# Patient Record
Sex: MALE | Race: WHITE | NOT HISPANIC OR LATINO | Employment: OTHER | URBAN - METROPOLITAN AREA
[De-identification: names, ages, dates, MRNs, and addresses within clinical notes are randomized per-mention and may not be internally consistent; named-entity substitution may affect disease eponyms.]

---

## 2017-05-10 ENCOUNTER — OFFICE VISIT (OUTPATIENT)
Dept: CARDIOLOGY | Facility: CLINIC | Age: 67
End: 2017-05-10

## 2017-05-10 VITALS
BODY MASS INDEX: 34.2 KG/M2 | SYSTOLIC BLOOD PRESSURE: 98 MMHG | HEIGHT: 66 IN | HEART RATE: 95 BPM | DIASTOLIC BLOOD PRESSURE: 55 MMHG | WEIGHT: 212.8 LBS

## 2017-05-10 DIAGNOSIS — R09.89 LABILE HYPERTENSION: ICD-10-CM

## 2017-05-10 DIAGNOSIS — E66.8 MODERATE OBESITY: Primary | ICD-10-CM

## 2017-05-10 DIAGNOSIS — E11.9 INSULIN DEPENDENT TYPE 2 DIABETES MELLITUS (HCC): ICD-10-CM

## 2017-05-10 DIAGNOSIS — E78.5 DYSLIPIDEMIA: ICD-10-CM

## 2017-05-10 DIAGNOSIS — I11.9 HYPERTENSIVE HEART DISEASE WITHOUT HEART FAILURE: ICD-10-CM

## 2017-05-10 DIAGNOSIS — Z79.4 INSULIN DEPENDENT TYPE 2 DIABETES MELLITUS (HCC): ICD-10-CM

## 2017-05-10 PROCEDURE — 99213 OFFICE O/P EST LOW 20 MIN: CPT | Performed by: INTERNAL MEDICINE

## 2017-05-10 RX ORDER — FENOFIBRATE 160 MG/1
160 TABLET ORAL DAILY
COMMUNITY
End: 2020-02-11 | Stop reason: SDUPTHER

## 2017-05-10 RX ORDER — TRIAMCINOLONE ACETONIDE 55 UG/1
2 SPRAY, METERED NASAL DAILY PRN
COMMUNITY
End: 2019-07-17

## 2017-08-18 RX ORDER — CARVEDILOL 25 MG/1
TABLET ORAL
Qty: 180 TABLET | Refills: 3 | Status: SHIPPED | OUTPATIENT
Start: 2017-08-18 | End: 2018-08-16 | Stop reason: SDUPTHER

## 2017-11-22 ENCOUNTER — OFFICE VISIT (OUTPATIENT)
Dept: CARDIOLOGY | Facility: CLINIC | Age: 67
End: 2017-11-22

## 2017-11-22 VITALS
HEART RATE: 78 BPM | WEIGHT: 211 LBS | HEIGHT: 66 IN | DIASTOLIC BLOOD PRESSURE: 72 MMHG | SYSTOLIC BLOOD PRESSURE: 121 MMHG | BODY MASS INDEX: 33.91 KG/M2

## 2017-11-22 DIAGNOSIS — I11.9 HYPERTENSIVE HEART DISEASE WITHOUT HEART FAILURE: Primary | ICD-10-CM

## 2017-11-22 DIAGNOSIS — R09.89 LABILE HYPERTENSION: ICD-10-CM

## 2017-11-22 DIAGNOSIS — E78.5 DYSLIPIDEMIA: ICD-10-CM

## 2017-11-22 PROCEDURE — 99214 OFFICE O/P EST MOD 30 MIN: CPT | Performed by: INTERNAL MEDICINE

## 2017-11-22 NOTE — PROGRESS NOTES
Subjective:     Encounter Date:11/22/2017    Patient ID: Kar Mora is a 67 y.o.  white male, , from [place], [place].      REFERRING PHYSICIAN/ENDOCRINOLOGIST: Hiren Montoya MD  ALLERGIST: Abhijeet Madrid MD  PHYSICIAN: Bro Kan MD    Chief Complaint:   Chief Complaint   Patient presents with   • labile hypertension     Problem List:  1. Insulin-dependent type 2 diabetes mellitus without apparent end organ damage.  2. Labile hypertension.  3. Dyslipidemia.  4. Erectile dysfunction.  5. Remote nasal polypectomy, 1986.  6. Allergic rhinitis.  7. Probable hypertensive cardiovascular disease:  a. Abnormal EKG with abnormal acceptable echocardiographic GXT, September 2004.   b. Acceptable echocardiographic GXT with preserved exercise capacity and mild LVH, March 2008.   c. Acceptable quantitative SPECT Gated Cardiolite GXT with nominal myocardial perfusion to 88% of predicted exercise capacity and 90% predicted maximum heart rate with preserved LVEF (0.65) with continued medical therapy felt warranted, December 2012.  d. Residual class I symptoms with recent documented abnormal EKG (LBBB/first-degree AV block) with abnormal echocardiogram demonstrating mild left ventricular chamber enlargement with mild reduction in the LVEF (0.42) without PE or pulmonary hypertension.  e. Resident class I symptoms with persistent abnormal echocardiogram (LVEF 0.40), with mild concentric LVH and mild left atrial enlargement without pulmonary arterial hypertension or pericardial effusion, July 2015.  f. Residual class I symptoms with acceptable  echocardiogram (LVEF 0.50) with mild-moderate RVE, mild-moderate LAE, and aortic valve sclerosis with no evidence of pericardial effusion (November 2016).  8. Moderate obesity (BMI 34.1).      Allergies   Allergen Reactions   • Asa [Aspirin]       upper airway congestion         Current Outpatient Prescriptions:   •  atorvastatin (LIPITOR) 20 MG tablet, Daily.,  "Disp: , Rfl: 1  •  B-D ULTRAFINE III SHORT PEN 31G X 8 MM misc, 2 (Two) Times a Day., Disp: , Rfl: 0  •  carvedilol (COREG) 25 MG tablet, take 1 tablet by mouth twice a day, Disp: 180 tablet, Rfl: 3  •  CIALIS 5 MG tablet, As Needed., Disp: , Rfl: 0  •  fenofibrate 160 MG tablet, Take 160 mg by mouth Daily., Disp: , Rfl:   •  glipiZIDE (GLUCOTROL) 10 MG 24 hr tablet, Daily., Disp: , Rfl: 0  •  JANUVIA 100 MG tablet, Daily., Disp: , Rfl: 1  •  Lancets (ONETOUCH ULTRASOFT) lancets, 2 (Two) Times a Day., Disp: , Rfl: 0  •  losartan (COZAAR) 100 MG tablet, Daily., Disp: , Rfl: 1  •  metFORMIN (GLUCOPHAGE) 500 MG tablet, 2 (Two) Times a Day., Disp: , Rfl: 1  •  ONE TOUCH ULTRA TEST test strip, Daily., Disp: , Rfl: 0  •  TOUJEO SOLOSTAR 300 UNIT/ML solution pen-injector, 70 Units Daily., Disp: , Rfl: 1  •  Triamcinolone Acetonide (NASACORT ALLERGY 24HR) 55 MCG/ACT nasal inhaler, 2 sprays into each nostril As Needed for Rhinitis., Disp: , Rfl:   •  INVOKANA 300 MG tablet, Daily., Disp: , Rfl: 1  •  raNITIdine (ZANTAC) 150 MG tablet, Daily., Disp: , Rfl: 0    HISTORY OF PRESENT ILLNESS: Patient returns for scheduled 6-month followup. He denies any chest pressure, shortness of breath, palpitations, edema, or presyncope.  He had labs drawn at his local hospital and he was hoping that they had sent the results to us.  He is not able to sleep well at night.  He started Toujeo 4 months ago.  He frequently gets up to urinate throughout the night.  He has some daytime sleepiness, but also says that he works 60 hours a week.  Occasionally he will feel a \"twinge\" of chest discomfort that only lasts for 1-2 seconds and then is gone. He is interested in a flu vaccination today.  Patient otherwise denies chest pain, shortness of breath, PND, edema, palpitations, syncope or presyncope at this time.      Review of Systems   HENT: Positive for nosebleeds.    Musculoskeletal: Positive for gout.   Genitourinary: Positive for frequency. " "  Neurological: Positive for excessive daytime sleepiness.      Obtained and otherwise negative except as outlined in problem list and HPI.    Procedures       Objective:       Vitals:    11/22/17 1418 11/22/17 1419   BP: 126/72 121/72   BP Location: Left arm Left arm   Patient Position: Sitting Standing   Pulse: 76 78   Weight: 211 lb (95.7 kg)    Height: 66\" (167.6 cm)      Body mass index is 34.06 kg/(m^2).   Last weight:  212 lbs.    Physical Exam   Constitutional: He is oriented to person, place, and time. He appears well-developed and well-nourished.   Neck: No JVD present. Carotid bruit is not present. No thyromegaly present.   Cardiovascular: Regular rhythm, S1 normal and S2 normal.  Exam reveals distant heart sounds. Exam reveals no gallop, no S3 and no friction rub.    No murmur heard.  Pulses:       Dorsalis pedis pulses are 2+ on the right side, and 2+ on the left side.        Posterior tibial pulses are 2+ on the right side, and 2+ on the left side.   Pulmonary/Chest: Effort normal. He has decreased breath sounds. He has no wheezes. He has no rhonchi. He has no rales.   Abdominal: Soft. He exhibits no mass. There is no hepatosplenomegaly. There is no tenderness. There is no guarding.   Bowel sounds audible x4   Musculoskeletal: Normal range of motion. He exhibits no edema.   Lymphadenopathy:     He has no cervical adenopathy.   Neurological: He is alert and oriented to person, place, and time.   Skin: Skin is warm, dry and intact. No rash noted.   Vitals reviewed.        Lab Review: None to review          Assessment:   Overall continued acceptable course with no interim cardiopulmonary complaints with good functional status. We will defer additional diagnostic or therapeutic intervention from a cardiac perspective at this time. When he returns in May 2018 we will repeat his echocardiogram to assess his cardiomyopathy.  Influenza vaccine administered in office today.       Diagnosis Plan   1. " Hypertensive heart disease without heart failure  Stable, Echocardiogram in May 2018    2. Labile hypertension  Controlled    3. Dyslipidemia  No new labs           Plan:         1. Patient to continue current medications and close follow up with the above providers.  2. Tentative cardiology follow up in May 2018 with echocardiogram, or patient may return sooner PRN.  3. Influenza vaccine administered in office today without difficulty or complication in the left deltoid.  4. Fax number is provided so that he can provide us with his lab results.        Scribed for Todd Qureshi MD by EDGAR Carlton. 11/22/2017  2:25 PM    I, Todd Qureshi MD, [facility], personally performed the services described in this documentation as scribed by the above named individual in my presence, and it is both accurate and complete. At 2:42 PM on 11/22/2017

## 2018-05-16 DIAGNOSIS — I10 HYPERTENSION, UNSPECIFIED TYPE: Primary | ICD-10-CM

## 2018-05-16 DIAGNOSIS — I11.9 HYPERTENSIVE HEART DISEASE WITHOUT HEART FAILURE: ICD-10-CM

## 2018-05-25 ENCOUNTER — OFFICE VISIT (OUTPATIENT)
Dept: CARDIOLOGY | Facility: CLINIC | Age: 68
End: 2018-05-25

## 2018-05-25 ENCOUNTER — HOSPITAL ENCOUNTER (OUTPATIENT)
Dept: CARDIOLOGY | Facility: HOSPITAL | Age: 68
Discharge: HOME | End: 2018-05-25
Attending: INTERNAL MEDICINE | Admitting: INTERNAL MEDICINE

## 2018-05-25 VITALS
DIASTOLIC BLOOD PRESSURE: 64 MMHG | BODY MASS INDEX: 33.91 KG/M2 | SYSTOLIC BLOOD PRESSURE: 130 MMHG | HEIGHT: 66 IN | WEIGHT: 211 LBS

## 2018-05-25 VITALS
WEIGHT: 213 LBS | SYSTOLIC BLOOD PRESSURE: 95 MMHG | DIASTOLIC BLOOD PRESSURE: 60 MMHG | BODY MASS INDEX: 34.23 KG/M2 | HEIGHT: 66 IN | HEART RATE: 83 BPM

## 2018-05-25 DIAGNOSIS — E78.5 DYSLIPIDEMIA: ICD-10-CM

## 2018-05-25 DIAGNOSIS — R09.89 LABILE HYPERTENSION: ICD-10-CM

## 2018-05-25 DIAGNOSIS — I11.9 HYPERTENSIVE HEART DISEASE WITHOUT HEART FAILURE: Primary | ICD-10-CM

## 2018-05-25 DIAGNOSIS — E11.9 INSULIN DEPENDENT TYPE 2 DIABETES MELLITUS (HCC): ICD-10-CM

## 2018-05-25 DIAGNOSIS — I10 HYPERTENSION, UNSPECIFIED TYPE: ICD-10-CM

## 2018-05-25 DIAGNOSIS — I44.7 LEFT BUNDLE BRANCH BLOCK: ICD-10-CM

## 2018-05-25 DIAGNOSIS — R07.2 PRECORDIAL PAIN: ICD-10-CM

## 2018-05-25 DIAGNOSIS — Z79.4 INSULIN DEPENDENT TYPE 2 DIABETES MELLITUS (HCC): ICD-10-CM

## 2018-05-25 DIAGNOSIS — I11.9 HYPERTENSIVE HEART DISEASE WITHOUT HEART FAILURE: ICD-10-CM

## 2018-05-25 LAB
BH CV ECHO MEAS - AO MAX PG (FULL): 3.2 MMHG
BH CV ECHO MEAS - AO MAX PG: 7 MMHG
BH CV ECHO MEAS - AO MEAN PG (FULL): 1.9 MMHG
BH CV ECHO MEAS - AO MEAN PG: 4 MMHG
BH CV ECHO MEAS - AO ROOT AREA (BSA CORRECTED): 1.4
BH CV ECHO MEAS - AO ROOT AREA: 6.6 CM^2
BH CV ECHO MEAS - AO ROOT DIAM: 2.9 CM
BH CV ECHO MEAS - AO V2 MAX: 131.7 CM/SEC
BH CV ECHO MEAS - AO V2 MEAN: 93.2 CM/SEC
BH CV ECHO MEAS - AO V2 VTI: 27.9 CM
BH CV ECHO MEAS - AVA(I,A): 2.3 CM^2
BH CV ECHO MEAS - AVA(I,D): 2.3 CM^2
BH CV ECHO MEAS - AVA(V,A): 2.4 CM^2
BH CV ECHO MEAS - AVA(V,D): 2.4 CM^2
BH CV ECHO MEAS - BSA(HAYCOCK): 2.1 M^2
BH CV ECHO MEAS - BSA: 2 M^2
BH CV ECHO MEAS - BZI_BMI: 34.1 KILOGRAMS/M^2
BH CV ECHO MEAS - BZI_METRIC_HEIGHT: 167.6 CM
BH CV ECHO MEAS - BZI_METRIC_WEIGHT: 95.7 KG
BH CV ECHO MEAS - CONTRAST EF (2CH): 57 ML/M^2
BH CV ECHO MEAS - CONTRAST EF 4CH: 37.1 ML/M^2
BH CV ECHO MEAS - EDV(CUBED): 129 ML
BH CV ECHO MEAS - EDV(MOD-SP2): 186 ML
BH CV ECHO MEAS - EDV(MOD-SP4): 213 ML
BH CV ECHO MEAS - EDV(TEICH): 121.2 ML
BH CV ECHO MEAS - EF(CUBED): 56.2 %
BH CV ECHO MEAS - EF(MOD-BP): 37 %
BH CV ECHO MEAS - EF(MOD-SP2): 57 %
BH CV ECHO MEAS - EF(MOD-SP4): 37.1 %
BH CV ECHO MEAS - EF(TEICH): 47.7 %
BH CV ECHO MEAS - ESV(CUBED): 56.5 ML
BH CV ECHO MEAS - ESV(MOD-SP2): 80 ML
BH CV ECHO MEAS - ESV(MOD-SP4): 134 ML
BH CV ECHO MEAS - ESV(TEICH): 63.4 ML
BH CV ECHO MEAS - FS: 24.1 %
BH CV ECHO MEAS - IVS/LVPW: 0.97
BH CV ECHO MEAS - IVSD: 1.4 CM
BH CV ECHO MEAS - LA DIMENSION: 4.4 CM
BH CV ECHO MEAS - LA/AO: 1.5
BH CV ECHO MEAS - LAT PEAK E' VEL: 15.4 CM/SEC
BH CV ECHO MEAS - LV DIASTOLIC VOL/BSA (35-75): 104.1 ML/M^2
BH CV ECHO MEAS - LV MASS(C)D: 266.4 GRAMS
BH CV ECHO MEAS - LV MASS(C)DI: 130.2 GRAMS/M^2
BH CV ECHO MEAS - LV MAX PG: 3.8 MMHG
BH CV ECHO MEAS - LV MEAN PG: 2.1 MMHG
BH CV ECHO MEAS - LV SYSTOLIC VOL/BSA (12-30): 65.5 ML/M^2
BH CV ECHO MEAS - LV V1 MAX: 96.9 CM/SEC
BH CV ECHO MEAS - LV V1 MEAN: 67.2 CM/SEC
BH CV ECHO MEAS - LV V1 VTI: 19.2 CM
BH CV ECHO MEAS - LVIDD: 5.1 CM
BH CV ECHO MEAS - LVIDS: 4.1 CM
BH CV ECHO MEAS - LVLD AP2: 9.6 CM
BH CV ECHO MEAS - LVLD AP4: 9.6 CM
BH CV ECHO MEAS - LVLS AP2: 8.4 CM
BH CV ECHO MEAS - LVLS AP4: 8.8 CM
BH CV ECHO MEAS - LVOT AREA (M): 3.5 CM^2
BH CV ECHO MEAS - LVOT AREA: 3.3 CM^2
BH CV ECHO MEAS - LVOT DIAM: 2.1 CM
BH CV ECHO MEAS - LVPWD: 1.4 CM
BH CV ECHO MEAS - MED PEAK E' VEL: 12.9 CM/SEC
BH CV ECHO MEAS - MV E MAX VEL: 119.7 CM/SEC
BH CV ECHO MEAS - PA ACC SLOPE: 1150 CM/SEC^2
BH CV ECHO MEAS - PA ACC TIME: 0.13 SEC
BH CV ECHO MEAS - PA MAX PG: 20.4 MMHG
BH CV ECHO MEAS - PA PR(ACCEL): 22 MMHG
BH CV ECHO MEAS - PA V2 MAX: 225.9 CM/SEC
BH CV ECHO MEAS - SI(AO): 89.9 ML/M^2
BH CV ECHO MEAS - SI(CUBED): 35.4 ML/M^2
BH CV ECHO MEAS - SI(LVOT): 31.1 ML/M^2
BH CV ECHO MEAS - SI(MOD-SP2): 51.8 ML/M^2
BH CV ECHO MEAS - SI(MOD-SP4): 38.6 ML/M^2
BH CV ECHO MEAS - SI(TEICH): 28.2 ML/M^2
BH CV ECHO MEAS - SV(AO): 184 ML
BH CV ECHO MEAS - SV(CUBED): 72.5 ML
BH CV ECHO MEAS - SV(LVOT): 63.6 ML
BH CV ECHO MEAS - SV(MOD-SP2): 106 ML
BH CV ECHO MEAS - SV(MOD-SP4): 79 ML
BH CV ECHO MEAS - SV(TEICH): 57.8 ML
BH CV ECHO MEAS - TAPSE (>1.6): 2.7 CM2
BH CV ECHO MEASUREMENTS AVERAGE E/E' RATIO: 8.46
BH CV VAS BP RIGHT ARM: NORMAL MMHG
BH CV XLRA - RV BASE: 3.2 CM
BH CV XLRA - RV LENGTH: 7.5 CM
BH CV XLRA - RV MID: 2.6 CM
BH CV XLRA - TDI S': 13.1 CM/SEC
LEFT ATRIUM VOLUME INDEX: 30.2 ML/M2
LV EF 2D ECHO EST: 40 %
MAXIMAL PREDICTED HEART RATE: 154 BPM
STRESS TARGET HR: 131 BPM

## 2018-05-25 PROCEDURE — 93306 TTE W/DOPPLER COMPLETE: CPT

## 2018-05-25 PROCEDURE — 93306 TTE W/DOPPLER COMPLETE: CPT | Performed by: INTERNAL MEDICINE

## 2018-05-25 PROCEDURE — 99214 OFFICE O/P EST MOD 30 MIN: CPT | Performed by: INTERNAL MEDICINE

## 2018-05-25 RX ORDER — NITROGLYCERIN 0.4 MG/1
TABLET SUBLINGUAL
Qty: 25 TABLET | Refills: 6 | Status: SHIPPED | OUTPATIENT
Start: 2018-05-25 | End: 2018-11-29 | Stop reason: SDUPTHER

## 2018-05-25 NOTE — PROGRESS NOTES
Subjective:     Encounter Date:05/25/2018    Patient ID: Kar Mora is a 67 y.o.  white male, , from [place], [place].      REFERRING PHYSICIAN/ENDOCRINOLOGIST: Hiren Montoya MD  ALLERGIST: Abhijeet Madrid MD  PHYSICIAN: Bro Kan MD.    Chief Complaint:   Chief Complaint   Patient presents with   • Hypertension   • Chest Pain     Problem List:  1. Insulin-dependent type 2 diabetes mellitus without apparent end organ damage; hemoglobin A1c 6.4%, November 2017  2. Labile hypertension.  3. Dyslipidemia.  4. Erectile dysfunction.  5. Remote nasal polypectomy, 1986.  6. Allergic rhinitis.  7. Probable hypertensive cardiovascular disease:  a. Abnormal EKG with abnormal acceptable echocardiographic GXT, September 2004.   b. Acceptable echocardiographic GXT with preserved exercise capacity and mild LVH, March 2008.   c. Acceptable quantitative SPECT Gated Cardiolite GXT with nominal myocardial perfusion to 88% of predicted exercise capacity and 90% predicted maximum heart rate with preserved LVEF (0.65) with continued medical therapy felt warranted, December 2012.  d. Residual class I symptoms with recent documented abnormal EKG (LBBB/first-degree AV block) with abnormal echocardiogram demonstrating mild left ventricular chamber enlargement with mild reduction in the LVEF (0.42) without PE or pulmonary hypertension.  e. Resident class I symptoms with persistent abnormal echocardiogram (LVEF 0.40), with mild concentric LVH and mild left atrial enlargement without pulmonary arterial hypertension or pericardial effusion, July 2015.  f. Residual class I symptoms with acceptable  echocardiogram (LVEF 0.50) with mild-moderate RVE, mild-moderate LAE, and aortic valve sclerosis with no evidence of pericardial effusion (November 2016).  g. Preliminary echocardiogram 5/25/18: EF 41-45 percent, mild concentric hypertrophy, LA dilated, aortic valve exhibits sclerosis, mild MR, trace TR, no  "pericardial effusion  8. Moderate obesity (BMI 34.1).    Allergies   Allergen Reactions   • Asa [Aspirin]       upper airway congestion         Current Outpatient Prescriptions:   •  atorvastatin (LIPITOR) 20 MG tablet, Daily., Disp: , Rfl: 1  •  B-D ULTRAFINE III SHORT PEN 31G X 8 MM misc, 2 (Two) Times a Day., Disp: , Rfl: 0  •  carvedilol (COREG) 25 MG tablet, take 1 tablet by mouth twice a day, Disp: 180 tablet, Rfl: 3  •  CIALIS 5 MG tablet, As Needed., Disp: , Rfl: 0  •  Empagliflozin (JARDIANCE) 25 MG tablet, Take  by mouth Daily., Disp: , Rfl:   •  fenofibrate 160 MG tablet, Take 160 mg by mouth Daily., Disp: , Rfl:   •  glipiZIDE (GLUCOTROL) 10 MG 24 hr tablet, Daily., Disp: , Rfl: 0  •  JANUVIA 100 MG tablet, Daily., Disp: , Rfl: 1  •  Lancets (ONETOUCH ULTRASOFT) lancets, 2 (Two) Times a Day., Disp: , Rfl: 0  •  losartan (COZAAR) 100 MG tablet, Daily., Disp: , Rfl: 1  •  metFORMIN (GLUCOPHAGE) 500 MG tablet, 2 (Two) Times a Day., Disp: , Rfl: 1  •  ONE TOUCH ULTRA TEST test strip, Daily., Disp: , Rfl: 0  •  raNITIdine (ZANTAC) 150 MG tablet, Daily., Disp: , Rfl: 0  •  TOUJEO SOLOSTAR 300 UNIT/ML solution pen-injector, 70 Units Daily., Disp: , Rfl: 1  •  Triamcinolone Acetonide (NASACORT ALLERGY 24HR) 55 MCG/ACT nasal inhaler, 2 sprays into each nostril As Needed for Rhinitis., Disp: , Rfl:     HISTORY OF PRESENT ILLNESS:  Patient is here for a 6 month follow-up.  He had an echocardiogram today before this appointment to assess his cardiomyopathy.  Preliminary results were abnormal but acceptable, EF 0.41-0.45, with previous echocardiogram demonstrating EF of 0.50 in 2016.  He complains of some chest pain today and says there is \"really no pattern\"; it can happen with rest or with walking up steps.  It usually happens in the evening, but he wonders if he just does not notice it during the day when he is working.  Left heart catheterization is discussed with him, including the process from coming to the " "hospital to going home.  The patient does not have nitroglycerin at home.  He feels his chest pain syndrome is becoming more progressive and recurrent, although not disabling at this time, and he has had no prolonged rest or nocturnal chest discomfort.  He is able to do his work activities without major restriction.  Patient otherwise denies prolonged chest pain, shortness of breath, PND, edema, palpitations, syncope or presyncope at this time.      Review of Systems   Cardiovascular: Positive for chest pain.   Respiratory: Positive for sleep disturbances due to breathing and snoring.    Musculoskeletal: Positive for gout.   Allergic/Immunologic: Positive for environmental allergies.        Food allergies      Obtained and otherwise negative except as outlined in problem list and HPI.    Procedures       Objective:       Vitals:    05/25/18 1502 05/25/18 1503   BP: 109/72 95/60   BP Location: Left arm Left arm   Patient Position: Sitting Standing   Pulse: 79 83   Weight: 96.6 kg (213 lb)    Height: 167.6 cm (66\")      Body mass index is 34.38 kg/m².  Last weight November 2017 was 211 pounds    Physical Exam   Constitutional: He is oriented to person, place, and time. He appears well-developed and well-nourished.   Neck: No JVD present. Carotid bruit is not present. No thyromegaly present.   Cardiovascular: Regular rhythm, S1 normal and S2 normal.  Exam reveals no gallop, no S3 and no friction rub.    Murmur heard.   Medium-pitched early systolic murmur is present with a grade of 2/6  at the lower left sternal border  Pulses:       Carotid pulses are 1+ on the right side, and 1+ on the left side.       Radial pulses are 1+ on the right side, and 1+ on the left side.        Femoral pulses are 1+ on the right side, and 1+ on the left side.       Popliteal pulses are 1+ on the right side, and 1+ on the left side.        Dorsalis pedis pulses are 1+ on the right side, and 1+ on the left side.        Posterior tibial " pulses are 1+ on the right side, and 1+ on the left side.   Pulmonary/Chest: Effort normal. He has decreased breath sounds. He has no wheezes. He has no rhonchi. He has no rales.   Abdominal: Soft. He exhibits no mass. There is no hepatosplenomegaly. There is no tenderness. There is no guarding.   Bowel sounds audible x4   Musculoskeletal: Normal range of motion. He exhibits no edema.   Lymphadenopathy:     He has no cervical adenopathy.   Neurological: He is alert and oriented to person, place, and time.   Skin: Skin is warm, dry and intact. No rash noted.   Vitals reviewed.        Lab Review:   11/10/17: (Reviewed per physician letter)  · CMP - normal electrolytes, liver function normal, creatinine 1.6, BUN 34, glucose 126, GFR 45  · Hemoglobin A1c - 6.4%  · TSH - mildly elevated  · FLP - total cholesterol 198, triglycerides 462, HDL 24,  with suggestion to continue fenofibrate, restrict carbohydrate intake, remain physically active and alter atorvastatin to 40 mg daily, add omega-3 Fish oil supplements with Vascepa 1 g twice a day bid      Assessment:   Overall continued acceptable course with no interim cardiopulmonary complaints with acceptable functional status. We will defer additional diagnostic or therapeutic intervention from a cardiac perspective at this time other than to add prn nitroglycerin and consider diagnostic coronary angiography to more definitively exclude obstructive or vasospastic coronary artery disease. We will review the echocardiogram results when available and send the patient a report.  We discussed left heart catheterization procedure, risks, and potential complications with the patient.       Diagnosis Plan   1. Hypertensive heart disease without heart failure  Case Request Cath Lab: Left Heart Cath   2. Labile hypertension  Continue current treatment   3. Dyslipidemia  Recommendations as outlined above   4. Insulin dependent type 2 diabetes mellitus  May be a candidate for  Ozempic   5. Precordial pain  Left heart catheterization recommended   6. Left bundle branch block  Continue current treatment          Plan:         1. Patient to continue current medications and close follow up with the above providers.  2. Left heart catheterization +/- catheter based intervention is recommended.  3. Initiate nitroglycerin 0.4 mg sublingual prn chest pain  4. Tentative cardiology follow up in October 2018 or patient may return sooner PRN.    Partially scribed for Todd Qureshi MD by Madhavi Stout, APRN. 5/25/2018  3:48 PM    Partially transcribed by Renetta Amor for Dr. Todd Qureshi at 3:08 PM on 05/25/2018    I, [facility] Q[facility]hi MD, [facility], personally performed the services described in this documentation as scribed by the above named individual in my presence, and it is both accurate and complete. At 3:49 PM on 05/25/2018

## 2018-07-05 ENCOUNTER — PREP FOR SURGERY (OUTPATIENT)
Dept: OTHER | Facility: HOSPITAL | Age: 68
End: 2018-07-05

## 2018-07-05 DIAGNOSIS — I20.9 ANGINA PECTORIS (HCC): Primary | ICD-10-CM

## 2018-07-05 PROBLEM — I11.9 HYPERTENSIVE HEART DISEASE WITHOUT HEART FAILURE: Status: ACTIVE | Noted: 2018-07-05

## 2018-07-05 RX ORDER — SODIUM CHLORIDE 0.9 % (FLUSH) 0.9 %
1-10 SYRINGE (ML) INJECTION AS NEEDED
Status: CANCELLED | OUTPATIENT
Start: 2018-07-05

## 2018-07-05 RX ORDER — ASPIRIN 325 MG
325 TABLET, DELAYED RELEASE (ENTERIC COATED) ORAL DAILY
Status: CANCELLED | OUTPATIENT
Start: 2018-07-06

## 2018-07-05 RX ORDER — ONDANSETRON 2 MG/ML
4 INJECTION INTRAMUSCULAR; INTRAVENOUS EVERY 6 HOURS PRN
Status: CANCELLED | OUTPATIENT
Start: 2018-07-05

## 2018-07-05 RX ORDER — ASPIRIN 325 MG
325 TABLET ORAL ONCE
Status: CANCELLED | OUTPATIENT
Start: 2018-07-05 | End: 2018-07-05

## 2018-07-05 RX ORDER — ACETAMINOPHEN 325 MG/1
650 TABLET ORAL EVERY 4 HOURS PRN
Status: CANCELLED | OUTPATIENT
Start: 2018-07-05

## 2018-07-05 RX ORDER — NITROGLYCERIN 0.4 MG/1
0.4 TABLET SUBLINGUAL
Status: CANCELLED | OUTPATIENT
Start: 2018-07-05

## 2018-07-19 ENCOUNTER — APPOINTMENT (OUTPATIENT)
Dept: PREADMISSION TESTING | Facility: HOSPITAL | Age: 68
End: 2018-07-19

## 2018-07-19 DIAGNOSIS — I20.9 ANGINA PECTORIS (HCC): ICD-10-CM

## 2018-07-19 LAB
ALBUMIN SERPL-MCNC: 4.42 G/DL (ref 3.2–4.8)
ALBUMIN/GLOB SERPL: 1.9 G/DL (ref 1.5–2.5)
ALP SERPL-CCNC: 39 U/L (ref 25–100)
ALT SERPL W P-5'-P-CCNC: 31 U/L (ref 7–40)
ANION GAP SERPL CALCULATED.3IONS-SCNC: 10 MMOL/L (ref 3–11)
AST SERPL-CCNC: 34 U/L (ref 0–33)
BASOPHILS # BLD AUTO: 0.02 10*3/MM3 (ref 0–0.2)
BASOPHILS NFR BLD AUTO: 0.4 % (ref 0–1)
BILIRUB SERPL-MCNC: 0.4 MG/DL (ref 0.3–1.2)
BUN BLD-MCNC: 30 MG/DL (ref 9–23)
BUN/CREAT SERPL: 17.9 (ref 7–25)
CALCIUM SPEC-SCNC: 9.1 MG/DL (ref 8.7–10.4)
CHLORIDE SERPL-SCNC: 107 MMOL/L (ref 99–109)
CO2 SERPL-SCNC: 24 MMOL/L (ref 20–31)
CREAT BLD-MCNC: 1.68 MG/DL (ref 0.6–1.3)
DEPRECATED RDW RBC AUTO: 44.5 FL (ref 37–54)
EOSINOPHIL # BLD AUTO: 0.12 10*3/MM3 (ref 0–0.3)
EOSINOPHIL NFR BLD AUTO: 2.6 % (ref 0–3)
ERYTHROCYTE [DISTWIDTH] IN BLOOD BY AUTOMATED COUNT: 13.7 % (ref 11.3–14.5)
GFR SERPL CREATININE-BSD FRML MDRD: 41 ML/MIN/1.73
GLOBULIN UR ELPH-MCNC: 2.4 GM/DL
GLUCOSE BLD-MCNC: 176 MG/DL (ref 70–100)
HBA1C MFR BLD: 8 % (ref 4.8–5.6)
HCT VFR BLD AUTO: 38.2 % (ref 38.9–50.9)
HGB BLD-MCNC: 12.5 G/DL (ref 13.1–17.5)
IMM GRANULOCYTES # BLD: 0.03 10*3/MM3 (ref 0–0.03)
IMM GRANULOCYTES NFR BLD: 0.7 % (ref 0–0.6)
LYMPHOCYTES # BLD AUTO: 1.26 10*3/MM3 (ref 0.6–4.8)
LYMPHOCYTES NFR BLD AUTO: 27.4 % (ref 24–44)
MCH RBC QN AUTO: 29 PG (ref 27–31)
MCHC RBC AUTO-ENTMCNC: 32.7 G/DL (ref 32–36)
MCV RBC AUTO: 88.6 FL (ref 80–99)
MONOCYTES # BLD AUTO: 0.65 10*3/MM3 (ref 0–1)
MONOCYTES NFR BLD AUTO: 14.1 % (ref 0–12)
NEUTROPHILS # BLD AUTO: 2.52 10*3/MM3 (ref 1.5–8.3)
NEUTROPHILS NFR BLD AUTO: 54.8 % (ref 41–71)
PLATELET # BLD AUTO: 188 10*3/MM3 (ref 150–450)
PMV BLD AUTO: 10.4 FL (ref 6–12)
POTASSIUM BLD-SCNC: 4.1 MMOL/L (ref 3.5–5.5)
PROT SERPL-MCNC: 6.8 G/DL (ref 5.7–8.2)
RBC # BLD AUTO: 4.31 10*6/MM3 (ref 4.2–5.76)
SODIUM BLD-SCNC: 141 MMOL/L (ref 132–146)
WBC NRBC COR # BLD: 4.6 10*3/MM3 (ref 3.5–10.8)

## 2018-07-19 PROCEDURE — 83036 HEMOGLOBIN GLYCOSYLATED A1C: CPT | Performed by: PHYSICIAN ASSISTANT

## 2018-07-19 PROCEDURE — 80053 COMPREHEN METABOLIC PANEL: CPT | Performed by: PHYSICIAN ASSISTANT

## 2018-07-19 PROCEDURE — 36415 COLL VENOUS BLD VENIPUNCTURE: CPT

## 2018-07-19 PROCEDURE — 85025 COMPLETE CBC W/AUTO DIFF WBC: CPT | Performed by: PHYSICIAN ASSISTANT

## 2018-07-19 PROCEDURE — 93010 ELECTROCARDIOGRAM REPORT: CPT | Performed by: INTERNAL MEDICINE

## 2018-07-19 PROCEDURE — 93005 ELECTROCARDIOGRAM TRACING: CPT

## 2018-07-19 NOTE — DISCHARGE INSTRUCTIONS
"Dear Patient,    Drink plenty of fluids the day before to ensure you are well hydrated, unless otherwise directed by your physician.    Do NOT eat after midnight the night before your procedure.   You may have clear liquids only up to three hours before your scheduled arrival time (Water is best, but clear liquids can also include coffee without cream or milk, fruit juice without pulp, clear broth, and clear gelatin)    We encourage you to drink 8 ounces of water three to four hours before your scheduled arrival time.    Take your medications as instructed by your doctor.    Following your procedure, be sure to drink plenty of fluids to continue flushing the kidneys.   Benefits of hydrating before and after your procedure include:    -improved hydration helps prevent potential harm to the kidneys    by flushing the contrast/dye used during your procedure    -Lower post-procedure complications    -Improved patient comfort     Do NOT smoke after midnight the night before your procedure.    Glasses and jewelry may be worn, but dentures must be removed prior to your procedure.    Leave any items you consider valuable at home.      MORNING of your Procedure, please bring the following:     -Photo ID and insurance card(s)    -ALL medications in their ORIGINAL CONTAINERS    -Co-pay and/or deductible required by your insurance   -Copy of living will or power of  document (if not brought to    Pre-Admission Testing department)   -CPAP mask and tubing, not your machine (if applicable)    -Relaxation aids (music, books, magazines)    Family members may wait in [facility] waiting area during procedure.    Need to make arrangements for transportation prior to discharge.    A handout regarding \"Heart Healthy Eating\" was provided today to encourage healthy eating habits.    Booklet published by [employer] was given in Pre-Admission testing.  This booklet is for informational purposes only.  If you have any questions about your " procedure, please speak with your physician.

## 2018-07-20 ENCOUNTER — HOSPITAL ENCOUNTER (OUTPATIENT)
Facility: HOSPITAL | Age: 68
Discharge: HOME | End: 2018-07-20
Attending: INTERNAL MEDICINE | Admitting: INTERNAL MEDICINE

## 2018-07-20 VITALS
SYSTOLIC BLOOD PRESSURE: 120 MMHG | RESPIRATION RATE: 16 BRPM | TEMPERATURE: 97.8 F | HEIGHT: 66 IN | OXYGEN SATURATION: 97 % | WEIGHT: 209 LBS | DIASTOLIC BLOOD PRESSURE: 68 MMHG | HEART RATE: 67 BPM | BODY MASS INDEX: 33.59 KG/M2

## 2018-07-20 DIAGNOSIS — I11.9 HYPERTENSIVE HEART DISEASE WITHOUT HEART FAILURE: ICD-10-CM

## 2018-07-20 LAB
ARTICHOKE IGE QN: 91 MG/DL (ref 0–130)
CHOLEST SERPL-MCNC: 169 MG/DL (ref 0–200)
GLUCOSE BLDC GLUCOMTR-MCNC: 110 MG/DL (ref 70–130)
GLUCOSE BLDC GLUCOMTR-MCNC: 140 MG/DL (ref 70–130)
HDLC SERPL-MCNC: 27 MG/DL (ref 40–60)
TRIGL SERPL-MCNC: 385 MG/DL (ref 0–150)

## 2018-07-20 PROCEDURE — 36415 COLL VENOUS BLD VENIPUNCTURE: CPT

## 2018-07-20 PROCEDURE — 93458 L HRT ARTERY/VENTRICLE ANGIO: CPT | Performed by: INTERNAL MEDICINE

## 2018-07-20 PROCEDURE — 25010000002 FENTANYL CITRATE (PF) 100 MCG/2ML SOLUTION: Performed by: INTERNAL MEDICINE

## 2018-07-20 PROCEDURE — C1769 GUIDE WIRE: HCPCS | Performed by: INTERNAL MEDICINE

## 2018-07-20 PROCEDURE — 80061 LIPID PANEL: CPT | Performed by: PHYSICIAN ASSISTANT

## 2018-07-20 PROCEDURE — C1894 INTRO/SHEATH, NON-LASER: HCPCS | Performed by: INTERNAL MEDICINE

## 2018-07-20 PROCEDURE — 0 IOPAMIDOL PER 1 ML: Performed by: INTERNAL MEDICINE

## 2018-07-20 PROCEDURE — 25010000002 MIDAZOLAM PER 1 MG: Performed by: INTERNAL MEDICINE

## 2018-07-20 PROCEDURE — 25010000002 PHENYLEPHRINE PER 1 ML: Performed by: INTERNAL MEDICINE

## 2018-07-20 PROCEDURE — 25010000002 HEPARIN (PORCINE) PER 1000 UNITS: Performed by: INTERNAL MEDICINE

## 2018-07-20 PROCEDURE — 82962 GLUCOSE BLOOD TEST: CPT

## 2018-07-20 PROCEDURE — G0108 DIAB MANAGE TRN  PER INDIV: HCPCS

## 2018-07-20 RX ORDER — ACETAMINOPHEN 325 MG/1
650 TABLET ORAL EVERY 4 HOURS PRN
Status: DISCONTINUED | OUTPATIENT
Start: 2018-07-20 | End: 2018-07-20 | Stop reason: HOSPADM

## 2018-07-20 RX ORDER — LIDOCAINE HYDROCHLORIDE 10 MG/ML
INJECTION, SOLUTION EPIDURAL; INFILTRATION; INTRACAUDAL; PERINEURAL AS NEEDED
Status: DISCONTINUED | OUTPATIENT
Start: 2018-07-20 | End: 2018-07-20 | Stop reason: HOSPADM

## 2018-07-20 RX ORDER — ONDANSETRON 2 MG/ML
4 INJECTION INTRAMUSCULAR; INTRAVENOUS EVERY 6 HOURS PRN
Status: DISCONTINUED | OUTPATIENT
Start: 2018-07-20 | End: 2018-07-20 | Stop reason: HOSPADM

## 2018-07-20 RX ORDER — SODIUM CHLORIDE 0.9 % (FLUSH) 0.9 %
1-10 SYRINGE (ML) INJECTION AS NEEDED
Status: DISCONTINUED | OUTPATIENT
Start: 2018-07-20 | End: 2018-07-20 | Stop reason: HOSPADM

## 2018-07-20 RX ORDER — ASPIRIN 325 MG
325 TABLET ORAL ONCE
Status: DISCONTINUED | OUTPATIENT
Start: 2018-07-20 | End: 2018-07-20 | Stop reason: HOSPADM

## 2018-07-20 RX ORDER — FENTANYL CITRATE 50 UG/ML
INJECTION, SOLUTION INTRAMUSCULAR; INTRAVENOUS AS NEEDED
Status: DISCONTINUED | OUTPATIENT
Start: 2018-07-20 | End: 2018-07-20 | Stop reason: HOSPADM

## 2018-07-20 RX ORDER — MIDAZOLAM HYDROCHLORIDE 1 MG/ML
INJECTION INTRAMUSCULAR; INTRAVENOUS AS NEEDED
Status: DISCONTINUED | OUTPATIENT
Start: 2018-07-20 | End: 2018-07-20 | Stop reason: HOSPADM

## 2018-07-20 RX ORDER — SODIUM CHLORIDE 9 MG/ML
1-3 INJECTION, SOLUTION INTRAVENOUS CONTINUOUS
Status: DISCONTINUED | OUTPATIENT
Start: 2018-07-20 | End: 2018-07-20 | Stop reason: HOSPADM

## 2018-07-20 RX ORDER — ASPIRIN 325 MG
325 TABLET, DELAYED RELEASE (ENTERIC COATED) ORAL DAILY
Status: DISCONTINUED | OUTPATIENT
Start: 2018-07-21 | End: 2018-07-20 | Stop reason: HOSPADM

## 2018-07-20 RX ORDER — NITROGLYCERIN 0.4 MG/1
0.4 TABLET SUBLINGUAL
Status: DISCONTINUED | OUTPATIENT
Start: 2018-07-20 | End: 2018-07-20 | Stop reason: HOSPADM

## 2018-07-20 RX ADMIN — SODIUM CHLORIDE 1 ML/KG/HR: 9 INJECTION, SOLUTION INTRAVENOUS at 09:17

## 2018-07-20 NOTE — CONSULTS
"Diabetes Education  Assessment/Teaching    Patient Name:  Kar Mora  YOB: 1950  MRN: [id number]  Admit Date:  7/20/2018      Assessment Date:  7/20/2018    Most Recent Value   General Information    Referral From:  A1c   Height  167.6 cm (66\")   Height Method  Stated   Weight  94.8 kg (208 lb 15.9 oz)   Weight Method  Standing scale   Are you currently involved in an activity/exercise program?   Yes   Describe physical activity  Outside/yard work. Maintains activity    Do you have high blood pressure?  yes   Are you currently being treated for high blood pressure?  yes   Pregnancy Assessment   Diabetes History   What type of diabetes do you have?  Type 2   Length of Diabetes Diagnosis  10 + years   Current DM knowledge  good [Followed by Dr. Hiren Montoya]   Have you had diabetes education/teaching in the past?  yes   When and where was your diabetes education?  Dr. Hiren Montoya office   Do you test your blood sugar at home?  yes   Frequency of checks  every morning    Meter type  One Touch    Who performs the test?  self    Typical readings  180's    Have you had low blood sugar? (<70mg/dl)  no   Have you had high blood sugar? (>140mg/dl)  yes   How often do you have high blood sugar?  frequently   How would you rate your diabetes control?  good   Do you have any diabetes complications?  circulation problems, heart disease, neuropathy   Education Preferences   What areas of diabetes would you like to learn about?  avoiding high blood sugar, diabetes complications, testing my blood sugar at home   Nutrition Information   Are you currently following a special meal plan?  occasionally [occasional carb counting ]   Assessment Topics   Healthy Eating - Assessment  Needs education   Being Active - Assessment  Competent   Taking Medication - Assessment  Competent   Problem Solving - Assessment  Competent   Reducing Risk - Assessment  Needs education   Healthy Coping - Assessment  Needs education " "  Monitoring - Assessment  Needs education   DM Goals            Most Recent Value   DM Education Needs   Meter  Has own   Meter Type  One Touch   Frequency of Testing  AC   Medication  Oral, Insulin   Problem Solving  Hyperglycemia, Sick days, Signs, Symptoms, Treatment   Reducing Risks  A1C testing   Physical Activity Frequency  Discussed exercise importance   Healthy Coping  Anxious [anxious after cath lab results]   Discharge Plan  Home   Motivation  Engaged   Teaching Method  Explanation, Handouts, Discussion   Patient Response  Verbalized understanding            Pt and spouse gave permission to be seen by diabetes education and education completed. Pt given the [facility] \"what is diabetes\", \"whats my A1C\" education handouts and a goal sheet. All hand outs reviewed with pt/wife.  Pt states he tests at home using a One Touch glucometer and feels confident with the skill. Pt instructed on recommended test times as well as goals per ADA. Pt advised to follow up after discharge as scheduled with Dr. Montoya.  Pt also advised to call us with any other questions or concerns.        Electronically signed by:  Indu Holland RN  07/20/18 1:04 PM  "

## 2018-07-20 NOTE — H&P
[facility] AT [address], [address]  [place], [place], [address] [phone]  WWW.[facility] HospitalQVOD TechnologyF[facility] [place]           HISTORY & PHYSICAL    Patient Care Team:  Patient Care Team:  Bro Kan MD as PCP - General    CHIEF COMPLAINT: Chest Pain           HPI:    Kar Mora is a 67 y.o. male.  History of Present Illness    The patient has a history of hypertensive heart disease, hypertension, diabetes mellitus, and dyslipidemia.    Since the patient was last seen by Dr. Qureshi in May his chest pain has improved.  He reports that his last episode occurred at the end of June.  His chest pain was intermittent, would occur with or without exertion, and was most prominent at night. He could not recall when he first started noticing the chest pain, but reports it was several months ago. He denies any current chest pain, shortness of breath, lower extremity edema, palpitations, orthopnea, PND, lightheadedness, or syncope.    PFSH:  Problem List/PMH  1. Insulin-dependent type 2 diabetes mellitus without apparent end organ damage; hemoglobin A1c 6.4%, November 2017  2. Labile hypertension.  3. Dyslipidemia.  4. Erectile dysfunction.  5. Remote nasal polypectomy, 1986.  6. Allergic rhinitis.  7. Probable hypertensive cardiovascular disease:  a. Abnormal EKG with abnormal acceptable echocardiographic GXT, September 2004.   b. Acceptable echocardiographic GXT with preserved exercise capacity and mild LVH, March 2008.   c. Acceptable quantitative SPECT Gated Cardiolite GXT with nominal myocardial perfusion to 88% of predicted exercise capacity and 90% predicted maximum heart rate with preserved LVEF (0.65) with continued medical therapy felt warranted, December 2012.  d. Residual class I symptoms with recent documented abnormal EKG (LBBB/first-degree AV block) with abnormal echocardiogram demonstrating mild left ventricular chamber enlargement with mild reduction in the LVEF  (0.42) without PE or pulmonary hypertension.  e. Resident class I symptoms with persistent abnormal echocardiogram (LVEF 0.40), with mild concentric LVH and mild left atrial enlargement without pulmonary arterial hypertension or pericardial effusion, July 2015.  f. Residual class I symptoms with acceptable  echocardiogram (LVEF 0.50) with mild-moderate RVE, mild-moderate LAE, and aortic valve sclerosis with no evidence of pericardial effusion (November 2016).  g. Preliminary echocardiogram 5/25/18: EF 41-45 percent, mild concentric hypertrophy, LA dilated, aortic valve exhibits sclerosis, mild MR, trace TR, no pericardial effusion  8. Moderate obesity (BMI 34.1).    Patient Active Problem List   Diagnosis   • Insulin dependent type 2 diabetes mellitus (CMS/[facility])   • Labile hypertension   • Dyslipidemia   • Erectile dysfunction   • Allergic rhinitis   • Hypertensive cardiovascular disease   • Moderate obesity   • Precordial pain   • Left bundle branch block   • Hypertensive heart disease without heart failure       No current facility-administered medications on file prior to encounter.      Current Outpatient Prescriptions on File Prior to Encounter   Medication Sig Dispense Refill   • atorvastatin (LIPITOR) 20 MG tablet 20 mg Every Night.  1   • carvedilol (COREG) 25 MG tablet take 1 tablet by mouth twice a day 180 tablet 3   • CIALIS 5 MG tablet Take 5 mg by mouth As Needed for erectile dysfunction.  0   • Empagliflozin (JARDIANCE) 25 MG tablet Take 25 mg by mouth Daily.     • fenofibrate 160 MG tablet Take 160 mg by mouth Daily.     • glipiZIDE (GLUCOTROL) 10 MG 24 hr tablet Take 10 mg by mouth Every Night.  0   • JANUVIA 100 MG tablet Take 100 mg by mouth Daily.  1   • losartan (COZAAR) 100 MG tablet Take 100 mg by mouth Every Night.  1   • metFORMIN (GLUCOPHAGE) 500 MG tablet Take 1,000 mg by mouth 2 (Two) Times a Day.  1   • nitroglycerin (NITROSTAT) 0.4 MG SL tablet 1 under the tongue as needed for angina, may  repeat q5mins for up three doses (Patient taking differently: Place 0.4 mg under the tongue Every 5 (Five) Minutes As Needed. 1 under the tongue as needed for angina, may repeat q5mins for up three doses) 25 tablet 6   • raNITIdine (ZANTAC) 150 MG tablet Take 150 mg by mouth 2 (Two) Times a Day.  0   • TOUJEO SOLOSTAR 300 UNIT/ML solution pen-injector 70 Units Every Night.  1   • Triamcinolone Acetonide (NASACORT ALLERGY 24HR) 55 MCG/ACT nasal inhaler 2 sprays into each nostril Daily As Needed for Rhinitis.       Allergies   Allergen Reactions   • Asa [Aspirin] Other (See Comments)      upper airway congestion       Social History     Social History   • Marital status:      Social History Main Topics   • Smoking status: Former Smoker     Packs/day: 2.00     Years: 10.00     Types: Cigarettes     Quit date: 1981   • Smokeless tobacco: Never Used      Comment: quit 35 years ago   • Alcohol use No   • Drug use: No   • Sexual activity: Defer     Other Topics Concern   • Not on file     Family History   Problem Relation Age of Onset   • Diabetes Mother    • Heart disease Mother    • Arthritis Mother    • Heart disease Father    • Arthritis Father    • Diabetes Sister        Review of Systems:  Negative for chest pain, shortness of breath, lower extremity edema, palpitations, lightheadedness, syncope, orthopnea, or PND.  All other systems reviewed are negative.         Objective:     Vital Sign Min/Max for last 24 hours  Temp  Min: 97.8 °F (36.6 °C)  Max: 97.8 °F (36.6 °C)   BP  Min: 101/70  Max: 101/70   Pulse  Min: 73  Max: 73   Resp  Min: 16  Max: 16   SpO2  Min: 94 %  Max: 94 %   No Data Recorded    No intake or output data in the 24 hours ending 07/20/18 0907        Vitals:    07/20/18 0811   BP: 101/70   Pulse: 73   Resp: 16   Temp: 97.8 °F (36.6 °C)   SpO2: 94%       CONSTITUTIONAL: Well-nourished. In no acute distress.   SKIN: Warm and dry. No rashes noted  HEENT: Head is normocephalic and atraumatic.  Mucous membranes are pink and moist.   NECK: Supple without masses or thyromegaly. There is no jugular venous distention at 30°.  LUNGS: Normal effort. Clear to auscultation bilaterally without wheezing, rhonchi, or rales noted.   CARDIOVASCULAR: The heart has a regular rate with a normal S1 and S2. There is no gallop, rub, or click appreciated. Murmur present.  PERIPHERAL VASCULAR: Carotid upstroke is 2+ bilaterally and without bruits. Radial pulses are 2+ bilaterally. Posterior tibial pulses are 2+ and symmetrical. There is no lower extremity edema. Bilateral Rafat's test acceptable.  ABDOMEN: Soft with no tenderness with palpitation. No hepatosplenomegaly  MUSCULOSKELETAL:  No digital cyanosis  NEUROLOGICAL: Nonfocal.  PSYCHIATRIC: Alert, orientated x 3, appropriate affect     Labs:  No results found for: CKTOTAL, CKMB, CKMBINDEX, TROPONINI, TROPONINT    Glucose   Date Value Ref Range Status   07/19/2018 176 (H) 70 - 100 mg/dL Final     BUN   Date Value Ref Range Status   07/19/2018 30 (H) 9 - 23 mg/dL Final     Creatinine   Date Value Ref Range Status   07/19/2018 1.68 (H) 0.60 - 1.30 mg/dL Final     Sodium   Date Value Ref Range Status   07/19/2018 141 132 - 146 mmol/L Final     Potassium   Date Value Ref Range Status   07/19/2018 4.1 3.5 - 5.5 mmol/L Final     Chloride   Date Value Ref Range Status   07/19/2018 107 99 - 109 mmol/L Final     CO2   Date Value Ref Range Status   07/19/2018 24.0 20.0 - 31.0 mmol/L Final     Calcium   Date Value Ref Range Status   07/19/2018 9.1 8.7 - 10.4 mg/dL Final     Total Protein   Date Value Ref Range Status   07/19/2018 6.8 5.7 - 8.2 g/dL Final     Albumin   Date Value Ref Range Status   07/19/2018 4.42 3.20 - 4.80 g/dL Final     ALT (SGPT)   Date Value Ref Range Status   07/19/2018 31 7 - 40 U/L Final     AST (SGOT)   Date Value Ref Range Status   07/19/2018 34 (H) 0 - 33 U/L Final     Alkaline Phosphatase   Date Value Ref Range Status   07/19/2018 39 25 - 100 U/L Final      Total Bilirubin   Date Value Ref Range Status   07/19/2018 0.4 0.3 - 1.2 mg/dL Final     eGFR Non  Amer   Date Value Ref Range Status   07/19/2018 41 (L) >60 mL/min/1.73 Final     BUN/Creatinine Ratio   Date Value Ref Range Status   07/19/2018 17.9 7.0 - 25.0 Final     Anion Gap   Date Value Ref Range Status   07/19/2018 10.0 3.0 - 11.0 mmol/L Final       No results found for: CHOL  No results found for: TRIG  No results found for: HDL  No components found for: LDLCALC  No results found for: VLDL  No results found for: LDLHDL    WBC   Date Value Ref Range Status   07/19/2018 4.60 3.50 - 10.80 10*3/mm3 Final     RBC   Date Value Ref Range Status   07/19/2018 4.31 4.20 - 5.76 10*6/mm3 Final     Hemoglobin   Date Value Ref Range Status   07/19/2018 12.5 (L) 13.1 - 17.5 g/dL Final     Hematocrit   Date Value Ref Range Status   07/19/2018 38.2 (L) 38.9 - 50.9 % Final     MCV   Date Value Ref Range Status   07/19/2018 88.6 80.0 - 99.0 fL Final     MCH   Date Value Ref Range Status   07/19/2018 29.0 27.0 - 31.0 pg Final     MCHC   Date Value Ref Range Status   07/19/2018 32.7 32.0 - 36.0 g/dL Final     RDW   Date Value Ref Range Status   07/19/2018 13.7 11.3 - 14.5 % Final     RDW-SD   Date Value Ref Range Status   07/19/2018 44.5 37.0 - 54.0 fl Final     MPV   Date Value Ref Range Status   07/19/2018 10.4 6.0 - 12.0 fL Final     Platelets   Date Value Ref Range Status   07/19/2018 188 150 - 450 10*3/mm3 Final     Neutrophil %   Date Value Ref Range Status   07/19/2018 54.8 41.0 - 71.0 % Final     Lymphocyte %   Date Value Ref Range Status   07/19/2018 27.4 24.0 - 44.0 % Final     Monocyte %   Date Value Ref Range Status   07/19/2018 14.1 (H) 0.0 - 12.0 % Final     Eosinophil %   Date Value Ref Range Status   07/19/2018 2.6 0.0 - 3.0 % Final     Basophil %   Date Value Ref Range Status   07/19/2018 0.4 0.0 - 1.0 % Final     Immature Grans %   Date Value Ref Range Status   07/19/2018 0.7 (H) 0.0 - 0.6 % Final      Neutrophils, Absolute   Date Value Ref Range Status   07/19/2018 2.52 1.50 - 8.30 10*3/mm3 Final     Lymphocytes, Absolute   Date Value Ref Range Status   07/19/2018 1.26 0.60 - 4.80 10*3/mm3 Final     Monocytes, Absolute   Date Value Ref Range Status   07/19/2018 0.65 0.00 - 1.00 10*3/mm3 Final     Eosinophils, Absolute   Date Value Ref Range Status   07/19/2018 0.12 0.00 - 0.30 10*3/mm3 Final     Basophils, Absolute   Date Value Ref Range Status   07/19/2018 0.02 0.00 - 0.20 10*3/mm3 Final     Immature Grans, Absolute   Date Value Ref Range Status   07/19/2018 0.03 0.00 - 0.03 10*3/mm3 Final         Diagnostic Data:    EKG:  NSR with 1AVB and LBBB    TTE 5/2018  · Left ventricular wall thickness is consistent with mild-to-moderate concentric hypertrophy.  · Left ventricular systolic function is moderately decreased. Estimated EF = 40%.  · Left atrial cavity size is mild-to-moderately dilated.  · Mild mitral valve regurgitation is present.  · Left ventricular diastolic dysfunction (grade I a) consistent with impaired relaxation.  · There is no evidence of pericardial effusion.  · No evidence of pulmonary hypertension is present.  · Normal right ventricular cavity size, wall thickness, systolic function and septal motion noted.  · Reduced echocardiographic left ventricular ejection fraction noted compared to remote study 2 November 2016 (LVEF 0.50).         Assessment and Plan:   ASSESSMENT:  -Chest pain, currently resolved, no known history of CAD.  Patient has a history of hypertensive heart disease with a decreased EF, diabetes mellitus, and dyslipidemia.  -Hypertension  -Diabetes mellitus, HgBA1C 8% 7/19/2018  -Dyslipidemia    PLAN:  -Will begin fluid protocol prior to cardiac catheterization due to elevated creatinine.  -Lipid panel pending at this time will review when available.  -LHC +/- CBI today. The risks, benefits, and alternatives of the procedure have been reviewed and the patient wishes to  proceed.     Morena Atkinson, APRN  7/20/2018 8:11 AM

## 2018-07-24 ENCOUNTER — TELEPHONE (OUTPATIENT)
Dept: CARDIOLOGY | Facility: CLINIC | Age: 68
End: 2018-07-24

## 2018-07-24 NOTE — TELEPHONE ENCOUNTER
Spoke with patient regarding his concerns for CABG.  He has a LHC and stated Dr. Moctezuma told him he needed surgery and he said he wanted to discuss with KTS before making that decision.  Schedule him to come in August 1, 2018 @ 2 pm.

## 2018-08-01 ENCOUNTER — OFFICE VISIT (OUTPATIENT)
Dept: CARDIOLOGY | Facility: CLINIC | Age: 68
End: 2018-08-01

## 2018-08-01 VITALS
HEIGHT: 66 IN | SYSTOLIC BLOOD PRESSURE: 118 MMHG | BODY MASS INDEX: 33.68 KG/M2 | HEART RATE: 74 BPM | WEIGHT: 209.6 LBS | DIASTOLIC BLOOD PRESSURE: 56 MMHG

## 2018-08-01 DIAGNOSIS — I25.9 ISCHEMIC HEART DISEASE: ICD-10-CM

## 2018-08-01 DIAGNOSIS — E11.9 INSULIN DEPENDENT TYPE 2 DIABETES MELLITUS (HCC): ICD-10-CM

## 2018-08-01 DIAGNOSIS — E78.5 DYSLIPIDEMIA: ICD-10-CM

## 2018-08-01 DIAGNOSIS — I44.7 LEFT BUNDLE BRANCH BLOCK: ICD-10-CM

## 2018-08-01 DIAGNOSIS — Z79.4 INSULIN DEPENDENT TYPE 2 DIABETES MELLITUS (HCC): ICD-10-CM

## 2018-08-01 DIAGNOSIS — I11.9 HYPERTENSIVE HEART DISEASE WITHOUT HEART FAILURE: Primary | ICD-10-CM

## 2018-08-01 PROCEDURE — 99214 OFFICE O/P EST MOD 30 MIN: CPT | Performed by: INTERNAL MEDICINE

## 2018-08-01 NOTE — PROGRESS NOTES
Subjective:     Encounter Date:08/01/2018    Patient ID: Kar Mora is a 67 y.o.  white male, , from [place], [place].      REFERRING PHYSICIAN/ENDOCRINOLOGIST: Hiren Montoya MD  ALLERGIST: Abhijeet Madrid MD  PHYSICIAN: Bro Kan MD  INTERVENTIONAL CARDIOLOGIST:  Madi Moctezuma MD, MSC, [facility]    Chief Complaint:   Chief Complaint   Patient presents with   • Heart Problem     Problem List:  1. Insulin-dependent type 2 diabetes mellitus without apparent end organ damage; hemoglobin A1c 6.4%, November 2017; 8.0%, July 2018  2. Labile hypertension.  3. Dyslipidemia.  4. Erectile dysfunction.  5. Remote nasal polypectomy, 1986.  6. Allergic rhinitis.  7. Probable hypertensive cardiovascular disease:  a. Abnormal EKG with abnormal acceptable echocardiographic GXT, September 2004.   b. Acceptable echocardiographic GXT with preserved exercise capacity and mild LVH, March 2008.   c. Acceptable quantitative SPECT Gated Cardiolite GXT with nominal myocardial perfusion to 88% of predicted exercise capacity and 90% predicted maximum heart rate with preserved LVEF (0.65) with continued medical therapy felt warranted, December 2012.  d. Residual class I symptoms with recent documented abnormal EKG (LBBB/first-degree AV block) with abnormal echocardiogram demonstrating mild left ventricular chamber enlargement with mild reduction in the LVEF (0.42) without PE or pulmonary hypertension.  e. Resident class I symptoms with persistent abnormal echocardiogram (LVEF 0.40), with mild concentric LVH and mild left atrial enlargement without pulmonary arterial hypertension or pericardial effusion, July 2015.  f. Residual class I symptoms with acceptable  echocardiogram (LVEF 0.50) with mild-moderate RVE, mild-moderate LAE, and aortic valve sclerosis with no evidence of pericardial effusion (November 2016).  g. Abnormal echocardiogram 5/25/18: LVEF 0.40, mild concentric hypertrophy, LA dilated, aortic  valve exhibits sclerosis, mild MR, trace TR, no pericardial effusion  8. Moderate obesity (BMI 33.8).  9. Ischemic heart disease:   A. Recent NYHA class II exertional dyspnea/CCS class II-III angina pectoris with diagnostic coronary angiography demonstrating severe 3-vessel obstructive coronary artery disease   B. Abnormal electrocardiogram (LBBB), July 2018     Allergies   Allergen Reactions   • Asa [Aspirin] Other (See Comments)      upper airway congestion         Current Outpatient Prescriptions:   •  atorvastatin (LIPITOR) 20 MG tablet, 20 mg Every Night., Disp: , Rfl: 1  •  carvedilol (COREG) 25 MG tablet, take 1 tablet by mouth twice a day, Disp: 180 tablet, Rfl: 3  •  CIALIS 5 MG tablet, Take 5 mg by mouth As Needed for erectile dysfunction., Disp: , Rfl: 0  •  Empagliflozin (JARDIANCE) 25 MG tablet, Take 25 mg by mouth Daily., Disp: , Rfl:   •  fenofibrate 160 MG tablet, Take 160 mg by mouth Daily., Disp: , Rfl:   •  glipiZIDE (GLUCOTROL) 10 MG 24 hr tablet, Take 10 mg by mouth Every Night., Disp: , Rfl: 0  •  JANUVIA 100 MG tablet, Take 100 mg by mouth Daily., Disp: , Rfl: 1  •  losartan (COZAAR) 100 MG tablet, Take 100 mg by mouth Every Night., Disp: , Rfl: 1  •  metFORMIN (GLUCOPHAGE) 500 MG tablet, Take 1,000 mg by mouth 2 (Two) Times a Day., Disp: , Rfl: 1  •  nitroglycerin (NITROSTAT) 0.4 MG SL tablet, 1 under the tongue as needed for angina, may repeat q5mins for up three doses (Patient taking differently: Place 0.4 mg under the tongue Every 5 (Five) Minutes As Needed. 1 under the tongue as needed for angina, may repeat q5mins for up three doses), Disp: 25 tablet, Rfl: 6  •  raNITIdine (ZANTAC) 150 MG tablet, Take 150 mg by mouth 2 (Two) Times a Day., Disp: , Rfl: 0  •  TOUJEO SOLOSTAR 300 UNIT/ML solution pen-injector, 70 Units Every Night., Disp: , Rfl: 1  •  Triamcinolone Acetonide (NASACORT ALLERGY 24HR) 55 MCG/ACT nasal inhaler, 2 sprays into each nostril Daily As Needed for Rhinitis., Disp: ,  "Rfl:     HISTORY OF PRESENT ILLNESS: Patient returns early for followup after a 2-month hiatus.  He had a heart catheterization almost 2 weeks ago, and he was recommended to have bypass surgery; he requested to be seen early because he has a lot of questions and does not really remember what he was told right after the cath by Dr. Moctezuma.  The catheterization findings are discussed with him and why his blockages are not favorable for stents.  He asks if his symptoms will get better with bypass surgery, and he is assured that that and improved survival are the goals.  He asks what the recovery time from the surgery is, and this is discussed with him.  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  He is told that he would probably need to plan on being on minimal work for 4 weeks.  Patient otherwise denies chest pain, shortness of breath, PND, edema, palpitations, syncope or presyncope at this time on limited activity currently.  We pulled up and reviewed his catheterization films with him in office today.  He requests cardiovascular surgical consultation with Dr. Migue Burton, and we will arrange this assessment.        Review of Systems   Cardiovascular: Positive for chest pain.   Musculoskeletal: Positive for gout.   Allergic/Immunologic: Positive for environmental allergies.      Obtained and otherwise negative except as outlined in problem list and HPI.    Procedures       Objective:       Vitals:    08/01/18 1356 08/01/18 1359   BP: 124/58 118/56   BP Location: Right arm Right arm   Patient Position: Sitting Standing   Pulse: 68 74   Weight: 95.1 kg (209 lb 9.6 oz)    Height: 167.6 cm (66\")      Body mass index is 33.83 kg/m².   Last weight:  213 lbs.    Physical Exam   Constitutional: He is oriented to person, place, and time. He appears well-developed and well-nourished.   Neck: No JVD present. Carotid bruit is not present. No thyromegaly present.   Cardiovascular: Regular rhythm, S1 " normal, S2 normal and normal heart sounds.  Exam reveals no gallop, no S3 and no friction rub.    No murmur heard.  Pulses:       Dorsalis pedis pulses are 2+ on the right side, and 2+ on the left side.        Posterior tibial pulses are 2+ on the right side, and 2+ on the left side.   Pulmonary/Chest: Effort normal and breath sounds normal. He has no wheezes. He has no rhonchi. He has no rales.   Abdominal: Soft. He exhibits no mass. There is no hepatosplenomegaly. There is no tenderness. There is no guarding.   Bowel sounds audible x4   Musculoskeletal: Normal range of motion. He exhibits no edema.   Lymphadenopathy:     He has no cervical adenopathy.   Neurological: He is alert and oriented to person, place, and time.   Skin: Skin is warm, dry and intact. No rash noted.   Vitals reviewed.        Lab Review:   Lab Results   Component Value Date    GLUCOSE 176 (H) 07/19/2018    BUN 30 (H) 07/19/2018    CREATININE 1.68 (H) 07/19/2018    EGFRIFNONA 41 (L) 07/19/2018    BCR 17.9 07/19/2018    CO2 24.0 07/19/2018    CALCIUM 9.1 07/19/2018    ALBUMIN 4.42 07/19/2018    AST 34 (H) 07/19/2018    ALT 31 07/19/2018       Lab Results   Component Value Date    WBC 4.60 07/19/2018    HGB 12.5 (L) 07/19/2018    HCT 38.2 (L) 07/19/2018    MCV 88.6 07/19/2018     07/19/2018       Lab Results   Component Value Date    HGBA1C 8.00 (H) 07/19/2018         Lab Results   Component Value Date    CHOL 169 07/20/2018     Lab Results   Component Value Date    TRIG 385 (H) 07/20/2018     Lab Results   Component Value Date    HDL 27 (L) 07/20/2018     Lab Results   Component Value Date    LDL 91 07/20/2018     Cardiac catheterization, 07/20/2018:    IMPRESSION:  · There was severe multivessel coronary artery disease involving the LAD and RCA as well as moderate disease of the ramus intermedius and circumflex arteries     RECOMMENDATIONS:  · Cardiac surgery consultation with Dr. Vigil for bypass surgery  · Findings discussed with the  patient's primary cardiologist Dr. Qureshi      Assessment:   Overall continued acceptable course with no interim cardiopulmonary complaints with fair functional status. We will defer additional diagnostic or therapeutic intervention from a cardiac perspective at this time other than to make arrangements for him to have cardiovascular surgical consultation with Dr. Migue Burton.       Diagnosis Plan   1. Hypertensive heart disease without heart failure  No recurrent angina pectoris or CHF; continue current treatment     2. Left bundle branch block  Continue current treatment   3. Ischemic heart disease  Cardiovascular surgical consultation with Dr. Migue Burton   4. Insulin dependent type 2 diabetes mellitus (CMS/[facility])  Continue current treatment; the patient may be a candidate for Ozempic   5. Dyslipidemia  Continue current treatment; may need to consider altering dose to 40/80 mg daily in view of precocious obstructive coronary artery disease          Plan:         1. Patient to continue current medications and close follow up with the above providers.  2. Tentative cardiology follow up in December 2018, or patient may return sooner PRN.    Transcribed by Renetta Amor for Dr. Todd Qureshi at 2:12 PM on 08/01/2018    ITodd MD, [facility], personally performed the services described in this documentation as scribed by the above named individual in my presence, and it is both accurate and complete. At 3:38 PM on 08/01/2018

## 2018-08-07 ENCOUNTER — DOCUMENTATION (OUTPATIENT)
Dept: CARDIOLOGY | Facility: CLINIC | Age: 68
End: 2018-08-07

## 2018-08-07 NOTE — PROGRESS NOTES
Patient brought letter from [facility] and stated that he need to stop his beta blockers but that he intends to stop his allergy shots because he has been on them for 30 years so he does not want to stop his beta blockers.

## 2018-08-16 RX ORDER — CARVEDILOL 25 MG/1
TABLET ORAL
Qty: 180 TABLET | Refills: 3 | Status: SHIPPED | OUTPATIENT
Start: 2018-08-16 | End: 2019-06-12 | Stop reason: SDUPTHER

## 2018-09-06 ENCOUNTER — OFFICE VISIT (OUTPATIENT)
Dept: CARDIAC SURGERY | Facility: CLINIC | Age: 68
End: 2018-09-06

## 2018-09-06 DIAGNOSIS — I25.119 CORONARY ARTERY DISEASE INVOLVING NATIVE CORONARY ARTERY OF NATIVE HEART WITH ANGINA PECTORIS (HCC): Primary | ICD-10-CM

## 2018-09-06 PROCEDURE — 99205 OFFICE O/P NEW HI 60 MIN: CPT | Performed by: THORACIC SURGERY (CARDIOTHORACIC VASCULAR SURGERY)

## 2018-09-07 VITALS
HEART RATE: 83 BPM | BODY MASS INDEX: 33.91 KG/M2 | DIASTOLIC BLOOD PRESSURE: 73 MMHG | OXYGEN SATURATION: 97 % | WEIGHT: 211 LBS | SYSTOLIC BLOOD PRESSURE: 123 MMHG | HEIGHT: 66 IN

## 2018-09-07 PROBLEM — I25.119 CORONARY ARTERY DISEASE INVOLVING NATIVE CORONARY ARTERY OF NATIVE HEART WITH ANGINA PECTORIS (HCC): Status: ACTIVE | Noted: 2018-09-07

## 2018-09-07 RX ORDER — AMOXICILLIN AND CLAVULANATE POTASSIUM 875; 125 MG/1; MG/1
1 TABLET, FILM COATED ORAL 2 TIMES DAILY
COMMUNITY
End: 2018-10-10

## 2018-09-07 RX ORDER — BENZONATATE 100 MG/1
100 CAPSULE ORAL 3 TIMES DAILY PRN
COMMUNITY
End: 2018-10-10

## 2018-09-07 NOTE — PROGRESS NOTES
09/06/2018  Patient Information  Kar Mora                                                                                          [address] [place] [address]   1950  'PCP/Referring Physician'  Bro Kan MD  [phone]  No ref. provider found    Chief Complaint   Patient presents with   • Consult     Np referred for coronary artery disease, complains of chest pain off and on for the past year.   • Chest Pain       History of Present Illness:  Mr. Mora is a 67-year-old male who has been referred by Dr. Qureshi for evaluation.  He has been having chest pain for several months, particularly with exertion.  He underwent catheterization with Dr. Moctezuma and has been found to have severe two vessel disease of his right coronary artery and LAD.  He has followed up with Dr. Qureshi. I had a long discussion with him.  The patient now appears to be agreeable to have coronary bypass surgery.  His ejection fraction is about 37-40%.  He continues to practice law.    Patient Active Problem List   Diagnosis   • Insulin dependent type 2 diabetes mellitus (CMS/HCC)   • Labile hypertension   • Dyslipidemia   • Erectile dysfunction   • Allergic rhinitis   • Hypertensive cardiovascular disease   • Moderate obesity   • Precordial pain   • Left bundle branch block   • Hypertensive heart disease without heart failure   • Ischemic heart disease   • Coronary artery disease involving native coronary artery of native heart with angina pectoris (CMS/[facility])     Past Medical History:   Diagnosis Date   • Allergic rhinitis    • Coronary artery disease    • Dyslipidemia    • Erectile dysfunction    • GERD (gastroesophageal reflux disease)    • Hyperlipidemia    • Insulin dependent type 2 diabetes mellitus (CMS/HCC) 2002    checks fsbg every am, a1c checked every 6 months, 7.2 in may 2018    • Kidney stones    • Labile hypertension 10/24/2016   • Myocardial infarct    • Wears glasses      Past Surgical  History:   Procedure Laterality Date   • CARDIAC CATHETERIZATION N/A 7/20/2018    Procedure: Left Heart Cath;  Surgeon: Madi Moctezuma MD;  Location: [facility] CATH INVASIVE LOCATION;  Service: Cardiovascular   • COLONOSCOPY  2016   • KIDNEY STONE SURGERY     • NASAL POLYP SURGERY  1986       Current Outpatient Prescriptions:   •  amoxicillin-clavulanate (AUGMENTIN) 875-125 MG per tablet, Take 1 tablet by mouth 2 (Two) Times a Day., Disp: , Rfl:   •  atorvastatin (LIPITOR) 20 MG tablet, 20 mg Every Night., Disp: , Rfl: 1  •  benzonatate (TESSALON) 100 MG capsule, Take 100 mg by mouth 3 (Three) Times a Day As Needed for Cough., Disp: , Rfl:   •  carvedilol (COREG) 25 MG tablet, take 1 tablet by mouth twice a day, Disp: 180 tablet, Rfl: 3  •  Empagliflozin (JARDIANCE) 25 MG tablet, Take 25 mg by mouth Daily., Disp: , Rfl:   •  fenofibrate 160 MG tablet, Take 160 mg by mouth Daily., Disp: , Rfl:   •  JANUVIA 100 MG tablet, Take 100 mg by mouth Daily., Disp: , Rfl: 1  •  losartan (COZAAR) 100 MG tablet, Take 100 mg by mouth Every Night., Disp: , Rfl: 1  •  metFORMIN (GLUCOPHAGE) 500 MG tablet, Take 1,000 mg by mouth 2 (Two) Times a Day., Disp: , Rfl: 1  •  raNITIdine (ZANTAC) 150 MG tablet, Take 150 mg by mouth 2 (Two) Times a Day., Disp: , Rfl: 0  •  TOUJEO SOLOSTAR 300 UNIT/ML solution pen-injector, 70 Units Every Night., Disp: , Rfl: 1  •  CIALIS 5 MG tablet, Take 5 mg by mouth As Needed for erectile dysfunction., Disp: , Rfl: 0  •  glipiZIDE (GLUCOTROL) 10 MG 24 hr tablet, Take 10 mg by mouth Every Night., Disp: , Rfl: 0  •  nitroglycerin (NITROSTAT) 0.4 MG SL tablet, 1 under the tongue as needed for angina, may repeat q5mins for up three doses (Patient taking differently: Place 0.4 mg under the tongue Every 5 (Five) Minutes As Needed. 1 under the tongue as needed for angina, may repeat q5mins for up three doses), Disp: 25 tablet, Rfl: 6  •  Triamcinolone Acetonide (NASACORT ALLERGY 24HR) 55 MCG/ACT nasal inhaler, 2  sprays into each nostril Daily As Needed for Rhinitis., Disp: , Rfl:   Allergies   Allergen Reactions   • Asa [Aspirin] Other (See Comments)      upper airway congestion     Social History     Social History   • Marital status:      Spouse name: N/A   • Number of children: 2   • Years of education: N/A     Occupational History   •       Social History Main Topics   • Smoking status: Former Smoker     Packs/day: 2.00     Years: 10.00     Types: Cigarettes     Quit date: 1981   • Smokeless tobacco: Never Used      Comment: quit 35 years ago   • Alcohol use No   • Drug use: No   • Sexual activity: Defer     Other Topics Concern   • Not on file     Social History Narrative    Lives in [facility] with his wife.     Family History   Problem Relation Age of Onset   • Diabetes Mother    • Heart disease Mother    • Arthritis Mother    • Heart disease Father    • Arthritis Father    • Heart attack Father    • Diabetes Sister      Review of Systems   Constitution: Negative for chills, fever, malaise/fatigue, night sweats and weight loss.   HENT: Negative for hearing loss, odynophagia and sore throat.    Cardiovascular: Positive for chest pain. Negative for dyspnea on exertion, leg swelling, orthopnea and palpitations.   Respiratory: Negative for cough and hemoptysis.    Endocrine: Negative for cold intolerance, heat intolerance, polydipsia, polyphagia and polyuria.   Hematologic/Lymphatic: Does not bruise/bleed easily.   Skin: Negative for itching and rash.   Musculoskeletal: Negative for joint pain, joint swelling and myalgias.   Gastrointestinal: Negative for abdominal pain, constipation, diarrhea, hematemesis, hematochezia, melena, nausea and vomiting.   Genitourinary: Negative for dysuria, frequency and hematuria.   Neurological: Negative for focal weakness, headaches, numbness and seizures.   Psychiatric/Behavioral: Negative for suicidal ideas.   Allergic/Immunologic: Positive for environmental allergies.  "  All other systems reviewed and are negative.    Vitals:    09/06/18 0841   BP: 123/73   BP Location: Right arm   Patient Position: Sitting   Pulse: 83   SpO2: 97%   Weight: 95.7 kg (211 lb)   Height: 167.6 cm (66\")      Physical Exam  CONSTITUTIONAL:  Oriented x3, well-nourished, well developed, in no acute distress.  EYES:    Eyes anicteric.  EOMI.  PERRLA.   ENT:                Good dentition.  NECK:    Supple without masses or thyromegaly.  LUNGS:           Decreased in the bases; no wheezing, rales or rhonchi.  CARDIOVASCULAR:  Regular rate and rhythm without murmur, rub or gallop.  There are no carotid bruits.  GI:     Abdomen is soft, nontender, nondistended with normal bowel sounds.  No hepatosplenomegaly and no masses.  EXTREMITIES:   No cyanosis, clubbing or edema.  SKIN:   No ulcerations, petechiae or bruising.  MUSCULOSKELETAL:  Full range of motion with no deformity of extremities.  NEURO:  Cranial nerves II-XII, motor and sensory exams are intact.  PSYCH:  Alert and oriented; mood and affect good.     Labs/Imaging:  I obtained and reviewed medical records from Dr. Qureshi including cardiac catheterization demonstrating severe two vessel disease of his right coronary artery and LAD.      Assessment/Plan:  Mr. Mora is a 67-year-old white male who has severe two vessel coronary disease.  I have obtained and reviewed his records in detail and gone over them in detail.  I concur with the 80% stenosis of his LAD and 100% stenosis of his right coronary artery.  He has approximately a 40% ejection fraction.  I have recommended coronary artery bypass surgery.  I have discussed the operation, risks including risk to his life, bleeding, infection, organ failure, as well as other risk factors, as well as alternatives.  He appears to understand completely all of the above and would like to proceed. He would like to delay it until October 8 because of his legal schedule.  He is aware of the risks of his " decision.       Patient Active Problem List   Diagnosis   • Insulin dependent type 2 diabetes mellitus (CMS/HCC)   • Labile hypertension   • Dyslipidemia   • Erectile dysfunction   • Allergic rhinitis   • Hypertensive cardiovascular disease   • Moderate obesity   • Precordial pain   • Left bundle branch block   • Hypertensive heart disease without heart failure   • Ischemic heart disease   • Coronary artery disease involving native coronary artery of native heart with angina pectoris (CMS/[facility])     CC: MD Bro Cuello MD Debbie Moore transcribing on behalf of Migue Burton MD dictating.

## 2018-09-10 DIAGNOSIS — I25.119 CORONARY ARTERY DISEASE INVOLVING NATIVE CORONARY ARTERY OF NATIVE HEART WITH ANGINA PECTORIS (HCC): Primary | ICD-10-CM

## 2018-09-24 ENCOUNTER — PREP FOR SURGERY (OUTPATIENT)
Dept: OTHER | Facility: HOSPITAL | Age: 68
End: 2018-09-24

## 2018-09-24 DIAGNOSIS — I25.118 ATHEROSCLEROSIS OF NATIVE CORONARY ARTERY OF NATIVE HEART WITH STABLE ANGINA PECTORIS (HCC): Primary | ICD-10-CM

## 2018-09-24 RX ORDER — CHLORHEXIDINE GLUCONATE 0.12 MG/ML
15 RINSE ORAL ONCE
Status: CANCELLED | OUTPATIENT
Start: 2018-10-11 | End: 2018-10-11

## 2018-09-24 RX ORDER — CHLORHEXIDINE GLUCONATE 500 MG/1
1 CLOTH TOPICAL EVERY 12 HOURS PRN
Status: CANCELLED | OUTPATIENT
Start: 2018-10-11

## 2018-09-24 RX ORDER — CHLORHEXIDINE GLUCONATE 500 MG/1
1 CLOTH TOPICAL EVERY 12 HOURS PRN
Status: CANCELLED | OUTPATIENT
Start: 2018-10-10

## 2018-09-24 RX ORDER — ACETAMINOPHEN 325 MG/1
650 TABLET ORAL EVERY 4 HOURS PRN
Status: CANCELLED | OUTPATIENT
Start: 2018-10-11

## 2018-09-24 RX ORDER — NITROGLYCERIN 0.4 MG/1
0.4 TABLET SUBLINGUAL
Status: CANCELLED | OUTPATIENT
Start: 2018-10-11

## 2018-10-10 ENCOUNTER — HOSPITAL ENCOUNTER (OUTPATIENT)
Dept: PULMONOLOGY | Facility: HOSPITAL | Age: 68
Discharge: HOME | End: 2018-10-10

## 2018-10-10 ENCOUNTER — APPOINTMENT (OUTPATIENT)
Dept: PREADMISSION TESTING | Facility: HOSPITAL | Age: 68
End: 2018-10-10

## 2018-10-10 ENCOUNTER — ANESTHESIA EVENT (OUTPATIENT)
Dept: PERIOP | Facility: HOSPITAL | Age: 68
End: 2018-10-10

## 2018-10-10 ENCOUNTER — HOSPITAL ENCOUNTER (OUTPATIENT)
Dept: GENERAL RADIOLOGY | Facility: HOSPITAL | Age: 68
Discharge: HOME | End: 2018-10-10
Admitting: PHYSICIAN ASSISTANT

## 2018-10-10 VITALS — BODY MASS INDEX: 34.07 KG/M2 | WEIGHT: 212 LBS | HEIGHT: 66 IN | OXYGEN SATURATION: 97 %

## 2018-10-10 DIAGNOSIS — I25.118 ATHEROSCLEROSIS OF NATIVE CORONARY ARTERY OF NATIVE HEART WITH STABLE ANGINA PECTORIS (HCC): ICD-10-CM

## 2018-10-10 LAB
ABO GROUP BLD: NORMAL
ALBUMIN SERPL-MCNC: 4.61 G/DL (ref 3.2–4.8)
ALBUMIN/GLOB SERPL: 2.2 G/DL (ref 1.5–2.5)
ALP SERPL-CCNC: 35 U/L (ref 25–100)
ALT SERPL W P-5'-P-CCNC: 29 U/L (ref 7–40)
AMPHET+METHAMPHET UR QL: NEGATIVE
AMPHETAMINES UR QL: NEGATIVE
ANION GAP SERPL CALCULATED.3IONS-SCNC: 7 MMOL/L (ref 3–11)
APTT PPP: 25.1 SECONDS (ref 24–31)
AST SERPL-CCNC: 33 U/L (ref 0–33)
BARBITURATES UR QL SCN: NEGATIVE
BASOPHILS # BLD AUTO: 0.02 10*3/MM3 (ref 0–0.2)
BASOPHILS NFR BLD AUTO: 0.4 % (ref 0–1)
BENZODIAZ UR QL SCN: NEGATIVE
BILIRUB SERPL-MCNC: 0.4 MG/DL (ref 0.3–1.2)
BLD GP AB SCN SERPL QL: NEGATIVE
BUN BLD-MCNC: 42 MG/DL (ref 9–23)
BUN/CREAT SERPL: 22.8 (ref 7–25)
BUPRENORPHINE SERPL-MCNC: NEGATIVE NG/ML
CALCIUM SPEC-SCNC: 8.9 MG/DL (ref 8.7–10.4)
CANNABINOIDS SERPL QL: NEGATIVE
CHLORIDE SERPL-SCNC: 104 MMOL/L (ref 99–109)
CO2 SERPL-SCNC: 26 MMOL/L (ref 20–31)
COCAINE UR QL: NEGATIVE
CREAT BLD-MCNC: 1.84 MG/DL (ref 0.6–1.3)
DEPRECATED RDW RBC AUTO: 44.8 FL (ref 37–54)
EOSINOPHIL # BLD AUTO: 0.15 10*3/MM3 (ref 0–0.3)
EOSINOPHIL NFR BLD AUTO: 3 % (ref 0–3)
ERYTHROCYTE [DISTWIDTH] IN BLOOD BY AUTOMATED COUNT: 14.1 % (ref 11.3–14.5)
GFR SERPL CREATININE-BSD FRML MDRD: 37 ML/MIN/1.73
GLOBULIN UR ELPH-MCNC: 2.1 GM/DL
GLUCOSE BLD-MCNC: 217 MG/DL (ref 70–100)
HBA1C MFR BLD: 7.5 % (ref 4.8–5.6)
HCT VFR BLD AUTO: 37.3 % (ref 38.9–50.9)
HGB BLD-MCNC: 12.2 G/DL (ref 13.1–17.5)
IMM GRANULOCYTES # BLD: 0.02 10*3/MM3 (ref 0–0.03)
IMM GRANULOCYTES NFR BLD: 0.4 % (ref 0–0.6)
INR PPP: 0.99 (ref 0.91–1.09)
LYMPHOCYTES # BLD AUTO: 1.48 10*3/MM3 (ref 0.6–4.8)
LYMPHOCYTES NFR BLD AUTO: 29.5 % (ref 24–44)
MAGNESIUM SERPL-MCNC: 2 MG/DL (ref 1.3–2.7)
MCH RBC QN AUTO: 28.6 PG (ref 27–31)
MCHC RBC AUTO-ENTMCNC: 32.7 G/DL (ref 32–36)
MCV RBC AUTO: 87.4 FL (ref 80–99)
METHADONE UR QL SCN: NEGATIVE
MONOCYTES # BLD AUTO: 0.38 10*3/MM3 (ref 0–1)
MONOCYTES NFR BLD AUTO: 7.6 % (ref 0–12)
NEUTROPHILS # BLD AUTO: 2.99 10*3/MM3 (ref 1.5–8.3)
NEUTROPHILS NFR BLD AUTO: 59.5 % (ref 41–71)
OPIATES UR QL: NEGATIVE
OXYCODONE UR QL SCN: NEGATIVE
PA ADP PRP-ACNC: 296 PRU
PCP UR QL SCN: NEGATIVE
PLATELET # BLD AUTO: 199 10*3/MM3 (ref 150–450)
PMV BLD AUTO: 11 FL (ref 6–12)
POTASSIUM BLD-SCNC: 4.4 MMOL/L (ref 3.5–5.5)
PROPOXYPH UR QL: NEGATIVE
PROT SERPL-MCNC: 6.7 G/DL (ref 5.7–8.2)
PROTHROMBIN TIME: 10.4 SECONDS (ref 9.6–11.5)
RBC # BLD AUTO: 4.27 10*6/MM3 (ref 4.2–5.76)
RH BLD: POSITIVE
SODIUM BLD-SCNC: 137 MMOL/L (ref 132–146)
T&S EXPIRATION DATE: NORMAL
TRICYCLICS UR QL SCN: NEGATIVE
WBC NRBC COR # BLD: 5.02 10*3/MM3 (ref 3.5–10.8)

## 2018-10-10 PROCEDURE — 71046 X-RAY EXAM CHEST 2 VIEWS: CPT

## 2018-10-10 PROCEDURE — 86900 BLOOD TYPING SEROLOGIC ABO: CPT | Performed by: PHYSICIAN ASSISTANT

## 2018-10-10 PROCEDURE — 86850 RBC ANTIBODY SCREEN: CPT | Performed by: PHYSICIAN ASSISTANT

## 2018-10-10 PROCEDURE — 85025 COMPLETE CBC W/AUTO DIFF WBC: CPT | Performed by: PHYSICIAN ASSISTANT

## 2018-10-10 PROCEDURE — 85730 THROMBOPLASTIN TIME PARTIAL: CPT | Performed by: PHYSICIAN ASSISTANT

## 2018-10-10 PROCEDURE — 85576 BLOOD PLATELET AGGREGATION: CPT | Performed by: PHYSICIAN ASSISTANT

## 2018-10-10 PROCEDURE — 83036 HEMOGLOBIN GLYCOSYLATED A1C: CPT | Performed by: PHYSICIAN ASSISTANT

## 2018-10-10 PROCEDURE — 83735 ASSAY OF MAGNESIUM: CPT | Performed by: PHYSICIAN ASSISTANT

## 2018-10-10 PROCEDURE — 85610 PROTHROMBIN TIME: CPT | Performed by: PHYSICIAN ASSISTANT

## 2018-10-10 PROCEDURE — 86901 BLOOD TYPING SEROLOGIC RH(D): CPT | Performed by: PHYSICIAN ASSISTANT

## 2018-10-10 PROCEDURE — 80306 DRUG TEST PRSMV INSTRMNT: CPT | Performed by: PHYSICIAN ASSISTANT

## 2018-10-10 PROCEDURE — 86923 COMPATIBILITY TEST ELECTRIC: CPT

## 2018-10-10 PROCEDURE — 94010 BREATHING CAPACITY TEST: CPT

## 2018-10-10 PROCEDURE — 80053 COMPREHEN METABOLIC PANEL: CPT | Performed by: PHYSICIAN ASSISTANT

## 2018-10-10 PROCEDURE — 36415 COLL VENOUS BLD VENIPUNCTURE: CPT

## 2018-10-10 PROCEDURE — 94010 BREATHING CAPACITY TEST: CPT | Performed by: INTERNAL MEDICINE

## 2018-10-10 RX ORDER — CHLORHEXIDINE GLUCONATE 500 MG/1
1 CLOTH TOPICAL EVERY 12 HOURS PRN
Status: ACTIVE | OUTPATIENT
Start: 2018-10-10

## 2018-10-10 NOTE — PAT
Patient to apply Chlorhexadine wipes  to surgical area (as instructed) the night before procedure and the AM of procedure. Wipes provided.    EKG 7/19/18    BACTROBAN APPLIED TO EACH NOSTRIL DURING PAT VISIT

## 2018-10-10 NOTE — DISCHARGE INSTRUCTIONS
The following information and instructions were given:    NPO after MN except sips of water with routine prescribed medication (except blood thinner, diabetes, or weight reducing medication) unless otherwise instructed by your physician.  Do not eat, drink, smoke or chew gum after midnight the night before surgery. This also includes no mints.    DO NOT shave for two days before your procedure.  Do not wear makeup.      DO NOT wear fingernail polish (gel/regular) and/or acrylic/artificial nails on the day of surgery.   If a patient had recent manicure and would rather not remove polish or artificial nails, then the minimum requirement is that the polish/artificial nails must be removed from the middle finger on each hand.      If patient is having surgery/procedure on an upper extremity, then the patient was instructed that fingernail polish/artificial fingernails must be removed for surgery.  NO EXCEPTIONS.      If patient is having surgery/procedure on a lower extremity, then the patient was instructed that toenail polish on both extremities must be removed for surgery.  NO EXCEPTIONS.    Remove all jewelry (advised to go to jeweler if unable to remove).  Jewelry, especially rings, can no longer be taped for surgery.    Leave anything you consider valuable at home.    Leave your suitcase in the car until after your surgery.    Bring the following with you (if applicable)       -picture ID and insurance cards       -Co-pay/deductible required by insurance       -Medications in the original bottles (not a list) including all over-the-counter  medications if not brought to PAT       -Copy of advance directive, living will or power of  documents if not  brought to [facility]       -CPAP or BIPAP mask and tubing (do not bring machine)       -Skin prep instructions sheet       -PAT Pass    Education booklet, brochure, handout or binder given to patient.    Pain Control After Surgery handout given to  patient.    Respirex use (handout given to patient) and pneumonia prevention.    Signs and Symptoms of infection discussed.    DVT Prevention education given.  Stressing the importance of ambulation.    Patient to apply Chlorhexadine wipes to surgical area (as instructed) the night before procedure and the AM of procedure.    When applicable for ERAS patients (colon, orthropedic), patients were instructed to drink 20 ounces of Gatorade or G2 for diabetics (or until full) the morning of surgery.  The Gatorade or G2 must be consumed at least 3 hours before surgery start time.  No RED Gatorade or G2.  Appropriated ERAS handout given to patient during PAT visit.      Patient/family provided a [facility] Heart Surgery book (if not already provided by the CT surgeon's office).  Encouraged patient and family to read the Heart Surgery book if they had not already done so.  Also completed Heart Surgery pre surgery checklist with patient.  Verified with patient that exercise handout was received, if not, then one was provided during PAT visit.      Education during PAT visit included general perioperative instructions that are routine for all surgical patients (PAT PASS, wipes, directions to pre-op,e tc.).  Additionally, a handout was provided and discussed that stressed the importance of Honesty, Incentive Spirometry use, Ambulation, and Pain control.  This handout also explained the tubes in place during immediate post op recovery.  Verbal instructions given as well.     Patient and/or family verbalized understanding of education and reinforcement was provided as needed.  Patient and/or family verbalized understanding.

## 2018-10-11 ENCOUNTER — APPOINTMENT (OUTPATIENT)
Dept: GENERAL RADIOLOGY | Facility: HOSPITAL | Age: 68
End: 2018-10-11

## 2018-10-11 ENCOUNTER — HOSPITAL ENCOUNTER (INPATIENT)
Facility: HOSPITAL | Age: 68
LOS: 11 days | Discharge: HOME | End: 2018-10-22
Attending: THORACIC SURGERY (CARDIOTHORACIC VASCULAR SURGERY) | Admitting: THORACIC SURGERY (CARDIOTHORACIC VASCULAR SURGERY)

## 2018-10-11 ENCOUNTER — ANESTHESIA (OUTPATIENT)
Dept: PERIOP | Facility: HOSPITAL | Age: 68
End: 2018-10-11

## 2018-10-11 DIAGNOSIS — I25.5 ISCHEMIC CARDIOMYOPATHY: ICD-10-CM

## 2018-10-11 DIAGNOSIS — I25.9 ISCHEMIC HEART DISEASE: ICD-10-CM

## 2018-10-11 DIAGNOSIS — Z74.09 IMPAIRED FUNCTIONAL MOBILITY, BALANCE, GAIT, AND ENDURANCE: Primary | ICD-10-CM

## 2018-10-11 DIAGNOSIS — I25.119 CORONARY ARTERY DISEASE INVOLVING NATIVE CORONARY ARTERY OF NATIVE HEART WITH ANGINA PECTORIS (HCC): ICD-10-CM

## 2018-10-11 DIAGNOSIS — I25.118 ATHEROSCLEROSIS OF NATIVE CORONARY ARTERY OF NATIVE HEART WITH STABLE ANGINA PECTORIS (HCC): ICD-10-CM

## 2018-10-11 DIAGNOSIS — N17.9 AKI (ACUTE KIDNEY INJURY) (HCC): ICD-10-CM

## 2018-10-11 DIAGNOSIS — I11.9 HYPERTENSIVE HEART DISEASE WITHOUT HEART FAILURE: ICD-10-CM

## 2018-10-11 LAB
ABO GROUP BLD: NORMAL
ACT BLD: 103 SECONDS (ref 82–152)
ACT BLD: 125 SECONDS (ref 82–152)
ACT BLD: 356 SECONDS (ref 82–152)
ACT BLD: 378 SECONDS (ref 82–152)
ACT BLD: 466 SECONDS (ref 82–152)
ACT BLD: 505 SECONDS (ref 82–152)
ACT BLD: 516 SECONDS (ref 82–152)
ALBUMIN SERPL-MCNC: 3.93 G/DL (ref 3.2–4.8)
ALBUMIN SERPL-MCNC: 3.94 G/DL (ref 3.2–4.8)
ANION GAP SERPL CALCULATED.3IONS-SCNC: 4 MMOL/L (ref 3–11)
ANION GAP SERPL CALCULATED.3IONS-SCNC: 5 MMOL/L (ref 3–11)
APTT PPP: <24 SECONDS (ref 24–31)
ARTERIAL PATENCY WRIST A: ABNORMAL
ATMOSPHERIC PRESS: ABNORMAL MMHG
BASE EXCESS BLDA CALC-SCNC: -1 MMOL/L (ref -5–5)
BASE EXCESS BLDA CALC-SCNC: -2 MMOL/L (ref -5–5)
BASE EXCESS BLDA CALC-SCNC: -4 MMOL/L (ref -5–5)
BASE EXCESS BLDA CALC-SCNC: -4 MMOL/L (ref 0–2)
BASE EXCESS BLDA CALC-SCNC: -4.3 MMOL/L (ref 0–2)
BASE EXCESS BLDA CALC-SCNC: -6 MMOL/L (ref 0–2)
BASE EXCESS BLDA CALC-SCNC: 0 MMOL/L (ref -5–5)
BDY SITE: ABNORMAL
BUN BLD-MCNC: 35 MG/DL (ref 9–23)
BUN BLD-MCNC: 36 MG/DL (ref 9–23)
BUN/CREAT SERPL: 21.5 (ref 7–25)
BUN/CREAT SERPL: 22.5 (ref 7–25)
CA-I BLDA-SCNC: 1.01 MMOL/L (ref 1.2–1.32)
CA-I BLDA-SCNC: 1.01 MMOL/L (ref 1.2–1.32)
CA-I BLDA-SCNC: 1.04 MMOL/L (ref 1.2–1.32)
CA-I BLDA-SCNC: 1.14 MMOL/L (ref 1.2–1.32)
CA-I BLDA-SCNC: 1.24 MMOL/L (ref 1.2–1.32)
CA-I BLDA-SCNC: 1.33 MMOL/L (ref 1.2–1.32)
CA-I SERPL ISE-MCNC: 1.2 MMOL/L (ref 1.12–1.32)
CALCIUM SPEC-SCNC: 8 MG/DL (ref 8.7–10.4)
CALCIUM SPEC-SCNC: 8 MG/DL (ref 8.7–10.4)
CHLORIDE SERPL-SCNC: 110 MMOL/L (ref 99–109)
CHLORIDE SERPL-SCNC: 110 MMOL/L (ref 99–109)
CO2 BLDA-SCNC: 22 MMOL/L (ref 24–29)
CO2 BLDA-SCNC: 22.7 MMOL/L (ref 22–33)
CO2 BLDA-SCNC: 22.7 MMOL/L (ref 22–33)
CO2 BLDA-SCNC: 23 MMOL/L (ref 24–29)
CO2 BLDA-SCNC: 23.3 MMOL/L (ref 22–33)
CO2 BLDA-SCNC: 24 MMOL/L (ref 24–29)
CO2 BLDA-SCNC: 25 MMOL/L (ref 24–29)
CO2 BLDA-SCNC: 27 MMOL/L (ref 24–29)
CO2 BLDA-SCNC: 28 MMOL/L (ref 24–29)
CO2 SERPL-SCNC: 23 MMOL/L (ref 20–31)
CO2 SERPL-SCNC: 23 MMOL/L (ref 20–31)
COHGB MFR BLD: 0.7 % (ref 0–2)
COHGB MFR BLD: 0.8 % (ref 0–2)
COHGB MFR BLD: 1.3 % (ref 0–2)
CREAT BLD-MCNC: 1.6 MG/DL (ref 0.6–1.3)
CREAT BLD-MCNC: 1.63 MG/DL (ref 0.6–1.3)
DEPRECATED RDW RBC AUTO: 45.7 FL (ref 37–54)
DEPRECATED RDW RBC AUTO: 46.1 FL (ref 37–54)
ERYTHROCYTE [DISTWIDTH] IN BLOOD BY AUTOMATED COUNT: 14.1 % (ref 11.3–14.5)
ERYTHROCYTE [DISTWIDTH] IN BLOOD BY AUTOMATED COUNT: 14.2 % (ref 11.3–14.5)
GFR SERPL CREATININE-BSD FRML MDRD: 42 ML/MIN/1.73
GFR SERPL CREATININE-BSD FRML MDRD: 43 ML/MIN/1.73
GLUCOSE BLD-MCNC: 125 MG/DL (ref 70–100)
GLUCOSE BLD-MCNC: 152 MG/DL (ref 70–100)
GLUCOSE BLDC GLUCOMTR-MCNC: 104 MG/DL (ref 70–130)
GLUCOSE BLDC GLUCOMTR-MCNC: 117 MG/DL (ref 70–130)
GLUCOSE BLDC GLUCOMTR-MCNC: 122 MG/DL (ref 70–130)
GLUCOSE BLDC GLUCOMTR-MCNC: 126 MG/DL (ref 70–130)
GLUCOSE BLDC GLUCOMTR-MCNC: 130 MG/DL (ref 70–130)
GLUCOSE BLDC GLUCOMTR-MCNC: 137 MG/DL (ref 70–130)
GLUCOSE BLDC GLUCOMTR-MCNC: 141 MG/DL (ref 70–130)
GLUCOSE BLDC GLUCOMTR-MCNC: 142 MG/DL (ref 70–130)
GLUCOSE BLDC GLUCOMTR-MCNC: 150 MG/DL (ref 70–130)
GLUCOSE BLDC GLUCOMTR-MCNC: 152 MG/DL (ref 70–130)
GLUCOSE BLDC GLUCOMTR-MCNC: 159 MG/DL (ref 70–130)
GLUCOSE BLDC GLUCOMTR-MCNC: 173 MG/DL (ref 70–130)
HCO3 BLDA-SCNC: 21.3 MMOL/L (ref 22–26)
HCO3 BLDA-SCNC: 21.4 MMOL/L (ref 20–26)
HCO3 BLDA-SCNC: 21.5 MMOL/L (ref 20–26)
HCO3 BLDA-SCNC: 21.7 MMOL/L (ref 20–26)
HCO3 BLDA-SCNC: 22 MMOL/L (ref 22–26)
HCO3 BLDA-SCNC: 22.3 MMOL/L (ref 22–26)
HCO3 BLDA-SCNC: 23.9 MMOL/L (ref 22–26)
HCO3 BLDA-SCNC: 25.4 MMOL/L (ref 22–26)
HCO3 BLDA-SCNC: 26 MMOL/L (ref 22–26)
HCT VFR BLD AUTO: 30.7 % (ref 38.9–50.9)
HCT VFR BLD AUTO: 31.9 % (ref 38.9–50.9)
HCT VFR BLD AUTO: 32 % (ref 38.9–50.9)
HCT VFR BLD CALC: 31 %
HCT VFR BLD CALC: 31.8 %
HCT VFR BLD CALC: 34.6 %
HCT VFR BLDA CALC: 27 % (ref 38–51)
HCT VFR BLDA CALC: 27 % (ref 38–51)
HCT VFR BLDA CALC: 28 % (ref 38–51)
HCT VFR BLDA CALC: 29 % (ref 38–51)
HCT VFR BLDA CALC: 30 % (ref 38–51)
HCT VFR BLDA CALC: 34 % (ref 38–51)
HGB BLD-MCNC: 10 G/DL (ref 13.1–17.5)
HGB BLD-MCNC: 10.4 G/DL (ref 13.1–17.5)
HGB BLD-MCNC: 10.5 G/DL (ref 13.1–17.5)
HGB BLDA-MCNC: 10.1 G/DL (ref 13.5–17.5)
HGB BLDA-MCNC: 10.2 G/DL (ref 12–17)
HGB BLDA-MCNC: 10.4 G/DL (ref 13.5–17.5)
HGB BLDA-MCNC: 11.3 G/DL (ref 13.5–17.5)
HGB BLDA-MCNC: 11.6 G/DL (ref 12–17)
HGB BLDA-MCNC: 9.2 G/DL (ref 12–17)
HGB BLDA-MCNC: 9.2 G/DL (ref 12–17)
HGB BLDA-MCNC: 9.5 G/DL (ref 12–17)
HGB BLDA-MCNC: 9.9 G/DL (ref 12–17)
HOROWITZ INDEX BLD+IHG-RTO: 100 %
HOROWITZ INDEX BLD+IHG-RTO: 40 %
HOROWITZ INDEX BLD+IHG-RTO: 40 %
INR PPP: 1.07 (ref 0.91–1.09)
MAGNESIUM SERPL-MCNC: 2.6 MG/DL (ref 1.3–2.7)
MAGNESIUM SERPL-MCNC: 2.6 MG/DL (ref 1.3–2.7)
MCH RBC QN AUTO: 28.8 PG (ref 27–31)
MCH RBC QN AUTO: 29.1 PG (ref 27–31)
MCHC RBC AUTO-ENTMCNC: 32.6 G/DL (ref 32–36)
MCHC RBC AUTO-ENTMCNC: 32.8 G/DL (ref 32–36)
MCV RBC AUTO: 87.9 FL (ref 80–99)
MCV RBC AUTO: 89.2 FL (ref 80–99)
METHGB BLD QL: 1.6 % (ref 0–1.5)
METHGB BLD QL: 1.7 % (ref 0–1.5)
METHGB BLD QL: 1.7 % (ref 0–1.5)
MODALITY: ABNORMAL
OXYHGB MFR BLDV: 94.5 % (ref 94–99)
OXYHGB MFR BLDV: 95.2 % (ref 94–99)
OXYHGB MFR BLDV: 96.8 % (ref 94–99)
PCO2 BLDA: 37.7 MM HG (ref 35–45)
PCO2 BLDA: 39.7 MM HG (ref 35–48)
PCO2 BLDA: 41.2 MM HG (ref 35–48)
PCO2 BLDA: 42.8 MM HG (ref 35–45)
PCO2 BLDA: 46.6 MM HG (ref 35–45)
PCO2 BLDA: 47.4 MM HG (ref 35–45)
PCO2 BLDA: 49.1 MM HG (ref 35–45)
PCO2 BLDA: 50.1 MM HG (ref 35–45)
PCO2 BLDA: 52 MM HG (ref 35–48)
PH BLDA: 7.23 PH UNITS (ref 7.35–7.45)
PH BLDA: 7.29 PH UNITS (ref 7.35–7.6)
PH BLDA: 7.31 PH UNITS (ref 7.35–7.6)
PH BLDA: 7.32 PH UNITS (ref 7.35–7.45)
PH BLDA: 7.32 PH UNITS (ref 7.35–7.6)
PH BLDA: 7.34 PH UNITS (ref 7.35–7.45)
PH BLDA: 7.36 PH UNITS (ref 7.35–7.6)
PHOSPHATE SERPL-MCNC: 2.8 MG/DL (ref 2.4–5.1)
PHOSPHATE SERPL-MCNC: 3.4 MG/DL (ref 2.4–5.1)
PLATELET # BLD AUTO: 114 10*3/MM3 (ref 150–450)
PLATELET # BLD AUTO: 119 10*3/MM3 (ref 150–450)
PMV BLD AUTO: 10.4 FL (ref 6–12)
PMV BLD AUTO: 10.4 FL (ref 6–12)
PO2 BLDA: 100 MM HG (ref 83–108)
PO2 BLDA: 191 MM HG (ref 83–108)
PO2 BLDA: 196 MMHG (ref 80–105)
PO2 BLDA: 301 MMHG (ref 80–105)
PO2 BLDA: 330 MMHG (ref 80–105)
PO2 BLDA: 335 MMHG (ref 80–105)
PO2 BLDA: 401 MMHG (ref 80–105)
PO2 BLDA: 82 MMHG (ref 80–105)
PO2 BLDA: 92.1 MM HG (ref 83–108)
POTASSIUM BLD-SCNC: 3.6 MMOL/L (ref 3.5–5.5)
POTASSIUM BLD-SCNC: 3.8 MMOL/L (ref 3.5–5.5)
POTASSIUM BLD-SCNC: 5.2 MMOL/L (ref 3.5–5.5)
POTASSIUM BLDA-SCNC: 4 MMOL/L (ref 3.5–4.9)
POTASSIUM BLDA-SCNC: 4.3 MMOL/L (ref 3.5–4.9)
POTASSIUM BLDA-SCNC: 4.6 MMOL/L (ref 3.5–4.9)
POTASSIUM BLDA-SCNC: 4.9 MMOL/L (ref 3.5–4.9)
POTASSIUM BLDA-SCNC: 5.2 MMOL/L (ref 3.5–4.9)
POTASSIUM BLDA-SCNC: 5.7 MMOL/L (ref 3.5–4.9)
PROTHROMBIN TIME: 11.2 SECONDS (ref 9.6–11.5)
RBC # BLD AUTO: 3.44 10*6/MM3 (ref 4.2–5.76)
RBC # BLD AUTO: 3.64 10*6/MM3 (ref 4.2–5.76)
RH BLD: POSITIVE
SAO2 % BLDA: 100 % (ref 95–98)
SAO2 % BLDA: 96 % (ref 95–98)
SODIUM BLD-SCNC: 137 MMOL/L (ref 132–146)
SODIUM BLD-SCNC: 138 MMOL/L (ref 132–146)
SODIUM BLDA-SCNC: 138 MMOL/L (ref 138–146)
SODIUM BLDA-SCNC: 138 MMOL/L (ref 138–146)
SODIUM BLDA-SCNC: 139 MMOL/L (ref 138–146)
SODIUM BLDA-SCNC: 140 MMOL/L (ref 138–146)
SODIUM BLDA-SCNC: 141 MMOL/L (ref 138–146)
SODIUM BLDA-SCNC: 143 MMOL/L (ref 138–146)
WBC NRBC COR # BLD: 4.89 10*3/MM3 (ref 3.5–10.8)
WBC NRBC COR # BLD: 5.18 10*3/MM3 (ref 3.5–10.8)

## 2018-10-11 PROCEDURE — 33519 CABG ARTERY-VEIN THREE: CPT | Performed by: PHYSICIAN ASSISTANT

## 2018-10-11 PROCEDURE — 25010000002 PAPAVERINE PER 60 MG: Performed by: THORACIC SURGERY (CARDIOTHORACIC VASCULAR SURGERY)

## 2018-10-11 PROCEDURE — 33508 ENDOSCOPIC VEIN HARVEST: CPT | Performed by: THORACIC SURGERY (CARDIOTHORACIC VASCULAR SURGERY)

## 2018-10-11 PROCEDURE — 71045 X-RAY EXAM CHEST 1 VIEW: CPT

## 2018-10-11 PROCEDURE — 25010000002 HEPARIN (PORCINE) PER 1000 UNITS: Performed by: THORACIC SURGERY (CARDIOTHORACIC VASCULAR SURGERY)

## 2018-10-11 PROCEDURE — 93010 ELECTROCARDIOGRAM REPORT: CPT | Performed by: INTERNAL MEDICINE

## 2018-10-11 PROCEDURE — 82947 ASSAY GLUCOSE BLOOD QUANT: CPT

## 2018-10-11 PROCEDURE — 33533 CABG ARTERIAL SINGLE: CPT | Performed by: PHYSICIAN ASSISTANT

## 2018-10-11 PROCEDURE — 93005 ELECTROCARDIOGRAM TRACING: CPT | Performed by: PHYSICIAN ASSISTANT

## 2018-10-11 PROCEDURE — 25010000002 PROPOFOL 1000 MG/ML EMULSION: Performed by: THORACIC SURGERY (CARDIOTHORACIC VASCULAR SURGERY)

## 2018-10-11 PROCEDURE — 85610 PROTHROMBIN TIME: CPT | Performed by: PHYSICIAN ASSISTANT

## 2018-10-11 PROCEDURE — 25010000002 MIDAZOLAM PER 1 MG: Performed by: ANESTHESIOLOGY

## 2018-10-11 PROCEDURE — 86900 BLOOD TYPING SEROLOGIC ABO: CPT

## 2018-10-11 PROCEDURE — 25010000003 DOPAMINE PER 40 MG: Performed by: ANESTHESIOLOGY

## 2018-10-11 PROCEDURE — 85018 HEMOGLOBIN: CPT | Performed by: THORACIC SURGERY (CARDIOTHORACIC VASCULAR SURGERY)

## 2018-10-11 PROCEDURE — 25810000003 DEXTROSE 5 % WITH KCL 20 MEQ 20-5 MEQ/L-% SOLUTION: Performed by: PHYSICIAN ASSISTANT

## 2018-10-11 PROCEDURE — 82805 BLOOD GASES W/O2 SATURATION: CPT | Performed by: THORACIC SURGERY (CARDIOTHORACIC VASCULAR SURGERY)

## 2018-10-11 PROCEDURE — 85014 HEMATOCRIT: CPT | Performed by: THORACIC SURGERY (CARDIOTHORACIC VASCULAR SURGERY)

## 2018-10-11 PROCEDURE — 99232 SBSQ HOSP IP/OBS MODERATE 35: CPT | Performed by: INTERNAL MEDICINE

## 2018-10-11 PROCEDURE — 94002 VENT MGMT INPAT INIT DAY: CPT

## 2018-10-11 PROCEDURE — 83735 ASSAY OF MAGNESIUM: CPT | Performed by: PHYSICIAN ASSISTANT

## 2018-10-11 PROCEDURE — P9041 ALBUMIN (HUMAN),5%, 50ML: HCPCS | Performed by: PHYSICIAN ASSISTANT

## 2018-10-11 PROCEDURE — 93005 ELECTROCARDIOGRAM TRACING: CPT | Performed by: THORACIC SURGERY (CARDIOTHORACIC VASCULAR SURGERY)

## 2018-10-11 PROCEDURE — 25010000002 HEPARIN (PORCINE) PER 1000 UNITS: Performed by: ANESTHESIOLOGY

## 2018-10-11 PROCEDURE — 82805 BLOOD GASES W/O2 SATURATION: CPT | Performed by: PHYSICIAN ASSISTANT

## 2018-10-11 PROCEDURE — 82330 ASSAY OF CALCIUM: CPT | Performed by: PHYSICIAN ASSISTANT

## 2018-10-11 PROCEDURE — 33533 CABG ARTERIAL SINGLE: CPT | Performed by: THORACIC SURGERY (CARDIOTHORACIC VASCULAR SURGERY)

## 2018-10-11 PROCEDURE — 02100Z9 BYPASS CORONARY ARTERY, ONE ARTERY FROM LEFT INTERNAL MAMMARY, OPEN APPROACH: ICD-10-PCS | Performed by: THORACIC SURGERY (CARDIOTHORACIC VASCULAR SURGERY)

## 2018-10-11 PROCEDURE — 25010000002 CEFUROXIME: Performed by: PHYSICIAN ASSISTANT

## 2018-10-11 PROCEDURE — 25010000002 PROTAMINE SULFATE PER 10 MG

## 2018-10-11 PROCEDURE — 25010000002 MORPHINE SULFATE (PF) 2 MG/ML SOLUTION: Performed by: THORACIC SURGERY (CARDIOTHORACIC VASCULAR SURGERY)

## 2018-10-11 PROCEDURE — 82330 ASSAY OF CALCIUM: CPT

## 2018-10-11 PROCEDURE — 84132 ASSAY OF SERUM POTASSIUM: CPT

## 2018-10-11 PROCEDURE — 85014 HEMATOCRIT: CPT

## 2018-10-11 PROCEDURE — 86901 BLOOD TYPING SEROLOGIC RH(D): CPT

## 2018-10-11 PROCEDURE — 94799 UNLISTED PULMONARY SVC/PX: CPT

## 2018-10-11 PROCEDURE — 84132 ASSAY OF SERUM POTASSIUM: CPT | Performed by: THORACIC SURGERY (CARDIOTHORACIC VASCULAR SURGERY)

## 2018-10-11 PROCEDURE — 021209W BYPASS CORONARY ARTERY, THREE ARTERIES FROM AORTA WITH AUTOLOGOUS VENOUS TISSUE, OPEN APPROACH: ICD-10-PCS | Performed by: THORACIC SURGERY (CARDIOTHORACIC VASCULAR SURGERY)

## 2018-10-11 PROCEDURE — 25010000002 FENTANYL CITRATE (PF) 100 MCG/2ML SOLUTION: Performed by: THORACIC SURGERY (CARDIOTHORACIC VASCULAR SURGERY)

## 2018-10-11 PROCEDURE — 25010000002 DOPAMINE PER 40 MG: Performed by: PHYSICIAN ASSISTANT

## 2018-10-11 PROCEDURE — 80069 RENAL FUNCTION PANEL: CPT | Performed by: PHYSICIAN ASSISTANT

## 2018-10-11 PROCEDURE — 85347 COAGULATION TIME ACTIVATED: CPT

## 2018-10-11 PROCEDURE — C1751 CATH, INF, PER/CENT/MIDLINE: HCPCS | Performed by: ANESTHESIOLOGY

## 2018-10-11 PROCEDURE — 84295 ASSAY OF SERUM SODIUM: CPT

## 2018-10-11 PROCEDURE — 85730 THROMBOPLASTIN TIME PARTIAL: CPT | Performed by: PHYSICIAN ASSISTANT

## 2018-10-11 PROCEDURE — 94760 N-INVAS EAR/PLS OXIMETRY 1: CPT

## 2018-10-11 PROCEDURE — 85027 COMPLETE CBC AUTOMATED: CPT | Performed by: PHYSICIAN ASSISTANT

## 2018-10-11 PROCEDURE — 25010000002 DOBUTAMINE 250 MG/20ML SOLUTION 20 ML VIAL: Performed by: ANESTHESIOLOGY

## 2018-10-11 PROCEDURE — 33519 CABG ARTERY-VEIN THREE: CPT | Performed by: THORACIC SURGERY (CARDIOTHORACIC VASCULAR SURGERY)

## 2018-10-11 PROCEDURE — A4648 IMPLANTABLE TISSUE MARKER: HCPCS | Performed by: THORACIC SURGERY (CARDIOTHORACIC VASCULAR SURGERY)

## 2018-10-11 PROCEDURE — 06BP4ZZ EXCISION OF RIGHT SAPHENOUS VEIN, PERCUTANEOUS ENDOSCOPIC APPROACH: ICD-10-PCS | Performed by: THORACIC SURGERY (CARDIOTHORACIC VASCULAR SURGERY)

## 2018-10-11 PROCEDURE — 63710000001 INSULIN REGULAR HUMAN PER 5 UNITS: Performed by: ANESTHESIOLOGY

## 2018-10-11 PROCEDURE — 82803 BLOOD GASES ANY COMBINATION: CPT

## 2018-10-11 PROCEDURE — 25010000002 CEFUROXIME PER 750 MG: Performed by: ANESTHESIOLOGY

## 2018-10-11 PROCEDURE — 5A1221Z PERFORMANCE OF CARDIAC OUTPUT, CONTINUOUS: ICD-10-PCS | Performed by: THORACIC SURGERY (CARDIOTHORACIC VASCULAR SURGERY)

## 2018-10-11 PROCEDURE — 25010000003 POTASSIUM CHLORIDE PER 2 MEQ: Performed by: PHYSICIAN ASSISTANT

## 2018-10-11 PROCEDURE — 25010000002 ALBUMIN HUMAN 5% PER 50 ML: Performed by: PHYSICIAN ASSISTANT

## 2018-10-11 PROCEDURE — 25010000002 PROTAMINE SULFATE PER 10 MG: Performed by: ANESTHESIOLOGY

## 2018-10-11 DEVICE — DISK-SHAPED STYLE, SILICONE (1 PER STERILE PKG)
Type: IMPLANTABLE DEVICE | Status: FUNCTIONAL
Brand: SCANLAN® RADIOMARK® GRAFT MARKERS

## 2018-10-11 RX ORDER — SODIUM CHLORIDE 9 MG/ML
30 INJECTION, SOLUTION INTRAVENOUS CONTINUOUS PRN
Status: DISCONTINUED | OUTPATIENT
Start: 2018-10-11 | End: 2018-10-12

## 2018-10-11 RX ORDER — AMINOCAPROIC ACID 250 MG/ML
INJECTION, SOLUTION INTRAVENOUS AS NEEDED
Status: DISCONTINUED | OUTPATIENT
Start: 2018-10-11 | End: 2018-10-11 | Stop reason: SURG

## 2018-10-11 RX ORDER — BISACODYL 5 MG/1
10 TABLET, DELAYED RELEASE ORAL DAILY PRN
Status: DISCONTINUED | OUTPATIENT
Start: 2018-10-11 | End: 2018-10-22 | Stop reason: HOSPADM

## 2018-10-11 RX ORDER — GLYCOPYRROLATE 0.2 MG/ML
INJECTION INTRAMUSCULAR; INTRAVENOUS AS NEEDED
Status: DISCONTINUED | OUTPATIENT
Start: 2018-10-11 | End: 2018-10-11 | Stop reason: SURG

## 2018-10-11 RX ORDER — ONDANSETRON 2 MG/ML
4 INJECTION INTRAMUSCULAR; INTRAVENOUS EVERY 6 HOURS PRN
Status: DISCONTINUED | OUTPATIENT
Start: 2018-10-11 | End: 2018-10-22 | Stop reason: HOSPADM

## 2018-10-11 RX ORDER — CHLORHEXIDINE GLUCONATE 0.12 MG/ML
15 RINSE ORAL EVERY 12 HOURS SCHEDULED
Status: DISCONTINUED | OUTPATIENT
Start: 2018-10-11 | End: 2018-10-12

## 2018-10-11 RX ORDER — ALBUTEROL SULFATE 2.5 MG/3ML
2.5 SOLUTION RESPIRATORY (INHALATION) EVERY 4 HOURS PRN
Status: DISCONTINUED | OUTPATIENT
Start: 2018-10-11 | End: 2018-10-12

## 2018-10-11 RX ORDER — ETOMIDATE 2 MG/ML
INJECTION INTRAVENOUS AS NEEDED
Status: DISCONTINUED | OUTPATIENT
Start: 2018-10-11 | End: 2018-10-11 | Stop reason: SURG

## 2018-10-11 RX ORDER — DEXTROSE MONOHYDRATE 50 MG/ML
30 INJECTION, SOLUTION INTRAVENOUS CONTINUOUS
Status: DISCONTINUED | OUTPATIENT
Start: 2018-10-11 | End: 2018-10-12

## 2018-10-11 RX ORDER — LIDOCAINE HYDROCHLORIDE 10 MG/ML
0.5 INJECTION, SOLUTION EPIDURAL; INFILTRATION; INTRACAUDAL; PERINEURAL ONCE AS NEEDED
Status: COMPLETED | OUTPATIENT
Start: 2018-10-11 | End: 2018-10-11

## 2018-10-11 RX ORDER — MORPHINE SULFATE 2 MG/ML
2 INJECTION, SOLUTION INTRAMUSCULAR; INTRAVENOUS
Status: DISCONTINUED | OUTPATIENT
Start: 2018-10-11 | End: 2018-10-12

## 2018-10-11 RX ORDER — ALBUMIN, HUMAN INJ 5% 5 %
500 SOLUTION INTRAVENOUS AS NEEDED
Status: COMPLETED | OUTPATIENT
Start: 2018-10-11 | End: 2018-10-11

## 2018-10-11 RX ORDER — SODIUM CHLORIDE 0.9 % (FLUSH) 0.9 %
3 SYRINGE (ML) INJECTION EVERY 12 HOURS SCHEDULED
Status: DISCONTINUED | OUTPATIENT
Start: 2018-10-11 | End: 2018-10-11 | Stop reason: HOSPADM

## 2018-10-11 RX ORDER — HEPARIN SODIUM 1000 [USP'U]/ML
INJECTION, SOLUTION INTRAVENOUS; SUBCUTANEOUS AS NEEDED
Status: DISCONTINUED | OUTPATIENT
Start: 2018-10-11 | End: 2018-10-11 | Stop reason: SURG

## 2018-10-11 RX ORDER — CHLORHEXIDINE GLUCONATE 500 MG/1
1 CLOTH TOPICAL EVERY 12 HOURS PRN
Status: DISCONTINUED | OUTPATIENT
Start: 2018-10-11 | End: 2018-10-11 | Stop reason: HOSPADM

## 2018-10-11 RX ORDER — SODIUM CHLORIDE 9 MG/ML
INJECTION, SOLUTION INTRAVENOUS CONTINUOUS PRN
Status: DISCONTINUED | OUTPATIENT
Start: 2018-10-11 | End: 2018-10-11 | Stop reason: SURG

## 2018-10-11 RX ORDER — BISACODYL 10 MG
10 SUPPOSITORY, RECTAL RECTAL DAILY PRN
Status: DISCONTINUED | OUTPATIENT
Start: 2018-10-12 | End: 2018-10-14

## 2018-10-11 RX ORDER — PROTAMINE SULFATE 10 MG/ML
INJECTION, SOLUTION INTRAVENOUS AS NEEDED
Status: DISCONTINUED | OUTPATIENT
Start: 2018-10-11 | End: 2018-10-11 | Stop reason: SURG

## 2018-10-11 RX ORDER — DOPAMINE HYDROCHLORIDE 160 MG/100ML
2-20 INJECTION, SOLUTION INTRAVENOUS CONTINUOUS PRN
Status: DISCONTINUED | OUTPATIENT
Start: 2018-10-11 | End: 2018-10-12

## 2018-10-11 RX ORDER — MEPERIDINE HYDROCHLORIDE 25 MG/ML
25 INJECTION INTRAMUSCULAR; INTRAVENOUS; SUBCUTANEOUS EVERY 4 HOURS PRN
Status: ACTIVE | OUTPATIENT
Start: 2018-10-11 | End: 2018-10-11

## 2018-10-11 RX ORDER — SODIUM CHLORIDE 0.9 % (FLUSH) 0.9 %
3-10 SYRINGE (ML) INJECTION AS NEEDED
Status: DISCONTINUED | OUTPATIENT
Start: 2018-10-11 | End: 2018-10-11 | Stop reason: HOSPADM

## 2018-10-11 RX ORDER — ASPIRIN 325 MG
325 TABLET ORAL ONCE
Status: DISCONTINUED | OUTPATIENT
Start: 2018-10-11 | End: 2018-10-11

## 2018-10-11 RX ORDER — MAGNESIUM HYDROXIDE 1200 MG/15ML
LIQUID ORAL AS NEEDED
Status: DISCONTINUED | OUTPATIENT
Start: 2018-10-11 | End: 2018-10-11 | Stop reason: HOSPADM

## 2018-10-11 RX ORDER — MIDAZOLAM HYDROCHLORIDE 1 MG/ML
INJECTION INTRAMUSCULAR; INTRAVENOUS AS NEEDED
Status: DISCONTINUED | OUTPATIENT
Start: 2018-10-11 | End: 2018-10-11 | Stop reason: SURG

## 2018-10-11 RX ORDER — SUFENTANIL CITRATE 50 UG/ML
INJECTION EPIDURAL; INTRAVENOUS AS NEEDED
Status: DISCONTINUED | OUTPATIENT
Start: 2018-10-11 | End: 2018-10-11 | Stop reason: SURG

## 2018-10-11 RX ORDER — NITROGLYCERIN 0.4 MG/1
0.4 TABLET SUBLINGUAL
Status: DISCONTINUED | OUTPATIENT
Start: 2018-10-11 | End: 2018-10-11 | Stop reason: HOSPADM

## 2018-10-11 RX ORDER — POTASSIUM CHLORIDE 1.5 G/1.77G
40 POWDER, FOR SOLUTION ORAL AS NEEDED
Status: DISCONTINUED | OUTPATIENT
Start: 2018-10-11 | End: 2018-10-14

## 2018-10-11 RX ORDER — SODIUM CHLORIDE 0.9 % (FLUSH) 0.9 %
30 SYRINGE (ML) INJECTION ONCE AS NEEDED
Status: DISCONTINUED | OUTPATIENT
Start: 2018-10-11 | End: 2018-10-12

## 2018-10-11 RX ORDER — NITROGLYCERIN 20 MG/100ML
5-200 INJECTION INTRAVENOUS CONTINUOUS PRN
Status: DISCONTINUED | OUTPATIENT
Start: 2018-10-11 | End: 2018-10-14

## 2018-10-11 RX ORDER — PHENYLEPHRINE HCL IN 0.9% NACL 0.5 MG/5ML
.5-3 SYRINGE (ML) INTRAVENOUS CONTINUOUS PRN
Status: DISCONTINUED | OUTPATIENT
Start: 2018-10-11 | End: 2018-10-14

## 2018-10-11 RX ORDER — PROTAMINE SULFATE 10 MG/ML
INJECTION, SOLUTION INTRAVENOUS
Status: COMPLETED
Start: 2018-10-11 | End: 2018-10-11

## 2018-10-11 RX ORDER — CARVEDILOL 12.5 MG/1
25 TABLET ORAL ONCE
Status: COMPLETED | OUTPATIENT
Start: 2018-10-11 | End: 2018-10-11

## 2018-10-11 RX ORDER — PAPAVERINE HYDROCHLORIDE 30 MG/ML
INJECTION INTRAMUSCULAR; INTRAVENOUS AS NEEDED
Status: DISCONTINUED | OUTPATIENT
Start: 2018-10-11 | End: 2018-10-11 | Stop reason: HOSPADM

## 2018-10-11 RX ORDER — POTASSIUM CHLORIDE 750 MG/1
40 CAPSULE, EXTENDED RELEASE ORAL AS NEEDED
Status: DISCONTINUED | OUTPATIENT
Start: 2018-10-11 | End: 2018-10-14

## 2018-10-11 RX ORDER — ROCURONIUM BROMIDE 10 MG/ML
INJECTION, SOLUTION INTRAVENOUS AS NEEDED
Status: DISCONTINUED | OUTPATIENT
Start: 2018-10-11 | End: 2018-10-11 | Stop reason: SURG

## 2018-10-11 RX ORDER — ASPIRIN 325 MG
325 TABLET, DELAYED RELEASE (ENTERIC COATED) ORAL DAILY
Status: DISCONTINUED | OUTPATIENT
Start: 2018-10-12 | End: 2018-10-11

## 2018-10-11 RX ORDER — NOREPINEPHRINE BIT/0.9 % NACL 8 MG/250ML
.02-.3 INFUSION BOTTLE (ML) INTRAVENOUS CONTINUOUS PRN
Status: DISCONTINUED | OUTPATIENT
Start: 2018-10-11 | End: 2018-10-14

## 2018-10-11 RX ORDER — NOREPINEPHRINE BITARTRATE 1 MG/ML
INJECTION, SOLUTION INTRAVENOUS CONTINUOUS PRN
Status: DISCONTINUED | OUTPATIENT
Start: 2018-10-11 | End: 2018-10-11 | Stop reason: SURG

## 2018-10-11 RX ORDER — METOPROLOL TARTRATE 5 MG/5ML
2.5 INJECTION INTRAVENOUS EVERY 6 HOURS SCHEDULED
Status: DISCONTINUED | OUTPATIENT
Start: 2018-10-11 | End: 2018-10-12

## 2018-10-11 RX ORDER — SENNA AND DOCUSATE SODIUM 50; 8.6 MG/1; MG/1
2 TABLET, FILM COATED ORAL 2 TIMES DAILY
Status: DISCONTINUED | OUTPATIENT
Start: 2018-10-11 | End: 2018-10-22 | Stop reason: HOSPADM

## 2018-10-11 RX ORDER — POTASSIUM CHLORIDE 29.8 MG/ML
20 INJECTION INTRAVENOUS
Status: DISCONTINUED | OUTPATIENT
Start: 2018-10-11 | End: 2018-10-12

## 2018-10-11 RX ORDER — DOPAMINE HYDROCHLORIDE 80 MG/100ML
INJECTION, SOLUTION INTRAVENOUS CONTINUOUS PRN
Status: DISCONTINUED | OUTPATIENT
Start: 2018-10-11 | End: 2018-10-11 | Stop reason: SURG

## 2018-10-11 RX ORDER — VECURONIUM BROMIDE 1 MG/ML
INJECTION, POWDER, LYOPHILIZED, FOR SOLUTION INTRAVENOUS AS NEEDED
Status: DISCONTINUED | OUTPATIENT
Start: 2018-10-11 | End: 2018-10-11 | Stop reason: SURG

## 2018-10-11 RX ORDER — CHLORHEXIDINE GLUCONATE 0.12 MG/ML
15 RINSE ORAL ONCE
Status: COMPLETED | OUTPATIENT
Start: 2018-10-11 | End: 2018-10-11

## 2018-10-11 RX ORDER — SODIUM CHLORIDE 9 MG/ML
INJECTION, SOLUTION INTRAVENOUS AS NEEDED
Status: DISCONTINUED | OUTPATIENT
Start: 2018-10-11 | End: 2018-10-11 | Stop reason: HOSPADM

## 2018-10-11 RX ORDER — ALBUMIN, HUMAN INJ 5% 5 %
SOLUTION INTRAVENOUS
Status: DISPENSED
Start: 2018-10-11 | End: 2018-10-11

## 2018-10-11 RX ORDER — DOCUSATE SODIUM 100 MG/1
100 CAPSULE, LIQUID FILLED ORAL 2 TIMES DAILY PRN
Status: DISCONTINUED | OUTPATIENT
Start: 2018-10-11 | End: 2018-10-22 | Stop reason: HOSPADM

## 2018-10-11 RX ORDER — SODIUM CHLORIDE 9 MG/ML
50 INJECTION, SOLUTION INTRAVENOUS CONTINUOUS
Status: DISCONTINUED | OUTPATIENT
Start: 2018-10-11 | End: 2018-10-11

## 2018-10-11 RX ORDER — PROTAMINE SULFATE 10 MG/ML
100 INJECTION, SOLUTION INTRAVENOUS ONCE
Status: COMPLETED | OUTPATIENT
Start: 2018-10-11 | End: 2018-10-11

## 2018-10-11 RX ORDER — FAMOTIDINE 20 MG/1
20 TABLET, FILM COATED ORAL ONCE
Status: COMPLETED | OUTPATIENT
Start: 2018-10-11 | End: 2018-10-11

## 2018-10-11 RX ORDER — PROTAMINE SULFATE 10 MG/ML
50 INJECTION, SOLUTION INTRAVENOUS ONCE
Status: DISCONTINUED | OUTPATIENT
Start: 2018-10-11 | End: 2018-10-11

## 2018-10-11 RX ORDER — FAMOTIDINE 10 MG/ML
20 INJECTION, SOLUTION INTRAVENOUS ONCE
Status: DISCONTINUED | OUTPATIENT
Start: 2018-10-11 | End: 2018-10-11

## 2018-10-11 RX ORDER — ACETAMINOPHEN 325 MG/1
650 TABLET ORAL EVERY 4 HOURS PRN
Status: DISCONTINUED | OUTPATIENT
Start: 2018-10-11 | End: 2018-10-11 | Stop reason: HOSPADM

## 2018-10-11 RX ORDER — OXYCODONE HYDROCHLORIDE AND ACETAMINOPHEN 5; 325 MG/1; MG/1
2 TABLET ORAL EVERY 4 HOURS PRN
Status: DISCONTINUED | OUTPATIENT
Start: 2018-10-11 | End: 2018-10-16

## 2018-10-11 RX ORDER — SODIUM CHLORIDE, SODIUM LACTATE, POTASSIUM CHLORIDE, CALCIUM CHLORIDE 600; 310; 30; 20 MG/100ML; MG/100ML; MG/100ML; MG/100ML
9 INJECTION, SOLUTION INTRAVENOUS CONTINUOUS
Status: DISCONTINUED | OUTPATIENT
Start: 2018-10-11 | End: 2018-10-11

## 2018-10-11 RX ORDER — FENTANYL CITRATE 50 UG/ML
25 INJECTION, SOLUTION INTRAMUSCULAR; INTRAVENOUS
Status: DISCONTINUED | OUTPATIENT
Start: 2018-10-11 | End: 2018-10-12

## 2018-10-11 RX ORDER — HYDROCODONE BITARTRATE AND ACETAMINOPHEN 7.5; 325 MG/1; MG/1
1 TABLET ORAL EVERY 4 HOURS PRN
Status: DISCONTINUED | OUTPATIENT
Start: 2018-10-11 | End: 2018-10-16

## 2018-10-11 RX ORDER — ATORVASTATIN CALCIUM 40 MG/1
40 TABLET, FILM COATED ORAL NIGHTLY
Status: DISCONTINUED | OUTPATIENT
Start: 2018-10-11 | End: 2018-10-22 | Stop reason: HOSPADM

## 2018-10-11 RX ORDER — ACETAMINOPHEN 325 MG/1
650 TABLET ORAL EVERY 4 HOURS PRN
Status: DISCONTINUED | OUTPATIENT
Start: 2018-10-11 | End: 2018-10-22 | Stop reason: HOSPADM

## 2018-10-11 RX ORDER — CALCIUM CHLORIDE 100 MG/ML
INJECTION INTRAVENOUS; INTRAVENTRICULAR AS NEEDED
Status: DISCONTINUED | OUTPATIENT
Start: 2018-10-11 | End: 2018-10-11 | Stop reason: SURG

## 2018-10-11 RX ORDER — POTASSIUM CHLORIDE, DEXTROSE MONOHYDRATE 150; 5 MG/100ML; G/100ML
30 INJECTION, SOLUTION INTRAVENOUS CONTINUOUS
Status: DISCONTINUED | OUTPATIENT
Start: 2018-10-11 | End: 2018-10-11 | Stop reason: SDUPTHER

## 2018-10-11 RX ORDER — NALOXONE HCL 0.4 MG/ML
0.4 VIAL (ML) INJECTION
Status: DISCONTINUED | OUTPATIENT
Start: 2018-10-11 | End: 2018-10-12

## 2018-10-11 RX ORDER — DOBUTAMINE HYDROCHLORIDE 100 MG/100ML
2-20 INJECTION INTRAVENOUS CONTINUOUS PRN
Status: DISCONTINUED | OUTPATIENT
Start: 2018-10-11 | End: 2018-10-12

## 2018-10-11 RX ADMIN — SENNOSIDES AND DOCUSATE SODIUM 2 TABLET: 8.6; 5 TABLET ORAL at 20:23

## 2018-10-11 RX ADMIN — DOPAMINE HYDROCHLORIDE 5 MCG/KG/MIN: 160 INJECTION, SOLUTION INTRAVENOUS at 11:32

## 2018-10-11 RX ADMIN — ETOMIDATE 24 MG: 2 INJECTION, SOLUTION INTRAVENOUS at 07:05

## 2018-10-11 RX ADMIN — SUFENTANIL CITRATE 25 MCG: 50 INJECTION, SOLUTION EPIDURAL; INTRAVENOUS at 09:38

## 2018-10-11 RX ADMIN — MIDAZOLAM HYDROCHLORIDE 1 MG: 1 INJECTION, SOLUTION INTRAMUSCULAR; INTRAVENOUS at 09:38

## 2018-10-11 RX ADMIN — SODIUM CHLORIDE 2 UNITS/HR: 9 INJECTION, SOLUTION INTRAVENOUS at 11:00

## 2018-10-11 RX ADMIN — ROCURONIUM BROMIDE 100 MG: 10 SOLUTION INTRAVENOUS at 07:05

## 2018-10-11 RX ADMIN — CEFUROXIME 1.5 G: 90 INJECTION, POWDER, FOR SOLUTION INTRAVENOUS at 07:30

## 2018-10-11 RX ADMIN — OXYCODONE HYDROCHLORIDE AND ACETAMINOPHEN 2 TABLET: 5; 325 TABLET ORAL at 18:51

## 2018-10-11 RX ADMIN — DOBUTAMINE 10 MCG/KG/MIN: 12.5 INJECTION, SOLUTION, CONCENTRATE INTRAVENOUS at 09:44

## 2018-10-11 RX ADMIN — PROTAMINE SULFATE 100 MG: 10 INJECTION, SOLUTION INTRAVENOUS at 11:35

## 2018-10-11 RX ADMIN — MORPHINE SULFATE 2 MG: 2 INJECTION, SOLUTION INTRAMUSCULAR; INTRAVENOUS at 21:04

## 2018-10-11 RX ADMIN — OXYCODONE HYDROCHLORIDE AND ACETAMINOPHEN 2 TABLET: 5; 325 TABLET ORAL at 15:07

## 2018-10-11 RX ADMIN — AMINOCAPROIC ACID 10 G: 250 INJECTION, SOLUTION INTRAVENOUS at 10:09

## 2018-10-11 RX ADMIN — PROTAMINE SULFATE 500 MG: 10 INJECTION, SOLUTION INTRAVENOUS at 10:05

## 2018-10-11 RX ADMIN — SODIUM CHLORIDE: 9 INJECTION, SOLUTION INTRAVENOUS at 06:59

## 2018-10-11 RX ADMIN — SODIUM CHLORIDE 50 ML/HR: 9 INJECTION, SOLUTION INTRAVENOUS at 06:12

## 2018-10-11 RX ADMIN — PROPOFOL 25 MCG/KG/MIN: 10 INJECTION, EMULSION INTRAVENOUS at 11:32

## 2018-10-11 RX ADMIN — CARVEDILOL 25 MG: 12.5 TABLET, FILM COATED ORAL at 06:13

## 2018-10-11 RX ADMIN — SUFENTANIL CITRATE 25 MCG: 50 INJECTION, SOLUTION EPIDURAL; INTRAVENOUS at 10:06

## 2018-10-11 RX ADMIN — VECURONIUM BROMIDE 6 MG: 1 INJECTION, POWDER, LYOPHILIZED, FOR SOLUTION INTRAVENOUS at 08:01

## 2018-10-11 RX ADMIN — DEXMEDETOMIDINE HYDROCHLORIDE 0.2 MCG/KG/HR: 100 INJECTION, SOLUTION, CONCENTRATE INTRAVENOUS at 13:15

## 2018-10-11 RX ADMIN — GLYCOPYRROLATE 0.4 MG: 0.2 INJECTION, SOLUTION INTRAMUSCULAR; INTRAVENOUS at 09:40

## 2018-10-11 RX ADMIN — MIDAZOLAM HYDROCHLORIDE 1 MG: 1 INJECTION, SOLUTION INTRAMUSCULAR; INTRAVENOUS at 06:40

## 2018-10-11 RX ADMIN — ALBUMIN HUMAN 500 ML: 0.05 INJECTION, SOLUTION INTRAVENOUS at 11:36

## 2018-10-11 RX ADMIN — LIDOCAINE HYDROCHLORIDE 0.5 ML: 10 INJECTION, SOLUTION EPIDURAL; INFILTRATION; INTRACAUDAL; PERINEURAL at 06:12

## 2018-10-11 RX ADMIN — ALBUMIN HUMAN 500 ML: 0.05 INJECTION, SOLUTION INTRAVENOUS at 20:01

## 2018-10-11 RX ADMIN — SODIUM CHLORIDE: 9 INJECTION, SOLUTION INTRAVENOUS at 07:24

## 2018-10-11 RX ADMIN — SUFENTANIL CITRATE 50 MCG: 50 INJECTION, SOLUTION EPIDURAL; INTRAVENOUS at 07:50

## 2018-10-11 RX ADMIN — NOREPINEPHRINE BITARTRATE 0.02 MCG/KG/MIN: 1 INJECTION, SOLUTION, CONCENTRATE INTRAVENOUS at 09:55

## 2018-10-11 RX ADMIN — OXYCODONE HYDROCHLORIDE AND ACETAMINOPHEN 2 TABLET: 5; 325 TABLET ORAL at 22:32

## 2018-10-11 RX ADMIN — FAMOTIDINE 20 MG: 20 TABLET ORAL at 06:00

## 2018-10-11 RX ADMIN — MUPIROCIN 1 APPLICATION: 20 OINTMENT TOPICAL at 06:00

## 2018-10-11 RX ADMIN — POTASSIUM CHLORIDE 20 MEQ: 29.8 INJECTION, SOLUTION INTRAVENOUS at 16:46

## 2018-10-11 RX ADMIN — VECURONIUM BROMIDE 4 MG: 1 INJECTION, POWDER, LYOPHILIZED, FOR SOLUTION INTRAVENOUS at 10:20

## 2018-10-11 RX ADMIN — SUFENTANIL CITRATE 100 MCG: 50 INJECTION, SOLUTION EPIDURAL; INTRAVENOUS at 07:05

## 2018-10-11 RX ADMIN — ATORVASTATIN CALCIUM 40 MG: 40 TABLET, FILM COATED ORAL at 20:23

## 2018-10-11 RX ADMIN — NOREPINEPHRINE BITARTRATE 0.02 MCG/KG/MIN: 8 SOLUTION at 12:00

## 2018-10-11 RX ADMIN — CEFUROXIME 1.5 G: 1.5 INJECTION, POWDER, FOR SOLUTION INTRAVENOUS at 18:51

## 2018-10-11 RX ADMIN — CALCIUM CHLORIDE 1 G: 100 INJECTION INTRAVENOUS; INTRAVENTRICULAR at 10:05

## 2018-10-11 RX ADMIN — CHLORHEXIDINE GLUCONATE 15 ML: 1.2 RINSE ORAL at 20:23

## 2018-10-11 RX ADMIN — VECURONIUM BROMIDE 6 MG: 1 INJECTION, POWDER, LYOPHILIZED, FOR SOLUTION INTRAVENOUS at 08:20

## 2018-10-11 RX ADMIN — MORPHINE SULFATE 2 MG: 2 INJECTION, SOLUTION INTRAMUSCULAR; INTRAVENOUS at 17:38

## 2018-10-11 RX ADMIN — MORPHINE SULFATE 2 MG: 2 INJECTION, SOLUTION INTRAMUSCULAR; INTRAVENOUS at 16:26

## 2018-10-11 RX ADMIN — SUFENTANIL CITRATE 50 MCG: 50 INJECTION, SOLUTION EPIDURAL; INTRAVENOUS at 07:45

## 2018-10-11 RX ADMIN — MORPHINE SULFATE 2 MG: 2 INJECTION, SOLUTION INTRAMUSCULAR; INTRAVENOUS at 23:36

## 2018-10-11 RX ADMIN — FENTANYL CITRATE 25 MCG: 50 INJECTION, SOLUTION INTRAMUSCULAR; INTRAVENOUS at 13:50

## 2018-10-11 RX ADMIN — HEPARIN SODIUM 35000 UNITS: 1000 INJECTION, SOLUTION INTRAVENOUS; SUBCUTANEOUS at 08:05

## 2018-10-11 RX ADMIN — SODIUM CHLORIDE 2 UNITS/HR: 9 INJECTION, SOLUTION INTRAVENOUS at 08:30

## 2018-10-11 RX ADMIN — POTASSIUM CHLORIDE 20 MEQ: 29.8 INJECTION, SOLUTION INTRAVENOUS at 18:05

## 2018-10-11 RX ADMIN — VECURONIUM BROMIDE 4 MG: 1 INJECTION, POWDER, LYOPHILIZED, FOR SOLUTION INTRAVENOUS at 09:37

## 2018-10-11 RX ADMIN — MIDAZOLAM HYDROCHLORIDE 3 MG: 1 INJECTION, SOLUTION INTRAMUSCULAR; INTRAVENOUS at 07:05

## 2018-10-11 RX ADMIN — CEFUROXIME 1.5 G: 90 INJECTION, POWDER, FOR SOLUTION INTRAVENOUS at 10:09

## 2018-10-11 RX ADMIN — AMINOCAPROIC ACID 10 G: 250 INJECTION, SOLUTION INTRAVENOUS at 07:30

## 2018-10-11 RX ADMIN — HEPARIN SODIUM 15000 UNITS: 1000 INJECTION, SOLUTION INTRAVENOUS; SUBCUTANEOUS at 08:16

## 2018-10-11 RX ADMIN — POTASSIUM CHLORIDE AND DEXTROSE MONOHYDRATE 30 ML/HR: 150; 5 INJECTION, SOLUTION INTRAVENOUS at 11:32

## 2018-10-11 RX ADMIN — DOPAMINE HYDROCHLORIDE 10 MCG/KG/MIN: 80 INJECTION, SOLUTION INTRAVENOUS at 09:54

## 2018-10-11 RX ADMIN — FENTANYL CITRATE 25 MCG: 50 INJECTION, SOLUTION INTRAMUSCULAR; INTRAVENOUS at 15:07

## 2018-10-11 RX ADMIN — CHLORHEXIDINE GLUCONATE 15 ML: 1.2 RINSE ORAL at 06:00

## 2018-10-11 RX ADMIN — DEXTROSE MONOHYDRATE 30 ML/HR: 50 INJECTION, SOLUTION INTRAVENOUS at 22:41

## 2018-10-11 NOTE — OP NOTE
Operative Report  Kar Mora  [id number]  1950    Preop Diagnosis: Coronary artery disease three-vessel        Postop Diagnosis same        Procedure: Coronary artery bypass graft ×4 with EVH.  Saphenous vein graft to diagonal and circumflex, saphenous vein graft to PDA, left internal mammary artery to LAD        Surgeons: Migue Burton        Assistant: Ahmet Capps        Operative Findings:         Description: The patient was brought to the operating room placed under general anesthesia.  Patient had placement of Hermitage-Tian catheter arterial line and Valenzuela catheter.  The patient was sterilely prepped and draped.  The vein was harvested from the right leg from below the knee to groin using EVH technique.  The subcutaneous tissues were closed with 2-0 Vicryl, 3-0 Vicryl, 3-0 Monocryl subcuticular stitch.  Simultaneously median sternotomy incision Zometa left internal mammary artery was taken down and divided distally.  The patient was heparinized and activating clotting times confirmed adequate.  2 Ethibond sutures and placed in ascending aorta and right appendage.  Patient cannulated arterial venous cannula.  Cardioplegic bypass was initiated nurse cross-clamp period cold cardioplegia was given.  Heart was elevated.  The PDA vessels opened sharply extended proximally and distally.  An end-to-side anastomosis 70 sutures constructed was tied down.  The circumflex vessels opened sharply extended proximally and distally.  End-to-side anastomosis running 7-0 Prolene suture was constructed was tied down.  The diagonal vessels opened sharply the side of the vein graft was opened.  A side-to-side anastomosis running 7-0 Prolene suture was constructed was tied down.  The distal LAD was opened.  The left internal mammary was brought through pericardial tunnel.  This anastomosed to this with running 7-0 Prolene suture.  2 aortotomies were then made.  Proximal anastomoses was carried out running 6-0 Prolene  suture.  Prior times last one down hot shot of cardioplegia was given.  Aortic cross-clamp was removed.  The patient was warmed and weaned from cardiopulmonary bypass in standard fashion as protamine was administered for reversal of heparin.  Decannulation was carried out.  2 ventricular wires were placed.  A single mediastinal tube and left chest tube was placed.  The Sternum was closed with #7 wire.  The linea alba was closed with #1 Ethibond sutures.  The subcutaneous tissues were closed with 0 Vicryl, 2-0 Vicryl, 3-0 Vicryl, 3-0 Monocryl subcuticular stitch.  Cardiopulmonary bypass 91 minutes and cross clamp 70 minutes        EBL: 500 cc      Please note that portions of this note were completed with a voice recognition program. Efforts were made to edit the dictations, but words may be mistranscribed      Migue Burton MD  10/11/18 10:34 AM

## 2018-10-11 NOTE — PROGRESS NOTES
[facility]    Inpatient Progress Note      Chief Complaint/Reason for service:    · Hypertension, hyperlipidemia, arrhythmia monitoring         Subjective:       Patient intubated and sedated.  Sedation currently being weaned.  Currently requiring pressor support and temporary pacemaker.     Past medical, surgical, social and family history reviewed in the patient's electronic medical record.    Review of Systems:   Unable to obtain, intubated and sedated.    Problem List  Active Hospital Problems    Diagnosis Date Noted   • **Coronary artery disease (S/P CABG x 4) [I25.119] 09/07/2018   • Insulin dependent type 2 diabetes mellitus (CMS/HCC) [E11.9, Z79.4] 10/24/2016   • Labile hypertension [R09.89] 10/24/2016   • Hypertensive cardiovascular disease [I11.9] 10/24/2016   • Moderate obesity [E66.8] 10/24/2016   • Dyslipidemia [E78.5]       Resolved Hospital Problems    Diagnosis Date Noted Date Resolved   No resolved problems to display.            Objective:      Current Facility-Administered Medications:   •  acetaminophen (TYLENOL) tablet 650 mg, 650 mg, Oral, Q4H PRN, Migue Burton MD  •  albumin human 5 % bottle  - ADS Override Pull, , , ,   •  albumin human 5 % bottle 500 mL, 500 mL, Intravenous, PRN, Adonis Capps PA, 500 mL at 10/11/18 1136  •  albuterol (PROVENTIL) nebulizer solution 0.083% 2.5 mg/3mL, 2.5 mg, Nebulization, Q4H PRN, Adonis Capps PA  •  atorvastatin (LIPITOR) tablet 40 mg, 40 mg, Oral, Nightly, Adonis Capps PA  •  bisacodyl (DULCOLAX) EC tablet 10 mg, 10 mg, Oral, Daily PRN, Adonis Capps PA  •  [START ON 10/12/2018] bisacodyl (DULCOLAX) suppository 10 mg, 10 mg, Rectal, Daily PRN, Adonis Capps PA  •  calcium gluconate 1 g in sodium chloride 0.9 % 100 mL IVPB, 1 g, Intravenous, Once PRN **AND** calcium gluconate 6 g in sodium chloride 0.9 % 500 mL IVPB, 6 g, Intravenous, Once PRN, Adonis Capps PA  •  calcium  gluconate 2 g in sodium chloride 0.9 % 100 mL IVPB, 2 g, Intravenous, Once PRN, Adonis Capps PA  •  cefuroxime (ZINACEF) 1.5 g/100 mL 0.9% NS IVPB (mbp), 1.5 g, Intravenous, Q8H, Adonis Capps PA  •  chlorhexidine (PERIDEX) 0.12 % solution 15 mL, 15 mL, Mouth/Throat, Q12H, Adonis Capps PA  •  dexmedetomidine (PRECEDEX) infusion in NaCl 400 mcg/100 mL, 0.2-1.5 mcg/kg/hr, Intravenous, Continuous PRN, Adonis Capps PA  •  dextrose 5 % with KCl 20 mEq/L infusion, 30 mL/hr, Intravenous, Continuous, Adonis Capps PA, Last Rate: 30 mL/hr at 10/11/18 1132, 30 mL/hr at 10/11/18 1132  •  DOBUTamine (DOBUTREX) 1 mg/mL infusion, 2-20 mcg/kg/min, Intravenous, Continuous PRN, Adonis Capps PA  •  docusate sodium (COLACE) capsule 100 mg, 100 mg, Oral, BID PRN, Adonis Capps PA  •  DOPamine 400 mg/250 mL (1.6 mg/mL) infusion, 2-20 mcg/kg/min, Intravenous, Continuous PRN, Adonis Capps PA, Last Rate: 18.04 mL/hr at 10/11/18 1132, 5 mcg/kg/min at 10/11/18 1132  •  EPINEPHrine (ADRENALIN) 10,000 mcg in sodium chloride 0.9 % 250 mL (40 mcg/mL) infusion, 0.02-0.3 mcg/kg/min, Intravenous, Continuous PRN, Adonis Capps PA  •  fentaNYL citrate (PF) (SUBLIMAZE) injection 25 mcg, 25 mcg, Intravenous, Q30 Min PRN **AND** naloxone (NARCAN) injection 0.4 mg, 0.4 mg, Intravenous, Q5 Min PRN, Migue Burton MD  •  HYDROcodone-acetaminophen (NORCO) 7.5-325 MG per tablet 1 tablet, 1 tablet, Oral, Q4H PRN, Migue Burton MD  •  insulin regular (HumuLIN R,NovoLIN R) 100 Units in sodium chloride 0.9 % 100 mL (1 Units/mL) infusion, 0-50 Units/hr, Intravenous, Continuous PRN, Adonis Capps PA  •  [START ON 10/12/2018] magnesium hydroxide (MILK OF MAGNESIA) suspension 2400 mg/10mL 10 mL, 10 mL, Oral, Daily PRN, Adonis Capps PA  •  meperidine (DEMEROL) injection 25 mg, 25 mg, Intravenous, Q4H PRN, Migue Burton MD  •  [START ON 10/12/2018] metoprolol tartrate (LOPRESSOR)  half tablet 12.5 mg, 12.5 mg, Oral, Q12H, Adonis Capps PA  •  metoprolol tartrate (LOPRESSOR) injection 2.5 mg, 2.5 mg, Intravenous, Q6H, Adonis Capps PA  •  Morphine sulfate (PF) injection 2 mg, 2 mg, Intravenous, Q30 Min PRN, Migue Burton MD  •  niCARdipine (CARDENE-IV) 40 mg/200 mL (0.2 mg/mL) in 0.9% NaCl infusion, 5-15 mg/hr, Intravenous, Continuous PRN, Adonis Capps PA  •  nitroglycerin 50 mg/250 mL (0.2 mg/mL) infusion, 5-200 mcg/min, Intravenous, Continuous PRN, Adonis Capps PA  •  norepinephrine (LEVOPHED) 8 mg/250 mL (32 mcg/mL) in sodium chloride 0.9% infusion (premix), 0.02-0.3 mcg/kg/min, Intravenous, Continuous PRN, Adonis Capps PA  •  ondansetron (ZOFRAN) injection 4 mg, 4 mg, Intravenous, Q6H PRN, Adonis Capps PA  •  oxyCODONE-acetaminophen (PERCOCET) 5-325 MG per tablet 2 tablet, 2 tablet, Oral, Q4H PRN, Migue Burton MD  •  phenylephrine (JORDON-SYNEPHRINE) 50 mg/250 mL (0.2 mg/mL) in 0.9% NS  infusion, 0.5-3 mcg/kg/min, Intravenous, Continuous PRN, Adonis Capps PA  •  potassium chloride (MICRO-K) CR capsule 40 mEq, 40 mEq, Oral, PRN **OR** potassium chloride (KLOR-CON) packet 40 mEq, 40 mEq, Oral, PRN, Adonis Capps PA  •  potassium chloride 20 mEq in 50 mL IVPB, 20 mEq, Intravenous, Q1H PRN **OR** potassium chloride 20 mEq in 50 mL IVPB, 20 mEq, Intravenous, Q1H PRN, Adonis Capps PA  •  propofol (DIPRIVAN) infusion 10 mg/mL 100 mL, 5-50 mcg/kg/min, Intravenous, Continuous PRN, Migue Burton MD, Last Rate: 14.43 mL/hr at 10/11/18 1132, 25 mcg/kg/min at 10/11/18 1132  •  racemic epinephrine (RACEPINEPHRINE) nebulizer solution 0.5 mL, 0.5 mL, Nebulization, Once PRN, Adonis Capps PA  •  sennosides-docusate sodium (SENOKOT-S) 8.6-50 MG tablet 2 tablet, 2 tablet, Oral, BID, Adonis Capps PA  •  sodium bicarbonate injection 8.4% 50 mEq, 50 mEq, Intravenous, Once PRN, Adonis Capps PA  •  sodium chloride 0.9 %  flush 30 mL, 30 mL, Intravenous, Once PRN, Adonis Capps PA  •  sodium chloride 0.9 % infusion, 30 mL/hr, Intravenous, Continuous PRN, Adonis Capps PA  •  Vasopressin 20 Units in sodium chloride 0.9 % 100 mL (0.2 Units/mL) infusion, 0.02-0.1 Units/min, Intravenous, Continuous PRN, Adonis Capps PA    Facility-Administered Medications Ordered in Other Encounters:   •  Chlorhexidine Gluconate Cloth 2 % pads 1 application, 1 application, Topical, Q12H PRN, Adonis Capps PA  •  mupirocin (BACTROBAN) 2 % nasal ointment 1 application, 1 application, Each Nare, Q12H, Adonis Capps PA    Vital Sign Min/Max for last 24 hours  Temp  Min: 96.9 °F (36.1 °C)  Max: 97.4 °F (36.3 °C)   BP  Min: 84/63  Max: 128/64   Pulse  Min: 79  Max: 89   Resp  Min: 14  Max: 17   SpO2  Min: 95 %  Max: 100 %   No Data Recorded      Intake/Output Summary (Last 24 hours) at 10/11/18 1324  Last data filed at 10/11/18 1200   Gross per 24 hour   Intake             1200 ml   Output              825 ml   Net              375 ml           CONSTITUTIONAL: No acute distress  RESPIRATORY: intubated. Clear to auscultation bilaterally without wheezing or rales.  CARDIOVASCULAR: Regular rate and rhythm with normal S1 and S2. Without murmur, gallop or rub.  PERIPHERAL VASCULAR: Normal radial pulses bilaterally. There is no peripheral edema bilaterally.    Results Review:   No results found for: TROPONINI, TROPONINT    BUN   Date Value Ref Range Status   10/10/2018 42 (H) 9 - 23 mg/dL Final     Creatinine   Date Value Ref Range Status   10/10/2018 1.84 (H) 0.60 - 1.30 mg/dL Final     Potassium   Date Value Ref Range Status   10/10/2018 4.4 3.5 - 5.5 mmol/L Final     ALT (SGPT)   Date Value Ref Range Status   10/10/2018 29 7 - 40 U/L Final     AST (SGOT)   Date Value Ref Range Status   10/10/2018 33 0 - 33 U/L Final       Lab Results   Component Value Date    CHOL 169 07/20/2018     Lab Results   Component Value Date    TRIG 385 (H)  07/20/2018     Lab Results   Component Value Date    HDL 27 (L) 07/20/2018     No components found for: LDLCALC  No results found for: VLDL  No results found for: LDLHDL    Tele:  Now sinus tachycardia with baseline LBBB    EKG: Paced    TTE 5/2018  · Left ventricular wall thickness is consistent with mild-to-moderate concentric hypertrophy.  · Left ventricular systolic function is moderately decreased. Estimated EF = 40%.  · Left atrial cavity size is mild-to-moderately dilated.  · Mild mitral valve regurgitation is present.  · Left ventricular diastolic dysfunction (grade I a) consistent with impaired relaxation.  · There is no evidence of pericardial effusion.  · No evidence of pulmonary hypertension is present.  · Normal right ventricular cavity size, wall thickness, systolic function and septal motion noted.  · Reduced echocardiographic left ventricular ejection fraction noted compared to remote study 2 November 2016 (LVEF 0.50).    [facility] 7/2018  · There was severe multivessel coronary artery disease involving the LAD and RCA as well as moderate disease of the ramus intermedius and circumflex arteries       Assessment/Plan:     ASSESSMENT:  -Hypertension, currently requiring pressor support, on carvedilol and losartan at home.  -Hyperlipidemia, LDL 91 7/2018, currently on statin therapy.  -Underlying asystolic rhythm immediately post-op; paced at that time. Now sinus tach with baseline underlying 1st AVB and LBBB  -CAD, s/p CABG x4 with EVH (LIMA to LAD, SVG to the diagonal , circumflex, and PDA), on ASA, statin, and betablocker at home.   -Diabetes mellitus, A1C 8% 7/2018      PLAN:  -Maintain epicardial pacemaker for now  -Resume ASA, statin, and beta blocker when able.  -Continued support by CTS and Intensivist.       Scribed for Dr Moctezuma by EDGAR Díaz APRN  10/11/18 1:24 PM     I, Madi Moctezuma MD, personally performed the services as scribed by the above named individual. I have  made any necessary edits and it is both accurate and complete.     Madi Moctezuma MD, MSc, [facility]  Interventional Cardiology  [facility]

## 2018-10-11 NOTE — H&P
Pre-Op H&P  Kar Mora  [id number]  1950    Chief complaint: CP    HPI:    9/6/18 office visit:  Mr. Mora is a 67-year-old male who has been referred by Dr. Qureshi for evaluation.  He has been having chest pain for several months, particularly with exertion.  He underwent catheterization with Dr. Moctezuma and has been found to have severe two vessel disease of his right coronary artery and LAD.  He has followed up with Dr. Qureshi. I had a long discussion with him.  The patient now appears to be agreeable to have coronary bypass surgery.  His ejection fraction is about 37-40%.  He continues to practice law.    10/11/18:  Here today for CORONARY ARTERY BYPASS WITH INTERNAL MAMMARY ARTERY GRAFT with [facility]    Review of Systems:  General ROS: negative for chills, fever or skin lesions;  No changes since last office visit  Cardiovascular ROS: + chest pain or dyspnea on exertion  Respiratory ROS: no cough, shortness of breath, or wheezing  : No history of renal disease per pt    Allergies:   Allergies   Allergen Reactions   • Asa [Aspirin] Other (See Comments)      upper airway congestion       Home Meds:    No current facility-administered medications on file prior to encounter.      Current Outpatient Prescriptions on File Prior to Encounter   Medication Sig Dispense Refill   • atorvastatin (LIPITOR) 20 MG tablet Take 20 mg by mouth Every Night.  1   • carvedilol (COREG) 25 MG tablet take 1 tablet by mouth twice a day 180 tablet 3   • Empagliflozin (JARDIANCE) 25 MG tablet Take 25 mg by mouth Daily.     • fenofibrate 160 MG tablet Take 160 mg by mouth Daily.     • glipiZIDE (GLUCOTROL) 10 MG 24 hr tablet Take 10 mg by mouth Every Night.  0   • JANUVIA 100 MG tablet Take 100 mg by mouth Daily.  1   • losartan (COZAAR) 100 MG tablet Take 100 mg by mouth Every Night.  1   • metFORMIN (GLUCOPHAGE) 500 MG tablet Take 1,000 mg by mouth 2 (Two) Times a Day.  1   • nitroglycerin (NITROSTAT) 0.4 MG SL tablet 1 under the  tongue as needed for angina, may repeat q5mins for up three doses (Patient taking differently: Place 0.4 mg under the tongue Every 5 (Five) Minutes As Needed. 1 under the tongue as needed for angina, may repeat q5mins for up three doses) 25 tablet 6   • raNITIdine (ZANTAC) 150 MG tablet Take 150 mg by mouth 2 (Two) Times a Day.  0   • TOUJEO SOLOSTAR 300 UNIT/ML solution pen-injector 70 Units Every Night.  1   • Triamcinolone Acetonide (NASACORT ALLERGY 24HR) 55 MCG/ACT nasal inhaler 2 sprays into each nostril Daily As Needed for Rhinitis.     • CIALIS 5 MG tablet Take 5 mg by mouth As Needed for erectile dysfunction.  0       PMH:   Past Medical History:   Diagnosis Date   • Allergic rhinitis    • Anesthesia     pt says he has woken up during surgery   • Chest pain    • Coronary artery disease    • Dyslipidemia    • Erectile dysfunction    • GERD (gastroesophageal reflux disease)    • Gout    • Hyperlipidemia    • Insulin dependent type 2 diabetes mellitus (CMS/HCC) 2002    checks fsbg every am, a1c checked every 6 months, 7.2 in may 2018    • Kidney stones    • Labile hypertension 10/24/2016   • Myocardial infarct (CMS/HCC)    • Wears glasses    • Wears glasses      PSH:    Past Surgical History:   Procedure Laterality Date   • CARDIAC CATHETERIZATION N/A 7/20/2018    Procedure: Left Heart Cath;  Surgeon: Madi Moctezuma MD;  Location: [facility] INVASIVE LOCATION;  Service: Cardiovascular   • COLONOSCOPY  2016   • KIDNEY STONE SURGERY     • NASAL POLYP SURGERY  1986     Social History:   Tobacco:   History   Smoking Status   • Former Smoker   • Packs/day: 2.00   • Years: 10.00   • Types: Cigarettes   • Quit date: 1981   Smokeless Tobacco   • Never Used     Comment: quit 35 years ago      Alcohol:     History   Alcohol Use No       Vitals:           /64 (BP Location: Right arm, Patient Position: Lying) Comment: 131/74 in the left arm  Pulse 79   Temp 97.4 °F (36.3 °C) (Tympanic)   Resp 16   Ht 167.6 cm  "(66\")   Wt 96.2 kg (212 lb)   SpO2 97%   BMI 34.22 kg/m²     Physical Exam:  General Appearance:    Alert, cooperative, no distress, appears stated age   Head:    Normocephalic, without obvious abnormality, atraumatic   Lungs:     Clear to auscultation bilaterally, respirations unlabored    Heart:   Regular rate and rhythm, S1 and S2 normal, no murmur, rub    or gallop    Abdomen:    Soft, non-tender.  +bowel sounds   Breast Exam:    deferred   Genitalia:    deferred   Extremities:   Extremities normal, atraumatic, no cyanosis or edema   Skin:   Skin color, texture, turgor normal, no rashes or lesions   Neurologic:   Grossly intact   Results Review  I reviewed the patient's new clinical results.    Cancer Staging (if applicable)  Cancer Patient: __ yes _x_no __unknown; If yes, clinical stage T:__ N:__M:__, stage group or __N/A    Impression/Plan:    Mr. Mora is a 67-year-old white male who has severe two vessel coronary disease.  I have obtained and reviewed his records in detail and gone over them in detail.  I concur with the 80% stenosis of his LAD and 100% stenosis of his right coronary artery.  He has approximately a 40% ejection fraction.  I have recommended coronary artery bypass surgery.  I have discussed the operation, risks including risk to his life, bleeding, infection, organ failure, as well as other risk factors, as well as alternatives.  He appears to understand completely all of the above and would like to proceed.     Glenys Ozuna, APRN   10/11/2018   6:22 AM  "

## 2018-10-11 NOTE — ANESTHESIA POSTPROCEDURE EVALUATION
Patient: Kar Mora    Procedure Summary     Date:  10/11/18 Room / Location:   [facility]  /  [facility]    Anesthesia Start:  0659 Anesthesia Stop:  1049    Procedure:  MEDIAN STERNOTMY<CORONARY ARTERY BYPASS WITH INTERNAL MAMMARY ARTERY GRAFT x  4 with EVH of the right greater saphenous vein (N/A Chest) Diagnosis:       Coronary artery disease involving native coronary artery of native heart with angina pectoris (CMS/HCC)      (Coronary artery disease involving native coronary artery of native heart with angina pectoris (CMS/HCC) [I25.119])    Surgeon:  Migue Burton MD Provider:  Emre Cunha MD    Anesthesia Type:  general ASA Status:  4          Anesthesia Type: general  Last vitals  BP   128/64 (131/74 in the left arm) (10/11/18 0604)   Temp   97.4 °F (36.3 °C) (10/11/18 0604)   Pulse   79 (10/11/18 0604)   Resp   16 (10/11/18 0604)     SpO2   97 % (10/11/18 0604)     Post Anesthesia Care and Evaluation    Patient location during evaluation: ICU  Patient participation: complete - patient cannot participate  Pain score: 0  Pain management: adequate  Airway patency: patent  Anesthetic complications: No anesthetic complications  PONV Status: none  Cardiovascular status: hemodynamically stable and acceptable  Respiratory status: acceptable, ETT, intubated and ventilator  Hydration status: acceptable    Comments: TO ICU ON O2/AMBU/MONITOR;VSS; REPORT TO RN

## 2018-10-11 NOTE — ANESTHESIA PROCEDURE NOTES
Airway  Urgency: elective    Airway not difficult    General Information and Staff    Patient location during procedure: OR  Anesthesiologist: TANMAY LANGFORD    Indications and Patient Condition  Indications for airway management: airway protection    Preoxygenated: yes  MILS not maintained throughout  Mask difficulty assessment: 1 - vent by mask    Final Airway Details  Final airway type: endotracheal airway      Successful airway: ETT  Cuffed: yes   Successful intubation technique: direct laryngoscopy  Endotracheal tube insertion site: oral  Blade: Willem  Blade size: 3  ETT size: 7.5 mm  Cormack-Lehane Classification: grade IIa - partial view of glottis  Placement verified by: chest auscultation and capnometry   Measured from: lips  ETT to lips (cm): 22  Number of attempts at approach: 1    Additional Comments  Negative epigastric sounds, Breath sound equal bilaterally with symmetric chest rise and fall

## 2018-10-11 NOTE — PROGRESS NOTES
Intensive Care Follow-up      LOS: 0 days     Mr. Kar Mora, 68 y.o. male is followed for: Glycemic, Electrolyte, Respiratory, and Medical management     Subjective - Interval History     Patient moved to the intensive care unit following CABG.  Rhythm currently pacer dependent.  On propofol drip for sedation as well as insulin drip    The patient's relevant past medical, surgical and social history were reviewed and updated in Epic as appropriate.     Objective     Infusions:    dexmedetomidine 0.2-1.5 mcg/kg/hr   dextrose 5 % with KCl 20 mEq 30 mL/hr   DOBUTamine 2-20 mcg/kg/min   DOPamine 2-20 mcg/kg/min   EPINEPHrine 0.02-0.3 mcg/kg/min   insulin regular infusion 1 unit/mL (CCU use) 0-50 Units/hr   niCARdipine 5-15 mg/hr   nitroglycerin 5-200 mcg/min   norepinephrine 0.02-0.3 mcg/kg/min   phenylephrine 0.5-3 mcg/kg/min   propofol 5-50 mcg/kg/min   sodium chloride 30 mL/hr   vasopressin 0.02-0.1 Units/min     Medications:    albumin human      atorvastatin 40 mg Oral Nightly   cefuroxime 1.5 g Intravenous Q8H   chlorhexidine 15 mL Mouth/Throat Q12H   [START ON 10/12/2018] metoprolol tartrate 12.5 mg Oral Q12H   metoprolol tartrate 2.5 mg Intravenous Q6H   protamine      protamine 100 mg Intravenous Once   sennosides-docusate sodium 2 tablet Oral BID     Intake/Output       10/11/18 0700 - 10/12/18 0659    Intake (ml) 1200    Output (ml) 400    Net (ml) 800        Vital Sign Min/Max for last 24 hours  Temp  Min: 97.4 °F (36.3 °C)  Max: 97.4 °F (36.3 °C)   BP  Min: 128/64  Max: 128/64   Pulse  Min: 79  Max: 89   Resp  Min: 16  Max: 16   SpO2  Min: 97 %  Max: 97 %   No Data Recorded        Physical Exam:   GENERAL: Intubated, sedated, mechanically ventilated, no distress   HEENT: ET tube at 23 cm.  Right IJ Cordis site okay   LUNGS: No wheezes or rhonchi   HEART: Regular, paced rhythm.  No air leak from MTs.  No active bleeding   ABDOMEN: Soft, quiet   EXTREMITIES: Palpable pulses.  No cyanosis or  edema   NEURO/PSYCH: Sedated and unresponsive      Results from last 7 days  Lab Units 10/11/18  1008 10/11/18  0927 10/11/18  0900  10/10/18  1324   WBC 10*3/mm3  --   --   --   --  5.02   HEMOGLOBIN g/dL  --   --   --   --  12.2*   HEMOGLOBIN, POC g/dL 9.5* 9.9* 9.2*  < >  --    PLATELETS 10*3/mm3  --   --   --   --  199   < > = values in this interval not displayed.    Results from last 7 days  Lab Units 10/10/18  1324   SODIUM mmol/L 137   POTASSIUM mmol/L 4.4   CO2 mmol/L 26.0   BUN mg/dL 42*   CREATININE mg/dL 1.84*   MAGNESIUM mg/dL 2.0   GLUCOSE mg/dL 217*     Estimated Creatinine Clearance: 42.3 mL/min (A) (by C-G formula based on SCr of 1.84 mg/dL (H)).      Results from last 7 days  Lab Units 10/10/18  1324   HEMOGLOBIN A1C % 7.50*         Results from last 7 days  Lab Units 10/11/18  1008   PH, ARTERIAL pH units 7.29*     No results found for: LACTATE       Images: Post procedure chest x-ray pending.  Preoperative chest x-ray revealed no active disease    I reviewed the patient's results and images.     Impression      Active Hospital Problems    Diagnosis   • **Coronary artery disease (S/P CABG x 4)   • Hypertensive cardiovascular disease     1. Probable hypertensive cardiovascular disease:  a. Abnormal EKG with abnormal acceptable echocardiographic GXT, September 2004.   b. Acceptable echocardiographic GXT with preserved exercise capacity and mild LVH, March 2008.   c. Acceptable Quantitative SPECT Gated Cardiolite GXT with nominal myocardial perfusion to 88% of predicted exercise capacity and 90% predicted maximum heart rate with preserved LVEF (0.65) with continued medical therapy felt warranted, December 2012.  d. Residual class I symptoms with recent documented abnormal EKG (LBBB/first-degree AV block) with abnormal echocardiogram demonstrating mild left ventricular chamber enlargement with mild reduction in the LVEF (0.42) without PE or pulmonary hypertension.  e. Resident class I symptoms with  persistent abnormal echocardiogram (LVEF 0.40), with mild concentric LVH and mild left atrial enlargement without pulmonary arterial hypertension or pericardial effusion, July 2015.       • Insulin dependent type 2 diabetes mellitus (CMS/HCC)   • Labile hypertension   • Moderate obesity   • Dyslipidemia         Plan        Continue mechanical ventilatory support and wean per protocol when hemodynamically stable.  Continue insulin drip for blood sugar management  Follow-up and replace electrolytes as necessary    I discussed the patient's findings and my recommendations with nursing staff       CHRIS Maravilla MD  Pulmonary and Critical Care Medicine

## 2018-10-11 NOTE — RESEARCH
About the STS Risk Calculator  Procedure: CAB Only   Risk of Mortality: 1.391%   Morbidity or Mortality: 16.394%   Long Length of Stay: 6.291%   Short Length of Stay: 41.319%   Permanent Stroke: 0.857%   Prolonged Ventilation: 9.253%   DSW Infection: 0.606%   Renal Failure: 7.626%   Reoperation: 5.081%

## 2018-10-11 NOTE — ANESTHESIA PROCEDURE NOTES
Central Line      Patient location during procedure: OR  Indications: vascular access  Staff  Anesthesiologist: TANMAY LANGFORD  Preanesthetic Checklist  Completed: patient identified, site marked, surgical consent, pre-op evaluation, timeout performed, IV checked, risks and benefits discussed and monitors and equipment checked  Central Line Prep  Sterile Tech:cap, gloves, gown, mask and sterile barriers  Prep: chloraprep  Patient monitoring: blood pressure monitoring, continuous pulse oximetry and EKG  Central Line Procedure  Laterality:right  Location:internal jugular  Catheter Type:Cordis  Catheter Size:9 Fr  Guidance:ultrasound guided  PROCEDURE NOTE/ULTRASOUND INTERPRETATION.  Using ultrasound guidance the potential vascular sites for insertion of the catheter were visualized to determine the patency of the vessel to be used for vascular access.  After selecting the appropriate site for insertion, the needle was visualized under ultrasound being inserted into the internal jugular vein, followed by ultrasound confirmation of wire and catheter placement. There were no abnormalities seen on ultrasound; an image was taken; and the patient tolerated the procedure with no complications.   Assessment  Post procedure:biopatch applied, line sutured, occlusive dressing applied and secured with tape  Assessement:blood return through all ports, free fluid flow and chest x-ray ordered  Complications:no  Patient Tolerance:patient tolerated the procedure well with no apparent complications

## 2018-10-11 NOTE — ANESTHESIA PROCEDURE NOTES
Arterial Line      Patient location during procedure: pre-op   Line placed for hemodynamic monitoring.  Preanesthetic Checklist  Completed: patient identified, site marked, surgical consent, pre-op evaluation, timeout performed, IV checked, risks and benefits discussed and monitors and equipment checked  Arterial Line Prep   Sterile Tech: cap, gloves and sterile barriers  Prep: ChloraPrep  Patient monitoring: blood pressure monitoring, continuous pulse oximetry and EKG  Arterial Line Procedure   Laterality:right  Location:  radial artery  Catheter size: 20 G   Guidance: palpation technique  Number of attempts: 1  Successful placement: yes          Post Assessment   Dressing Type: line sutured, occlusive dressing applied, secured with tape and wrist guard applied.   Complications no  Circ/Move/Sens Assessment: normal and unchanged.   Patient Tolerance: patient tolerated the procedure well with no apparent complications

## 2018-10-11 NOTE — ANESTHESIA PREPROCEDURE EVALUATION
Anesthesia Evaluation     Patient summary reviewed and Nursing notes reviewed                Airway   Mallampati: II  TM distance: >3 FB  Neck ROM: full  No difficulty expected  Dental - normal exam     Pulmonary - normal exam   (+) a smoker (quit '81) Former,   Cardiovascular - normal exam    (+) hypertension, CAD, dysrhythmias (LBBB), hyperlipidemia,     ROS comment: EF 40%, mild MR    Neuro/Psych- negative ROS  GI/Hepatic/Renal/Endo    (+) morbid obesity, GERD,  renal disease (Cr=1.8; GFR 37) CRI, diabetes mellitus type 2,     Musculoskeletal (-) negative ROS    Abdominal  - normal exam    Bowel sounds: normal.   Substance History - negative use     OB/GYN negative ob/gyn ROS         Other                        Anesthesia Plan    ASA 4     general   (A line, PAC, ICU/vent)  intravenous induction   Anesthetic plan, all risks, benefits, and alternatives have been provided, discussed and informed consent has been obtained with: patient.

## 2018-10-12 ENCOUNTER — APPOINTMENT (OUTPATIENT)
Dept: GENERAL RADIOLOGY | Facility: HOSPITAL | Age: 68
End: 2018-10-12

## 2018-10-12 LAB
ALBUMIN SERPL-MCNC: 4.07 G/DL (ref 3.2–4.8)
ANION GAP SERPL CALCULATED.3IONS-SCNC: 7 MMOL/L (ref 3–11)
BASOPHILS # BLD AUTO: 0 10*3/MM3 (ref 0–0.2)
BASOPHILS NFR BLD AUTO: 0 % (ref 0–1)
BUN BLD-MCNC: 36 MG/DL (ref 9–23)
BUN/CREAT SERPL: 19 (ref 7–25)
CALCIUM SPEC-SCNC: 8 MG/DL (ref 8.7–10.4)
CHLORIDE SERPL-SCNC: 109 MMOL/L (ref 99–109)
CO2 SERPL-SCNC: 20 MMOL/L (ref 20–31)
CREAT BLD-MCNC: 1.89 MG/DL (ref 0.6–1.3)
DEPRECATED RDW RBC AUTO: 48.5 FL (ref 37–54)
EOSINOPHIL # BLD AUTO: 0.01 10*3/MM3 (ref 0–0.3)
EOSINOPHIL NFR BLD AUTO: 0.1 % (ref 0–3)
ERYTHROCYTE [DISTWIDTH] IN BLOOD BY AUTOMATED COUNT: 14.9 % (ref 11.3–14.5)
GFR SERPL CREATININE-BSD FRML MDRD: 36 ML/MIN/1.73
GLUCOSE BLD-MCNC: 114 MG/DL (ref 70–100)
GLUCOSE BLDC GLUCOMTR-MCNC: 115 MG/DL (ref 70–130)
GLUCOSE BLDC GLUCOMTR-MCNC: 118 MG/DL (ref 70–130)
GLUCOSE BLDC GLUCOMTR-MCNC: 119 MG/DL (ref 70–130)
GLUCOSE BLDC GLUCOMTR-MCNC: 121 MG/DL (ref 70–130)
GLUCOSE BLDC GLUCOMTR-MCNC: 122 MG/DL (ref 70–130)
GLUCOSE BLDC GLUCOMTR-MCNC: 123 MG/DL (ref 70–130)
GLUCOSE BLDC GLUCOMTR-MCNC: 123 MG/DL (ref 70–130)
GLUCOSE BLDC GLUCOMTR-MCNC: 125 MG/DL (ref 70–130)
GLUCOSE BLDC GLUCOMTR-MCNC: 126 MG/DL (ref 70–130)
GLUCOSE BLDC GLUCOMTR-MCNC: 126 MG/DL (ref 70–130)
GLUCOSE BLDC GLUCOMTR-MCNC: 127 MG/DL (ref 70–130)
GLUCOSE BLDC GLUCOMTR-MCNC: 128 MG/DL (ref 70–130)
GLUCOSE BLDC GLUCOMTR-MCNC: 133 MG/DL (ref 70–130)
GLUCOSE BLDC GLUCOMTR-MCNC: 133 MG/DL (ref 70–130)
GLUCOSE BLDC GLUCOMTR-MCNC: 139 MG/DL (ref 70–130)
GLUCOSE BLDC GLUCOMTR-MCNC: 139 MG/DL (ref 70–130)
GLUCOSE BLDC GLUCOMTR-MCNC: 140 MG/DL (ref 70–130)
GLUCOSE BLDC GLUCOMTR-MCNC: 149 MG/DL (ref 70–130)
HCT VFR BLD AUTO: 31.4 % (ref 38.9–50.9)
HCT VFR BLD AUTO: 31.7 % (ref 38.9–50.9)
HGB BLD-MCNC: 10 G/DL (ref 13.1–17.5)
HGB BLD-MCNC: 10 G/DL (ref 13.1–17.5)
IMM GRANULOCYTES # BLD: 0.02 10*3/MM3 (ref 0–0.03)
IMM GRANULOCYTES NFR BLD: 0.2 % (ref 0–0.6)
INR PPP: 1.02 (ref 0.91–1.09)
LYMPHOCYTES # BLD AUTO: 0.59 10*3/MM3 (ref 0.6–4.8)
LYMPHOCYTES NFR BLD AUTO: 7 % (ref 24–44)
MAGNESIUM SERPL-MCNC: 2.6 MG/DL (ref 1.3–2.7)
MCH RBC QN AUTO: 28.5 PG (ref 27–31)
MCHC RBC AUTO-ENTMCNC: 31.8 G/DL (ref 32–36)
MCV RBC AUTO: 89.5 FL (ref 80–99)
MONOCYTES # BLD AUTO: 0.85 10*3/MM3 (ref 0–1)
MONOCYTES NFR BLD AUTO: 10.1 % (ref 0–12)
NEUTROPHILS # BLD AUTO: 6.99 10*3/MM3 (ref 1.5–8.3)
NEUTROPHILS NFR BLD AUTO: 82.8 % (ref 41–71)
PHOSPHATE SERPL-MCNC: 4.6 MG/DL (ref 2.4–5.1)
PLATELET # BLD AUTO: 169 10*3/MM3 (ref 150–450)
PMV BLD AUTO: 11.5 FL (ref 6–12)
POTASSIUM BLD-SCNC: 4.6 MMOL/L (ref 3.5–5.5)
POTASSIUM BLD-SCNC: 4.8 MMOL/L (ref 3.5–5.5)
PROTHROMBIN TIME: 10.7 SECONDS (ref 9.6–11.5)
RBC # BLD AUTO: 3.51 10*6/MM3 (ref 4.2–5.76)
SODIUM BLD-SCNC: 136 MMOL/L (ref 132–146)
WBC NRBC COR # BLD: 8.44 10*3/MM3 (ref 3.5–10.8)

## 2018-10-12 PROCEDURE — 85610 PROTHROMBIN TIME: CPT | Performed by: PHYSICIAN ASSISTANT

## 2018-10-12 PROCEDURE — 84132 ASSAY OF SERUM POTASSIUM: CPT | Performed by: THORACIC SURGERY (CARDIOTHORACIC VASCULAR SURGERY)

## 2018-10-12 PROCEDURE — 71045 X-RAY EXAM CHEST 1 VIEW: CPT

## 2018-10-12 PROCEDURE — 85025 COMPLETE CBC W/AUTO DIFF WBC: CPT | Performed by: PHYSICIAN ASSISTANT

## 2018-10-12 PROCEDURE — 94799 UNLISTED PULMONARY SVC/PX: CPT

## 2018-10-12 PROCEDURE — 99232 SBSQ HOSP IP/OBS MODERATE 35: CPT | Performed by: INTERNAL MEDICINE

## 2018-10-12 PROCEDURE — 93005 ELECTROCARDIOGRAM TRACING: CPT | Performed by: PHYSICIAN ASSISTANT

## 2018-10-12 PROCEDURE — 83735 ASSAY OF MAGNESIUM: CPT | Performed by: PHYSICIAN ASSISTANT

## 2018-10-12 PROCEDURE — 85014 HEMATOCRIT: CPT | Performed by: THORACIC SURGERY (CARDIOTHORACIC VASCULAR SURGERY)

## 2018-10-12 PROCEDURE — 25010000002 CEFUROXIME: Performed by: PHYSICIAN ASSISTANT

## 2018-10-12 PROCEDURE — 93010 ELECTROCARDIOGRAM REPORT: CPT | Performed by: INTERNAL MEDICINE

## 2018-10-12 PROCEDURE — 97163 PT EVAL HIGH COMPLEX 45 MIN: CPT

## 2018-10-12 PROCEDURE — 85018 HEMOGLOBIN: CPT | Performed by: THORACIC SURGERY (CARDIOTHORACIC VASCULAR SURGERY)

## 2018-10-12 PROCEDURE — 63710000001 INSULIN DETEMIR PER 5 UNITS: Performed by: INTERNAL MEDICINE

## 2018-10-12 PROCEDURE — 25010000002 MORPHINE SULFATE (PF) 2 MG/ML SOLUTION: Performed by: THORACIC SURGERY (CARDIOTHORACIC VASCULAR SURGERY)

## 2018-10-12 PROCEDURE — 25010000002 FENTANYL CITRATE (PF) 100 MCG/2ML SOLUTION: Performed by: THORACIC SURGERY (CARDIOTHORACIC VASCULAR SURGERY)

## 2018-10-12 PROCEDURE — 99233 SBSQ HOSP IP/OBS HIGH 50: CPT | Performed by: INTERNAL MEDICINE

## 2018-10-12 PROCEDURE — 80069 RENAL FUNCTION PANEL: CPT | Performed by: PHYSICIAN ASSISTANT

## 2018-10-12 PROCEDURE — 82962 GLUCOSE BLOOD TEST: CPT

## 2018-10-12 RX ORDER — MORPHINE SULFATE 2 MG/ML
2 INJECTION, SOLUTION INTRAMUSCULAR; INTRAVENOUS
Status: DISCONTINUED | OUTPATIENT
Start: 2018-10-12 | End: 2018-10-22 | Stop reason: HOSPADM

## 2018-10-12 RX ORDER — DEXTROSE MONOHYDRATE 25 G/50ML
25 INJECTION, SOLUTION INTRAVENOUS
Status: DISCONTINUED | OUTPATIENT
Start: 2018-10-12 | End: 2018-10-22 | Stop reason: HOSPADM

## 2018-10-12 RX ORDER — NICOTINE POLACRILEX 4 MG
15 LOZENGE BUCCAL
Status: DISCONTINUED | OUTPATIENT
Start: 2018-10-12 | End: 2018-10-22 | Stop reason: HOSPADM

## 2018-10-12 RX ADMIN — PHENOL 2 SPRAY: 1.5 LIQUID ORAL at 20:47

## 2018-10-12 RX ADMIN — MORPHINE SULFATE 2 MG: 2 INJECTION, SOLUTION INTRAMUSCULAR; INTRAVENOUS at 05:33

## 2018-10-12 RX ADMIN — OXYCODONE HYDROCHLORIDE AND ACETAMINOPHEN 2 TABLET: 5; 325 TABLET ORAL at 22:57

## 2018-10-12 RX ADMIN — MORPHINE SULFATE 2 MG: 2 INJECTION, SOLUTION INTRAMUSCULAR; INTRAVENOUS at 02:04

## 2018-10-12 RX ADMIN — ATORVASTATIN CALCIUM 40 MG: 40 TABLET, FILM COATED ORAL at 20:07

## 2018-10-12 RX ADMIN — CEFUROXIME 1.5 G: 1.5 INJECTION, POWDER, FOR SOLUTION INTRAVENOUS at 02:04

## 2018-10-12 RX ADMIN — MORPHINE SULFATE 2 MG: 2 INJECTION, SOLUTION INTRAMUSCULAR; INTRAVENOUS at 18:12

## 2018-10-12 RX ADMIN — HYDROCODONE BITARTRATE AND ACETAMINOPHEN 1 TABLET: 7.5; 325 TABLET ORAL at 20:07

## 2018-10-12 RX ADMIN — SENNOSIDES AND DOCUSATE SODIUM 2 TABLET: 8.6; 5 TABLET ORAL at 08:07

## 2018-10-12 RX ADMIN — HYDROCODONE BITARTRATE AND ACETAMINOPHEN 1 TABLET: 7.5; 325 TABLET ORAL at 11:57

## 2018-10-12 RX ADMIN — OXYCODONE HYDROCHLORIDE AND ACETAMINOPHEN 2 TABLET: 5; 325 TABLET ORAL at 15:59

## 2018-10-12 RX ADMIN — FENTANYL CITRATE 25 MCG: 50 INJECTION, SOLUTION INTRAMUSCULAR; INTRAVENOUS at 14:20

## 2018-10-12 RX ADMIN — SENNOSIDES AND DOCUSATE SODIUM 2 TABLET: 8.6; 5 TABLET ORAL at 20:07

## 2018-10-12 RX ADMIN — INSULIN DETEMIR 11 UNITS: 100 INJECTION, SOLUTION SUBCUTANEOUS at 20:55

## 2018-10-12 RX ADMIN — MORPHINE SULFATE 2 MG: 2 INJECTION, SOLUTION INTRAMUSCULAR; INTRAVENOUS at 11:17

## 2018-10-12 RX ADMIN — FENTANYL CITRATE 25 MCG: 50 INJECTION, SOLUTION INTRAMUSCULAR; INTRAVENOUS at 08:06

## 2018-10-12 RX ADMIN — CEFUROXIME 1.5 G: 1.5 INJECTION, POWDER, FOR SOLUTION INTRAVENOUS at 09:06

## 2018-10-12 RX ADMIN — METOPROLOL TARTRATE 12.5 MG: 25 TABLET, FILM COATED ORAL at 16:06

## 2018-10-12 RX ADMIN — HYDROCODONE BITARTRATE AND ACETAMINOPHEN 1 TABLET: 7.5; 325 TABLET ORAL at 03:06

## 2018-10-12 RX ADMIN — OXYCODONE HYDROCHLORIDE AND ACETAMINOPHEN 2 TABLET: 5; 325 TABLET ORAL at 08:06

## 2018-10-12 RX ADMIN — CHLORHEXIDINE GLUCONATE 15 ML: 1.2 RINSE ORAL at 08:06

## 2018-10-12 RX ADMIN — METOPROLOL TARTRATE 12.5 MG: 25 TABLET, FILM COATED ORAL at 20:07

## 2018-10-12 RX ADMIN — MORPHINE SULFATE 2 MG: 2 INJECTION, SOLUTION INTRAMUSCULAR; INTRAVENOUS at 20:48

## 2018-10-12 RX ADMIN — CEFUROXIME 1.5 G: 1.5 INJECTION, POWDER, FOR SOLUTION INTRAVENOUS at 18:02

## 2018-10-12 RX ADMIN — INSULIN DETEMIR 11 UNITS: 100 INJECTION, SOLUTION SUBCUTANEOUS at 11:17

## 2018-10-12 NOTE — PROGRESS NOTES
Kar Mora  [id number]  1950   LOS: 1 day   Patient Care Team:  Bro Kan MD as PCP - General    Chief Complaint:  SOB / WEAKNESS / CHF / S/P CABG X 4    Subjective     Up in chair and generally comfortable with minimal incisional pain currently.  Acceptable cough.  He denies anginal type chest discomfort, increased tachypnea/dyspnea, nausea, emesis, abdominal pain, headache, or focal motor-sensory changes.  Alert, appropriate, and conversant.    Objective     Vital Sign Min/Max for last 24 hours  Temp  Min: 96.9 °F (36.1 °C)  Max: 100.1 °F (37.8 °C)   BP  Min: 82/57  Max: 120/66   Pulse  Min: 0  Max: 109   Resp  Min: 14  Max: 26   SpO2  Min: 94 %  Max: 100 %      Weight  Min: 96.2 kg (212 lb 1.3 oz)  Max: 96.2 kg (212 lb 1.3 oz)     1    10/12/18  0200 10/12/18  0400   Weight: 96.2 kg (212 lb 1.3 oz) 96.2 kg (212 lb 1.3 oz)         Intake/Output Summary (Last 24 hours) at 10/12/18 0710  Last data filed at 10/12/18 0600   Gross per 24 hour   Intake          4217.56 ml   Output             2789 ml   Net          1428.56 ml       Physical Exam:     General Appearance:    Alert, cooperative, in no acute distress   Lungs:     Clear to auscultation,respirations regular, even and                   unlabored    Heart:    Regular and normal rate, normal S1 and S2, no            murmur, no gallop, no rub, no click   Abdomen:  Extremities:   Soft, non-tender, bowel sounds audible x4    No edema, normal range of motion   Pulses:   Pulses palpable and equal bilaterally      Results Review:     Results from last 7 days  Lab Units 10/12/18  0323 10/12/18  0030 10/11/18  2002 10/11/18  1448 10/11/18  1305   SODIUM mmol/L 136  --   --  138 137   POTASSIUM mmol/L 4.6 4.8 5.2 3.6 3.8   CHLORIDE mmol/L 109  --   --  110* 110*   CO2 mmol/L 20.0  --   --  23.0 23.0   BUN mg/dL 36*  --   --  36* 35*   CREATININE mg/dL 1.89*  --   --  1.60* 1.63*   GLUCOSE mg/dL 114*  --   --  125* 152*   CALCIUM mg/dL 8.0*  --    --  8.0* 8.0*       Results from last 7 days  Lab Units 10/12/18  0323 10/12/18  0030 10/11/18  2002 10/11/18  1448 10/11/18  1305   WBC 10*3/mm3 8.44  --   --  4.89 5.18   HEMOGLOBIN g/dL 10.0* 10.0* 10.4* 10.0* 10.5*   HEMATOCRIT % 31.4* 31.7* 31.9* 30.7* 32.0*   PLATELETS 10*3/mm3 169  --   --  119* 114*         Results from last 7 days  Lab Units 10/10/18  1324   HEMOGLOBIN A1C % 7.50*     · CXR: Mild cardiomegaly without overt pulmonary vascular congestion or pneumothorax with minimal bibasilar pleural effusions and atelectasis; formal official report pending.    · EKG: Normal sinus rhythm with left bundle branch block and prolonged QTC.      Medication Review: REVIEWED; DRIPS = regular insulin 2.8 units/ hour IV.    Assessment/Plan     Acceptable initial course with slight bump in serum creatinine.  Acceptable urinary output and hemodynamics currently.  Would continue current interventions and observe closely.  Eventually would alter metoprolol tartrate to carvedilol and if renal function improves, eventually a trial of Entresto.      Coronary artery disease (S/P CABG x 4)    Insulin dependent type 2 diabetes mellitus (CMS/HCC)    Labile hypertension    Dyslipidemia    Hypertensive cardiovascular disease    Moderate obesity        10/12/18  7:10 AM

## 2018-10-12 NOTE — PROGRESS NOTES
Clinical Nutrition     Multidisciplinary Rounds    Time: 20min  Patient Name: Kar Mora  Date of Encounter: 10/12/18 9:09 AM  MRN: [id number]  Admission date: 10/11/2018      Reason for visit: MDR. RD to continue to follow per protocol.     Additional information obtained during MDR:      Current diet: Diet Clear Liquid; Thin; Cardiac, Consistent Carbohydrate  No active supplement orders      EMR reviewed     Intervention:  Follow treatment plan  Care plan reviewed    Follow up:   Per protocol      Paula Willson RD  9:09 AM

## 2018-10-12 NOTE — PROGRESS NOTES
"Kar Mora  [id number]  1950     LOS: 1 day   Patient Care Team:  Bro Kan MD as PCP - General    Chief Complaint: Coronary artery disease      Subjective: No complaints    Objective:     Vital Sign Min/Max for last 24 hours  Temp  Min: 96.9 °F (36.1 °C)  Max: 100.1 °F (37.8 °C)   BP  Min: 82/57  Max: 120/66   Pulse  Min: 0  Max: 109   Resp  Min: 14  Max: 26   SpO2  Min: 94 %  Max: 100 %   No Data Recorded   Weight  Min: 96.2 kg (212 lb 1.3 oz)  Max: 96.2 kg (212 lb 1.3 oz)     Flowsheet Rows      First Filed Value   Admission Height  167.6 cm (66\") Documented at 10/11/2018 0604   Admission Weight  96.2 kg (212 lb) Documented at 10/11/2018 0604          Physical Exam:    Wound: Satisfactory    Pulses:     Mediastinal and Chest Tube Drainage:       Results Review:     Results from last 7 days  Lab Units 10/12/18  0323   WBC 10*3/mm3 8.44   HEMOGLOBIN g/dL 10.0*   HEMATOCRIT % 31.4*   PLATELETS 10*3/mm3 169       Results from last 7 days  Lab Units 10/12/18  0323   SODIUM mmol/L 136   POTASSIUM mmol/L 4.6   CHLORIDE mmol/L 109   CO2 mmol/L 20.0   BUN mg/dL 36*   CREATININE mg/dL 1.89*   GLUCOSE mg/dL 114*   CALCIUM mg/dL 8.0*       Results from last 7 days  Lab Units 10/11/18  1448   PH, ARTERIAL pH units 7.341*   PO2 ART mm Hg 100.0   PCO2, ARTERIAL mm Hg 39.7   HCO3 ART mmol/L 21.5         Assessment      Coronary artery disease (S/P CABG x 4)    Insulin dependent type 2 diabetes mellitus (CMS/[facility])    Labile hypertension    Dyslipidemia    Hypertensive cardiovascular disease    Moderate obesity      Doing satisfactory        Migue Burton MD  10/12/18  7:47 AM      Please note that portions of this note were completed with a voice recognition program. Efforts were made to edit the dictations, but words may be mistranscribed  "

## 2018-10-12 NOTE — PLAN OF CARE
Problem: Patient Care Overview  Goal: Plan of Care Review  Outcome: Ongoing (interventions implemented as appropriate)   10/12/18 2205   Coping/Psychosocial   Plan of Care Reviewed With patient   Plan of Care Review   Progress improving   OTHER   Outcome Summary Pt. intact neuro. On 4L NC. Levophed weaned off. Insulin drip remains. Pt. in 1st degree block, bundle branch block. Pt. c/o pain and PRNs given frequently. Will ambulate back to chair in am.

## 2018-10-12 NOTE — PLAN OF CARE
Problem: Patient Care Overview  Goal: Plan of Care Review  Outcome: Ongoing (interventions implemented as appropriate)   10/11/18 2005   Coping/Psychosocial   Plan of Care Reviewed With patient;family   Plan of Care Review   Progress improving   OTHER   Outcome Summary Pt had CABGx4 with Dr. Burton today. Pt came to unit at 1041, and was extubated at 1458. Pt has been hemodynamically stable. Pt only on insulin gtt and low dose levo gtt for BP maintenance. Pt up in the chair this evening. Pt c/o 8/10 pain moderately controlled with PRN pain medication. Pt had adequate UOP and MT drainage. No other concerns at this time.       Problem: Skin Injury Risk (Adult)  Goal: Identify Related Risk Factors and Signs and Symptoms  Outcome: Ongoing (interventions implemented as appropriate)   10/11/18 2005   Skin Injury Risk (Adult)   Related Risk Factors (Skin Injury Risk) critical care admission     Goal: Skin Health and Integrity  Outcome: Ongoing (interventions implemented as appropriate)   10/11/18 2005   Skin Injury Risk (Adult)   Skin Health and Integrity making progress toward outcome       Problem: Ventilation, Mechanical Invasive (Adult)  Goal: Signs and Symptoms of Listed Potential Problems Will be Absent, Minimized or Managed (Ventilation, Mechanical Invasive)  Outcome: Ongoing (interventions implemented as appropriate)   10/11/18 2005   Goal/Outcome Evaluation   Problems Assessed (Mechanical Ventilation, Invasive) all   Problems Present (Mech Vent, Invasive) none       Problem: Cardiac Surgery (Adult)  Goal: Signs and Symptoms of Listed Potential Problems Will be Absent, Minimized or Managed (Cardiac Surgery)  Outcome: Ongoing (interventions implemented as appropriate)   10/11/18 2005   Goal/Outcome Evaluation   Problems Assessed (Cardiac Surgery) all   Problems Present (Cardiac Surgery) none     Goal: Anesthesia/Sedation Recovery  Outcome: Ongoing (interventions implemented as appropriate)   10/11/18 2005    Goal/Outcome Evaluation   Anesthesia/Sedation Recovery progressing toward baseline

## 2018-10-12 NOTE — PROGRESS NOTES
Discharge Planning Assessment  [facility]     Patient Name: Kar Mora  MRN: [id number]  Today's Date: 10/12/2018    Admit Date: 10/11/2018          Discharge Needs Assessment     Row Name 10/12/18 9415       Living Environment    Lives With spouse    Name(s) of Who Lives With Patient Spouse:  Laura Mora; cell# [phone]; home# [phone]    Current Living Arrangements home/apartment/condo    Primary Care Provided by self    Provides Primary Care For no one    Family Caregiver if Needed none    Quality of Family Relationships supportive;involved    Able to Return to Prior Arrangements yes    Living Arrangement Comments Resides in private residence with spouse       Resource/Environmental Concerns    Resource/Environmental Concerns none    Transportation Concerns car, none       Transition Planning    Patient/Family Anticipates Transition to home;home with family    Patient/Family Anticipated Services at Transition        Discharge Needs Assessment    Readmission Within the Last 30 Days no previous admission in last 30 days    Concerns to be Addressed no discharge needs identified;denies needs/concerns at this time    Equipment Currently Used at Home glucometer    Anticipated Changes Related to Illness none    Equipment Needed After Discharge none    Offered/Gave Vendor List no    Patient's Choice of Community Agency(s) NA    Discharge Coordination/Progress Home with spouse            Discharge Plan     Row Name 10/12/18 4705       Plan    Plan Home with spouse    Patient/Family in Agreement with Plan --   Patient    Plan Comments Met with patient at bedside, reports he was independent with ADL's and is planning to return home with spouse at discharge.  No needs identified at this time, Case Management will continue to follow closely.    Final Discharge Disposition Code 01 - home or self-care        Destination     No service coordination in this encounter.      Durable Medical Equipment      No service coordination in this encounter.      Dialysis/Infusion     No service coordination in this encounter.      Home Medical Care     No service coordination in this encounter.      Social Care     No service coordination in this encounter.                Demographic Summary     Row Name 10/12/18 9101       General Information    Admission Type inpatient    Arrived From home    Referral Source admission list;interdisciplinary rounds    Reason for Consult discharge planning    Preferred Language English     Used During This Interaction no            Functional Status    No documentation.           Psychosocial    No documentation.           Abuse/Neglect    No documentation.           Legal    No documentation.           Substance Abuse    No documentation.           Patient Forms    No documentation.         VANI Montano

## 2018-10-12 NOTE — PLAN OF CARE
Problem: Patient Care Overview  Goal: Plan of Care Review  Outcome: Ongoing (interventions implemented as appropriate)   10/12/18 3281   Coping/Psychosocial   Plan of Care Reviewed With patient   OTHER   Outcome Summary PT eval completed patient is able to ambulate 100 ft with min assist and stable vitals. anticipate patient being able to go home with family assist at D/C

## 2018-10-12 NOTE — THERAPY EVALUATION
Acute Care - Physical Therapy Initial Evaluation  [facility]     Patient Name: Kar Mora  : 1950  MRN: [id number]  Today's Date: 10/12/2018   Onset of Illness/Injury or Date of Surgery: 10/11/18  Date of Referral to PT: 10/12/18  Referring Physician: MD Burton      Admit Date: 10/11/2018    Visit Dx:     ICD-10-CM ICD-9-CM   1. Impaired functional mobility, balance, gait, and endurance Z74.09 V49.89   2. Atherosclerosis of native coronary artery of native heart with stable angina pectoris (CMS/[facility]) I25.118 414.01     413.9     Patient Active Problem List   Diagnosis   • Insulin dependent type 2 diabetes mellitus (CMS/HCC)   • Labile hypertension   • Dyslipidemia   • Erectile dysfunction   • Allergic rhinitis   • Hypertensive cardiovascular disease   • Moderate obesity   • Precordial pain   • Left bundle branch block   • Ischemic heart disease   • Coronary artery disease (S/P CABG x 4)     Past Medical History:   Diagnosis Date   • Allergic rhinitis    • Anesthesia     pt says he has woken up during surgery   • Chest pain    • Coronary artery disease    • Dyslipidemia    • Erectile dysfunction    • GERD (gastroesophageal reflux disease)    • Gout    • Hyperlipidemia    • Insulin dependent type 2 diabetes mellitus (CMS/HCC) 2002    checks fsbg every am, a1c checked every 6 months, 7.2 in may 2018    • Kidney stones    • Labile hypertension 10/24/2016   • Myocardial infarct (CMS/[facility])    • Wears glasses    • Wears glasses      Past Surgical History:   Procedure Laterality Date   • CARDIAC CATHETERIZATION N/A 2018    Procedure: Left Heart Cath;  Surgeon: Madi Moctezuma MD;  Location: [facility] CATH INVASIVE LOCATION;  Service: Cardiovascular   • COLONOSCOPY  2016   • CORONARY ARTERY BYPASS GRAFT N/A 10/11/2018    Procedure: MEDIAN STERNOTMY<CORONARY ARTERY BYPASS WITH INTERNAL MAMMARY ARTERY GRAFT x  4 with EVH of the right greater saphenous vein;  Surgeon: Migue Burton MD;  Location: [facility] OR;   Service: Cardiothoracic   • KIDNEY STONE SURGERY     • NASAL POLYP SURGERY  1986        PT ASSESSMENT (last 12 hours)      Physical Therapy Evaluation     Row Name 10/12/18 0855          PT Evaluation Time/Intention    Subjective Information complains of;pain  -VERONICA     Document Type evaluation  -VERONICA     Mode of Treatment physical therapy  -VERONICA     Patient Effort good  -VERONICA     Symptoms Noted During/After Treatment increased pain  -VERONICA     Row Name 10/12/18 0855          General Information    Patient Profile Reviewed? yes  -VERONICA     Onset of Illness/Injury or Date of Surgery 10/11/18  -VERONICA     Referring Physician MD Burton  -VERONICA     Patient Observations alert;cooperative;agree to therapy  -VERONICA     Patient/Family Observations no family present  -VERONICA     Prior Level of Function independent:;gait;transfer;bed mobility;ADL's  -VERONICA     Equipment Currently Used at Home none  -VERONICA     Pertinent History of Current Functional Problem patient was admitted with several month hx of chest pain patient is now S/P CABG x4 on 10/11  -VERONICA     Existing Precautions/Restrictions cardiac;oxygen therapy device and L/min;sternal  -VERONICA     Limitations/Impairments safety/cognitive  -VERONICA     Risks Reviewed patient:;LOB;increased discomfort;change in vital signs  -VERONICA     Benefits Reviewed patient:;improve function;increase strength;decrease risk of DVT  -VERONICA     Row Name 10/12/18 0855          Relationship/Environment    Lives With spouse  -VERONICA     Row Name 10/12/18 0855          Resource/Environmental Concerns    Current Living Arrangements home/apartment/condo  -VERONICA     Resource/Environmental Concerns none  -VERONICA     Row Name 10/12/18 0855          Cognitive Assessment/Intervention- PT/OT    Orientation Status (Cognition) oriented x 4  -VERONICA     Follows Commands (Cognition) WFL  -VERONICA     Safety Deficit (Cognitive) safety precautions awareness;safety precautions follow-through/compliance  -VERONICA     Row Name 10/12/18 0855          Safety Issues, Functional Mobility     Safety Issues Affecting Function (Mobility) safety precautions follow-through/compliance;safety precaution awareness   cues for sternal precautions  -VERONICA     Impairments Affecting Function (Mobility) balance;endurance/activity tolerance;pain;shortness of breath  -VERONICA     Row Name 10/12/18 0855          Bed Mobility Assessment/Treatment    Comment (Bed Mobility) patient is OOB and returns to the chair  -VERONICA     Row Name 10/12/18 0855          Transfer Assessment/Treatment    Transfer Assessment/Treatment sit-stand transfer;stand-sit transfer  -VERONICA     Sit-Stand [place] (Transfers) minimum assist (75% patient effort);2 person assist  -VERONICA     Stand-Sit [place] (Transfers) minimum assist (75% patient effort);2 person assist  -VERONICA     Row Name 10/12/18 0855          Gait/Stairs Assessment/Training    Gait/Stairs Assessment/Training gait/ambulation independence  -VERONICA     [place] Level (Gait) minimum assist (75% patient effort);1 person to manage equipment  -VERONICA     Distance in Feet (Gait) 100  -VERONICA     Pattern (Gait) step-through  -VERONICA     Comment (Gait/Stairs) patient is able to increase step length with cues.   -VERONICA     Row Name 10/12/18 0855          General ROM    GENERAL ROM COMMENTS no ROM deficits  -VERONICA     Row Name 10/12/18 0855          MMT (Manual Muscle Testing)    General MMT Comments generalized weakness 4/5  -VERONICA     Row Name 10/12/18 0855          Pain Assessment    Additional Documentation Pain Scale: Numbers Pre/Post-Treatment (Group)  -VERONICA     Row Name 10/12/18 0855          Pain Scale: Numbers Pre/Post-Treatment    Pain Scale: Numbers, Pretreatment 3/10  -VERONICA     Pain Scale: Numbers, Post-Treatment 5/10  -VERONICA     Pain Location - Orientation incisional  -VERONICA     Pain Location chest  -VERONICA     Pain Intervention(s) Medication (See MAR);Repositioned;Ambulation/increased activity  -VERONICA     Row Name             Wound 10/11/18 0801 Other (See comments) chest incision    Wound - Properties Group Date first  assessed: 10/11/18  -SH Time first assessed: 0801  -SH Side: Other (See comments)  -SH Location: chest  -SH Type: incision  -SH    Row Name             Wound 10/11/18 0801 Right leg incision    Wound - Properties Group Date first assessed: 10/11/18  -SH Time first assessed: 0801  -SH Side: Right  -SH Location: leg  -SH Type: incision  -SH    Row Name 10/12/18 0855          Coping    Observed Emotional State calm;cooperative  -VERONICA     Verbalized Emotional State acceptance  -VERONICA     Row Name 10/12/18 0855          Plan of Care Review    Plan of Care Reviewed With patient  -VERONICA     Row Name 10/12/18 0855          Physical Therapy Clinical Impression    Date of Referral to PT 10/12/18  -VERONICA     PT Diagnosis (PT Clinical Impression) impaired bed mobility transfer and gait decreased strength and balance  -VERONICA     Patient/Family Goals Statement (PT Clinical Impression) patient to go home with family assist  -VERONICA     Criteria for Skilled Interventions Met (PT Clinical Impression) yes;treatment indicated  -VERONICA     Rehab Potential (PT Clinical Summary) good, to achieve stated therapy goals  -VERONICA     Care Plan Review (PT) evaluation/treatment results reviewed;care plan/treatment goals reviewed;risks/benefits reviewed;patient/other agree to care plan  -VERONICA     Row Name 10/12/18 0855          Vital Signs    Pre Systolic BP Rehab 103  -VERONICA     Pre Treatment Diastolic BP 63  -VERONICA     Post Systolic BP Rehab 114  -VERONICA     Post Treatment Diastolic BP 66  -VERONICA     Pretreatment Heart Rate (beats/min) 88  -VERONICA     Posttreatment Heart Rate (beats/min) 90  -VERONICA     Pre SpO2 (%) 90  -VERONICA     O2 Delivery Pre Treatment nasal cannula  -VERONICA     Post SpO2 (%) 94  -VERONICA     O2 Delivery Post Treatment supplemental O2  -VERONICA     Pre Patient Position Sitting  -VERONICA     Intra Patient Position Standing  -VERONICA     Post Patient Position Sitting  -VERONICA     Row Name 10/12/18 0855          Physical Therapy Goals    Bed Mobility Goal Selection (PT) bed mobility, PT goal 1  -VERONICA      Transfer Goal Selection (PT) transfer, PT goal 1  -VERONICA     Gait Training Goal Selection (PT) gait training, PT goal 1  -VERONICA     Row Name 10/12/18 0855          Bed Mobility Goal 1 (PT)    Activity/Assistive Device (Bed Mobility Goal 1, PT) sit to supine/supine to sit  -VERONICA     [place] Level/Cues Needed (Bed Mobility Goal 1, PT) independent  -VERONICA     Time Frame (Bed Mobility Goal 1, PT) long term goal (LTG);10 days  -VERONICA     Progress/Outcomes (Bed Mobility Goal 1, PT) goal ongoing  -VERONICA     Row Name 10/12/18 0855          Transfer Goal 1 (PT)    Activity/Assistive Device (Transfer Goal 1, PT) sit-to-stand/stand-to-sit  -VERONICA     [place] Level/Cues Needed (Transfer Goal 1, PT) independent  -VERONICA     Time Frame (Transfer Goal 1, PT) long term goal (LTG);10 days  -VERONICA     Progress/Outcome (Transfer Goal 1, PT) goal ongoing  -VERONICA     Row Name 10/12/18 0855          Gait Training Goal 1 (PT)    Activity/Assistive Device (Gait Training Goal 1, PT) gait (walking locomotion)  -VERONICA     [place] Level (Gait Training Goal 1, PT) independent  -VERONICA     Distance (Gait Goal 1, PT) 400  -VERONICA     Time Frame (Gait Training Goal 1, PT) long term goal (LTG);10 days  -VERONICA     Progress/Outcome (Gait Training Goal 1, PT) goal ongoing  -VERONICA     Row Name 10/12/18 0855          Patient Education Goal (PT)    Activity (Patient Education Goal, PT) HEP  -VERONICA     [place]/Cues/Accuracy (Memory Goal 2, PT) verbalizes understanding  -VERONICA     Time Frame (Patient Education Goal, PT) long term goal (LTG);10 days  -VERONICA     Progress/Outcome (Patient Education Goal, PT) goal ongoing  -VERONICA     Row Name 10/12/18 0855          Positioning and Restraints    Pre-Treatment Position sitting in chair/recliner  -VERONICA     Post Treatment Position chair  -VERONICA     In Chair notified nsg;reclined;sitting;call light within reach;with nsg  -VERONICA       User Key  (r) = Recorded By, (t) = Taken By, (c) = Cosigned By    Initials Name Provider Type    Rhiannon Louis, PT  Physical Therapist    SH Haase, Sherri L, RN Registered Nurse          Physical Therapy Education     Title: PT OT SLP Therapies (Active)     Topic: Physical Therapy (Active)     Point: Mobility training (Active)    Learning Progress Summary     Learner Status Readiness Method Response Comment Documented by    Patient Active Acceptance E NR  VERONICA 10/12/18 0855     Active Acceptance E NR  VERONICA 10/12/18 0855          Point: Home exercise program (Active)    Learning Progress Summary     Learner Status Readiness Method Response Comment Documented by    Patient Active Acceptance E NR  VERONICA 10/12/18 0855     Active Acceptance E NR  VERONICA 10/12/18 0855          Point: Body mechanics (Active)    Learning Progress Summary     Learner Status Readiness Method Response Comment Documented by    Patient Active Acceptance E NR  VERONICA 10/12/18 0855     Active Acceptance E NR  VERONICA 10/12/18 0855          Point: Precautions (Active)    Learning Progress Summary     Learner Status Readiness Method Response Comment Documented by    Patient Active Acceptance E NR  VERONICA 10/12/18 0855     Active Acceptance E NR  VERONICA 10/12/18 0855                      User Key     Initials Effective Dates Name Provider Type Discipline     06/19/15 -  Rhiannon Muñoz PT Physical Therapist PT                PT Recommendation and Plan  Anticipated Discharge Disposition (PT): home with assist  Planned Therapy Interventions (PT Eval): balance training, bed mobility training, gait training, home exercise program, transfer training  Therapy Frequency (PT Clinical Impression): daily  Outcome Summary/Treatment Plan (PT)  Anticipated Discharge Disposition (PT): home with assist  Plan of Care Reviewed With: patient  Outcome Summary: PT eval completed patient is able to ambulate 100 ft with min assist and stable vitals. anticipate patient being able to go home with family assist at D/C          Outcome Measures     Row Name 10/12/18 0855             How much help from another  person do you currently need...    Turning from your back to your side while in flat bed without using bedrails? 3  -VERONICA      Moving from lying on back to sitting on the side of a flat bed without bedrails? 2  -VERONICA      Moving to and from a bed to a chair (including a wheelchair)? 3  -VERONICA      Standing up from a chair using your arms (e.g., wheelchair, bedside chair)? 3  -VERONICA      Climbing 3-5 steps with a railing? 2  -VERONICA      To walk in hospital room? 3  -VERONICA      AM-PAC 6 Clicks Score 16  -VERONICA         Functional Assessment    Outcome Measure Options AM-PAC 6 Clicks Basic Mobility (PT)  -VERONICA        User Key  (r) = Recorded By, (t) = Taken By, (c) = Cosigned By    Initials Name Provider Type    Rhiannon Louis PT Physical Therapist           Time Calculation:         PT Charges     Row Name 10/12/18 0855             Time Calculation    Start Time 0855  -VERONICA      PT Received On 10/12/18  -VERONICA      PT Goal Re-Cert Due Date 10/22/18  -VERONICA        User Key  (r) = Recorded By, (t) = Taken By, (c) = Cosigned By    Initials Name Provider Type    Rhiannon Louis PT Physical Therapist        Therapy Suggested Charges     Code   Minutes Charges    None           Therapy Charges for Today     Code Description Service Date Service Provider Modifiers Qty    12033381634 HC PT EVAL HIGH COMPLEXITY 4 10/12/2018 Rhiannon Muñoz, PT GP 1    [id number]  PT THER SUPP EA 15 MIN 10/12/2018 Rhiannon Muñoz, PT GP 1          PT G-Codes  Outcome Measure Options: AM-PAC 6 Clicks Basic Mobility (PT)  AM-PAC 6 Clicks Score: 16      Rhiannon Muñoz PT  10/12/2018

## 2018-10-12 NOTE — NURSING NOTE
Order received for Phase II Cardiac Rehab. Patient is POD #1 today.  Staff will follow up once patient is transferred to telemetry.

## 2018-10-12 NOTE — PROGRESS NOTES
Intensive Care Follow-up      LOS: 1 day     Mr. Kar Mora, 68 y.o. male is followed for: Glycemic, Electrolyte, Respiratory, and Medical management     Subjective - Interval History     Awake and alert  No problems overnight  Oxygenating well on 2 L/m via nasal cannula  Remains on insulin drip    The patient's relevant past medical, surgical and social history were reviewed and updated in Epic as appropriate.     Objective     Infusions:    dexmedetomidine 0.2-1.5 mcg/kg/hr Last Rate: Stopped (10/11/18 1450)   dextrose 30 mL/hr Last Rate: 30 mL/hr (10/11/18 2241)   DOBUTamine 2-20 mcg/kg/min    DOPamine 2-20 mcg/kg/min Last Rate: Stopped (10/11/18 1345)   EPINEPHrine 0.02-0.3 mcg/kg/min    insulin regular infusion 1 unit/mL (CCU use) 0-50 Units/hr Last Rate: 2.8 Units/hr (10/12/18 0600)   niCARdipine 5-15 mg/hr    nitroglycerin 5-200 mcg/min    norepinephrine 0.02-0.3 mcg/kg/min Last Rate: Stopped (10/11/18 2200)   phenylephrine 0.5-3 mcg/kg/min    propofol 5-50 mcg/kg/min Last Rate: Stopped (10/11/18 1315)   sodium chloride 30 mL/hr    vasopressin 0.02-0.1 Units/min      Medications:    atorvastatin 40 mg Oral Nightly   cefuroxime 1.5 g Intravenous Q8H   chlorhexidine 15 mL Mouth/Throat Q12H   metoprolol tartrate 12.5 mg Oral Q12H   metoprolol tartrate 2.5 mg Intravenous Q6H   pharmacy consult - MTM  Does not apply Daily   sennosides-docusate sodium 2 tablet Oral BID     Intake/Output       10/11/18 0700 - 10/12/18 0659    Intake (ml) 4217.6    Output (ml) 2789    Net (ml) 1428.6    Last Weight  96.2 kg (212 lb 1.3 oz)        Vital Sign Min/Max for last 24 hours  Temp  Min: 96.9 °F (36.1 °C)  Max: 100.1 °F (37.8 °C)   BP  Min: 82/57  Max: 120/66   Pulse  Min: 0  Max: 109   Resp  Min: 14  Max: 26   SpO2  Min: 94 %  Max: 100 %   No Data Recorded        Physical Exam:   GENERAL: Awake, no distress   HEENT: Right IJ Cordis site okay, no adenopathy   LUNGS: No wheezes or rhonchi.   HEART: Regular rate and  rhythm.  Sternal incision without drainage or dehiscence.  No air leak from MTs   ABDOMEN: Soft, nontender   EXTREMITIES: Trace pitting edema, no cyanosis   NEURO/PSYCH: Awake and alert.  Follows commands.  No deficits      Results from last 7 days  Lab Units 10/12/18  0323 10/12/18  0030 10/11/18  2002 10/11/18  1448 10/11/18  1305   WBC 10*3/mm3 8.44  --   --  4.89 5.18   HEMOGLOBIN g/dL 10.0* 10.0* 10.4* 10.0* 10.5*   PLATELETS 10*3/mm3 169  --   --  119* 114*       Results from last 7 days  Lab Units 10/12/18  0323 10/12/18  0030 10/11/18  2002 10/11/18  1448 10/11/18  1305   SODIUM mmol/L 136  --   --  138 137   POTASSIUM mmol/L 4.6 4.8 5.2 3.6 3.8   CO2 mmol/L 20.0  --   --  23.0 23.0   BUN mg/dL 36*  --   --  36* 35*   CREATININE mg/dL 1.89*  --   --  1.60* 1.63*   MAGNESIUM mg/dL 2.6  --   --  2.6 2.6   PHOSPHORUS mg/dL 4.6  --   --  2.8 3.4   GLUCOSE mg/dL 114*  --   --  125* 152*     Estimated Creatinine Clearance: 41.2 mL/min (A) (by C-G formula based on SCr of 1.89 mg/dL (H)).      Results from last 7 days  Lab Units 10/10/18  1324   HEMOGLOBIN A1C % 7.50*         Results from last 7 days  Lab Units 10/11/18  1448   PH, ARTERIAL pH units 7.341*   PCO2, ARTERIAL mm Hg 39.7   PO2 ART mm Hg 100.0     No results found for: LACTATE       Images: Chest x-ray is a dark film.  [place]-Tian catheter is pulled back into the pulmonary artery outflow tract.  No overt pneumothorax, consolidation, or effusion    I reviewed the patient's results and images.     Impression      Active Hospital Problems    Diagnosis   • **Coronary artery disease (S/P CABG x 4)   • Hypertensive cardiovascular disease     1. Probable hypertensive cardiovascular disease:  a. Abnormal EKG with abnormal acceptable echocardiographic GXT, September 2004.   b. Acceptable echocardiographic GXT with preserved exercise capacity and mild LVH, March 2008.   c. Acceptable Quantitative SPECT Gated Cardiolite GXT with nominal myocardial perfusion to 88% of  predicted exercise capacity and 90% predicted maximum heart rate with preserved LVEF (0.65) with continued medical therapy felt warranted, December 2012.  d. Residual class I symptoms with recent documented abnormal EKG (LBBB/first-degree AV block) with abnormal echocardiogram demonstrating mild left ventricular chamber enlargement with mild reduction in the LVEF (0.42) without PE or pulmonary hypertension.  e. Resident class I symptoms with persistent abnormal echocardiogram (LVEF 0.40), with mild concentric LVH and mild left atrial enlargement without pulmonary arterial hypertension or pericardial effusion, July 2015.       • Insulin dependent type 2 diabetes mellitus (CMS/HCC)   • Labile hypertension   • Moderate obesity   • Dyslipidemia         Plan        Remover reposition [place]-Tian catheter  Transition to basal/bolus insulin  Mobilize    Plan of care and goals reviewed with mulitdisciplinary team at daily rounds   I discussed the patient's findings and my recommendations with patient and nursing staff       CHRIS Maravilla MD  Pulmonary and Critical Care Medicine

## 2018-10-13 ENCOUNTER — APPOINTMENT (OUTPATIENT)
Dept: CARDIOLOGY | Facility: HOSPITAL | Age: 68
End: 2018-10-13
Attending: INTERNAL MEDICINE

## 2018-10-13 ENCOUNTER — APPOINTMENT (OUTPATIENT)
Dept: GENERAL RADIOLOGY | Facility: HOSPITAL | Age: 68
End: 2018-10-13

## 2018-10-13 LAB
ANION GAP SERPL CALCULATED.3IONS-SCNC: 9 MMOL/L (ref 3–11)
BUN BLD-MCNC: 36 MG/DL (ref 9–23)
BUN/CREAT SERPL: 21.2 (ref 7–25)
CALCIUM SPEC-SCNC: 8.4 MG/DL (ref 8.7–10.4)
CHLORIDE SERPL-SCNC: 106 MMOL/L (ref 99–109)
CO2 SERPL-SCNC: 19 MMOL/L (ref 20–31)
CREAT BLD-MCNC: 1.7 MG/DL (ref 0.6–1.3)
DEPRECATED RDW RBC AUTO: 48.7 FL (ref 37–54)
ERYTHROCYTE [DISTWIDTH] IN BLOOD BY AUTOMATED COUNT: 14.8 % (ref 11.3–14.5)
GFR SERPL CREATININE-BSD FRML MDRD: 40 ML/MIN/1.73
GLUCOSE BLD-MCNC: 173 MG/DL (ref 70–100)
GLUCOSE BLDC GLUCOMTR-MCNC: 163 MG/DL (ref 70–130)
GLUCOSE BLDC GLUCOMTR-MCNC: 187 MG/DL (ref 70–130)
GLUCOSE BLDC GLUCOMTR-MCNC: 189 MG/DL (ref 70–130)
GLUCOSE BLDC GLUCOMTR-MCNC: 209 MG/DL (ref 70–130)
HCT VFR BLD AUTO: 31.5 % (ref 38.9–50.9)
HGB BLD-MCNC: 10 G/DL (ref 13.1–17.5)
MCH RBC QN AUTO: 28.7 PG (ref 27–31)
MCHC RBC AUTO-ENTMCNC: 31.7 G/DL (ref 32–36)
MCV RBC AUTO: 90.3 FL (ref 80–99)
PLATELET # BLD AUTO: 160 10*3/MM3 (ref 150–450)
PMV BLD AUTO: 11.3 FL (ref 6–12)
POTASSIUM BLD-SCNC: 4.6 MMOL/L (ref 3.5–5.5)
RBC # BLD AUTO: 3.49 10*6/MM3 (ref 4.2–5.76)
SODIUM BLD-SCNC: 134 MMOL/L (ref 132–146)
WBC NRBC COR # BLD: 9.84 10*3/MM3 (ref 3.5–10.8)

## 2018-10-13 PROCEDURE — 93010 ELECTROCARDIOGRAM REPORT: CPT | Performed by: INTERNAL MEDICINE

## 2018-10-13 PROCEDURE — 63710000001 INSULIN LISPRO (HUMAN) PER 5 UNITS: Performed by: INTERNAL MEDICINE

## 2018-10-13 PROCEDURE — 25010000002 CEFUROXIME: Performed by: PHYSICIAN ASSISTANT

## 2018-10-13 PROCEDURE — 99232 SBSQ HOSP IP/OBS MODERATE 35: CPT | Performed by: INTERNAL MEDICINE

## 2018-10-13 PROCEDURE — 93306 TTE W/DOPPLER COMPLETE: CPT

## 2018-10-13 PROCEDURE — 82962 GLUCOSE BLOOD TEST: CPT

## 2018-10-13 PROCEDURE — 25010000002 SULFUR HEXAFLUORIDE MICROSPH 60.7-25 MG RECONSTITUTED SUSPENSION: Performed by: THORACIC SURGERY (CARDIOTHORACIC VASCULAR SURGERY)

## 2018-10-13 PROCEDURE — 71045 X-RAY EXAM CHEST 1 VIEW: CPT

## 2018-10-13 PROCEDURE — 99024 POSTOP FOLLOW-UP VISIT: CPT | Performed by: THORACIC SURGERY (CARDIOTHORACIC VASCULAR SURGERY)

## 2018-10-13 PROCEDURE — 25010000002 METOCLOPRAMIDE PER 10 MG: Performed by: THORACIC SURGERY (CARDIOTHORACIC VASCULAR SURGERY)

## 2018-10-13 PROCEDURE — 85027 COMPLETE CBC AUTOMATED: CPT | Performed by: PHYSICIAN ASSISTANT

## 2018-10-13 PROCEDURE — 63710000001 INSULIN DETEMIR PER 5 UNITS: Performed by: INTERNAL MEDICINE

## 2018-10-13 PROCEDURE — 80048 BASIC METABOLIC PNL TOTAL CA: CPT | Performed by: PHYSICIAN ASSISTANT

## 2018-10-13 PROCEDURE — 99233 SBSQ HOSP IP/OBS HIGH 50: CPT | Performed by: INTERNAL MEDICINE

## 2018-10-13 PROCEDURE — 93306 TTE W/DOPPLER COMPLETE: CPT | Performed by: INTERNAL MEDICINE

## 2018-10-13 PROCEDURE — 93005 ELECTROCARDIOGRAM TRACING: CPT | Performed by: PHYSICIAN ASSISTANT

## 2018-10-13 RX ORDER — METOCLOPRAMIDE HYDROCHLORIDE 5 MG/ML
10 INJECTION INTRAMUSCULAR; INTRAVENOUS ONCE
Status: COMPLETED | OUTPATIENT
Start: 2018-10-13 | End: 2018-10-13

## 2018-10-13 RX ORDER — CALCIUM CARBONATE 200(500)MG
2 TABLET,CHEWABLE ORAL 3 TIMES DAILY PRN
Status: DISCONTINUED | OUTPATIENT
Start: 2018-10-13 | End: 2018-10-22 | Stop reason: HOSPADM

## 2018-10-13 RX ORDER — TAMSULOSIN HYDROCHLORIDE 0.4 MG/1
0.4 CAPSULE ORAL DAILY
Status: DISCONTINUED | OUTPATIENT
Start: 2018-10-14 | End: 2018-10-22 | Stop reason: HOSPADM

## 2018-10-13 RX ORDER — CARVEDILOL 12.5 MG/1
25 TABLET ORAL 2 TIMES DAILY
Status: DISCONTINUED | OUTPATIENT
Start: 2018-10-13 | End: 2018-10-22 | Stop reason: HOSPADM

## 2018-10-13 RX ORDER — LOSARTAN POTASSIUM 50 MG/1
100 TABLET ORAL NIGHTLY
Status: DISCONTINUED | OUTPATIENT
Start: 2018-10-13 | End: 2018-10-15

## 2018-10-13 RX ORDER — FINASTERIDE 5 MG/1
5 TABLET, FILM COATED ORAL DAILY
Status: DISCONTINUED | OUTPATIENT
Start: 2018-10-14 | End: 2018-10-22 | Stop reason: HOSPADM

## 2018-10-13 RX ADMIN — METOPROLOL TARTRATE 12.5 MG: 25 TABLET, FILM COATED ORAL at 08:56

## 2018-10-13 RX ADMIN — ATORVASTATIN CALCIUM 40 MG: 40 TABLET, FILM COATED ORAL at 20:09

## 2018-10-13 RX ADMIN — SENNOSIDES AND DOCUSATE SODIUM 2 TABLET: 8.6; 5 TABLET ORAL at 08:56

## 2018-10-13 RX ADMIN — INSULIN DETEMIR 11 UNITS: 100 INJECTION, SOLUTION SUBCUTANEOUS at 20:27

## 2018-10-13 RX ADMIN — INSULIN LISPRO 7 UNITS: 100 INJECTION, SOLUTION INTRAVENOUS; SUBCUTANEOUS at 17:45

## 2018-10-13 RX ADMIN — NITROGLYCERIN 5 MCG/MIN: 20 INJECTION INTRAVENOUS at 06:06

## 2018-10-13 RX ADMIN — LOSARTAN POTASSIUM 100 MG: 50 TABLET ORAL at 20:10

## 2018-10-13 RX ADMIN — INSULIN LISPRO 2 UNITS: 100 INJECTION, SOLUTION INTRAVENOUS; SUBCUTANEOUS at 20:27

## 2018-10-13 RX ADMIN — INSULIN LISPRO 2 UNITS: 100 INJECTION, SOLUTION INTRAVENOUS; SUBCUTANEOUS at 09:09

## 2018-10-13 RX ADMIN — INSULIN LISPRO 7 UNITS: 100 INJECTION, SOLUTION INTRAVENOUS; SUBCUTANEOUS at 11:54

## 2018-10-13 RX ADMIN — CARVEDILOL 25 MG: 12.5 TABLET, FILM COATED ORAL at 10:48

## 2018-10-13 RX ADMIN — OXYCODONE HYDROCHLORIDE AND ACETAMINOPHEN 2 TABLET: 5; 325 TABLET ORAL at 10:48

## 2018-10-13 RX ADMIN — INSULIN LISPRO 4 UNITS: 100 INJECTION, SOLUTION INTRAVENOUS; SUBCUTANEOUS at 17:46

## 2018-10-13 RX ADMIN — CARVEDILOL 25 MG: 12.5 TABLET, FILM COATED ORAL at 20:10

## 2018-10-13 RX ADMIN — SENNOSIDES AND DOCUSATE SODIUM 2 TABLET: 8.6; 5 TABLET ORAL at 20:09

## 2018-10-13 RX ADMIN — HYDROCODONE BITARTRATE AND ACETAMINOPHEN 1 TABLET: 7.5; 325 TABLET ORAL at 21:34

## 2018-10-13 RX ADMIN — METOPROLOL TARTRATE 12.5 MG: 25 TABLET, FILM COATED ORAL at 20:09

## 2018-10-13 RX ADMIN — Medication 2 TABLET: at 02:18

## 2018-10-13 RX ADMIN — METOCLOPRAMIDE 10 MG: 5 INJECTION, SOLUTION INTRAMUSCULAR; INTRAVENOUS at 08:56

## 2018-10-13 RX ADMIN — SULFUR HEXAFLUORIDE 5 ML: KIT at 17:32

## 2018-10-13 RX ADMIN — OXYCODONE HYDROCHLORIDE AND ACETAMINOPHEN 2 TABLET: 5; 325 TABLET ORAL at 18:00

## 2018-10-13 RX ADMIN — INSULIN LISPRO 2 UNITS: 100 INJECTION, SOLUTION INTRAVENOUS; SUBCUTANEOUS at 11:54

## 2018-10-13 RX ADMIN — OXYCODONE HYDROCHLORIDE AND ACETAMINOPHEN 2 TABLET: 5; 325 TABLET ORAL at 04:28

## 2018-10-13 RX ADMIN — INSULIN DETEMIR 11 UNITS: 100 INJECTION, SOLUTION SUBCUTANEOUS at 08:57

## 2018-10-13 RX ADMIN — INSULIN LISPRO 7 UNITS: 100 INJECTION, SOLUTION INTRAVENOUS; SUBCUTANEOUS at 08:57

## 2018-10-13 RX ADMIN — CEFUROXIME 1.5 G: 1.5 INJECTION, POWDER, FOR SOLUTION INTRAVENOUS at 01:55

## 2018-10-13 NOTE — PLAN OF CARE
Problem: Patient Care Overview  Goal: Plan of Care Review  Outcome: Ongoing (interventions implemented as appropriate)   10/13/18 4737   Coping/Psychosocial   Plan of Care Reviewed With patient   Plan of Care Review   Progress improving   OTHER   Outcome Summary Pt. intact neuro, ambulated in [place] before bed, slept in chair per pt. request. On 3L NC. Pain controlled with prns. Plt unable to void, in/out cathed: 450ml. MTs, Vwires, Cordis remain.

## 2018-10-13 NOTE — PROGRESS NOTES
CTS Progress Note       LOS: 2 days   Patient Care Team:  Bro Kan MD as PCP - General    Chief Complaint: Coronary artery disease involving native coronary artery of native heart with angina pectoris (CMS/HCC)    Vital Signs:  Temp:  [98 °F (36.7 °C)-99.3 °F (37.4 °C)] 98.5 °F (36.9 °C)  Heart Rate:  [83-98] 96  Resp:  [20-22] 20  BP: (105-147)/(57-87) 132/64  Arterial Line BP: (120)/(59) 120/59    Physical Exam:  Up in chair.  Alert conversant ambulatory in the ICU     Results:     Results from last 7 days  Lab Units 10/13/18  0321   WBC 10*3/mm3 9.84   HEMOGLOBIN g/dL 10.0*   HEMATOCRIT % 31.5*   PLATELETS 10*3/mm3 160       Results from last 7 days  Lab Units 10/13/18  0321   SODIUM mmol/L 134   POTASSIUM mmol/L 4.6   CHLORIDE mmol/L 106   CO2 mmol/L 19.0*   BUN mg/dL 36*   CREATININE mg/dL 1.70*   GLUCOSE mg/dL 173*   CALCIUM mg/dL 8.4*           Imaging Results (last 24 hours)     Procedure Component Value Units Date/Time    XR Chest 1 View [[id number]] Updated:  10/13/18 0601    XR Chest 1 View [[id number]] Collected:  10/12/18 0915     Updated:  10/12/18 2251    Narrative:       EXAMINATION: XR CHEST 1 VW- 10/12/2018     INDICATION: Post-Op Heart Surgery; I25.118-Atherosclerotic heart disease  of native coronary artery with other forms of angina pectoris      COMPARISON: 10/11/2018     FINDINGS: ET tube and NG tube have been removed. PA catheter tip lies in  the main pulmonary artery segment. The heart is enlarged. The  vasculature appears upper normal. There is mild remaining left basilar  atelectasis.           Impression:       Post extubation chest radiograph with mild remaining left  basilar atelectasis. Cardiomegaly without evidence of overt congestive  failure.     D:  10/12/2018  E:  10/12/2018     This report was finalized on 10/12/2018 10:49 PM by DR. Joseph Dobson MD.             Assessment      Coronary artery disease (S/P CABG x 4)    Insulin dependent type 2 diabetes mellitus  (CMS/[facility])    Labile hypertension    Dyslipidemia    Hypertensive cardiovascular disease    Moderate obesity    Creatinine is 1.7 today.  However patient is had 410 mL per chest tube in the last 24 hours.  Therefore we'll leave chest tubes until tomorrow.    Plan   As above    Please note that portions of this note were completed with a voice recognition program. Efforts were made to edit the dictations, but occasionally words are mistranscribed.    Brice Oakley MD  10/13/18  7:20 AM

## 2018-10-13 NOTE — PROGRESS NOTES
CARDIOLOGY PROGRESS NOTE           10/13/2018 9:40 AM    Admit Date: 10/11/2018    Admit Diagnosis: Coronary artery disease involving native coronary artery of native heart with angina pectoris (CMS/HCC) [I25.119]  Atherosclerosis of native coronary artery of native heart with stable angina pectoris (CMS/HCC) [I25.118]    Chief Compliant: Follow up for CAD, Bradycardia, CHF    Subjective:   Patient's status has been stable. Improved Vital Signs      Objective:     Vitals:    10/13/18 0500 10/13/18 0600 10/13/18 0620 10/13/18 0640   BP: 139/70 147/68 135/66 132/64   Pulse: 92 98 97 96   Resp:  20     Temp:       TempSrc:       SpO2: 94% 95% 93% 94%   Weight:       Height:           Physical Exam:  General-Well Nourished, Well developed  Eyes - PERRLA  Neck- supple, No mass  CV- regular rate and rhythm, no MRG  Lung- clear bilaterally  Abd- soft, +BS  Musc/skel - Norm strength and range of motion  Skin- warm and dry  Neuro - Alert & Oriented x 3, appropriate mood.      Current Facility-Administered Medications:   •  acetaminophen (TYLENOL) tablet 650 mg, 650 mg, Oral, Q4H PRN, Migue Burton MD  •  atorvastatin (LIPITOR) tablet 40 mg, 40 mg, Oral, Nightly, Adonis Capps PA, 40 mg at 10/12/18 2007  •  bisacodyl (DULCOLAX) EC tablet 10 mg, 10 mg, Oral, Daily PRN, Adonis Capps PA  •  bisacodyl (DULCOLAX) suppository 10 mg, 10 mg, Rectal, Daily PRN, Adonis Capps PA  •  calcium carbonate (TUMS) chewable tablet 500 mg (200 mg elemental), 2 tablet, Oral, TID PRN, Bhumi Knight APRN, 2 tablet at 10/13/18 0218  •  carvedilol (COREG) tablet 25 mg, 25 mg, Oral, BID, Branden Maravilla MD  •  dextrose (D50W) 25 g/ 50mL Intravenous Solution 25 g, 25 g, Intravenous, Q15 Min PRN, Branden Maravilla MD  •  dextrose (GLUTOSE) oral gel 15 g, 15 g, Oral, Q15 Min PRN, Branden Maravilla MD  •  docusate sodium (COLACE) capsule 100 mg, 100 mg, Oral, BID PRN, Adonis Capps PA  •  glucagon  (human recombinant) (GLUCAGEN DIAGNOSTIC) injection 1 mg, 1 mg, Subcutaneous, PRN, Branden Maravilla MD  •  HYDROcodone-acetaminophen (NORCO) 7.5-325 MG per tablet 1 tablet, 1 tablet, Oral, Q4H PRN, Migue Burton MD, 1 tablet at 10/12/18 2007  •  insulin detemir (LEVEMIR) injection 11 Units, 11 Units, Subcutaneous, BID, Branden Maravilla MD, 11 Units at 10/13/18 0857  •  insulin lispro (humaLOG) injection 0-9 Units, 0-9 Units, Subcutaneous, 4x Daily With Meals & Nightly, Branden Maravilla MD, 2 Units at 10/13/18 0909  •  insulin lispro (humaLOG) injection 7 Units, 7 Units, Subcutaneous, TID With Meals, Branden Maravilla MD, 7 Units at 10/13/18 0857  •  losartan (COZAAR) tablet 100 mg, 100 mg, Oral, Nightly, Branden Maravilla MD  •  magnesium hydroxide (MILK OF MAGNESIA) suspension 2400 mg/10mL 10 mL, 10 mL, Oral, Daily PRN, Adonis Capps PA  •  metoprolol tartrate (LOPRESSOR) half tablet 12.5 mg, 12.5 mg, Oral, Q12H, Adonis Capps PA, 12.5 mg at 10/13/18 0856  •  Morphine sulfate (PF) injection 2 mg, 2 mg, Intravenous, Q2H PRN, Migue Burton MD, 2 mg at 10/12/18 2048  •  niCARdipine (CARDENE-IV) 40 mg/200 mL (0.2 mg/mL) in 0.9% NaCl infusion, 5-15 mg/hr, Intravenous, Continuous PRN, Adonis Capps PA  •  nitroglycerin 50 mg/250 mL (0.2 mg/mL) infusion, 5-200 mcg/min, Intravenous, Continuous PRN, Adonis Capps PA, Last Rate: 1.5 mL/hr at 10/13/18 0606, 5 mcg/min at 10/13/18 0606  •  norepinephrine (LEVOPHED) 8 mg/250 mL (32 mcg/mL) in sodium chloride 0.9% infusion (premix), 0.02-0.3 mcg/kg/min, Intravenous, Continuous PRN, Adonis Capps PA, Stopped at 10/11/18 2200  •  ondansetron (ZOFRAN) injection 4 mg, 4 mg, Intravenous, Q6H PRN, Adonis Capps PA  •  oxyCODONE-acetaminophen (PERCOCET) 5-325 MG per tablet 2 tablet, 2 tablet, Oral, Q4H PRN, Migue Burton MD, 2 tablet at 10/13/18 0428  •  Pharmacy Consult - [facility], , Does not apply, Daily,  David Whaley, [facility]  •  phenol (CHLORASEPTIC) 1.4 % liquid 2 spray, 2 spray, Mouth/Throat, Q2H PRN, Migue Burton MD, 2 spray at 10/12/18 2047  •  phenylephrine (JORDON-SYNEPHRINE) 50 mg/250 mL (0.2 mg/mL) in 0.9% NS  infusion, 0.5-3 mcg/kg/min, Intravenous, Continuous PRN, Adonis Capps PA  •  potassium chloride (MICRO-K) CR capsule 40 mEq, 40 mEq, Oral, PRN **OR** potassium chloride (KLOR-CON) packet 40 mEq, 40 mEq, Oral, PRN, Adonis Capps PA  •  sennosides-docusate sodium (SENOKOT-S) 8.6-50 MG tablet 2 tablet, 2 tablet, Oral, BID, Adonis Capps PA, 2 tablet at 10/13/18 0856    Facility-Administered Medications Ordered in Other Encounters:   •  Chlorhexidine Gluconate Cloth 2 % pads 1 application, 1 application, Topical, Q12H PRN, Adonis Capps PA    Data Review:   Recent Results (from the past 24 hour(s))   POC Glucose Once    Collection Time: 10/12/18 10:15 AM   Result Value Ref Range    Glucose 127 70 - 130 mg/dL   POC Glucose Once    Collection Time: 10/12/18 10:52 AM   Result Value Ref Range    Glucose 118 70 - 130 mg/dL   POC Glucose Once    Collection Time: 10/12/18 12:03 PM   Result Value Ref Range    Glucose 119 70 - 130 mg/dL   POC Glucose Once    Collection Time: 10/12/18  1:00 PM   Result Value Ref Range    Glucose 121 70 - 130 mg/dL   POC Glucose Once    Collection Time: 10/12/18  2:02 PM   Result Value Ref Range    Glucose 133 (H) 70 - 130 mg/dL   POC Glucose Once    Collection Time: 10/12/18  3:11 PM   Result Value Ref Range    Glucose 139 (H) 70 - 130 mg/dL   POC Glucose Once    Collection Time: 10/12/18  4:05 PM   Result Value Ref Range    Glucose 140 (H) 70 - 130 mg/dL   POC Glucose Once    Collection Time: 10/12/18  8:10 PM   Result Value Ref Range    Glucose 149 (H) 70 - 130 mg/dL   CBC (No Diff)    Collection Time: 10/13/18  3:21 AM   Result Value Ref Range    WBC 9.84 3.50 - 10.80 10*3/mm3    RBC 3.49 (L) 4.20 - 5.76 10*6/mm3    Hemoglobin 10.0 (L) 13.1 - 17.5  g/dL    Hematocrit 31.5 (L) 38.9 - 50.9 %    MCV 90.3 80.0 - 99.0 fL    MCH 28.7 27.0 - 31.0 pg    MCHC 31.7 (L) 32.0 - 36.0 g/dL    RDW 14.8 (H) 11.3 - 14.5 %    RDW-SD 48.7 37.0 - 54.0 fl    MPV 11.3 6.0 - 12.0 fL    Platelets 160 150 - 450 10*3/mm3   Basic Metabolic Panel    Collection Time: 10/13/18  3:21 AM   Result Value Ref Range    Glucose 173 (H) 70 - 100 mg/dL    BUN 36 (H) 9 - 23 mg/dL    Creatinine 1.70 (H) 0.60 - 1.30 mg/dL    Sodium 134 132 - 146 mmol/L    Potassium 4.6 3.5 - 5.5 mmol/L    Chloride 106 99 - 109 mmol/L    CO2 19.0 (L) 20.0 - 31.0 mmol/L    Calcium 8.4 (L) 8.7 - 10.4 mg/dL    eGFR Non African Amer 40 (L) >60 mL/min/1.73    BUN/Creatinine Ratio 21.2 7.0 - 25.0    Anion Gap 9.0 3.0 - 11.0 mmol/L   POC Glucose Once    Collection Time: 10/13/18  7:35 AM   Result Value Ref Range    Glucose 187 (H) 70 - 130 mg/dL     Assessment:       Coronary artery disease (S/P CABG x 4)    Insulin dependent type 2 diabetes mellitus (CMS/HCC)    Labile hypertension    Dyslipidemia    Hypertensive cardiovascular disease    Moderate obesity    Plan:   1. CAD - s/p CABG x4 with EVH (LIMA to LAD, SVG to the diagonal , circumflex, and PDA), on ASA, statin, and betablocker at home.  2. Asystolic rhythm immediately post-op; paced at that time. Now sinus with baseline underlying 1st AVB and LBBB  3. Hypertension, currently requiring pressor support, on carvedilol and losartan at home.  4. Chronic Systolic heart failure - EF 40% when measured in May 2018 - Will need to recheck ECHO to determine current post op EF. On Coreg and Cozaar         Isreal Beaver M.D., F.A.C.C, F.H.R.S.  Cardiology/Electrophysiology

## 2018-10-13 NOTE — PROGRESS NOTES
Intensive Care Follow-up      LOS: 2 days     Mr. Kar Mora, 68 y.o. male is followed for: Glycemic, Electrolyte, Respiratory, and Medical management     Subjective - Interval History     Remains on a nitroglycerin drip for blood pressure control  No air leak from MTs  No bowel movement for about 5 days    The patient's relevant past medical, surgical and social history were reviewed and updated in Epic as appropriate.     Objective     Infusions:    niCARdipine 5-15 mg/hr    nitroglycerin 5-200 mcg/min Last Rate: 5 mcg/min (10/13/18 0606)   norepinephrine 0.02-0.3 mcg/kg/min Last Rate: Stopped (10/11/18 2200)   phenylephrine 0.5-3 mcg/kg/min      Medications:    atorvastatin 40 mg Oral Nightly   carvedilol 25 mg Oral BID   insulin detemir 11 Units Subcutaneous BID   insulin lispro 0-9 Units Subcutaneous 4x Daily With Meals & Nightly   insulin lispro 7 Units Subcutaneous TID With Meals   losartan 100 mg Oral Nightly   metoprolol tartrate 12.5 mg Oral Q12H   pharmacy consult - [facility]  Does not apply Daily   sennosides-docusate sodium 2 tablet Oral BID     Intake/Output       10/12/18 0700 - 10/13/18 0659    Intake (ml) 2160    Output (ml) 1355    Net (ml) 805        Vital Sign Min/Max for last 24 hours  Temp  Min: 98 °F (36.7 °C)  Max: 99.3 °F (37.4 °C)   BP  Min: 105/57  Max: 147/68   Pulse  Min: 83  Max: 98   Resp  Min: 20  Max: 22   SpO2  Min: 93 %  Max: 95 %   No Data Recorded        Physical Exam:   GENERAL: Awake, no distress   HEENT: Right IJ Cordis site okay, no adenopathy   LUNGS: Basilar crackles without wheezes   HEART: Regular rate and rhythm.  Sternal incision without drainage or dehiscence.  MTs without air leak   ABDOMEN: Soft, nontender but distended   EXTREMITIES: Palpable pulses.  No cyanosis or edema   NEURO/PSYCH: Awake and alert.  Follows commands.  No deficits      Results from last 7 days  Lab Units 10/13/18  0321 10/12/18  0323 10/12/18  0030  10/11/18  1448   WBC 10*3/mm3 9.84 8.44  --    --  4.89   HEMOGLOBIN g/dL 10.0* 10.0* 10.0*  < > 10.0*   PLATELETS 10*3/mm3 160 169  --   --  119*   < > = values in this interval not displayed.    Results from last 7 days  Lab Units 10/13/18  0321 10/12/18  0323 10/12/18  0030  10/11/18  1448 10/11/18  1305   SODIUM mmol/L 134 136  --   --  138 137   POTASSIUM mmol/L 4.6 4.6 4.8  < > 3.6 3.8   CO2 mmol/L 19.0* 20.0  --   --  23.0 23.0   BUN mg/dL 36* 36*  --   --  36* 35*   CREATININE mg/dL 1.70* 1.89*  --   --  1.60* 1.63*   MAGNESIUM mg/dL  --  2.6  --   --  2.6 2.6   PHOSPHORUS mg/dL  --  4.6  --   --  2.8 3.4   GLUCOSE mg/dL 173* 114*  --   --  125* 152*   < > = values in this interval not displayed.  Estimated Creatinine Clearance: 45.8 mL/min (A) (by C-G formula based on SCr of 1.7 mg/dL (H)).      Results from last 7 days  Lab Units 10/10/18  1324   HEMOGLOBIN A1C % 7.50*         Results from last 7 days  Lab Units 10/11/18  1448   PH, ARTERIAL pH units 7.341*   PCO2, ARTERIAL mm Hg 39.7   PO2 ART mm Hg 100.0     No results found for: LACTATE       Images: Chest x-ray reveals no consolidation or effusions    I reviewed the patient's results and images.     Impression      Active Hospital Problems    Diagnosis   • **Coronary artery disease (S/P CABG x 4)   • Hypertensive cardiovascular disease     1. Probable hypertensive cardiovascular disease:  a. Abnormal EKG with abnormal acceptable echocardiographic GXT, September 2004.   b. Acceptable echocardiographic GXT with preserved exercise capacity and mild LVH, March 2008.   c. Acceptable Quantitative SPECT Gated Cardiolite GXT with nominal myocardial perfusion to 88% of predicted exercise capacity and 90% predicted maximum heart rate with preserved LVEF (0.65) with continued medical therapy felt warranted, December 2012.  d. Residual class I symptoms with recent documented abnormal EKG (LBBB/first-degree AV block) with abnormal echocardiogram demonstrating mild left ventricular chamber enlargement with mild  reduction in the LVEF (0.42) without PE or pulmonary hypertension.  e. Resident class I symptoms with persistent abnormal echocardiogram (LVEF 0.40), with mild concentric LVH and mild left atrial enlargement without pulmonary arterial hypertension or pericardial effusion, July 2015.       • Insulin dependent type 2 diabetes mellitus (CMS/HCC)   • Labile hypertension   • Moderate obesity   • Dyslipidemia         Plan        Restart home antihypertensive regimen and wean nitroglycerin drip  Bowel regimen  Mobilize  MT management per CVT    Plan of care and goals reviewed with mulitdisciplinary team at daily rounds   I discussed the patient's findings and my recommendations with patient and nursing staff       CHRIS Maravilla MD  Pulmonary and Critical Care Medicine

## 2018-10-14 PROBLEM — N17.9 AKI (ACUTE KIDNEY INJURY): Status: ACTIVE | Noted: 2018-10-14

## 2018-10-14 LAB
ABO + RH BLD: NORMAL
ABO + RH BLD: NORMAL
ANION GAP SERPL CALCULATED.3IONS-SCNC: 7 MMOL/L (ref 3–11)
BH BB BLOOD EXPIRATION DATE: NORMAL
BH BB BLOOD EXPIRATION DATE: NORMAL
BH BB BLOOD TYPE BARCODE: 6200
BH BB BLOOD TYPE BARCODE: 6200
BH BB DISPENSE STATUS: NORMAL
BH BB DISPENSE STATUS: NORMAL
BH BB PRODUCT CODE: NORMAL
BH BB PRODUCT CODE: NORMAL
BH BB UNIT NUMBER: NORMAL
BH BB UNIT NUMBER: NORMAL
BH CV ECHO MEAS - AO MAX PG (FULL): 7.5 MMHG
BH CV ECHO MEAS - AO MAX PG: 11 MMHG
BH CV ECHO MEAS - AO MEAN PG (FULL): 4.1 MMHG
BH CV ECHO MEAS - AO MEAN PG: 6 MMHG
BH CV ECHO MEAS - AO ROOT AREA (BSA CORRECTED): 1.6
BH CV ECHO MEAS - AO ROOT AREA: 8.6 CM^2
BH CV ECHO MEAS - AO ROOT DIAM: 3.3 CM
BH CV ECHO MEAS - AO V2 MAX: 166.3 CM/SEC
BH CV ECHO MEAS - AO V2 MEAN: 113.8 CM/SEC
BH CV ECHO MEAS - AO V2 VTI: 29.1 CM
BH CV ECHO MEAS - AVA(I,A): 2 CM^2
BH CV ECHO MEAS - AVA(I,D): 2 CM^2
BH CV ECHO MEAS - AVA(V,A): 2.4 CM^2
BH CV ECHO MEAS - AVA(V,D): 2.4 CM^2
BH CV ECHO MEAS - BSA(HAYCOCK): 2.1 M^2
BH CV ECHO MEAS - BSA: 2 M^2
BH CV ECHO MEAS - BZI_BMI: 35.3 KILOGRAMS/M^2
BH CV ECHO MEAS - BZI_METRIC_HEIGHT: 165.1 CM
BH CV ECHO MEAS - BZI_METRIC_WEIGHT: 96.2 KG
BH CV ECHO MEAS - EDV(CUBED): 178.8 ML
BH CV ECHO MEAS - EDV(MOD-SP2): 111 ML
BH CV ECHO MEAS - EDV(MOD-SP4): 183 ML
BH CV ECHO MEAS - EDV(TEICH): 155.8 ML
BH CV ECHO MEAS - EF(CUBED): 43 %
BH CV ECHO MEAS - EF(MOD-SP2): 18 %
BH CV ECHO MEAS - EF(MOD-SP4): 30.1 %
BH CV ECHO MEAS - EF(TEICH): 35.3 %
BH CV ECHO MEAS - ESV(CUBED): 101.9 ML
BH CV ECHO MEAS - ESV(MOD-SP2): 91 ML
BH CV ECHO MEAS - ESV(MOD-SP4): 128 ML
BH CV ECHO MEAS - ESV(TEICH): 100.8 ML
BH CV ECHO MEAS - FS: 17.1 %
BH CV ECHO MEAS - IVS/LVPW: 0.96
BH CV ECHO MEAS - IVSD: 1.3 CM
BH CV ECHO MEAS - LA DIMENSION: 4.3 CM
BH CV ECHO MEAS - LA/AO: 1.3
BH CV ECHO MEAS - LAD MAJOR: 6.5 CM
BH CV ECHO MEAS - LAT PEAK E' VEL: 9.4 CM/SEC
BH CV ECHO MEAS - LATERAL E/E' RATIO: 11
BH CV ECHO MEAS - LV DIASTOLIC VOL/BSA (35-75): 90.2 ML/M^2
BH CV ECHO MEAS - LV MASS(C)D: 339.3 GRAMS
BH CV ECHO MEAS - LV MASS(C)DI: 167.4 GRAMS/M^2
BH CV ECHO MEAS - LV MAX PG: 3.5 MMHG
BH CV ECHO MEAS - LV MEAN PG: 1.9 MMHG
BH CV ECHO MEAS - LV SYSTOLIC VOL/BSA (12-30): 63.1 ML/M^2
BH CV ECHO MEAS - LV V1 MAX: 93.8 CM/SEC
BH CV ECHO MEAS - LV V1 MEAN: 63.7 CM/SEC
BH CV ECHO MEAS - LV V1 VTI: 14.2 CM
BH CV ECHO MEAS - LVIDD: 5.6 CM
BH CV ECHO MEAS - LVIDS: 4.7 CM
BH CV ECHO MEAS - LVLD AP2: 8.3 CM
BH CV ECHO MEAS - LVLD AP4: 8.8 CM
BH CV ECHO MEAS - LVLS AP2: 7.6 CM
BH CV ECHO MEAS - LVLS AP4: 8.3 CM
BH CV ECHO MEAS - LVOT AREA (M): 4.2 CM^2
BH CV ECHO MEAS - LVOT AREA: 4.2 CM^2
BH CV ECHO MEAS - LVOT DIAM: 2.3 CM
BH CV ECHO MEAS - LVPWD: 1.4 CM
BH CV ECHO MEAS - MED PEAK E' VEL: 8.1 CM/SEC
BH CV ECHO MEAS - MEDIAL E/E' RATIO: 12.6
BH CV ECHO MEAS - MV A MAX VEL: 26.7 CM/SEC
BH CV ECHO MEAS - MV DEC TIME: 0.15 SEC
BH CV ECHO MEAS - MV E MAX VEL: 104.6 CM/SEC
BH CV ECHO MEAS - MV E/A: 3.9
BH CV ECHO MEAS - PA ACC SLOPE: 912.6 CM/SEC^2
BH CV ECHO MEAS - PA ACC TIME: 0.09 SEC
BH CV ECHO MEAS - PA PR(ACCEL): 38.6 MMHG
BH CV ECHO MEAS - PULM DIAS VEL: 37.6 CM/SEC
BH CV ECHO MEAS - PULM S/D: 1.8
BH CV ECHO MEAS - PULM SYS VEL: 68.2 CM/SEC
BH CV ECHO MEAS - RVDD: 3.1 CM
BH CV ECHO MEAS - SI(AO): 123.6 ML/M^2
BH CV ECHO MEAS - SI(CUBED): 38 ML/M^2
BH CV ECHO MEAS - SI(LVOT): 29.4 ML/M^2
BH CV ECHO MEAS - SI(MOD-SP2): 9.9 ML/M^2
BH CV ECHO MEAS - SI(MOD-SP4): 27.1 ML/M^2
BH CV ECHO MEAS - SI(TEICH): 27.1 ML/M^2
BH CV ECHO MEAS - SV(AO): 250.6 ML
BH CV ECHO MEAS - SV(CUBED): 77 ML
BH CV ECHO MEAS - SV(LVOT): 59.6 ML
BH CV ECHO MEAS - SV(MOD-SP2): 20 ML
BH CV ECHO MEAS - SV(MOD-SP4): 55 ML
BH CV ECHO MEAS - SV(TEICH): 55 ML
BH CV ECHO MEAS - TR MAX PG: 22 MMHG
BH CV ECHO MEAS - TR MAX VEL: 235.6 CM/SEC
BH CV ECHO MEASUREMENTS AVERAGE E/E' RATIO: 11.95
BH CV VAS BP LEFT ARM: NORMAL MMHG
BH CV XLRA - RV BASE: 3.7 CM
BH CV XLRA - RV LENGTH: 8.5 CM
BH CV XLRA - RV MID: 3.3 CM
BUN BLD-MCNC: 44 MG/DL (ref 9–23)
BUN/CREAT SERPL: 28.2 (ref 7–25)
CALCIUM SPEC-SCNC: 7.8 MG/DL (ref 8.7–10.4)
CHLORIDE SERPL-SCNC: 104 MMOL/L (ref 99–109)
CO2 SERPL-SCNC: 21 MMOL/L (ref 20–31)
CREAT BLD-MCNC: 1.56 MG/DL (ref 0.6–1.3)
DEPRECATED RDW RBC AUTO: 47.4 FL (ref 37–54)
ERYTHROCYTE [DISTWIDTH] IN BLOOD BY AUTOMATED COUNT: 14.5 % (ref 11.3–14.5)
GFR SERPL CREATININE-BSD FRML MDRD: 45 ML/MIN/1.73
GLUCOSE BLD-MCNC: 141 MG/DL (ref 70–100)
GLUCOSE BLDC GLUCOMTR-MCNC: 132 MG/DL (ref 70–130)
GLUCOSE BLDC GLUCOMTR-MCNC: 187 MG/DL (ref 70–130)
GLUCOSE BLDC GLUCOMTR-MCNC: 189 MG/DL (ref 70–130)
GLUCOSE BLDC GLUCOMTR-MCNC: 195 MG/DL (ref 70–130)
HCT VFR BLD AUTO: 28.1 % (ref 38.9–50.9)
HGB BLD-MCNC: 9.1 G/DL (ref 13.1–17.5)
LEFT ATRIUM VOLUME INDEX: 31.1 ML/M^2
MCH RBC QN AUTO: 28.6 PG (ref 27–31)
MCHC RBC AUTO-ENTMCNC: 32.4 G/DL (ref 32–36)
MCV RBC AUTO: 88.4 FL (ref 80–99)
PLATELET # BLD AUTO: 158 10*3/MM3 (ref 150–450)
PMV BLD AUTO: 11 FL (ref 6–12)
POTASSIUM BLD-SCNC: 4 MMOL/L (ref 3.5–5.5)
RBC # BLD AUTO: 3.18 10*6/MM3 (ref 4.2–5.76)
SODIUM BLD-SCNC: 132 MMOL/L (ref 132–146)
UNIT  ABO: NORMAL
UNIT  ABO: NORMAL
UNIT  RH: NORMAL
UNIT  RH: NORMAL
WBC NRBC COR # BLD: 7.34 10*3/MM3 (ref 3.5–10.8)

## 2018-10-14 PROCEDURE — 82962 GLUCOSE BLOOD TEST: CPT

## 2018-10-14 PROCEDURE — 99232 SBSQ HOSP IP/OBS MODERATE 35: CPT | Performed by: INTERNAL MEDICINE

## 2018-10-14 PROCEDURE — 63710000001 INSULIN DETEMIR PER 5 UNITS: Performed by: INTERNAL MEDICINE

## 2018-10-14 PROCEDURE — 97116 GAIT TRAINING THERAPY: CPT

## 2018-10-14 PROCEDURE — 85027 COMPLETE CBC AUTOMATED: CPT | Performed by: PHYSICIAN ASSISTANT

## 2018-10-14 PROCEDURE — 80048 BASIC METABOLIC PNL TOTAL CA: CPT | Performed by: PHYSICIAN ASSISTANT

## 2018-10-14 PROCEDURE — 63710000001 INSULIN LISPRO (HUMAN) PER 5 UNITS: Performed by: INTERNAL MEDICINE

## 2018-10-14 PROCEDURE — 99024 POSTOP FOLLOW-UP VISIT: CPT | Performed by: THORACIC SURGERY (CARDIOTHORACIC VASCULAR SURGERY)

## 2018-10-14 RX ORDER — SODIUM CHLORIDE 0.9 % (FLUSH) 0.9 %
3 SYRINGE (ML) INJECTION EVERY 12 HOURS SCHEDULED
Status: DISCONTINUED | OUTPATIENT
Start: 2018-10-14 | End: 2018-10-22 | Stop reason: HOSPADM

## 2018-10-14 RX ORDER — SODIUM CHLORIDE 0.9 % (FLUSH) 0.9 %
3-10 SYRINGE (ML) INJECTION EVERY 8 HOURS
Status: DISCONTINUED | OUTPATIENT
Start: 2018-10-14 | End: 2018-10-22 | Stop reason: HOSPADM

## 2018-10-14 RX ADMIN — OXYCODONE HYDROCHLORIDE AND ACETAMINOPHEN 2 TABLET: 5; 325 TABLET ORAL at 02:13

## 2018-10-14 RX ADMIN — INSULIN DETEMIR 11 UNITS: 100 INJECTION, SOLUTION SUBCUTANEOUS at 21:33

## 2018-10-14 RX ADMIN — INSULIN LISPRO 7 UNITS: 100 INJECTION, SOLUTION INTRAVENOUS; SUBCUTANEOUS at 09:29

## 2018-10-14 RX ADMIN — Medication 10 ML: at 21:37

## 2018-10-14 RX ADMIN — INSULIN LISPRO 2 UNITS: 100 INJECTION, SOLUTION INTRAVENOUS; SUBCUTANEOUS at 17:26

## 2018-10-14 RX ADMIN — CARVEDILOL 25 MG: 12.5 TABLET, FILM COATED ORAL at 09:29

## 2018-10-14 RX ADMIN — INSULIN LISPRO 2 UNITS: 100 INJECTION, SOLUTION INTRAVENOUS; SUBCUTANEOUS at 21:35

## 2018-10-14 RX ADMIN — TAMSULOSIN HYDROCHLORIDE 0.4 MG: 0.4 CAPSULE ORAL at 09:29

## 2018-10-14 RX ADMIN — INSULIN DETEMIR 11 UNITS: 100 INJECTION, SOLUTION SUBCUTANEOUS at 09:30

## 2018-10-14 RX ADMIN — INSULIN LISPRO 7 UNITS: 100 INJECTION, SOLUTION INTRAVENOUS; SUBCUTANEOUS at 12:23

## 2018-10-14 RX ADMIN — INSULIN LISPRO 2 UNITS: 100 INJECTION, SOLUTION INTRAVENOUS; SUBCUTANEOUS at 12:24

## 2018-10-14 RX ADMIN — INSULIN LISPRO 7 UNITS: 100 INJECTION, SOLUTION INTRAVENOUS; SUBCUTANEOUS at 17:26

## 2018-10-14 RX ADMIN — Medication 3 ML: at 21:37

## 2018-10-14 RX ADMIN — HYDROCODONE BITARTRATE AND ACETAMINOPHEN 1 TABLET: 7.5; 325 TABLET ORAL at 21:48

## 2018-10-14 RX ADMIN — HYDROCODONE BITARTRATE AND ACETAMINOPHEN 1 TABLET: 7.5; 325 TABLET ORAL at 17:26

## 2018-10-14 RX ADMIN — ATORVASTATIN CALCIUM 40 MG: 40 TABLET, FILM COATED ORAL at 21:36

## 2018-10-14 RX ADMIN — LOSARTAN POTASSIUM 100 MG: 50 TABLET ORAL at 21:36

## 2018-10-14 RX ADMIN — CARVEDILOL 25 MG: 12.5 TABLET, FILM COATED ORAL at 21:36

## 2018-10-14 RX ADMIN — FINASTERIDE 5 MG: 5 TABLET, FILM COATED ORAL at 09:30

## 2018-10-14 RX ADMIN — HYDROCODONE BITARTRATE AND ACETAMINOPHEN 1 TABLET: 7.5; 325 TABLET ORAL at 07:51

## 2018-10-14 RX ADMIN — HYDROCODONE BITARTRATE AND ACETAMINOPHEN 1 TABLET: 7.5; 325 TABLET ORAL at 12:24

## 2018-10-14 NOTE — PLAN OF CARE
Problem: Patient Care Overview  Goal: Plan of Care Review  Outcome: Ongoing (interventions implemented as appropriate)   10/14/18 7485   Coping/Psychosocial   Plan of Care Reviewed With patient   Plan of Care Review   Progress improving   OTHER   Outcome Summary Pt transferred from ICU today, doing ok, still with some c/o pain, continue to monitor, home when ready.        Problem: Skin Injury Risk (Adult)  Goal: Identify Related Risk Factors and Signs and Symptoms  Outcome: Outcome(s) achieved Date Met: 10/14/18    Goal: Skin Health and Integrity  Outcome: Ongoing (interventions implemented as appropriate)      Problem: Cardiac Surgery (Adult)  Goal: Signs and Symptoms of Listed Potential Problems Will be Absent, Minimized or Managed (Cardiac Surgery)  Outcome: Ongoing (interventions implemented as appropriate)    Goal: Anesthesia/Sedation Recovery  Outcome: Outcome(s) achieved Date Met: 10/14/18

## 2018-10-14 NOTE — PLAN OF CARE
Problem: Patient Care Overview  Goal: Plan of Care Review  Outcome: Ongoing (interventions implemented as appropriate)   10/14/18 0104   Coping/Psychosocial   Plan of Care Reviewed With patient;spouse   Plan of Care Review   Progress improving   OTHER   Outcome Summary Pt increased ambulation distance to 200ft with RW with CGA, occasional Panfilo. VSS ambulating on RA. Continue POC.

## 2018-10-14 NOTE — THERAPY TREATMENT NOTE
Acute Care - Physical Therapy Treatment Note  [facility]     Patient Name: Kar Mora  : 1950  MRN: [id number]  Today's Date: 10/14/2018  Onset of Illness/Injury or Date of Surgery: 10/11/18  Date of Referral to PT: 10/12/18  Referring Physician: MD Burton    Admit Date: 10/11/2018    Visit Dx:    ICD-10-CM ICD-9-CM   1. Impaired functional mobility, balance, gait, and endurance Z74.09 V49.89   2. Atherosclerosis of native coronary artery of native heart with stable angina pectoris (CMS/[facility]) I25.118 414.01     413.9     Patient Active Problem List   Diagnosis   • Insulin dependent type 2 diabetes mellitus (CMS/[facility])   • Labile hypertension   • Dyslipidemia   • Erectile dysfunction   • Allergic rhinitis   • Hypertensive cardiovascular disease   • Moderate obesity   • Precordial pain   • Left bundle branch block   • Ischemic heart disease   • Coronary artery disease (S/P CABG x 4)   • LOPEZ (acute kidney injury) (CMS/[facility])       Therapy Treatment          Rehabilitation Treatment Summary     Row Name 10/14/18 1555             Treatment Time/Intention    Discipline physical therapist  -SJ      Document Type therapy note (daily note)  -SJ      Subjective Information complains of;pain  -SJ      Mode of Treatment individual therapy  -SJ      Patient/Family Observations wife present. Pt supine in bed.  -SJ      Care Plan Review care plan/treatment goals reviewed;risks/benefits reviewed;current/potential barriers reviewed;patient/other agree to care plan  -SJ      Therapy Frequency (PT Clinical Impression) daily  -SJ      Patient Effort good  -SJ      Existing Precautions/Restrictions cardiac;oxygen therapy device and L/min;sternal  -SJ      Recorded by [SJ] Loly Jones, PT 10/14/18 8915      Row Name 10/14/18 1554             Vital Signs    Pre Systolic BP Rehab 94  -SJ      Pre Treatment Diastolic BP 54  -SJ      Post Systolic BP Rehab 120  -SJ      Post Treatment Diastolic BP 58  -SJ      Pretreatment  Heart Rate (beats/min) 82  -SJ      Posttreatment Heart Rate (beats/min) 84  -SJ      Pre SpO2 (%) 96  -SJ      O2 Delivery Pre Treatment supplemental O2  -SJ      Intra SpO2 (%) 92  -SJ      O2 Delivery Intra Treatment room air  -SJ      Post SpO2 (%) 93  -SJ      O2 Delivery Post Treatment supplemental O2  -SJ      Pre Patient Position Supine  -SJ      Intra Patient Position Standing  -SJ      Post Patient Position Sitting  -SJ      Recorded by [SJ] Loly Jones, PT 10/14/18 1624      Row Name 10/14/18 1554             Cognitive Assessment/Intervention- PT/OT    Orientation Status (Cognition) oriented x 4  -SJ      Follows Commands (Cognition) WFL  -SJ      Recorded by [SJ] Loly Jones, PT 10/14/18 1624      Row Name 10/14/18 1554             Safety Issues, Functional Mobility    Safety Issues Affecting Function (Mobility) sequencing abilities  -SJ      Impairments Affecting Function (Mobility) balance;endurance/activity tolerance;pain;shortness of breath  -SJ      Recorded by [SJ] Loly Jones, PT 10/14/18 1624      Row Name 10/14/18 1552             Bed Mobility Assessment/Treatment    Bed Mobility Assessment/Treatment supine-sit  -SJ      Supine-Sit [place] (Bed Mobility) moderate assist (50% patient effort);verbal cues  -SJ      Bed Mobility, Safety Issues decreased use of arms for pushing/pulling  -SJ      Assistive Device (Bed Mobility) head of bed elevated  -SJ      Comment (Bed Mobility) assist to raise trunk   -SJ      Recorded by [SJ] Loly Jones, PT 10/14/18 1624      Row Name 10/14/18 1550             Transfer Assessment/Treatment    Comment (Transfers) good recall of sternal precautions  -SJ      Recorded by [SJ] Loly Jones, PT 10/14/18 1624      Row Name 10/14/18 1551             Sit-Stand Transfer    Sit-Stand [place] (Transfers) contact guard;verbal cues  -SJ      Recorded by [SJ] Loly Jones, PT 10/14/18 1624      Row Name 10/14/18 1557             Stand-Sit  Transfer    Stand-Sit [place] (Transfers) contact guard;verbal cues  -SJ      Recorded by [SJ] Loly Jones, PT 10/14/18 1624      Row Name 10/14/18 1558             Gait/Stairs Assessment/Training    23684 - Gait Training Minutes  23  -SJ      Gait/Stairs Assessment/Training gait/ambulation independence  -SJ      [place] Level (Gait) verbal cues;contact guard  -SJ      Assistive Device (Gait) walker, front-wheeled  -SJ      Distance in Feet (Gait) 200  -SJ      Pattern (Gait) step-through  -SJ      Deviations/Abnormal Patterns (Gait) base of support, narrow;gait speed decreased;diana decreased  -SJ      Bilateral Gait Deviations weight shift ability decreased  -SJ      Comment (Gait/Stairs) backwards lean, slow speed  -SJ      Recorded by [SJ] Loly Jones, PT 10/14/18 1624      Row Name 10/14/18 0578             Positioning and Restraints    Pre-Treatment Position in bed  -SJ      Post Treatment Position chair  -SJ      In Chair reclined;call light within reach;encouraged to call for assist;with family/caregiver;waffle cushion  -SJ      Recorded by [SJ] Loly Jones, PT 10/14/18 1624      Row Name 10/14/18 1559             Pain Scale: Numbers Pre/Post-Treatment    Pain Scale: Numbers, Pretreatment 2/10  -SJ      Pain Scale: Numbers, Post-Treatment 3/10  -SJ      Pain Location - Orientation incisional  -SJ      Pain Location chest  -SJ      Pain Intervention(s) Repositioned;Ambulation/increased activity  -SJ      Recorded by [SJ] Loly Jones, PT 10/14/18 1624      Row Name                Wound 10/11/18 0801 Other (See comments) chest incision    Wound - Properties Group Date first assessed: 10/11/18 [SH] Time first assessed: 0801 [SH] Side: Other (See comments) [SH] Location: chest [SH] Type: incision [SH] Recorded by:  [SH] Haase, Sherri L, RN 10/11/18 0801    Row Name                Wound 10/11/18 0801 Right leg incision    Wound - Properties Group Date first assessed: 10/11/18 [SH]  Time first assessed: 0801 [SH] Side: Right [SH] Location: leg [SH] Type: incision [SH] Recorded by:  [SH] Haase, Sherri L, RN 10/11/18 0801    Row Name 10/14/18 1555             Coping    Observed Emotional State calm;cooperative;flat  -SJ      Verbalized Emotional State acceptance  -SJ      Recorded by [SJ] Loly Jones, PT 10/14/18 1624      Row Name 10/14/18 1555             Plan of Care Review    Plan of Care Reviewed With patient  -SJ      Recorded by [SJ] Loly Jones, PT 10/14/18 1624      Row Name 10/14/18 1555             Outcome Summary/Treatment Plan (PT)    Daily Summary of Progress (PT) progress toward functional goals is good  -SJ      Recorded by [JEOVANNY] Loly Jones, PT 10/14/18 1624        User Key  (r) = Recorded By, (t) = Taken By, (c) = Cosigned By    Initials Name Effective Dates Discipline     Loly Jones, PT 06/19/15 -  PT    SH Haase, Sherri L, RN 06/16/16 -  Nurse          Wound 10/11/18 0801 Other (See comments) chest incision (Active)   Dressing Appearance open to air 10/14/2018 10:22 AM   Closure Liquid skin adhesive 10/14/2018 10:22 AM   Drainage Amount none 10/14/2018 10:22 AM   Care, Wound cleansed with;other (see comments) 10/13/2018  8:00 PM   Dressing Care, Wound open to air 10/14/2018 10:22 AM       Wound 10/11/18 0801 Right leg incision (Active)   Dressing Appearance open to air 10/14/2018 10:22 AM   Closure Liquid skin adhesive 10/14/2018 10:22 AM   Drainage Amount none 10/14/2018 10:22 AM   Care, Wound cleansed with;other (see comments) 10/13/2018  8:00 PM   Dressing Care, Wound open to air 10/14/2018 10:22 AM             Physical Therapy Education     Title: PT OT SLP Therapies (Active)     Topic: Physical Therapy (Active)     Point: Mobility training (Active)    Learning Progress Summary     Learner Status Readiness Method Response Comment Documented by    Patient Active Acceptance E,D NR  SJ 10/14/18 1625     Active Acceptance E NR  VERONICA 10/12/18 0855     Active  Acceptance E NR  VERONICA 10/12/18 0855    Significant Other Active Acceptance E,D NR   10/14/18 1625          Point: Home exercise program (Active)    Learning Progress Summary     Learner Status Readiness Method Response Comment Documented by    Patient Active Acceptance E,D NR   10/14/18 1625     Active Acceptance E NR  VERONICA 10/12/18 0855     Active Acceptance E NR  VERONICA 10/12/18 0855    Significant Other Active Acceptance E,D NR   10/14/18 1625          Point: Body mechanics (Active)    Learning Progress Summary     Learner Status Readiness Method Response Comment Documented by    Patient Active Acceptance E,D NR   10/14/18 1625     Done Acceptance E VU,NR   10/14/18 1537     Active Acceptance E NR  VERONICA 10/12/18 0855     Active Acceptance E NR  VERONICA 10/12/18 0855    Significant Other Active Acceptance E,D NR   10/14/18 1625          Point: Precautions (Active)    Learning Progress Summary     Learner Status Readiness Method Response Comment Documented by    Patient Active Acceptance E,D NR   10/14/18 1625     Active Acceptance E NR  VERONICA 10/12/18 0855     Active Acceptance E NR   10/12/18 0855    Significant Other Active Acceptance E,D NR   10/14/18 1625                      User Key     Initials Effective Dates Name Provider Type Discipline     06/19/15 -  Rhiannon Muñoz, PT Physical Therapist PT     06/19/15 -  Loly Jones, PT Physical Therapist PT     06/16/16 -  Saige Grove RN Registered Nurse Nurse                    PT Recommendation and Plan  Therapy Frequency (PT Clinical Impression): daily  Outcome Summary/Treatment Plan (PT)  Daily Summary of Progress (PT): progress toward functional goals is good  Plan of Care Reviewed With: patient, spouse  Progress: improving  Outcome Summary: Pt increased ambulation distance to 200ft with RW with CGA, occasional Panfilo. VSS ambulating on RA. Continue POC.          Outcome Measures     Row Name 10/14/18 1138 10/12/18 0855          How much help  from another person do you currently need...    Turning from your back to your side while in flat bed without using bedrails? 3  -SJ 3  -VERONICA     Moving from lying on back to sitting on the side of a flat bed without bedrails? 2  -SJ 2  -VERONICA     Moving to and from a bed to a chair (including a wheelchair)? 3  -SJ 3  -VERONICA     Standing up from a chair using your arms (e.g., wheelchair, bedside chair)? 3  -SJ 3  -VERONICA     Climbing 3-5 steps with a railing? 2  -SJ 2  -VERONICA     To walk in hospital room? 3  -SJ 3  -VERONICA     AM-PAC 6 Clicks Score 16  - 16  -VERONICA        Functional Assessment    Outcome Measure Options AM-PAC 6 Clicks Basic Mobility (PT)  -SJ AM-PAC 6 Clicks Basic Mobility (PT)  -VERONICA       User Key  (r) = Recorded By, (t) = Taken By, (c) = Cosigned By    Initials Name Provider Type    Rhiannon Louis, PT Physical Therapist    Loly Spaulding, PT Physical Therapist           Time Calculation:         PT Charges     Row Name 10/14/18 1625 10/14/18 1555          Time Calculation    Start Time 1555  -  --     PT Received On 10/14/18  -  --     PT Goal Re-Cert Due Date 10/22/18  -  --        Time Calculation- PT    Total Timed Code Minutes- PT 23 minute(s)  -  --        Timed Charges    51699 - Gait Training Minutes   -- 23  -SJ       User Key  (r) = Recorded By, (t) = Taken By, (c) = Cosigned By    Initials Name Provider Type    Loly Spaulding, PT Physical Therapist        Therapy Suggested Charges     Code   Minutes Charges    70829 (CPT®) Hc Pt Neuromusc Re Education Ea 15 Min      36714 (CPT®) Hc Pt Ther Proc Ea 15 Min      99006 (CPT®) Hc Gait Training Ea 15 Min 23 2    43619 (CPT®) Hc Pt Therapeutic Act Ea 15 Min      61319 (CPT®) Hc Pt Manual Therapy Ea 15 Min      38964 (CPT®) Hc Pt Iontophoresis Ea 15 Min      81765 (CPT®) Hc Pt Elec Stim Ea-Per 15 Min      35883 (CPT®) Hc Pt Ultrasound Ea 15 Min      49303 (CPT®) Hc Pt Self Care/Mgmt/Train Ea 15 Min      19501 (CPT®) Hc Pt Prosthetic (S)  Train Initial Encounter, Each 15 Min      25677 (CPT®) Hc Pt Orthotic(S)/Prosthetic(S) Encounter, Each 15 Min      22451 (CPT®) Hc Orthotic(S) Mgmt/Train Initial Encounter, Each 15min      Total  23 2        Therapy Charges for Today     Code Description Service Date Service Provider Modifiers Qty    [id number] HC GAIT TRAINING EA 15 MIN 10/14/2018 Loly Jones, PT GP 2          PT G-Codes  Outcome Measure Options: AM-PAC 6 Clicks Basic Mobility (PT)  AM-PAC 6 Clicks Score: 16    Loly Jones PT  10/14/2018

## 2018-10-14 NOTE — PROGRESS NOTES
Intensive Care Follow-up      LOS: 3 days     Mr. Kar Mora, 68 y.o. male is followed for: Glycemic, Electrolyte, Respiratory, and Medical management     Subjective - Interval History     Pain moderately well-controlled  1 sugar still little elevated.  Off of insulin drip  Valenzuela catheter is last night due to urinary retention    The patient's relevant past medical, surgical and social history were reviewed and updated in Epic as appropriate.     Objective     Infusions:    niCARdipine 5-15 mg/hr    nitroglycerin 5-200 mcg/min Last Rate: Stopped (10/13/18 1200)   norepinephrine 0.02-0.3 mcg/kg/min Last Rate: Stopped (10/11/18 2200)   phenylephrine 0.5-3 mcg/kg/min      Medications:    atorvastatin 40 mg Oral Nightly   carvedilol 25 mg Oral BID   finasteride 5 mg Oral Daily   insulin detemir 11 Units Subcutaneous BID   insulin lispro 0-9 Units Subcutaneous 4x Daily With Meals & Nightly   insulin lispro 7 Units Subcutaneous TID With Meals   losartan 100 mg Oral Nightly   pharmacy consult - MTM  Does not apply Daily   sennosides-docusate sodium 2 tablet Oral BID   tamsulosin 0.4 mg Oral Daily     Intake/Output       10/13/18 0700 - 10/14/18 0659    Intake (ml) 500    Output (ml) 1860    Net (ml) -1360    Last Weight  96.2 kg (212 lb)        Vital Sign Min/Max for last 24 hours  Temp  Min: 98.1 °F (36.7 °C)  Max: 98.6 °F (37 °C)   BP  Min: 90/57  Max: 142/68   Pulse  Min: 84  Max: 96   Resp  Min: 16  Max: 22   SpO2  Min: 93 %  Max: 97 %   No Data Recorded        Physical Exam:   GENERAL: Awake, no distress   HEENT: Right IJ Cordis site okay, no adenopathy   LUNGS: Decreased breath sounds with crackles, no wheezes   HEART: Regular rate and rhythm.  No sternal wound dehiscence or drainage   ABDOMEN: Soft, nontender   EXTREMITIES: Trace edema.  Palpable pulses   NEURO/PSYCH: Awake and alert.  Follows commands.  Walking with assistance      Results from last 7 days  Lab Units 10/14/18  0321 10/13/18  0321  10/12/18  0323   WBC 10*3/mm3 7.34 9.84 8.44   HEMOGLOBIN g/dL 9.1* 10.0* 10.0*   PLATELETS 10*3/mm3 158 160 169       Results from last 7 days  Lab Units 10/14/18  0321 10/13/18  0321 10/12/18  0323  10/11/18  1448 10/11/18  1305   SODIUM mmol/L 132 134 136  --  138 137   POTASSIUM mmol/L 4.0 4.6 4.6  < > 3.6 3.8   CO2 mmol/L 21.0 19.0* 20.0  --  23.0 23.0   BUN mg/dL 44* 36* 36*  --  36* 35*   CREATININE mg/dL 1.56* 1.70* 1.89*  --  1.60* 1.63*   MAGNESIUM mg/dL  --   --  2.6  --  2.6 2.6   PHOSPHORUS mg/dL  --   --  4.6  --  2.8 3.4   GLUCOSE mg/dL 141* 173* 114*  --  125* 152*   < > = values in this interval not displayed.  Estimated Creatinine Clearance: 49.9 mL/min (A) (by C-G formula based on SCr of 1.56 mg/dL (H)).      Results from last 7 days  Lab Units 10/10/18  1324   HEMOGLOBIN A1C % 7.50*         Results from last 7 days  Lab Units 10/11/18  1448   PH, ARTERIAL pH units 7.341*   PCO2, ARTERIAL mm Hg 39.7   PO2 ART mm Hg 100.0     No results found for: LACTATE       Images: Most recent chest x-ray reveals prominent interstitial pattern without consolidation or effusions.  Low lung volumes    I reviewed the patient's results and images.     Impression      Active Hospital Problems    Diagnosis   • **Coronary artery disease (S/P CABG x 4)   • LOPEZ (acute kidney injury) (CMS/[facility])   • Hypertensive cardiovascular disease     1. Probable hypertensive cardiovascular disease:  a. Abnormal EKG with abnormal acceptable echocardiographic GXT, September 2004.   b. Acceptable echocardiographic GXT with preserved exercise capacity and mild LVH, March 2008.   c. Acceptable Quantitative SPECT Gated Cardiolite GXT with nominal myocardial perfusion to 88% of predicted exercise capacity and 90% predicted maximum heart rate with preserved LVEF (0.65) with continued medical therapy felt warranted, December 2012.  d. Residual class I symptoms with recent documented abnormal EKG (LBBB/first-degree AV block) with abnormal  echocardiogram demonstrating mild left ventricular chamber enlargement with mild reduction in the LVEF (0.42) without PE or pulmonary hypertension.  e. Resident class I symptoms with persistent abnormal echocardiogram (LVEF 0.40), with mild concentric LVH and mild left atrial enlargement without pulmonary arterial hypertension or pericardial effusion, July 2015.       • Insulin dependent type 2 diabetes mellitus (CMS/HCC)   • Labile hypertension   • Moderate obesity   • Dyslipidemia         Plan        Continue to monitor renal function  Mobilize  Discontinue Valenzuela catheter in the next day or so  Better blood sugar control      Plan of care and goals reviewed with mulitdisciplinary team at daily rounds   I discussed the patient's findings and my recommendations with patient and nursing staff       CHRIS Maravilla MD  Pulmonary and Critical Care Medicine

## 2018-10-14 NOTE — PROGRESS NOTES
CARDIOLOGY PROGRESS NOTE           10/14/2018 8:32 AM    Admit Date: 10/11/2018    Admit Diagnosis: Coronary artery disease involving native coronary artery of native heart with angina pectoris (CMS/HCC) [I25.119]  Atherosclerosis of native coronary artery of native heart with stable angina pectoris (CMS/HCC) [I25.118]    Chief Compliant: Follow up for CAD, Bradycardia, CHF    Subjective:   Patient's status has been stable. Improved Vital Signs. Planning for CT removal today      Objective:     Vitals:    10/14/18 0400 10/14/18 0500 10/14/18 0600 10/14/18 0700   BP: 111/73  100/54 111/55   Pulse: 85 85 84 85   Resp: 20  20    Temp: 98.2 °F (36.8 °C)      TempSrc: Oral      SpO2: 96% 93% 96% 95%   Weight:       Height:           Physical Exam:  General-Well Nourished, Well developed  Eyes - PERRLA  Neck- supple, No mass  CV- regular rate and rhythm, no MRG  Lung- clear bilaterally  Abd- soft, +BS  Musc/skel - Norm strength and range of motion  Skin- warm and dry  Neuro - Alert & Oriented x 3, appropriate mood.      Current Facility-Administered Medications:   •  acetaminophen (TYLENOL) tablet 650 mg, 650 mg, Oral, Q4H PRN, Migue Burton MD  •  atorvastatin (LIPITOR) tablet 40 mg, 40 mg, Oral, Nightly, Adonis Capps PA, 40 mg at 10/13/18 2009  •  bisacodyl (DULCOLAX) EC tablet 10 mg, 10 mg, Oral, Daily PRN, Adonis Capps PA  •  bisacodyl (DULCOLAX) suppository 10 mg, 10 mg, Rectal, Daily PRN, Adonis Capps PA  •  calcium carbonate (TUMS) chewable tablet 500 mg (200 mg elemental), 2 tablet, Oral, TID PRN, Bhumi Knight APRN, 2 tablet at 10/13/18 0218  •  carvedilol (COREG) tablet 25 mg, 25 mg, Oral, BID, Branden Maravilla MD, 25 mg at 10/13/18 2010  •  dextrose (D50W) 25 g/ 50mL Intravenous Solution 25 g, 25 g, Intravenous, Q15 Min PRN, Branden Maravilla MD  •  dextrose (GLUTOSE) oral gel 15 g, 15 g, Oral, Q15 Min PRN, Branden Maravilla MD  •  docusate sodium (COLACE)  capsule 100 mg, 100 mg, Oral, BID PRN, Adonis Capps PA  •  finasteride (PROSCAR) tablet 5 mg, 5 mg, Oral, Daily, Jamaal Goss, APRN  •  glucagon (human recombinant) (GLUCAGEN DIAGNOSTIC) injection 1 mg, 1 mg, Subcutaneous, PRN, Branden Maravilla MD  •  HYDROcodone-acetaminophen (NORCO) 7.5-325 MG per tablet 1 tablet, 1 tablet, Oral, Q4H PRN, Migue Burton MD, 1 tablet at 10/14/18 0751  •  insulin detemir (LEVEMIR) injection 11 Units, 11 Units, Subcutaneous, BID, Branden Maravilla MD, 11 Units at 10/13/18 2027  •  insulin lispro (humaLOG) injection 0-9 Units, 0-9 Units, Subcutaneous, 4x Daily With Meals & Nightly, Branden Maravilla MD, 2 Units at 10/13/18 2027  •  insulin lispro (humaLOG) injection 7 Units, 7 Units, Subcutaneous, TID With Meals, Branden Maravilla MD, 7 Units at 10/13/18 1745  •  losartan (COZAAR) tablet 100 mg, 100 mg, Oral, Nightly, Branden Maravilla MD, 100 mg at 10/13/18 2010  •  magnesium hydroxide (MILK OF MAGNESIA) suspension 2400 mg/10mL 10 mL, 10 mL, Oral, Daily PRN, Adonis Capps PA  •  metoprolol tartrate (LOPRESSOR) half tablet 12.5 mg, 12.5 mg, Oral, Q12H, Adonis Capps PA, 12.5 mg at 10/13/18 2009  •  Morphine sulfate (PF) injection 2 mg, 2 mg, Intravenous, Q2H PRN, Migue Burton MD, 2 mg at 10/12/18 2048  •  niCARdipine (CARDENE-IV) 40 mg/200 mL (0.2 mg/mL) in 0.9% NaCl infusion, 5-15 mg/hr, Intravenous, Continuous PRN, Adonis Capps PA  •  nitroglycerin 50 mg/250 mL (0.2 mg/mL) infusion, 5-200 mcg/min, Intravenous, Continuous PRN, Adonis Capps PA, Stopped at 10/13/18 1200  •  norepinephrine (LEVOPHED) 8 mg/250 mL (32 mcg/mL) in sodium chloride 0.9% infusion (premix), 0.02-0.3 mcg/kg/min, Intravenous, Continuous PRN, Adonis Capps PA, Stopped at 10/11/18 2200  •  ondansetron (ZOFRAN) injection 4 mg, 4 mg, Intravenous, Q6H PRN, Adonis Capps PA  •  oxyCODONE-acetaminophen (PERCOCET) 5-325 MG per  tablet 2 tablet, 2 tablet, Oral, Q4H PRN, Migue Burton MD, 2 tablet at 10/14/18 0213  •  Pharmacy Consult - MTM, , Does not apply, Daily, David Whaley, [facility]  •  phenol (CHLORASEPTIC) 1.4 % liquid 2 spray, 2 spray, Mouth/Throat, Q2H PRN, Migue Burton MD, 2 spray at 10/12/18 2047  •  phenylephrine (JORDON-SYNEPHRINE) 50 mg/250 mL (0.2 mg/mL) in 0.9% NS  infusion, 0.5-3 mcg/kg/min, Intravenous, Continuous PRN, Adonis Capps PA  •  potassium chloride (MICRO-K) CR capsule 40 mEq, 40 mEq, Oral, PRN **OR** potassium chloride (KLOR-CON) packet 40 mEq, 40 mEq, Oral, PRN, Adonis Capps PA  •  sennosides-docusate sodium (SENOKOT-S) 8.6-50 MG tablet 2 tablet, 2 tablet, Oral, BID, Adonis Capps PA, 2 tablet at 10/13/18 2009  •  tamsulosin (FLOMAX) 24 hr capsule 0.4 mg, 0.4 mg, Oral, Daily, Jamaal Goss, EDGAR    Facility-Administered Medications Ordered in Other Encounters:   •  Chlorhexidine Gluconate Cloth 2 % pads 1 application, 1 application, Topical, Q12H PRN, Adonis Capps PA    Data Review:   Recent Results (from the past 24 hour(s))   POC Glucose Once    Collection Time: 10/13/18 11:13 AM   Result Value Ref Range    Glucose 189 (H) 70 - 130 mg/dL   POC Glucose Once    Collection Time: 10/13/18  4:22 PM   Result Value Ref Range    Glucose 209 (H) 70 - 130 mg/dL   Adult Transthoracic Echo Complete W/ Cont if Necessary Per Protocol    Collection Time: 10/13/18  6:07 PM   Result Value Ref Range    BSA 2.0 m^2     CV ECHO JASON - RVDD 3.1 cm    IVSd 1.3 cm    LVIDd 5.6 cm    LVIDs 4.7 cm    LVPWd 1.4 cm    IVS/LVPW 0.96     FS 17.1 %    EDV(Teich) 155.8 ml    ESV(Teich) 100.8 ml    EF(Teich) 35.3 %    EDV(cubed) 178.8 ml    ESV(cubed) 101.9 ml    EF(cubed) 43.0 %    LV mass(C)d 339.3 grams    LV mass(C)dI 167.4 grams/m^2    SV(Teich) 55.0 ml    SI(Teich) 27.1 ml/m^2    SV(cubed) 77.0 ml    SI(cubed) 38.0 ml/m^2    Ao root diam 3.3 cm    Ao root area 8.6 cm^2    LA dimension 4.3 cm     LA/Ao 1.3     LVOT diam 2.3 cm    LVOT area 4.2 cm^2    LVOT area(traced) 4.2 cm^2    LAd major 6.5 cm    LVLd ap4 8.8 cm    EDV(MOD-sp4) 183.0 ml    LVLs ap4 8.3 cm    ESV(MOD-sp4) 128.0 ml    EF(MOD-sp4) 30.1 %    LVLd ap2 8.3 cm    EDV(MOD-sp2) 111.0 ml    LVLs ap2 7.6 cm    ESV(MOD-sp2) 91.0 ml    EF(MOD-sp2) 18.0 %    SV(MOD-sp4) 55.0 ml    SI(MOD-sp4) 27.1 ml/m^2    SV(MOD-sp2) 20.0 ml    SI(MOD-sp2) 9.9 ml/m^2    Ao root area (BSA corrected) 1.6     LV Nava Vol (BSA corrected) 90.2 ml/m^2    LV Sys Vol (BSA corrected) 63.1 ml/m^2    LA Volume Index 31.1 ml/m^2    MV E max antonio 104.6 cm/sec    MV A max antonio 26.7 cm/sec    MV E/A 3.9     MV dec time 0.15 sec    Ao pk antonio 166.3 cm/sec    Ao max PG 11.0 mmHg    Ao max PG (full) 7.5 mmHg    Ao V2 mean 113.8 cm/sec    Ao mean PG 6.0 mmHg    Ao mean PG (full) 4.1 mmHg    Ao V2 VTI 29.1 cm    TL(I,A) 2.0 cm^2    TL(I,D) 2.0 cm^2    TL(V,A) 2.4 cm^2    TL(V,D) 2.4 cm^2    LV V1 max PG 3.5 mmHg    LV V1 mean PG 1.9 mmHg    LV V1 max 93.8 cm/sec    LV V1 mean 63.7 cm/sec    LV V1 VTI 14.2 cm    SV(Ao) 250.6 ml    SI(Ao) 123.6 ml/m^2    SV(LVOT) 59.6 ml    SI(LVOT) 29.4 ml/m^2    PA acc slope 912.6 cm/sec^2    PA acc time 0.09 sec    TR max antonio 235.6 cm/sec    BH CV ECHO JASON - TR MAX PG 22.0 mmHg    PA pr(Accel) 38.6 mmHg    Pulm Sys Antonio 68.2 cm/sec    Pulm Nava Antonio 37.6 cm/sec    Pulm S/D 1.8     Lat E/e'  11.0     Med E/e' 12.6     Lat Peak E' Antonio 9.4 cm/sec    Med Peak E' Antonio 8.1 cm/sec    BH CV ECHO JASON - BZI_BMI 35.3 kilograms/m^2     CV ECHO JASON - BSA(HAYCOCK) 2.1 m^2     CV ECHO JASON - BZI_METRIC_WEIGHT 96.2 kg     CV ECHO JASON - BZI_METRIC_HEIGHT 165.1 cm    Avg E/e' ratio 11.95      CV VAS BP LEFT /67 mmHg    RV Base 3.70 cm    RV Length 8.50 cm    RV Mid 3.30 cm   POC Glucose Once    Collection Time: 10/13/18  8:11 PM   Result Value Ref Range    Glucose 163 (H) 70 - 130 mg/dL   Prepare RBC, 2 Units    Collection Time: 10/14/18  3:10 AM    Result Value Ref Range    Product Code A6078S06     Unit Number [id number]     UNIT  ABO A     UNIT  RH POS     Dispense Status RE     Blood Type APOS     Blood Expiration Date [id number]     Blood Type Barcode 6200     Product Code Y5165L15     Unit Number [id number]     UNIT  ABO A     UNIT  RH POS     Dispense Status RE     Blood Type APOS     Blood Expiration Date [id number]     Blood Type Barcode 6200    CBC (No Diff)    Collection Time: 10/14/18  3:21 AM   Result Value Ref Range    WBC 7.34 3.50 - 10.80 10*3/mm3    RBC 3.18 (L) 4.20 - 5.76 10*6/mm3    Hemoglobin 9.1 (L) 13.1 - 17.5 g/dL    Hematocrit 28.1 (L) 38.9 - 50.9 %    MCV 88.4 80.0 - 99.0 fL    MCH 28.6 27.0 - 31.0 pg    MCHC 32.4 32.0 - 36.0 g/dL    RDW 14.5 11.3 - 14.5 %    RDW-SD 47.4 37.0 - 54.0 fl    MPV 11.0 6.0 - 12.0 fL    Platelets 158 150 - 450 10*3/mm3   Basic Metabolic Panel    Collection Time: 10/14/18  3:21 AM   Result Value Ref Range    Glucose 141 (H) 70 - 100 mg/dL    BUN 44 (H) 9 - 23 mg/dL    Creatinine 1.56 (H) 0.60 - 1.30 mg/dL    Sodium 132 132 - 146 mmol/L    Potassium 4.0 3.5 - 5.5 mmol/L    Chloride 104 99 - 109 mmol/L    CO2 21.0 20.0 - 31.0 mmol/L    Calcium 7.8 (L) 8.7 - 10.4 mg/dL    eGFR Non African Amer 45 (L) >60 mL/min/1.73    BUN/Creatinine Ratio 28.2 (H) 7.0 - 25.0    Anion Gap 7.0 3.0 - 11.0 mmol/L   POC Glucose Once    Collection Time: 10/14/18  7:43 AM   Result Value Ref Range    Glucose 132 (H) 70 - 130 mg/dL     Assessment:       Coronary artery disease (S/P CABG x 4)    Insulin dependent type 2 diabetes mellitus (CMS/HCC)    Labile hypertension    Dyslipidemia    Hypertensive cardiovascular disease    Moderate obesity    Plan:   1. CAD - s/p CABG x4 with EVH (LIMA to LAD, SVG to the diagonal , circumflex, and PDA), on ASA, statin, and betablocker at home.  2. Asystolic rhythm immediately post-op; paced at that time. Now sinus with baseline underlying 1st AVB and LBBB  3. Hypertension, currently  requiring pressor support, on carvedilol and losartan at home.  4. Chronic Systolic heart failure - EF 40% when measured in May 2018 - Will need to recheck ECHO to determine current post op EF. On Coreg and Cozaar. Stop Metoprolol and use Coreg if EF is reduced. Also plan for LifeVest at discharge if ECHO remains <35%        Isreal Beaver M.D., F.A.C.C, F.H.R.S.  Cardiology/Electrophysiology

## 2018-10-14 NOTE — PLAN OF CARE
Problem: Patient Care Overview  Goal: Plan of Care Review  Outcome: Ongoing (interventions implemented as appropriate)   10/13/18 1800   Plan of Care Review   Progress improving   OTHER   Outcome Summary Pt A&Ox4. On 3L NC, MT output 100mL. No BM, in/out cathed: 900mL. Pt unable to void. Pt on 0 gtts. Pt ambulated montano x2. Pain controlled with PO meds. VSS       Problem: Skin Injury Risk (Adult)  Goal: Identify Related Risk Factors and Signs and Symptoms  Outcome: Ongoing (interventions implemented as appropriate)   10/13/18 2006   Skin Injury Risk (Adult)   Related Risk Factors (Skin Injury Risk) critical care admission     Goal: Skin Health and Integrity  Outcome: Ongoing (interventions implemented as appropriate)   10/13/18 2006   Skin Injury Risk (Adult)   Skin Health and Integrity making progress toward outcome       Problem: Cardiac Surgery (Adult)  Goal: Signs and Symptoms of Listed Potential Problems Will be Absent, Minimized or Managed (Cardiac Surgery)  Outcome: Ongoing (interventions implemented as appropriate)   10/13/18 2006   Goal/Outcome Evaluation   Problems Assessed (Cardiac Surgery) all   Problems Present (Cardiac Surgery) pain;postoperative urinary retention

## 2018-10-14 NOTE — PROGRESS NOTES
CTS Progress Note       LOS: 3 days   Patient Care Team:  Bro Kan MD as PCP - General    Chief Complaint: Coronary artery disease involving native coronary artery of native heart with angina pectoris (CMS/HCC)    Vital Signs:  Temp:  [98.1 °F (36.7 °C)-98.6 °F (37 °C)] 98.2 °F (36.8 °C)  Heart Rate:  [84-98] 85  Resp:  [16-22] 20  BP: ()/(54-73) 111/55    Physical Exam:  Up in chair.  Alert conversant.  Sternum is stable     Results:     Results from last 7 days  Lab Units 10/14/18  0321   WBC 10*3/mm3 7.34   HEMOGLOBIN g/dL 9.1*   HEMATOCRIT % 28.1*   PLATELETS 10*3/mm3 158       Results from last 7 days  Lab Units 10/14/18  0321   SODIUM mmol/L 132   POTASSIUM mmol/L 4.0   CHLORIDE mmol/L 104   CO2 mmol/L 21.0   BUN mg/dL 44*   CREATININE mg/dL 1.56*   GLUCOSE mg/dL 141*   CALCIUM mg/dL 7.8*           Imaging Results (last 24 hours)     Procedure Component Value Units Date/Time    XR Chest 1 View [[id number]] Collected:  10/13/18 0843     Updated:  10/13/18 1147    Narrative:          EXAMINATION: XR CHEST 1 VW - 10/13/2018     INDICATION:  Z74.09-Other reduced mobility; I25.118-Atherosclerotic  heart disease of native coronary artery with other forms of angina  pectoris.      COMPARISON: 10/12/2018.     FINDINGS: Portable chest reveals heart to be enlarged. The patient is  status post median sternotomy. Wolcott-Tian catheter has been removed.  Chest tube remains on the left with no definite pneumothorax.  Degenerative change is seen within the spine. Pulmonary vascularity is  within normal limits.       Impression:       Removal of Wolcott-Tian catheter with improvement seen in  aeration of the left lung base. No new focal parenchymal  opacification present.     DICTATED:   10/13/2018  EDITED/ls :   10/13/2018      This report was finalized on 10/13/2018 11:45 AM by Dr. Natty Owen MD.             Assessment      Coronary artery disease (S/P CABG x 4)    Insulin dependent type 2 diabetes  mellitus (CMS/HCC)    Labile hypertension    Dyslipidemia    Hypertensive cardiovascular disease    Moderate obesity    Valenzuela catheter was placed overnight secondary to inability to void    Plan   Discontinue chest tubes.  Transfer to telemetry    Please note that portions of this note were completed with a voice recognition program. Efforts were made to edit the dictations, but occasionally words are mistranscribed.    Brice Oakley MD  10/14/18  7:16 AM

## 2018-10-14 NOTE — PLAN OF CARE
Problem: Patient Care Overview  Goal: Plan of Care Review  Outcome: Ongoing (interventions implemented as appropriate)   10/14/18 0601   Coping/Psychosocial   Plan of Care Reviewed With patient   Plan of Care Review   Progress improving   OTHER   Outcome Summary pt. intact neuro, ambulated in montano before bed. On 3L NC. VSS. Pt. unable to void, would have been 3rd time to in/out cathed so leong anchored: 920ml out for shift. MT drainage: 140 for shift. Pain much better, prns given. Pt. had several liquid stools.

## 2018-10-15 ENCOUNTER — APPOINTMENT (OUTPATIENT)
Dept: GENERAL RADIOLOGY | Facility: HOSPITAL | Age: 68
End: 2018-10-15

## 2018-10-15 LAB
ANION GAP SERPL CALCULATED.3IONS-SCNC: 9 MMOL/L (ref 3–11)
BUN BLD-MCNC: 50 MG/DL (ref 9–23)
BUN/CREAT SERPL: 30.9 (ref 7–25)
CALCIUM SPEC-SCNC: 9.1 MG/DL (ref 8.7–10.4)
CHLORIDE SERPL-SCNC: 101 MMOL/L (ref 99–109)
CO2 SERPL-SCNC: 23 MMOL/L (ref 20–31)
CREAT BLD-MCNC: 1.62 MG/DL (ref 0.6–1.3)
GFR SERPL CREATININE-BSD FRML MDRD: 43 ML/MIN/1.73
GLUCOSE BLD-MCNC: 212 MG/DL (ref 70–100)
GLUCOSE BLDC GLUCOMTR-MCNC: 178 MG/DL (ref 70–130)
GLUCOSE BLDC GLUCOMTR-MCNC: 190 MG/DL (ref 70–130)
GLUCOSE BLDC GLUCOMTR-MCNC: 201 MG/DL (ref 70–130)
GLUCOSE BLDC GLUCOMTR-MCNC: 232 MG/DL (ref 70–130)
HCT VFR BLD AUTO: 30.7 % (ref 38.9–50.9)
HGB BLD-MCNC: 9.9 G/DL (ref 13.1–17.5)
POTASSIUM BLD-SCNC: 4.4 MMOL/L (ref 3.5–5.5)
SODIUM BLD-SCNC: 133 MMOL/L (ref 132–146)

## 2018-10-15 PROCEDURE — 71045 X-RAY EXAM CHEST 1 VIEW: CPT

## 2018-10-15 PROCEDURE — 82962 GLUCOSE BLOOD TEST: CPT

## 2018-10-15 PROCEDURE — 93005 ELECTROCARDIOGRAM TRACING: CPT | Performed by: PHYSICIAN ASSISTANT

## 2018-10-15 PROCEDURE — 80048 BASIC METABOLIC PNL TOTAL CA: CPT | Performed by: PHYSICIAN ASSISTANT

## 2018-10-15 PROCEDURE — 99233 SBSQ HOSP IP/OBS HIGH 50: CPT | Performed by: INTERNAL MEDICINE

## 2018-10-15 PROCEDURE — 63710000001 INSULIN DETEMIR PER 5 UNITS: Performed by: NURSE PRACTITIONER

## 2018-10-15 PROCEDURE — 25010000002 MORPHINE SULFATE (PF) 2 MG/ML SOLUTION: Performed by: THORACIC SURGERY (CARDIOTHORACIC VASCULAR SURGERY)

## 2018-10-15 PROCEDURE — 85018 HEMOGLOBIN: CPT | Performed by: PHYSICIAN ASSISTANT

## 2018-10-15 PROCEDURE — 99221 1ST HOSP IP/OBS SF/LOW 40: CPT | Performed by: NURSE PRACTITIONER

## 2018-10-15 PROCEDURE — 93010 ELECTROCARDIOGRAM REPORT: CPT | Performed by: INTERNAL MEDICINE

## 2018-10-15 PROCEDURE — 85014 HEMATOCRIT: CPT | Performed by: PHYSICIAN ASSISTANT

## 2018-10-15 RX ORDER — ALPRAZOLAM 0.25 MG/1
0.25 TABLET ORAL 2 TIMES DAILY PRN
Status: DISCONTINUED | OUTPATIENT
Start: 2018-10-15 | End: 2018-10-22 | Stop reason: HOSPADM

## 2018-10-15 RX ORDER — CLOPIDOGREL BISULFATE 75 MG/1
75 TABLET ORAL DAILY
Status: DISCONTINUED | OUTPATIENT
Start: 2018-10-15 | End: 2018-10-22 | Stop reason: HOSPADM

## 2018-10-15 RX ORDER — FERROUS SULFATE 325(65) MG
325 TABLET ORAL 2 TIMES DAILY WITH MEALS
Status: DISCONTINUED | OUTPATIENT
Start: 2018-10-15 | End: 2018-10-22 | Stop reason: HOSPADM

## 2018-10-15 RX ADMIN — Medication 325 MG: at 17:36

## 2018-10-15 RX ADMIN — Medication 325 MG: at 12:21

## 2018-10-15 RX ADMIN — MORPHINE SULFATE 2 MG: 2 INJECTION, SOLUTION INTRAMUSCULAR; INTRAVENOUS at 22:46

## 2018-10-15 RX ADMIN — CARVEDILOL 25 MG: 12.5 TABLET, FILM COATED ORAL at 21:38

## 2018-10-15 RX ADMIN — SENNOSIDES AND DOCUSATE SODIUM 2 TABLET: 8.6; 5 TABLET ORAL at 21:38

## 2018-10-15 RX ADMIN — INSULIN LISPRO 7 UNITS: 100 INJECTION, SOLUTION INTRAVENOUS; SUBCUTANEOUS at 12:23

## 2018-10-15 RX ADMIN — CARVEDILOL 25 MG: 12.5 TABLET, FILM COATED ORAL at 08:26

## 2018-10-15 RX ADMIN — INSULIN DETEMIR 16 UNITS: 100 INJECTION, SOLUTION SUBCUTANEOUS at 17:33

## 2018-10-15 RX ADMIN — INSULIN LISPRO 2 UNITS: 100 INJECTION, SOLUTION INTRAVENOUS; SUBCUTANEOUS at 17:32

## 2018-10-15 RX ADMIN — ALPRAZOLAM 0.25 MG: 0.25 TABLET ORAL at 15:50

## 2018-10-15 RX ADMIN — INSULIN LISPRO 7 UNITS: 100 INJECTION, SOLUTION INTRAVENOUS; SUBCUTANEOUS at 08:30

## 2018-10-15 RX ADMIN — MORPHINE SULFATE 2 MG: 2 INJECTION, SOLUTION INTRAMUSCULAR; INTRAVENOUS at 20:28

## 2018-10-15 RX ADMIN — INSULIN LISPRO 2 UNITS: 100 INJECTION, SOLUTION INTRAVENOUS; SUBCUTANEOUS at 21:39

## 2018-10-15 RX ADMIN — INSULIN LISPRO 4 UNITS: 100 INJECTION, SOLUTION INTRAVENOUS; SUBCUTANEOUS at 08:30

## 2018-10-15 RX ADMIN — INSULIN LISPRO 7 UNITS: 100 INJECTION, SOLUTION INTRAVENOUS; SUBCUTANEOUS at 17:32

## 2018-10-15 RX ADMIN — HYDROCODONE BITARTRATE AND ACETAMINOPHEN 1 TABLET: 7.5; 325 TABLET ORAL at 08:26

## 2018-10-15 RX ADMIN — HYDROCODONE BITARTRATE AND ACETAMINOPHEN 1 TABLET: 7.5; 325 TABLET ORAL at 14:13

## 2018-10-15 RX ADMIN — HYDROCODONE POLISTIREX AND CHLORPHENIRAMINE POLISTIREX 5 ML: 10; 8 SUSPENSION, EXTENDED RELEASE ORAL at 10:06

## 2018-10-15 RX ADMIN — HYDROCODONE POLISTIREX AND CHLORPHENIRAMINE POLISTIREX 5 ML: 10; 8 SUSPENSION, EXTENDED RELEASE ORAL at 21:40

## 2018-10-15 RX ADMIN — Medication 3 ML: at 21:41

## 2018-10-15 RX ADMIN — Medication 5 MG: at 22:46

## 2018-10-15 RX ADMIN — Medication 3 ML: at 08:28

## 2018-10-15 RX ADMIN — INSULIN LISPRO 4 UNITS: 100 INJECTION, SOLUTION INTRAVENOUS; SUBCUTANEOUS at 12:23

## 2018-10-15 RX ADMIN — FINASTERIDE 5 MG: 5 TABLET, FILM COATED ORAL at 08:26

## 2018-10-15 RX ADMIN — OXYCODONE HYDROCHLORIDE AND ACETAMINOPHEN 2 TABLET: 5; 325 TABLET ORAL at 17:32

## 2018-10-15 RX ADMIN — TAMSULOSIN HYDROCHLORIDE 0.4 MG: 0.4 CAPSULE ORAL at 08:26

## 2018-10-15 RX ADMIN — ATORVASTATIN CALCIUM 40 MG: 40 TABLET, FILM COATED ORAL at 21:38

## 2018-10-15 RX ADMIN — CLOPIDOGREL BISULFATE 75 MG: 75 TABLET ORAL at 12:21

## 2018-10-15 RX ADMIN — SACUBITRIL AND VALSARTAN 1 TABLET: 24; 26 TABLET, FILM COATED ORAL at 21:38

## 2018-10-15 NOTE — DISCHARGE PLACEMENT REQUEST
"Delaney Gann  [phone]  Abilio Orders    Brain Mora (68 y.o. Male)     Date of Birth Social Security Number Address Home Phone MRN    1950  [address] [facility] [facility] [address] [phone] [id number]    Jew Marital Status          None        Admission Date Admission Type Admitting Provider Attending Provider Department, Room/Bed    10/11/18 Elective Migue Burton MD Rogers, Anthony G, MD [address], [address]1    Discharge Date Discharge Disposition Discharge Destination                       Attending Provider:  Migue Burton MD    Allergies:  Asa [Aspirin]    Isolation:  None   Infection:  None   Code Status:  CPR    Ht:  167.6 cm (66\")   Wt:  95.7 kg (211 lb)    Admission Cmt:  None   Principal Problem:  Coronary artery disease (S/P CABG x 4) [I25.119]                 Active Insurance as of 10/11/2018     Primary Coverage     Payor Plan Insurance Group Employer/Plan Group    MISC COMMERCIAL MISC COMMERCIAL INS      Coverage Address Coverage Phone Number Effective From Effective To    [address] [phone] 2007     [facility] [address]       Subscriber Name Subscriber Birth Date Member ID       BRAIN MORA 1950 [id number]                 Emergency Contacts      (Rel.) Home Phone Work Phone Mobile Phone    Laura Mora (Spouse) [phone] -- [phone]    Lucretia Galvan (Sister) [phone] -- --          [address] [address]  Dept. Phone:  [phone]  Dept. Fax:   Date Ordered: Oct 15, 2018         Patient:  Brain Mora MRN:  [id number]   [address] [facility]  [place] [address] :  1950  SSN:    Phone: [phone] Sex:  M     Weight: 95.7 kg (211 lb)         Ht Readings from Last 1 Encounters:   10/13/18 167.6 cm (66\")         Abilio               (Order ID: [id number])    Diagnosis:  Impaired functional mobility, " balance, gait, and endurance (Z74.09 [ICD-10-CM] V49.89 [ICD-9-CM])  Coronary artery disease involving native coronary artery of native heart with angina pectoris (CMS/[facility]) (I25.119 [ICD-10-CM] 414.01,413.9 [ICD-9-CM])  LOPEZ (acute kidney injury) (CMS/[facility]) (N17.9 [ICD-10-CM] 584.9 [ICD-9-CM])  Ischemic heart disease (I25.9 [ICD-10-CM] 414.9 [ICD-9-CM])   Quantity:  1     Equipment:  Walker Folding with Wheels  Length of Need (99 Months = Lifetime): 99 Months = Lifetime        Authorizing Provider's Phone: [phone]         Verbal Order Mode: Telephone with readback   Authorizing Provider: Felix Lan MD  Authorizing Provider's NPI: [id number]     Order Entered By: Delaney Gann RN 10/15/2018  2:52 PM     :                  History & Physical      Glenys Ozuna APRN at 10/11/2018  6:22 AM          Pre-Op H&P  Kar Mora  [id number]  1950    Chief complaint: CP    HPI:    9/6/18 office visit:  Mr. Mora is a 67-year-old male who has been referred by Dr. Qureshi for evaluation.  He has been having chest pain for several months, particularly with exertion.  He underwent catheterization with Dr. Moctezuma and has been found to have severe two vessel disease of his right coronary artery and LAD.  He has followed up with Dr. Qureshi. I had a long discussion with him.  The patient now appears to be agreeable to have coronary bypass surgery.  His ejection fraction is about 37-40%.  He continues to practice law.    10/11/18:  Here today for CORONARY ARTERY BYPASS WITH INTERNAL MAMMARY ARTERY GRAFT with [facility]    Review of Systems:  General ROS: negative for chills, fever or skin lesions;  No changes since last office visit  Cardiovascular ROS: + chest pain or dyspnea on exertion  Respiratory ROS: no cough, shortness of breath, or wheezing  : No history of renal disease per pt    Allergies:   Allergies   Allergen Reactions   • Asa [Aspirin] Other (See Comments)      upper airway congestion        Home Meds:    No current facility-administered medications on file prior to encounter.      Current Outpatient Prescriptions on File Prior to Encounter   Medication Sig Dispense Refill   • atorvastatin (LIPITOR) 20 MG tablet Take 20 mg by mouth Every Night.  1   • carvedilol (COREG) 25 MG tablet take 1 tablet by mouth twice a day 180 tablet 3   • Empagliflozin (JARDIANCE) 25 MG tablet Take 25 mg by mouth Daily.     • fenofibrate 160 MG tablet Take 160 mg by mouth Daily.     • glipiZIDE (GLUCOTROL) 10 MG 24 hr tablet Take 10 mg by mouth Every Night.  0   • JANUVIA 100 MG tablet Take 100 mg by mouth Daily.  1   • losartan (COZAAR) 100 MG tablet Take 100 mg by mouth Every Night.  1   • metFORMIN (GLUCOPHAGE) 500 MG tablet Take 1,000 mg by mouth 2 (Two) Times a Day.  1   • nitroglycerin (NITROSTAT) 0.4 MG SL tablet 1 under the tongue as needed for angina, may repeat q5mins for up three doses (Patient taking differently: Place 0.4 mg under the tongue Every 5 (Five) Minutes As Needed. 1 under the tongue as needed for angina, may repeat q5mins for up three doses) 25 tablet 6   • raNITIdine (ZANTAC) 150 MG tablet Take 150 mg by mouth 2 (Two) Times a Day.  0   • TOUJEO SOLOSTAR 300 UNIT/ML solution pen-injector 70 Units Every Night.  1   • Triamcinolone Acetonide (NASACORT ALLERGY 24HR) 55 MCG/ACT nasal inhaler 2 sprays into each nostril Daily As Needed for Rhinitis.     • CIALIS 5 MG tablet Take 5 mg by mouth As Needed for erectile dysfunction.  0       PMH:   Past Medical History:   Diagnosis Date   • Allergic rhinitis    • Anesthesia     pt says he has woken up during surgery   • Chest pain    • Coronary artery disease    • Dyslipidemia    • Erectile dysfunction    • GERD (gastroesophageal reflux disease)    • Gout    • Hyperlipidemia    • Insulin dependent type 2 diabetes mellitus (CMS/[facility]) 2002    checks fsbg every am, a1c checked every 6 months, 7.2 in may 2018    • Kidney stones    • Labile hypertension  "10/24/2016   • Myocardial infarct (CMS/HCC)    • Wears glasses    • Wears glasses      PSH:    Past Surgical History:   Procedure Laterality Date   • CARDIAC CATHETERIZATION N/A 7/20/2018    Procedure: Left Heart Cath;  Surgeon: Madi Moctezuma MD;  Location: [facility] CATH INVASIVE LOCATION;  Service: Cardiovascular   • COLONOSCOPY  2016   • KIDNEY STONE SURGERY     • NASAL POLYP SURGERY  1986     Social History:   Tobacco:   History   Smoking Status   • Former Smoker   • Packs/day: 2.00   • Years: 10.00   • Types: Cigarettes   • Quit date: 1981   Smokeless Tobacco   • Never Used     Comment: quit 35 years ago      Alcohol:     History   Alcohol Use No       Vitals:           /64 (BP Location: Right arm, Patient Position: Lying) Comment: 131/74 in the left arm  Pulse 79   Temp 97.4 °F (36.3 °C) (Tympanic)   Resp 16   Ht 167.6 cm (66\")   Wt 96.2 kg (212 lb)   SpO2 97%   BMI 34.22 kg/m²      Physical Exam:  General Appearance:    Alert, cooperative, no distress, appears stated age   Head:    Normocephalic, without obvious abnormality, atraumatic   Lungs:     Clear to auscultation bilaterally, respirations unlabored    Heart:   Regular rate and rhythm, S1 and S2 normal, no murmur, rub    or gallop    Abdomen:    Soft, non-tender.  +bowel sounds   Breast Exam:    deferred   Genitalia:    deferred   Extremities:   Extremities normal, atraumatic, no cyanosis or edema   Skin:   Skin color, texture, turgor normal, no rashes or lesions   Neurologic:   Grossly intact   Results Review  I reviewed the patient's new clinical results.    Cancer Staging (if applicable)  Cancer Patient: __ yes _x_no __unknown; If yes, clinical stage T:__ N:__M:__, stage group or __N/A    Impression/Plan:    Mr. Mora is a 67-year-old white male who has severe two vessel coronary disease.  I have obtained and reviewed his records in detail and gone over them in detail.  I concur with the 80% stenosis of his LAD and 100% stenosis of his " right coronary artery.  He has approximately a 40% ejection fraction.  I have recommended coronary artery bypass surgery.  I have discussed the operation, risks including risk to his life, bleeding, infection, organ failure, as well as other risk factors, as well as alternatives.  He appears to understand completely all of the above and would like to proceed.     Glenys Ozuna, APRN   10/11/2018   6:22 AM    Electronically signed by Migue Burton MD at 10/11/2018  6:45 AM

## 2018-10-15 NOTE — NURSING NOTE
Pt. Referred for Phase II Cardiac Rehab. Staff discussed benefits of exercise, program protocol, and educational material provided. Teach back verified.  Permission granted from patient for staff to fax referral information to outlying program at this time.  Staff to fax referral info to [facility].

## 2018-10-15 NOTE — PLAN OF CARE
Problem: Patient Care Overview  Goal: Plan of Care Review  Outcome: Ongoing (interventions implemented as appropriate)   10/15/18 0358   Coping/Psychosocial   Plan of Care Reviewed With patient   Plan of Care Review   Progress improving   OTHER   Outcome Summary Patient rested comfortably throughout tthe night. Patient did report pain once and recieved PRN pain medication. Patient is alert and oriented and is on room air. Vital signs remain stable. Will continue to monitor.      Goal: Individualization and Mutuality  Outcome: Ongoing (interventions implemented as appropriate)    Goal: Discharge Needs Assessment  Outcome: Ongoing (interventions implemented as appropriate)      Problem: Skin Injury Risk (Adult)  Goal: Skin Health and Integrity  Outcome: Ongoing (interventions implemented as appropriate)      Problem: Cardiac Surgery (Adult)  Goal: Signs and Symptoms of Listed Potential Problems Will be Absent, Minimized or Managed (Cardiac Surgery)  Outcome: Ongoing (interventions implemented as appropriate)

## 2018-10-15 NOTE — PROGRESS NOTES
"Kar Mora  [id number]  1950     LOS: 4 days   Patient Care Team:  Bro Kan MD as PCP - General    Chief Complaint: Coronary artery disease      Subjective: No complaints    Objective:     Vital Sign Min/Max for last 24 hours  Temp  Min: 97.8 °F (36.6 °C)  Max: 98.7 °F (37.1 °C)   BP  Min: 94/54  Max: 123/65   Pulse  Min: 81  Max: 88   Resp  Min: 18  Max: 18   SpO2  Min: 91 %  Max: 95 %   No Data Recorded   Weight  Min: 96 kg (211 lb 9.6 oz)  Max: 96 kg (211 lb 9.6 oz)     Flowsheet Rows      First Filed Value   Admission Height  167.6 cm (66\") Documented at 10/11/2018 0604   Admission Weight  96.2 kg (212 lb) Documented at 10/11/2018 0604          Physical Exam:    Wound: Satisfactory    Pulses:     Mediastinal and Chest Tube Drainage:       Results Review:     Results from last 7 days  Lab Units 10/15/18  0500 10/14/18  0321   WBC 10*3/mm3  --  7.34   HEMOGLOBIN g/dL 9.9* 9.1*   HEMATOCRIT % 30.7* 28.1*   PLATELETS 10*3/mm3  --  158       Results from last 7 days  Lab Units 10/15/18  0500   SODIUM mmol/L 133   POTASSIUM mmol/L 4.4   CHLORIDE mmol/L 101   CO2 mmol/L 23.0   BUN mg/dL 50*   CREATININE mg/dL 1.62*   GLUCOSE mg/dL 212*   CALCIUM mg/dL 9.1       Results from last 7 days  Lab Units 10/11/18  1448   PH, ARTERIAL pH units 7.341*   PO2 ART mm Hg 100.0   PCO2, ARTERIAL mm Hg 39.7   HCO3 ART mmol/L 21.5         Assessment      Coronary artery disease (S/P CABG x 4)    Insulin dependent type 2 diabetes mellitus (CMS/[facility])    Labile hypertension    Dyslipidemia    Hypertensive cardiovascular disease    Moderate obesity    LOPEZ (acute kidney injury) (CMS/[facility])      satisfactory, home in the next 24-48 hours hopefully        Migue Burton MD  10/15/18  7:13 AM      Please note that portions of this note were completed with a voice recognition program. Efforts were made to edit the dictations, but words may be mistranscribed  "

## 2018-10-15 NOTE — PAYOR COMM NOTE
"Utilization Review [phone]  Brain Mora (68 y.o. Male)     Date of Birth Social Security Number Address Home Phone MRN    1950  [id number] [facility] [phone] [id number]    Muslim Marital Status          None        Admission Date Admission Type Admitting Provider Attending Provider Department, Room/Bed    10/11/18 Elective Migue Burton MD Rogers, Anthony G, MD [facility] 4H, S470/1    Discharge Date Discharge Disposition Discharge Destination                       Attending Provider:  Migue Burton MD    Allergies:  Asa [Aspirin]    Isolation:  None   Infection:  None   Code Status:  CPR    Ht:  167.6 cm (66\")   Wt:  95.7 kg (211 lb)    Admission Cmt:  None   Principal Problem:  Coronary artery disease (S/P CABG x 4) [I25.119]                 Active Insurance as of 10/11/2018     Primary Coverage     Payor Plan Insurance Group Employer/Plan Group    MISC COMMERCIAL MISC COMMERCIAL INS      Coverage Address Coverage Phone Number Effective From Effective To    [address] [phone] 7/1/2007     [facility] [id number]       Subscriber Name Subscriber Birth Date Member ID       BRAIN MORA 1950 [id number]                 Emergency Contacts      (Rel.) Home Phone Work Phone Mobile Phone    Laura Mora (Spouse) [phone] -- [phone]    Lucretia Galvan (Sister) [phone] -- --               Physician Progress Notes (last 72 hours) (Notes from 10/12/2018  1:30 PM through 10/15/2018  1:30 PM)      Todd Qureshi MD at 10/15/2018  8:04 AM          Brain BEATRIZ Morgan  [id number]  1950   LOS: 4 days   Patient Care Team:  Bro Kan MD as PCP - General    Chief Complaint:  Follow-up on CAD, bradycardia, CHF    Subjective      Patient denies chest pressure, increased SOB, palpitations, or presyncope. He is using his IS. Says his throat is hurting and he is trying to cough up " sputum.     Objective     Vital Sign Min/Max for last 24 hours  Temp  Min: 97.8 °F (36.6 °C)  Max: 99.2 °F (37.3 °C)   BP  Min: 94/54  Max: 123/65   Pulse  Min: 81  Max: 88   Resp  Min: 18  Max: 18   SpO2  Min: 91 %  Max: 95 %      Weight  Min: 95.7 kg (211 lb)  Max: 96 kg (211 lb 9.6 oz)     1    10/14/18  1022 10/15/18  0500   Weight: 96 kg (211 lb 9.6 oz) 95.7 kg (211 lb)         Intake/Output Summary (Last 24 hours) at 10/15/18 0804  Last data filed at 10/15/18 0403   Gross per 24 hour   Intake              120 ml   Output             1650 ml   Net            -1530 ml       Physical Exam:     General Appearance:    Alert, cooperative, in no acute distress   Lungs:     Clear to auscultation,respirations regular, even and                   unlabored    Heart:    Regular and normal rate, normal S1 and S2, grade 1/6            murmur, no gallop, no rub, no click   Abdomen:  Extremities:   Soft, non-tender, bowel sounds audible x4    No edema, normal range of motion   Pulses:   Pulses palpable and equal bilaterally    Sternal incision CDI  Results Review:     Results from last 7 days  Lab Units 10/15/18  0500 10/14/18  0321 10/13/18  0321   SODIUM mmol/L 133 132 134   POTASSIUM mmol/L 4.4 4.0 4.6   CHLORIDE mmol/L 101 104 106   CO2 mmol/L 23.0 21.0 19.0*   BUN mg/dL 50* 44* 36*   CREATININE mg/dL 1.62* 1.56* 1.70*   GLUCOSE mg/dL 212* 141* 173*   CALCIUM mg/dL 9.1 7.8* 8.4*       Results from last 7 days  Lab Units 10/15/18  0500 10/14/18  0321 10/13/18  0321 10/12/18  0323   WBC 10*3/mm3  --  7.34 9.84 8.44   HEMOGLOBIN g/dL 9.9* 9.1* 10.0* 10.0*   HEMATOCRIT % 30.7* 28.1* 31.5* 31.4*   PLATELETS 10*3/mm3  --  158 160 169       Results from last 7 days  Lab Units 10/10/18  1324   HEMOGLOBIN A1C % 7.50*     · Echocardiogram 10/13/18:    · Estimated EF appears to be in the range of 26 - 30%.  · The following left ventricular wall segments are hypokinetic: mid anterolateral. The following left ventricular wall segments  "are dyskinetic: apical anterior.  · Right ventricular cavity is borderline dilated.  · Left atrial cavity size is mildly dilated.  · Mild mitral valve regurgitation is present    · No new chest x ray or ECG to review.    Medication Review: REVIEWED; NO DRIPS.    Assessment/Plan      S/p CABG x 4 vessels (LIMA to LAD, SVG to the diagonal , circumflex, and PDA), with plans for discharge in next 24-48 hours per CTS. Persistent anemia and marginal renal function. EF 40% when measured in May 2018, EF now 26-30%. Would switch cozaar to Entresto if renal function will allow. He will need a Lifevest at discharge.  Tentative discharge cardiac medications:    · Atorvastatin 40 mg daily  · Carvedilol 25 mg BID    · Entresto 24/26 BID  · Clopidogrel 75 mg daily  · Ferrous sulfate 325 mg BID times month  · CB cardiology follow-up 6 weeks with limited echocardiogram to assess LVEF  · Lifevest      Coronary artery disease (S/P CABG x 4)    Insulin dependent type 2 diabetes mellitus (CMS/[facility])    Labile hypertension    Dyslipidemia    Hypertensive cardiovascular disease    Moderate obesity    LOPEZ (acute kidney injury) (CMS/[facility])     Scribed for Todd Qureshi MD by Madhavi Stout, EDGAR. 10/15/2018  8:15 AM      10/15/18  8:04 AM    Electronically signed by Todd Qureshi MD at 10/15/2018  8:49 AM     Migue Burton MD at 10/15/2018  7:13 AM          Kar Mora  [id number]  1950     LOS: 4 days   Patient Care Team:  Bro Kan MD as PCP - General    Chief Complaint: Coronary artery disease      Subjective: No complaints    Objective:     Vital Sign Min/Max for last 24 hours  Temp  Min: 97.8 °F (36.6 °C)  Max: 98.7 °F (37.1 °C)   BP  Min: 94/54  Max: 123/65   Pulse  Min: 81  Max: 88   Resp  Min: 18  Max: 18   SpO2  Min: 91 %  Max: 95 %   No Data Recorded   Weight  Min: 96 kg (211 lb 9.6 oz)  Max: 96 kg (211 lb 9.6 oz)     Flowsheet Rows      First Filed Value   Admission Height  167.6 cm (66\") " Documented at 10/11/2018 0604   Admission Weight  96.2 kg (212 lb) Documented at 10/11/2018 0604          Physical Exam:    Wound: Satisfactory    Pulses:     Mediastinal and Chest Tube Drainage:       Results Review:     Results from last 7 days  Lab Units 10/15/18  0500 10/14/18  0321   WBC 10*3/mm3  --  7.34   HEMOGLOBIN g/dL 9.9* 9.1*   HEMATOCRIT % 30.7* 28.1*   PLATELETS 10*3/mm3  --  158       Results from last 7 days  Lab Units 10/15/18  0500   SODIUM mmol/L 133   POTASSIUM mmol/L 4.4   CHLORIDE mmol/L 101   CO2 mmol/L 23.0   BUN mg/dL 50*   CREATININE mg/dL 1.62*   GLUCOSE mg/dL 212*   CALCIUM mg/dL 9.1       Results from last 7 days  Lab Units 10/11/18  1448   PH, ARTERIAL pH units 7.341*   PO2 ART mm Hg 100.0   PCO2, ARTERIAL mm Hg 39.7   HCO3 ART mmol/L 21.5         Assessment      Coronary artery disease (S/P CABG x 4)    Insulin dependent type 2 diabetes mellitus (CMS/[facility])    Labile hypertension    Dyslipidemia    Hypertensive cardiovascular disease    Moderate obesity    LOPEZ (acute kidney injury) (CMS/[facility])      satisfactory, home in the next 24-48 hours hopefully        Migue Burton MD  10/15/18  7:13 AM      Please note that portions of this note were completed with a voice recognition program. Efforts were made to edit the dictations, but words may be mistranscribed    Electronically signed by Migue Burton MD at 10/15/2018  7:14 AM     Branden Maravilla MD at 10/14/2018  8:55 AM          Intensive Care Follow-up      LOS: 3 days     Mr. Kar Mora, 68 y.o. male is followed for: Glycemic, Electrolyte, Respiratory, and Medical management     Subjective - Interval History     Pain moderately well-controlled  1 sugar still little elevated.  Off of insulin drip  Valenzuela catheter is last night due to urinary retention    The patient's relevant past medical, surgical and social history were reviewed and updated in Epic as appropriate.     Objective     Infusions:    niCARdipine 5-15  mg/hr    nitroglycerin 5-200 mcg/min Last Rate: Stopped (10/13/18 1200)   norepinephrine 0.02-0.3 mcg/kg/min Last Rate: Stopped (10/11/18 2200)   phenylephrine 0.5-3 mcg/kg/min      Medications:    atorvastatin 40 mg Oral Nightly   carvedilol 25 mg Oral BID   finasteride 5 mg Oral Daily   insulin detemir 11 Units Subcutaneous BID   insulin lispro 0-9 Units Subcutaneous 4x Daily With Meals & Nightly   insulin lispro 7 Units Subcutaneous TID With Meals   losartan 100 mg Oral Nightly   pharmacy consult - MTM  Does not apply Daily   sennosides-docusate sodium 2 tablet Oral BID   tamsulosin 0.4 mg Oral Daily     Intake/Output       10/13/18 0700 - 10/14/18 0659    Intake (ml) 500    Output (ml) 1860    Net (ml) -1360    Last Weight  96.2 kg (212 lb)        Vital Sign Min/Max for last 24 hours  Temp  Min: 98.1 °F (36.7 °C)  Max: 98.6 °F (37 °C)   BP  Min: 90/57  Max: 142/68   Pulse  Min: 84  Max: 96   Resp  Min: 16  Max: 22   SpO2  Min: 93 %  Max: 97 %   No Data Recorded        Physical Exam:   GENERAL: Awake, no distress   HEENT: Right IJ Cordis site okay, no adenopathy   LUNGS: Decreased breath sounds with crackles, no wheezes   HEART: Regular rate and rhythm.  No sternal wound dehiscence or drainage   ABDOMEN: Soft, nontender   EXTREMITIES: Trace edema.  Palpable pulses   NEURO/PSYCH: Awake and alert.  Follows commands.  Walking with assistance      Results from last 7 days  Lab Units 10/14/18  0321 10/13/18  0321 10/12/18  0323   WBC 10*3/mm3 7.34 9.84 8.44   HEMOGLOBIN g/dL 9.1* 10.0* 10.0*   PLATELETS 10*3/mm3 158 160 169       Results from last 7 days  Lab Units 10/14/18  0321 10/13/18  0321 10/12/18  0323  10/11/18  1448 10/11/18  1305   SODIUM mmol/L 132 134 136  --  138 137   POTASSIUM mmol/L 4.0 4.6 4.6  < > 3.6 3.8   CO2 mmol/L 21.0 19.0* 20.0  --  23.0 23.0   BUN mg/dL 44* 36* 36*  --  36* 35*   CREATININE mg/dL 1.56* 1.70* 1.89*  --  1.60* 1.63*   MAGNESIUM mg/dL  --   --  2.6  --  2.6 2.6   PHOSPHORUS mg/dL   --   --  4.6  --  2.8 3.4   GLUCOSE mg/dL 141* 173* 114*  --  125* 152*   < > = values in this interval not displayed.  Estimated Creatinine Clearance: 49.9 mL/min (A) (by C-G formula based on SCr of 1.56 mg/dL (H)).      Results from last 7 days  Lab Units 10/10/18  1324   HEMOGLOBIN A1C % 7.50*         Results from last 7 days  Lab Units 10/11/18  1448   PH, ARTERIAL pH units 7.341*   PCO2, ARTERIAL mm Hg 39.7   PO2 ART mm Hg 100.0     No results found for: LACTATE       Images: Most recent chest x-ray reveals prominent interstitial pattern without consolidation or effusions.  Low lung volumes    I reviewed the patient's results and images.     Impression      Active Hospital Problems    Diagnosis   • **Coronary artery disease (S/P CABG x 4)   • LOPEZ (acute kidney injury) (CMS/[facility])   • Hypertensive cardiovascular disease     1. Probable hypertensive cardiovascular disease:  a. Abnormal EKG with abnormal acceptable echocardiographic GXT, September 2004.   b. Acceptable echocardiographic GXT with preserved exercise capacity and mild LVH, March 2008.   c. Acceptable Quantitative SPECT Gated Cardiolite GXT with nominal myocardial perfusion to 88% of predicted exercise capacity and 90% predicted maximum heart rate with preserved LVEF (0.65) with continued medical therapy felt warranted, December 2012.  d. Residual class I symptoms with recent documented abnormal EKG (LBBB/first-degree AV block) with abnormal echocardiogram demonstrating mild left ventricular chamber enlargement with mild reduction in the LVEF (0.42) without PE or pulmonary hypertension.  e. Resident class I symptoms with persistent abnormal echocardiogram (LVEF 0.40), with mild concentric LVH and mild left atrial enlargement without pulmonary arterial hypertension or pericardial effusion, July 2015.       • Insulin dependent type 2 diabetes mellitus (CMS/HCC)   • Labile hypertension   • Moderate obesity   • Dyslipidemia         Plan        Continue to  monitor renal function  Mobilize  Discontinue Valenzuela catheter in the next day or so  Better blood sugar control      Plan of care and goals reviewed with mulitdisciplinary team at daily rounds   I discussed the patient's findings and my recommendations with patient and nursing staff       CHRIS Maravilla MD  Pulmonary and Critical Care Medicine       Electronically signed by Branden Maravilla MD at 10/14/2018  8:58 AM     Isreal Beaver MD at 10/14/2018  8:32 AM          CARDIOLOGY PROGRESS NOTE           10/14/2018 8:32 AM    Admit Date: 10/11/2018    Admit Diagnosis: Coronary artery disease involving native coronary artery of native heart with angina pectoris (CMS/HCC) [I25.119]  Atherosclerosis of native coronary artery of native heart with stable angina pectoris (CMS/HCC) [I25.118]    Chief Compliant: Follow up for CAD, Bradycardia, CHF    Subjective:   Patient's status has been stable. Improved Vital Signs. Planning for CT removal today      Objective:     Vitals:    10/14/18 0400 10/14/18 0500 10/14/18 0600 10/14/18 0700   BP: 111/73  100/54 111/55   Pulse: 85 85 84 85   Resp: 20  20    Temp: 98.2 °F (36.8 °C)      TempSrc: Oral      SpO2: 96% 93% 96% 95%   Weight:       Height:           Physical Exam:  General-Well Nourished, Well developed  Eyes - PERRLA  Neck- supple, No mass  CV- regular rate and rhythm, no MRG  Lung- clear bilaterally  Abd- soft, +BS  Musc/skel - Norm strength and range of motion  Skin- warm and dry  Neuro - Alert & Oriented x 3, appropriate mood.      Current Facility-Administered Medications:   •  acetaminophen (TYLENOL) tablet 650 mg, 650 mg, Oral, Q4H PRN, Migue Burton MD  •  atorvastatin (LIPITOR) tablet 40 mg, 40 mg, Oral, Nightly, Adonis Capps PA, 40 mg at 10/13/18 2009  •  bisacodyl (DULCOLAX) EC tablet 10 mg, 10 mg, Oral, Daily PRN, Adonis Capps PA  •  bisacodyl (DULCOLAX) suppository 10 mg, 10 mg, Rectal, Daily PRN, Adonis Capps PA  •   calcium carbonate (TUMS) chewable tablet 500 mg (200 mg elemental), 2 tablet, Oral, TID PRN, Bhumi Knight, APRN, 2 tablet at 10/13/18 0218  •  carvedilol (COREG) tablet 25 mg, 25 mg, Oral, BID, Branden Maravilla MD, 25 mg at 10/13/18 2010  •  dextrose (D50W) 25 g/ 50mL Intravenous Solution 25 g, 25 g, Intravenous, Q15 Min PRN, Branden Maravilla MD  •  dextrose (GLUTOSE) oral gel 15 g, 15 g, Oral, Q15 Min PRN, Branden Maravilla MD  •  docusate sodium (COLACE) capsule 100 mg, 100 mg, Oral, BID PRN, Adonis Capps PA  •  finasteride (PROSCAR) tablet 5 mg, 5 mg, Oral, Daily, Jamaal Goss, APRN  •  glucagon (human recombinant) (GLUCAGEN DIAGNOSTIC) injection 1 mg, 1 mg, Subcutaneous, PRN, Branden Maravilla MD  •  HYDROcodone-acetaminophen (NORCO) 7.5-325 MG per tablet 1 tablet, 1 tablet, Oral, Q4H PRN, Migue Burton MD, 1 tablet at 10/14/18 0751  •  insulin detemir (LEVEMIR) injection 11 Units, 11 Units, Subcutaneous, BID, Branden Maravilla MD, 11 Units at 10/13/18 2027  •  insulin lispro (humaLOG) injection 0-9 Units, 0-9 Units, Subcutaneous, 4x Daily With Meals & Nightly, Branden Maravilla MD, 2 Units at 10/13/18 2027  •  insulin lispro (humaLOG) injection 7 Units, 7 Units, Subcutaneous, TID With Meals, Branden Maravilla MD, 7 Units at 10/13/18 1745  •  losartan (COZAAR) tablet 100 mg, 100 mg, Oral, Nightly, Branden Maravilla MD, 100 mg at 10/13/18 2010  •  magnesium hydroxide (MILK OF MAGNESIA) suspension 2400 mg/10mL 10 mL, 10 mL, Oral, Daily PRN, Adonis Capps PA  •  metoprolol tartrate (LOPRESSOR) half tablet 12.5 mg, 12.5 mg, Oral, Q12H, Adonis Capps PA, 12.5 mg at 10/13/18 2009  •  Morphine sulfate (PF) injection 2 mg, 2 mg, Intravenous, Q2H PRN, Migue Burton MD, 2 mg at 10/12/18 2048  •  niCARdipine (CARDENE-IV) 40 mg/200 mL (0.2 mg/mL) in 0.9% NaCl infusion, 5-15 mg/hr, Intravenous, Continuous PRN, Adonis Capps PA  •   nitroglycerin 50 mg/250 mL (0.2 mg/mL) infusion, 5-200 mcg/min, Intravenous, Continuous PRN, Adonis Capps PA, Stopped at 10/13/18 1200  •  norepinephrine (LEVOPHED) 8 mg/250 mL (32 mcg/mL) in sodium chloride 0.9% infusion (premix), 0.02-0.3 mcg/kg/min, Intravenous, Continuous PRN, Adonis Capps PA, Stopped at 10/11/18 2200  •  ondansetron (ZOFRAN) injection 4 mg, 4 mg, Intravenous, Q6H PRN, Adonis Capps PA  •  oxyCODONE-acetaminophen (PERCOCET) 5-325 MG per tablet 2 tablet, 2 tablet, Oral, Q4H PRN, Migue Burton MD, 2 tablet at 10/14/18 0213  •  Pharmacy Consult - [facility], , Does not apply, Daily, David Whaley, [facility]  •  phenol (CHLORASEPTIC) 1.4 % liquid 2 spray, 2 spray, Mouth/Throat, Q2H PRN, Migue Burton MD, 2 spray at 10/12/18 2047  •  phenylephrine (JORDON-SYNEPHRINE) 50 mg/250 mL (0.2 mg/mL) in 0.9% NS  infusion, 0.5-3 mcg/kg/min, Intravenous, Continuous PRN, Adonis Capsp PA  •  potassium chloride (MICRO-K) CR capsule 40 mEq, 40 mEq, Oral, PRN **OR** potassium chloride (KLOR-CON) packet 40 mEq, 40 mEq, Oral, PRN, Adonis Capps PA  •  sennosides-docusate sodium (SENOKOT-S) 8.6-50 MG tablet 2 tablet, 2 tablet, Oral, BID, Adonis Capps PA, 2 tablet at 10/13/18 2009  •  tamsulosin (FLOMAX) 24 hr capsule 0.4 mg, 0.4 mg, Oral, Daily, Jamaal Goss, APRN    Facility-Administered Medications Ordered in Other Encounters:   •  Chlorhexidine Gluconate Cloth 2 % pads 1 application, 1 application, Topical, Q12H PRN, Capps, Adonis J, PA    Data Review:   Recent Results (from the past 24 hour(s))   POC Glucose Once    Collection Time: 10/13/18 11:13 AM   Result Value Ref Range    Glucose 189 (H) 70 - 130 mg/dL   POC Glucose Once    Collection Time: 10/13/18  4:22 PM   Result Value Ref Range    Glucose 209 (H) 70 - 130 mg/dL   Adult Transthoracic Echo Complete W/ Cont if Necessary Per Protocol    Collection Time: 10/13/18  6:07 PM   Result Value Ref Range    BSA 2.0  m^2     CV ECHO JASON - RVDD 3.1 cm    IVSd 1.3 cm    LVIDd 5.6 cm    LVIDs 4.7 cm    LVPWd 1.4 cm    IVS/LVPW 0.96     FS 17.1 %    EDV(Teich) 155.8 ml    ESV(Teich) 100.8 ml    EF(Teich) 35.3 %    EDV(cubed) 178.8 ml    ESV(cubed) 101.9 ml    EF(cubed) 43.0 %    LV mass(C)d 339.3 grams    LV mass(C)dI 167.4 grams/m^2    SV(Teich) 55.0 ml    SI(Teich) 27.1 ml/m^2    SV(cubed) 77.0 ml    SI(cubed) 38.0 ml/m^2    Ao root diam 3.3 cm    Ao root area 8.6 cm^2    LA dimension 4.3 cm    LA/Ao 1.3     LVOT diam 2.3 cm    LVOT area 4.2 cm^2    LVOT area(traced) 4.2 cm^2    LAd major 6.5 cm    LVLd ap4 8.8 cm    EDV(MOD-sp4) 183.0 ml    LVLs ap4 8.3 cm    ESV(MOD-sp4) 128.0 ml    EF(MOD-sp4) 30.1 %    LVLd ap2 8.3 cm    EDV(MOD-sp2) 111.0 ml    LVLs ap2 7.6 cm    ESV(MOD-sp2) 91.0 ml    EF(MOD-sp2) 18.0 %    SV(MOD-sp4) 55.0 ml    SI(MOD-sp4) 27.1 ml/m^2    SV(MOD-sp2) 20.0 ml    SI(MOD-sp2) 9.9 ml/m^2    Ao root area (BSA corrected) 1.6     LV Nava Vol (BSA corrected) 90.2 ml/m^2    LV Sys Vol (BSA corrected) 63.1 ml/m^2    LA Volume Index 31.1 ml/m^2    MV E max antonio 104.6 cm/sec    MV A max antonio 26.7 cm/sec    MV E/A 3.9     MV dec time 0.15 sec    Ao pk antonio 166.3 cm/sec    Ao max PG 11.0 mmHg    Ao max PG (full) 7.5 mmHg    Ao V2 mean 113.8 cm/sec    Ao mean PG 6.0 mmHg    Ao mean PG (full) 4.1 mmHg    Ao V2 VTI 29.1 cm    TL(I,A) 2.0 cm^2    TL(I,D) 2.0 cm^2    TL(V,A) 2.4 cm^2    TL(V,D) 2.4 cm^2    LV V1 max PG 3.5 mmHg    LV V1 mean PG 1.9 mmHg    LV V1 max 93.8 cm/sec    LV V1 mean 63.7 cm/sec    LV V1 VTI 14.2 cm    SV(Ao) 250.6 ml    SI(Ao) 123.6 ml/m^2    SV(LVOT) 59.6 ml    SI(LVOT) 29.4 ml/m^2    PA acc slope 912.6 cm/sec^2    PA acc time 0.09 sec    TR max antonio 235.6 cm/sec    BH CV ECHO JASON - TR MAX PG 22.0 mmHg    PA pr(Accel) 38.6 mmHg    Pulm Sys Antonio 68.2 cm/sec    Pulm Nava Antonio 37.6 cm/sec    Pulm S/D 1.8     Lat E/e'  11.0     Med E/e' 12.6     Lat Peak E' Antonio 9.4 cm/sec    Med Peak E' Antonio 8.1 cm/sec      CV ECHO JASON - BZI_BMI 35.3 kilograms/m^2     CV ECHO JASON - BSA([facility]) 2.1 m^2     CV ECHO JASON - BZI_METRIC_WEIGHT 96.2 kg     CV ECHO JASON - BZI_METRIC_HEIGHT 165.1 cm    Avg E/e' ratio 11.95      CV VAS BP LEFT /67 mmHg    RV Base 3.70 cm    RV Length 8.50 cm    RV Mid 3.30 cm   POC Glucose Once    Collection Time: 10/13/18  8:11 PM   Result Value Ref Range    Glucose 163 (H) 70 - 130 mg/dL   Prepare RBC, 2 Units    Collection Time: 10/14/18  3:10 AM   Result Value Ref Range    Product Code M3396X05     Unit Number [id number]     UNIT  ABO A     UNIT  RH POS     Dispense Status RE     Blood Type APOS     Blood Expiration Date 456077754237     Blood Type Barcode 6200     Product Code B2404H90     Unit Number [id number]     UNIT  ABO A     UNIT  RH POS     Dispense Status RE     Blood Type APOS     Blood Expiration Date [id number]     Blood Type Barcode 6200    CBC (No Diff)    Collection Time: 10/14/18  3:21 AM   Result Value Ref Range    WBC 7.34 3.50 - 10.80 10*3/mm3    RBC 3.18 (L) 4.20 - 5.76 10*6/mm3    Hemoglobin 9.1 (L) 13.1 - 17.5 g/dL    Hematocrit 28.1 (L) 38.9 - 50.9 %    MCV 88.4 80.0 - 99.0 fL    MCH 28.6 27.0 - 31.0 pg    MCHC 32.4 32.0 - 36.0 g/dL    RDW 14.5 11.3 - 14.5 %    RDW-SD 47.4 37.0 - 54.0 fl    MPV 11.0 6.0 - 12.0 fL    Platelets 158 150 - 450 10*3/mm3   Basic Metabolic Panel    Collection Time: 10/14/18  3:21 AM   Result Value Ref Range    Glucose 141 (H) 70 - 100 mg/dL    BUN 44 (H) 9 - 23 mg/dL    Creatinine 1.56 (H) 0.60 - 1.30 mg/dL    Sodium 132 132 - 146 mmol/L    Potassium 4.0 3.5 - 5.5 mmol/L    Chloride 104 99 - 109 mmol/L    CO2 21.0 20.0 - 31.0 mmol/L    Calcium 7.8 (L) 8.7 - 10.4 mg/dL    eGFR Non African Amer 45 (L) >60 mL/min/1.73    BUN/Creatinine Ratio 28.2 (H) 7.0 - 25.0    Anion Gap 7.0 3.0 - 11.0 mmol/L   POC Glucose Once    Collection Time: 10/14/18  7:43 AM   Result Value Ref Range    Glucose 132 (H) 70 - 130 mg/dL     Assessment:        Coronary artery disease (S/P CABG x 4)    Insulin dependent type 2 diabetes mellitus (CMS/[facility])    Labile hypertension    Dyslipidemia    Hypertensive cardiovascular disease    Moderate obesity    Plan:   1. CAD - s/p CABG x4 with EVH (LIMA to LAD, SVG to the diagonal , circumflex, and PDA), on ASA, statin, and betablocker at home.  2. Asystolic rhythm immediately post-op; paced at that time. Now sinus with baseline underlying 1st AVB and LBBB  3. Hypertension, currently requiring pressor support, on carvedilol and losartan at home.  4. Chronic Systolic heart failure - EF 40% when measured in May 2018 - Will need to recheck ECHO to determine current post op EF. On Coreg and Cozaar. Stop Metoprolol and use Coreg if EF is reduced. Also plan for LifeVest at discharge if ECHO remains <35%        Isreal Beaver M.D., F.A.C.C, F.H.R.S.  Cardiology/Electrophysiology    Electronically signed by Isreal Beaver MD at 10/14/2018  8:35 AM     Brice Oakley MD at 10/14/2018  7:16 AM          CTS Progress Note       LOS: 3 days   Patient Care Team:  Bro Kan MD as PCP - General    Chief Complaint: Coronary artery disease involving native coronary artery of native heart with angina pectoris (CMS/[facility])    Vital Signs:  Temp:  [98.1 °F (36.7 °C)-98.6 °F (37 °C)] 98.2 °F (36.8 °C)  Heart Rate:  [84-98] 85  Resp:  [16-22] 20  BP: ()/(54-73) 111/55    Physical Exam:  Up in chair.  Alert conversant.  Sternum is stable     Results:     Results from last 7 days  Lab Units 10/14/18  0321   WBC 10*3/mm3 7.34   HEMOGLOBIN g/dL 9.1*   HEMATOCRIT % 28.1*   PLATELETS 10*3/mm3 158       Results from last 7 days  Lab Units 10/14/18  0321   SODIUM mmol/L 132   POTASSIUM mmol/L 4.0   CHLORIDE mmol/L 104   CO2 mmol/L 21.0   BUN mg/dL 44*   CREATININE mg/dL 1.56*   GLUCOSE mg/dL 141*   CALCIUM mg/dL 7.8*           Imaging Results (last 24 hours)     Procedure Component Value Units Date/Time    XR Chest 1 View  [[id number]] Collected:  10/13/18 0843     Updated:  10/13/18 1147    Narrative:          EXAMINATION: XR CHEST 1 VW - 10/13/2018     INDICATION:  Z74.09-Other reduced mobility; I25.118-Atherosclerotic  heart disease of native coronary artery with other forms of angina  pectoris.      COMPARISON: 10/12/2018.     FINDINGS: Portable chest reveals heart to be enlarged. The patient is  status post median sternotomy. Yaphank-Tian catheter has been removed.  Chest tube remains on the left with no definite pneumothorax.  Degenerative change is seen within the spine. Pulmonary vascularity is  within normal limits.       Impression:       Removal of [place]-Tian catheter with improvement seen in  aeration of the left lung base. No new focal parenchymal  opacification present.     DICTATED:   10/13/2018  EDITED/ls :   10/13/2018      This report was finalized on 10/13/2018 11:45 AM by Dr. Natty Owen MD.             Assessment      Coronary artery disease (S/P CABG x 4)    Insulin dependent type 2 diabetes mellitus (CMS/HCC)    Labile hypertension    Dyslipidemia    Hypertensive cardiovascular disease    Moderate obesity    Valenzuela catheter was placed overnight secondary to inability to void    Plan   Discontinue chest tubes.  Transfer to telemetry    Please note that portions of this note were completed with a voice recognition program. Efforts were made to edit the dictations, but occasionally words are mistranscribed.    Brice Oakley MD  10/14/18  7:16 AM        Electronically signed by Brice Oakley MD at 10/14/2018  7:16 AM     Isreal Beaver MD at 10/13/2018  9:40 AM          CARDIOLOGY PROGRESS NOTE           10/13/2018 9:40 AM    Admit Date: 10/11/2018    Admit Diagnosis: Coronary artery disease involving native coronary artery of native heart with angina pectoris (CMS/HCC) [I25.119]  Atherosclerosis of native coronary artery of native heart with stable angina pectoris (CMS/HCC) [I25.118]    Chief  Compliant: Follow up for CAD, Bradycardia, CHF    Subjective:   Patient's status has been stable. Improved Vital Signs      Objective:     Vitals:    10/13/18 0500 10/13/18 0600 10/13/18 0620 10/13/18 0640   BP: 139/70 147/68 135/66 132/64   Pulse: 92 98 97 96   Resp:  20     Temp:       TempSrc:       SpO2: 94% 95% 93% 94%   Weight:       Height:           Physical Exam:  General-Well Nourished, Well developed  Eyes - PERRLA  Neck- supple, No mass  CV- regular rate and rhythm, no MRG  Lung- clear bilaterally  Abd- soft, +BS  Musc/skel - Norm strength and range of motion  Skin- warm and dry  Neuro - Alert & Oriented x 3, appropriate mood.      Current Facility-Administered Medications:   •  acetaminophen (TYLENOL) tablet 650 mg, 650 mg, Oral, Q4H PRN, Migue Burton MD  •  atorvastatin (LIPITOR) tablet 40 mg, 40 mg, Oral, Nightly, Adonis Capps PA, 40 mg at 10/12/18 2007  •  bisacodyl (DULCOLAX) EC tablet 10 mg, 10 mg, Oral, Daily PRN, Adonis Capps PA  •  bisacodyl (DULCOLAX) suppository 10 mg, 10 mg, Rectal, Daily PRN, Adonis Capps PA  •  calcium carbonate (TUMS) chewable tablet 500 mg (200 mg elemental), 2 tablet, Oral, TID PRN, Bhumi Knight, APRN, 2 tablet at 10/13/18 0218  •  carvedilol (COREG) tablet 25 mg, 25 mg, Oral, BID, Branden Maravilla MD  •  dextrose (D50W) 25 g/ 50mL Intravenous Solution 25 g, 25 g, Intravenous, Q15 Min PRN, Branden Maravilla MD  •  dextrose (GLUTOSE) oral gel 15 g, 15 g, Oral, Q15 Min PRN, Branden Maravilla MD  •  docusate sodium (COLACE) capsule 100 mg, 100 mg, Oral, BID PRN, Adonis Capps PA  •  glucagon (human recombinant) (GLUCAGEN DIAGNOSTIC) injection 1 mg, 1 mg, Subcutaneous, PRN, Branden Maravilla MD  •  HYDROcodone-acetaminophen (NORCO) 7.5-325 MG per tablet 1 tablet, 1 tablet, Oral, Q4H PRN, Migue Burton MD, 1 tablet at 10/12/18 2007  •  insulin detemir (LEVEMIR) injection 11 Units, 11 Units, Subcutaneous, BID,  Brnaden Maravilla MD, 11 Units at 10/13/18 0857  •  insulin lispro (humaLOG) injection 0-9 Units, 0-9 Units, Subcutaneous, 4x Daily With Meals & Nightly, Branden Maravilla MD, 2 Units at 10/13/18 0909  •  insulin lispro (humaLOG) injection 7 Units, 7 Units, Subcutaneous, TID With Meals, Branden Maravilla MD, 7 Units at 10/13/18 0857  •  losartan (COZAAR) tablet 100 mg, 100 mg, Oral, Nightly, Branden Maravilla MD  •  magnesium hydroxide (MILK OF MAGNESIA) suspension 2400 mg/10mL 10 mL, 10 mL, Oral, Daily PRN, Adonis Capps PA  •  metoprolol tartrate (LOPRESSOR) half tablet 12.5 mg, 12.5 mg, Oral, Q12H, Adonis Capps PA, 12.5 mg at 10/13/18 0856  •  Morphine sulfate (PF) injection 2 mg, 2 mg, Intravenous, Q2H PRN, Migue Burton MD, 2 mg at 10/12/18 2048  •  niCARdipine (CARDENE-IV) 40 mg/200 mL (0.2 mg/mL) in 0.9% NaCl infusion, 5-15 mg/hr, Intravenous, Continuous PRN, Adonis Capps PA  •  nitroglycerin 50 mg/250 mL (0.2 mg/mL) infusion, 5-200 mcg/min, Intravenous, Continuous PRN, Adonis Capps PA, Last Rate: 1.5 mL/hr at 10/13/18 0606, 5 mcg/min at 10/13/18 0606  •  norepinephrine (LEVOPHED) 8 mg/250 mL (32 mcg/mL) in sodium chloride 0.9% infusion (premix), 0.02-0.3 mcg/kg/min, Intravenous, Continuous PRN, Adonis Capps PA, Stopped at 10/11/18 2200  •  ondansetron (ZOFRAN) injection 4 mg, 4 mg, Intravenous, Q6H PRN, Adonis Capps PA  •  oxyCODONE-acetaminophen (PERCOCET) 5-325 MG per tablet 2 tablet, 2 tablet, Oral, Q4H PRN, Migue Burton MD, 2 tablet at 10/13/18 0428  •  Pharmacy Consult - [facility], , Does not apply, Daily, David Whaley, [facility]  •  phenol (CHLORASEPTIC) 1.4 % liquid 2 spray, 2 spray, Mouth/Throat, Q2H PRN, Migue Burton MD, 2 spray at 10/12/18 2047  •  phenylephrine (JORDON-SYNEPHRINE) 50 mg/250 mL (0.2 mg/mL) in 0.9% NS  infusion, 0.5-3 mcg/kg/min, Intravenous, Continuous PRN, Adonis Capps PA  •  potassium chloride  (MICRO-K) CR capsule 40 mEq, 40 mEq, Oral, PRN **OR** potassium chloride (KLOR-CON) packet 40 mEq, 40 mEq, Oral, PRN, Adonis Capps PA  •  sennosides-docusate sodium (SENOKOT-S) 8.6-50 MG tablet 2 tablet, 2 tablet, Oral, BID, Adonis Capps PA, 2 tablet at 10/13/18 0856    Facility-Administered Medications Ordered in Other Encounters:   •  Chlorhexidine Gluconate Cloth 2 % pads 1 application, 1 application, Topical, Q12H PRN, Adonis Capps PA    Data Review:   Recent Results (from the past 24 hour(s))   POC Glucose Once    Collection Time: 10/12/18 10:15 AM   Result Value Ref Range    Glucose 127 70 - 130 mg/dL   POC Glucose Once    Collection Time: 10/12/18 10:52 AM   Result Value Ref Range    Glucose 118 70 - 130 mg/dL   POC Glucose Once    Collection Time: 10/12/18 12:03 PM   Result Value Ref Range    Glucose 119 70 - 130 mg/dL   POC Glucose Once    Collection Time: 10/12/18  1:00 PM   Result Value Ref Range    Glucose 121 70 - 130 mg/dL   POC Glucose Once    Collection Time: 10/12/18  2:02 PM   Result Value Ref Range    Glucose 133 (H) 70 - 130 mg/dL   POC Glucose Once    Collection Time: 10/12/18  3:11 PM   Result Value Ref Range    Glucose 139 (H) 70 - 130 mg/dL   POC Glucose Once    Collection Time: 10/12/18  4:05 PM   Result Value Ref Range    Glucose 140 (H) 70 - 130 mg/dL   POC Glucose Once    Collection Time: 10/12/18  8:10 PM   Result Value Ref Range    Glucose 149 (H) 70 - 130 mg/dL   CBC (No Diff)    Collection Time: 10/13/18  3:21 AM   Result Value Ref Range    WBC 9.84 3.50 - 10.80 10*3/mm3    RBC 3.49 (L) 4.20 - 5.76 10*6/mm3    Hemoglobin 10.0 (L) 13.1 - 17.5 g/dL    Hematocrit 31.5 (L) 38.9 - 50.9 %    MCV 90.3 80.0 - 99.0 fL    MCH 28.7 27.0 - 31.0 pg    MCHC 31.7 (L) 32.0 - 36.0 g/dL    RDW 14.8 (H) 11.3 - 14.5 %    RDW-SD 48.7 37.0 - 54.0 fl    MPV 11.3 6.0 - 12.0 fL    Platelets 160 150 - 450 10*3/mm3   Basic Metabolic Panel    Collection Time: 10/13/18  3:21 AM   Result Value  Ref Range    Glucose 173 (H) 70 - 100 mg/dL    BUN 36 (H) 9 - 23 mg/dL    Creatinine 1.70 (H) 0.60 - 1.30 mg/dL    Sodium 134 132 - 146 mmol/L    Potassium 4.6 3.5 - 5.5 mmol/L    Chloride 106 99 - 109 mmol/L    CO2 19.0 (L) 20.0 - 31.0 mmol/L    Calcium 8.4 (L) 8.7 - 10.4 mg/dL    eGFR Non African Amer 40 (L) >60 mL/min/1.73    BUN/Creatinine Ratio 21.2 7.0 - 25.0    Anion Gap 9.0 3.0 - 11.0 mmol/L   POC Glucose Once    Collection Time: 10/13/18  7:35 AM   Result Value Ref Range    Glucose 187 (H) 70 - 130 mg/dL     Assessment:       Coronary artery disease (S/P CABG x 4)    Insulin dependent type 2 diabetes mellitus (CMS/HCC)    Labile hypertension    Dyslipidemia    Hypertensive cardiovascular disease    Moderate obesity    Plan:   1. CAD - s/p CABG x4 with EVH (LIMA to LAD, SVG to the diagonal , circumflex, and PDA), on ASA, statin, and betablocker at home.  2. Asystolic rhythm immediately post-op; paced at that time. Now sinus with baseline underlying 1st AVB and LBBB  3. Hypertension, currently requiring pressor support, on carvedilol and losartan at home.  4. Chronic Systolic heart failure - EF 40% when measured in May 2018 - Will need to recheck ECHO to determine current post op EF. On Coreg and Cozaar         Isreal Beaver M.D., F.A.C.C, F.H.R.S.  Cardiology/Electrophysiology    Electronically signed by Isreal Beaver MD at 10/13/2018  9:59 AM     Branden Maravilla MD at 10/13/2018  9:17 AM          Intensive Care Follow-up      LOS: 2 days     Mr. Kar Mora, 68 y.o. male is followed for: Glycemic, Electrolyte, Respiratory, and Medical management     Subjective - Interval History     Remains on a nitroglycerin drip for blood pressure control  No air leak from MTs  No bowel movement for about 5 days    The patient's relevant past medical, surgical and social history were reviewed and updated in Epic as appropriate.     Objective     Infusions:    niCARdipine 5-15 mg/hr    nitroglycerin  5-200 mcg/min Last Rate: 5 mcg/min (10/13/18 0606)   norepinephrine 0.02-0.3 mcg/kg/min Last Rate: Stopped (10/11/18 2200)   phenylephrine 0.5-3 mcg/kg/min      Medications:    atorvastatin 40 mg Oral Nightly   carvedilol 25 mg Oral BID   insulin detemir 11 Units Subcutaneous BID   insulin lispro 0-9 Units Subcutaneous 4x Daily With Meals & Nightly   insulin lispro 7 Units Subcutaneous TID With Meals   losartan 100 mg Oral Nightly   metoprolol tartrate 12.5 mg Oral Q12H   pharmacy consult - [facility]  Does not apply Daily   sennosides-docusate sodium 2 tablet Oral BID     Intake/Output       10/12/18 0700 - 10/13/18 0659    Intake (ml) 2160    Output (ml) 1355    Net (ml) 805        Vital Sign Min/Max for last 24 hours  Temp  Min: 98 °F (36.7 °C)  Max: 99.3 °F (37.4 °C)   BP  Min: 105/57  Max: 147/68   Pulse  Min: 83  Max: 98   Resp  Min: 20  Max: 22   SpO2  Min: 93 %  Max: 95 %   No Data Recorded        Physical Exam:   GENERAL: Awake, no distress   HEENT: Right IJ Cordis site okay, no adenopathy   LUNGS: Basilar crackles without wheezes   HEART: Regular rate and rhythm.  Sternal incision without drainage or dehiscence.  MTs without air leak   ABDOMEN: Soft, nontender but distended   EXTREMITIES: Palpable pulses.  No cyanosis or edema   NEURO/PSYCH: Awake and alert.  Follows commands.  No deficits      Results from last 7 days  Lab Units 10/13/18  0321 10/12/18  0323 10/12/18  0030  10/11/18  1448   WBC 10*3/mm3 9.84 8.44  --   --  4.89   HEMOGLOBIN g/dL 10.0* 10.0* 10.0*  < > 10.0*   PLATELETS 10*3/mm3 160 169  --   --  119*   < > = values in this interval not displayed.    Results from last 7 days  Lab Units 10/13/18  0321 10/12/18  0323 10/12/18  0030  10/11/18  1448 10/11/18  1305   SODIUM mmol/L 134 136  --   --  138 137   POTASSIUM mmol/L 4.6 4.6 4.8  < > 3.6 3.8   CO2 mmol/L 19.0* 20.0  --   --  23.0 23.0   BUN mg/dL 36* 36*  --   --  36* 35*   CREATININE mg/dL 1.70* 1.89*  --   --  1.60* 1.63*   MAGNESIUM mg/dL   --  2.6  --   --  2.6 2.6   PHOSPHORUS mg/dL  --  4.6  --   --  2.8 3.4   GLUCOSE mg/dL 173* 114*  --   --  125* 152*   < > = values in this interval not displayed.  Estimated Creatinine Clearance: 45.8 mL/min (A) (by C-G formula based on SCr of 1.7 mg/dL (H)).      Results from last 7 days  Lab Units 10/10/18  1324   HEMOGLOBIN A1C % 7.50*         Results from last 7 days  Lab Units 10/11/18  1448   PH, ARTERIAL pH units 7.341*   PCO2, ARTERIAL mm Hg 39.7   PO2 ART mm Hg 100.0     No results found for: LACTATE       Images: Chest x-ray reveals no consolidation or effusions    I reviewed the patient's results and images.     Impression      Active Hospital Problems    Diagnosis   • **Coronary artery disease (S/P CABG x 4)   • Hypertensive cardiovascular disease     2. Probable hypertensive cardiovascular disease:  a. Abnormal EKG with abnormal acceptable echocardiographic GXT, September 2004.   b. Acceptable echocardiographic GXT with preserved exercise capacity and mild LVH, March 2008.   c. Acceptable Quantitative SPECT Gated Cardiolite GXT with nominal myocardial perfusion to 88% of predicted exercise capacity and 90% predicted maximum heart rate with preserved LVEF (0.65) with continued medical therapy felt warranted, December 2012.  d. Residual class I symptoms with recent documented abnormal EKG (LBBB/first-degree AV block) with abnormal echocardiogram demonstrating mild left ventricular chamber enlargement with mild reduction in the LVEF (0.42) without PE or pulmonary hypertension.  e. Resident class I symptoms with persistent abnormal echocardiogram (LVEF 0.40), with mild concentric LVH and mild left atrial enlargement without pulmonary arterial hypertension or pericardial effusion, July 2015.       • Insulin dependent type 2 diabetes mellitus (CMS/HCC)   • Labile hypertension   • Moderate obesity   • Dyslipidemia         Plan        Restart home antihypertensive regimen and wean nitroglycerin drip  Bowel  regimen  Mobilize  MT management per CVT    Plan of care and goals reviewed with mulitdisciplinary team at daily rounds   I discussed the patient's findings and my recommendations with patient and nursing staff       CHRIS Maravilla MD  Pulmonary and Critical Care Medicine       Electronically signed by Branden Maravilla MD at 10/13/2018  9:18 AM     Brice Oakley MD at 10/13/2018  7:20 AM          CTS Progress Note       LOS: 2 days   Patient Care Team:  Bro Kan MD as PCP - General    Chief Complaint: Coronary artery disease involving native coronary artery of native heart with angina pectoris (CMS/HCC)    Vital Signs:  Temp:  [98 °F (36.7 °C)-99.3 °F (37.4 °C)] 98.5 °F (36.9 °C)  Heart Rate:  [83-98] 96  Resp:  [20-22] 20  BP: (105-147)/(57-87) 132/64  Arterial Line BP: (120)/(59) 120/59    Physical Exam:  Up in chair.  Alert conversant ambulatory in the ICU     Results:     Results from last 7 days  Lab Units 10/13/18  0321   WBC 10*3/mm3 9.84   HEMOGLOBIN g/dL 10.0*   HEMATOCRIT % 31.5*   PLATELETS 10*3/mm3 160       Results from last 7 days  Lab Units 10/13/18  0321   SODIUM mmol/L 134   POTASSIUM mmol/L 4.6   CHLORIDE mmol/L 106   CO2 mmol/L 19.0*   BUN mg/dL 36*   CREATININE mg/dL 1.70*   GLUCOSE mg/dL 173*   CALCIUM mg/dL 8.4*           Imaging Results (last 24 hours)     Procedure Component Value Units Date/Time    XR Chest 1 View [[id number]] Updated:  10/13/18 0601    XR Chest 1 View [[id number]] Collected:  10/12/18 0915     Updated:  10/12/18 2251    Narrative:       EXAMINATION: XR CHEST 1 VW- 10/12/2018     INDICATION: Post-Op Heart Surgery; I25.118-Atherosclerotic heart disease  of native coronary artery with other forms of angina pectoris      COMPARISON: 10/11/2018     FINDINGS: ET tube and NG tube have been removed. PA catheter tip lies in  the main pulmonary artery segment. The heart is enlarged. The  vasculature appears upper normal. There is mild remaining left  basilar  atelectasis.           Impression:       Post extubation chest radiograph with mild remaining left  basilar atelectasis. Cardiomegaly without evidence of overt congestive  failure.     D:  10/12/2018  E:  10/12/2018     This report was finalized on 10/12/2018 10:49 PM by DR. Joseph Dobson MD.             Assessment      Coronary artery disease (S/P CABG x 4)    Insulin dependent type 2 diabetes mellitus (CMS/HCC)    Labile hypertension    Dyslipidemia    Hypertensive cardiovascular disease    Moderate obesity    Creatinine is 1.7 today.  However patient is had 410 mL per chest tube in the last 24 hours.  Therefore we'll leave chest tubes until tomorrow.    Plan   As above    Please note that portions of this note were completed with a voice recognition program. Efforts were made to edit the dictations, but occasionally words are mistranscribed.    Brice Oakley MD  10/13/18  7:20 AM        Electronically signed by Brice Oakley MD at 10/13/2018  7:21 AM

## 2018-10-15 NOTE — PROGRESS NOTES
Continued Stay Note   [facility]     Patient Name: Kar Mora  MRN: [id number]  Today's Date: 10/15/2018    Admit Date: 10/11/2018          Discharge Plan     Row Name 10/15/18 1633       Plan    Plan Home with family    Patient/Family in Agreement with Plan yes    Plan Comments Spoke with patient at bedside.  Patient will likely discharge in 1-2 days.  Will need [employer].  Kayla ([employer] rep.) called.  She will see patient today and fit him for vest so that he will have at discharge.  Patient will need rolling walker for discharge.  Edita with [employer] called.  She will deliver equipment to patient's bedside today.  She has been faxed order for patient.  Patient will be discharging on Entresto and Plavix.  Will call to check for prior auth. and cost for patient.  Family will transport home at time of discharge.  Case management will continue to follow for discharge planning needs.      Final Discharge Disposition Code 01 - home or self-care              Discharge Codes    No documentation.           Delaney Gann RN

## 2018-10-15 NOTE — CONSULTS
[facility] Medicine Services  CONSULT NOTE      Patient Name: Kar Mora  : 1950  MRN: [id number]    Primary Care Physician: Bro Kan MD  Referring Provider: Migue Burton MD    Subjective   Subjective     Reason for Consultation:  Dm management     HPI:  Kar Mora is a 68 y.o. male who has been referred by Dr. Qureshi for evaluation.  He has been having chest pain for several months, particularly with exertion.  He underwent catheterization with Dr. Moctezuma and has been found to have severe two vessel disease of his right coronary artery and LAD.  He has followed up with Dr. Qureshi. I had a long discussion with him.  The patient now appears to be agreeable to have coronary bypass surgery.  His ejection fraction is about 37-40%.  He continues to practice law. Patient had a CABG x 4 on 10/11/18. He was transferred to the floor on 10/14/18 and we were asked to see for DM.     Patient is resting in bed in NAD with wife at bedside. Has a non prod cough tussinex was ordered. He denies any nasal congestion. Not sleeping well. Appetite fair. Bowels moving. No acute events overnight per nursing.       Review of Systems   Constitutional: Negative for fever.   HENT: Negative for postnasal drip, rhinorrhea, sinus pain and trouble swallowing.    Respiratory: Positive for cough. Negative for shortness of breath.    Cardiovascular: Negative for chest pain.   Gastrointestinal: Negative for abdominal pain, diarrhea, nausea and vomiting.         Otherwise 10-system ROS reviewed and is negative except as mentioned in the HPI.      Past Medical History:   Diagnosis Date   • Allergic rhinitis    • Anesthesia     pt says he has woken up during surgery   • Chest pain    • Coronary artery disease    • Dyslipidemia    • Erectile dysfunction    • GERD (gastroesophageal reflux disease)    • Gout    • Hyperlipidemia    • Insulin dependent type 2 diabetes mellitus (CMS/[facility])      checks fsbg every am, a1c checked every 6 months, 7.2 in may 2018    • Kidney stones    • Labile hypertension 10/24/2016   • Myocardial infarct (CMS/HCC)    • Wears glasses    • Wears glasses        Past Surgical History:   Procedure Laterality Date   • CARDIAC CATHETERIZATION N/A 7/20/2018    Procedure: Left Heart Cath;  Surgeon: Madi Moctezuma MD;  Location:  [facility];  Service: Cardiovascular   • COLONOSCOPY  2016   • CORONARY ARTERY BYPASS GRAFT N/A 10/11/2018    Procedure: MEDIAN STERNOTMY<CORONARY ARTERY BYPASS WITH INTERNAL MAMMARY ARTERY GRAFT x  4 with EVH of the right greater saphenous vein;  Surgeon: Migue Burton MD;  Location:  [facility] OR;  Service: Cardiothoracic   • KIDNEY STONE SURGERY     • NASAL POLYP SURGERY  1986       Family History: family history includes Arthritis in his father and mother; Diabetes in his mother and sister; Heart attack in his father; Heart disease in his father and mother.     Social History:  reports that he quit smoking about 37 years ago. His smoking use included Cigarettes. He has a 20.00 pack-year smoking history. He has never used smokeless tobacco. He reports that he does not drink alcohol or use drugs.    Medications:  Prescriptions Prior to Admission   Medication Sig Dispense Refill Last Dose   • atorvastatin (LIPITOR) 20 MG tablet Take 20 mg by mouth Every Night.  1 10/10/2018 at 2300   • carvedilol (COREG) 25 MG tablet take 1 tablet by mouth twice a day 180 tablet 3 10/10/2018 at 2100   • Empagliflozin (JARDIANCE) 25 MG tablet Take 25 mg by mouth Daily.   10/10/2018 at 0730   • fenofibrate 160 MG tablet Take 160 mg by mouth Daily.   10/10/2018 at 0730   • glipiZIDE (GLUCOTROL) 10 MG 24 hr tablet Take 10 mg by mouth Every Night.  0 10/10/2018 at 2100   • JANUVIA 100 MG tablet Take 100 mg by mouth Daily.  1 10/10/2018 at 0730   • losartan (COZAAR) 100 MG tablet Take 100 mg by mouth Every Night.  1 10/10/2018 at 2100   • metFORMIN  (GLUCOPHAGE) 500 MG tablet Take 1,000 mg by mouth 2 (Two) Times a Day.  1 10/10/2018 at 2100   • Multiple Vitamins-Minerals (MULTIVITAMIN ADULT PO) Take 1 tablet by mouth Daily.   10/10/2018 at 0730   • nitroglycerin (NITROSTAT) 0.4 MG SL tablet 1 under the tongue as needed for angina, may repeat q5mins for up three doses (Patient taking differently: Place 0.4 mg under the tongue Every 5 (Five) Minutes As Needed. 1 under the tongue as needed for angina, may repeat q5mins for up three doses) 25 tablet 6 Past Month at Unknown time   • raNITIdine (ZANTAC) 150 MG tablet Take 150 mg by mouth 2 (Two) Times a Day.  0 10/10/2018 at 2100   • TOUJEO SOLOSTAR 300 UNIT/ML solution pen-injector 70 Units Every Night.  1 10/10/2018 at 2100   • Triamcinolone Acetonide (NASACORT ALLERGY 24HR) 55 MCG/ACT nasal inhaler 2 sprays into each nostril Daily As Needed for Rhinitis.   Past Week at Unknown time   • CIALIS 5 MG tablet Take 5 mg by mouth As Needed for erectile dysfunction.  0 More than a month at Unknown time       Allergies   Allergen Reactions   • Asa [Aspirin] Other (See Comments)      upper airway congestion       Objective   Objective     Vital Signs:   Temp:  [98.2 °F (36.8 °C)-99.2 °F (37.3 °C)] 98.2 °F (36.8 °C)  Heart Rate:  [80-89] 80  Resp:  [18] 18  BP: ()/(50-70) 103/50     Physical Exam  Constitutional: No acute distress, awake, alert  Eyes: PERRLA, sclerae anicteric, no conjunctival injection  HENT: NCAT, mucous membranes moist  Neck: Suppl, trachea midline  Respiratory: decreased in duran bases, nonlabored respirations, room air    Cardiovascular: RRR, no murmurs, rubs, or gallops, palpable pedal pulses bilaterally  Gastrointestinal: Positive bowel sounds, soft, nontender, nondistended  Musculoskeletal: trace duran ankle edema, no clubbing or cyanosis to extremities  Psychiatric: Appropriate affect, cooperative  Neurologic: Oriented x 3, strength symmetric in all extremities, Cranial Nerves grossly intact to  confrontation, speech clear  Skin: No rashes, MS IS with small dressing at bottom, MT with dressing    leong to BSD with clear yellow urine     Results Reviewed:  I have personally reviewed current lab, radiology, and data and agree.      Results from last 7 days  Lab Units 10/15/18  0500 10/14/18  0321  10/12/18  0323   WBC 10*3/mm3  --  7.34  < > 8.44   HEMOGLOBIN g/dL 9.9* 9.1*  < > 10.0*   HEMATOCRIT % 30.7* 28.1*  < > 31.4*   PLATELETS 10*3/mm3  --  158  < > 169   INR   --   --   --  1.02   < > = values in this interval not displayed.    Results from last 7 days  Lab Units 10/15/18  0500  10/10/18  1324   SODIUM mmol/L 133  < > 137   POTASSIUM mmol/L 4.4  < > 4.4   CHLORIDE mmol/L 101  < > 104   CO2 mmol/L 23.0  < > 26.0   BUN mg/dL 50*  < > 42*   CREATININE mg/dL 1.62*  < > 1.84*   GLUCOSE mg/dL 212*  < > 217*   CALCIUM mg/dL 9.1  < > 8.9   ALT (SGPT) U/L  --   --  29   AST (SGOT) U/L  --   --  33   < > = values in this interval not displayed.  Estimated Creatinine Clearance: 47.9 mL/min (A) (by C-G formula based on SCr of 1.62 mg/dL (H)).  Brief Urine Lab Results     None        No results found for: BNP    Microbiology Results Abnormal     None          Imaging Results (last 24 hours)     ** No results found for the last 24 hours. **        Results for orders placed during the hospital encounter of 10/11/18   Adult Transthoracic Echo Complete W/ Cont if Necessary Per Protocol    Narrative · Estimated EF appears to be in the range of 26 - 30%.  · The following left ventricular wall segments are hypokinetic: mid   anterolateral. The following left ventricular wall segments are   dyskinetic: apical anterior.  · Right ventricular cavity is borderline dilated.  · Left atrial cavity size is mildly dilated.  · Mild mitral valve regurgitation is present          Assessment/Plan   Assessment / Plan     Active Hospital Problems    Diagnosis   • **Coronary artery disease (S/P CABG x 4)   • LOPEZ (acute kidney injury)  (CMS/[facility])   • Insulin dependent type 2 diabetes mellitus (CMS/[facility])   • Labile hypertension   • Hypertensive cardiovascular disease     1. Probable hypertensive cardiovascular disease:  a. Abnormal EKG with abnormal acceptable echocardiographic GXT, September 2004.   b. Acceptable echocardiographic GXT with preserved exercise capacity and mild LVH, March 2008.   c. Acceptable Quantitative SPECT Gated Cardiolite GXT with nominal myocardial perfusion to 88% of predicted exercise capacity and 90% predicted maximum heart rate with preserved LVEF (0.65) with continued medical therapy felt warranted, December 2012.  d. Residual class I symptoms with recent documented abnormal EKG (LBBB/first-degree AV block) with abnormal echocardiogram demonstrating mild left ventricular chamber enlargement with mild reduction in the LVEF (0.42) without PE or pulmonary hypertension.  e. Resident class I symptoms with persistent abnormal echocardiogram (LVEF 0.40), with mild concentric LVH and mild left atrial enlargement without pulmonary arterial hypertension or pericardial effusion, July 2015.       • Moderate obesity   • Dyslipidemia         Plan:  DM  -- on metformin, Toujeo, glucotrol, and Jardiance at home  -- follows with Hiren montoya every 3-4 months  -- creat 1.68 and GFR 41 on  7/2018  -- A1C 7.5%  -- may need home meds adjusted (Jardiance or Metformin) based on GFR and with Entresto   -- -232 over last 24 hours , cont s/s and  Bolus.  increased basal to 16 units bid  -- check bmp in am   -- pt will need to follow up with Dr. Montoya at discharge within 1-2 weeks to see if further changes needs to be made.     CAD  -- CABG x 4  -- per CTS  -- ASA, statin, BB  -- plan home 1-2 days     CHF  -- life vest at discharge per cards rec's  --   entresto started 10/15/18  -- EF 26-30%  -- cards following     LOPEZ/CKD  -- nothing noted in history or per pt  -- creat 1.68 and GFR 41 in 7/2018  -- creat and GFR about the same  here    Urinary retention  -- bladder training today  -- has been started on flomax and proscar in ICU  -- plan voiding trail in am talked with nurse     Will add melatonin for sleep       Thank you for allowing [facility] to provide consultative care for your patient, we will continue to follow while clinically appropriate.    Electronically signed by EDGAR Joaquin, 10/15/18, 10:50 AM.

## 2018-10-15 NOTE — PROGRESS NOTES
Kar Mora  [id number]  1950   LOS: 4 days   Patient Care Team:  Bro Kan MD as PCP - General    Chief Complaint:  Follow-up on CAD, bradycardia, CHF    Subjective      Patient denies chest pressure, increased SOB, palpitations, or presyncope. He is using his IS. Says his throat is hurting and he is trying to cough up sputum.     Objective     Vital Sign Min/Max for last 24 hours  Temp  Min: 97.8 °F (36.6 °C)  Max: 99.2 °F (37.3 °C)   BP  Min: 94/54  Max: 123/65   Pulse  Min: 81  Max: 88   Resp  Min: 18  Max: 18   SpO2  Min: 91 %  Max: 95 %      Weight  Min: 95.7 kg (211 lb)  Max: 96 kg (211 lb 9.6 oz)     1    10/14/18  1022 10/15/18  0500   Weight: 96 kg (211 lb 9.6 oz) 95.7 kg (211 lb)         Intake/Output Summary (Last 24 hours) at 10/15/18 0804  Last data filed at 10/15/18 0403   Gross per 24 hour   Intake              120 ml   Output             1650 ml   Net            -1530 ml       Physical Exam:     General Appearance:    Alert, cooperative, in no acute distress   Lungs:     Clear to auscultation,respirations regular, even and                   unlabored    Heart:    Regular and normal rate, normal S1 and S2, grade 1/6            murmur, no gallop, no rub, no click   Abdomen:  Extremities:   Soft, non-tender, bowel sounds audible x4    No edema, normal range of motion   Pulses:   Pulses palpable and equal bilaterally    Sternal incision CDI  Results Review:     Results from last 7 days  Lab Units 10/15/18  0500 10/14/18  0321 10/13/18  0321   SODIUM mmol/L 133 132 134   POTASSIUM mmol/L 4.4 4.0 4.6   CHLORIDE mmol/L 101 104 106   CO2 mmol/L 23.0 21.0 19.0*   BUN mg/dL 50* 44* 36*   CREATININE mg/dL 1.62* 1.56* 1.70*   GLUCOSE mg/dL 212* 141* 173*   CALCIUM mg/dL 9.1 7.8* 8.4*       Results from last 7 days  Lab Units 10/15/18  0500 10/14/18  0321 10/13/18  0321 10/12/18  0323   WBC 10*3/mm3  --  7.34 9.84 8.44   HEMOGLOBIN g/dL 9.9* 9.1* 10.0* 10.0*   HEMATOCRIT % 30.7* 28.1*  31.5* 31.4*   PLATELETS 10*3/mm3  --  158 160 169       Results from last 7 days  Lab Units 10/10/18  1324   HEMOGLOBIN A1C % 7.50*     · Echocardiogram 10/13/18:    · Estimated EF appears to be in the range of 26 - 30%.  · The following left ventricular wall segments are hypokinetic: mid anterolateral. The following left ventricular wall segments are dyskinetic: apical anterior.  · Right ventricular cavity is borderline dilated.  · Left atrial cavity size is mildly dilated.  · Mild mitral valve regurgitation is present    · No new chest x ray or ECG to review.    Medication Review: REVIEWED; NO DRIPS.    Assessment/Plan      S/p CABG x 4 vessels (LIMA to LAD, SVG to the diagonal , circumflex, and PDA), with plans for discharge in next 24-48 hours per CTS. Persistent anemia and marginal renal function. EF 40% when measured in May 2018, EF now 26-30%. Would switch cozaar to Entresto if renal function will allow. He will need a Lifevest at discharge.  Tentative discharge cardiac medications:    · Atorvastatin 40 mg daily  · Carvedilol 25 mg BID    · Entresto 24/26 BID  · Clopidogrel 75 mg daily  · Ferrous sulfate 325 mg BID times month  · [facility] cardiology follow-up 6 weeks with limited echocardiogram to assess LVEF  · Lifevest      Coronary artery disease (S/P CABG x 4)    Insulin dependent type 2 diabetes mellitus (CMS/[facility])    Labile hypertension    Dyslipidemia    Hypertensive cardiovascular disease    Moderate obesity    LOPEZ (acute kidney injury) (CMS/[facility])     Scribed for Todd Qureshi MD by EDGAR Carlton. 10/15/2018  8:15 AM      10/15/18  8:04 AM

## 2018-10-15 NOTE — PLAN OF CARE
Problem: Patient Care Overview  Goal: Plan of Care Review  Outcome: Ongoing (interventions implemented as appropriate)   10/15/18 3691   Coping/Psychosocial   Plan of Care Reviewed With patient;spouse   Plan of Care Review   Progress improving   OTHER   Outcome Summary Pt doing ok today, still with some pain, tussonex ordered for cough, watch incision it is oozing at bottom, restarted some home meds, watch BP has been running on low side of normal, pt c/o seeing spots and walks with a R drift. Started bladder training today orders to remove fc at 5am tomorrow. Continue to monitor, home possibly tomorrow.       Problem: Skin Injury Risk (Adult)  Goal: Skin Health and Integrity  Outcome: Ongoing (interventions implemented as appropriate)      Problem: Cardiac Surgery (Adult)  Goal: Signs and Symptoms of Listed Potential Problems Will be Absent, Minimized or Managed (Cardiac Surgery)  Outcome: Ongoing (interventions implemented as appropriate)

## 2018-10-16 ENCOUNTER — APPOINTMENT (OUTPATIENT)
Dept: CARDIOLOGY | Facility: HOSPITAL | Age: 68
End: 2018-10-16
Attending: INTERNAL MEDICINE

## 2018-10-16 ENCOUNTER — APPOINTMENT (OUTPATIENT)
Dept: GENERAL RADIOLOGY | Facility: HOSPITAL | Age: 68
End: 2018-10-16

## 2018-10-16 PROBLEM — I25.5 ISCHEMIC CARDIOMYOPATHY: Status: ACTIVE | Noted: 2018-10-16

## 2018-10-16 PROBLEM — Z95.1 S/P CABG X 4: Status: ACTIVE | Noted: 2018-10-16

## 2018-10-16 PROBLEM — R05.9 COUGH: Status: ACTIVE | Noted: 2018-10-16

## 2018-10-16 LAB
ANION GAP SERPL CALCULATED.3IONS-SCNC: 10 MMOL/L (ref 3–11)
BASOPHILS # BLD AUTO: 0.01 10*3/MM3 (ref 0–0.2)
BASOPHILS NFR BLD AUTO: 0.1 % (ref 0–1)
BUN BLD-MCNC: 50 MG/DL (ref 9–23)
BUN/CREAT SERPL: 35.7 (ref 7–25)
CALCIUM SPEC-SCNC: 8.4 MG/DL (ref 8.7–10.4)
CHLORIDE SERPL-SCNC: 104 MMOL/L (ref 99–109)
CO2 SERPL-SCNC: 21 MMOL/L (ref 20–31)
CREAT BLD-MCNC: 1.4 MG/DL (ref 0.6–1.3)
DEPRECATED RDW RBC AUTO: 46 FL (ref 37–54)
EOSINOPHIL # BLD AUTO: 0.16 10*3/MM3 (ref 0–0.3)
EOSINOPHIL NFR BLD AUTO: 2.4 % (ref 0–3)
ERYTHROCYTE [DISTWIDTH] IN BLOOD BY AUTOMATED COUNT: 14.4 % (ref 11.3–14.5)
FLUAV SUBTYP SPEC NAA+PROBE: NOT DETECTED
FLUBV RNA ISLT QL NAA+PROBE: NOT DETECTED
GFR SERPL CREATININE-BSD FRML MDRD: 51 ML/MIN/1.73
GLUCOSE BLD-MCNC: 199 MG/DL (ref 70–100)
GLUCOSE BLDC GLUCOMTR-MCNC: 238 MG/DL (ref 70–130)
GLUCOSE BLDC GLUCOMTR-MCNC: 312 MG/DL (ref 70–130)
GLUCOSE BLDC GLUCOMTR-MCNC: 318 MG/DL (ref 70–130)
GLUCOSE BLDC GLUCOMTR-MCNC: 345 MG/DL (ref 70–130)
HCT VFR BLD AUTO: 31 % (ref 38.9–50.9)
HCT VFR BLD AUTO: 31.2 % (ref 38.9–50.9)
HGB BLD-MCNC: 10 G/DL (ref 13.1–17.5)
HGB BLD-MCNC: 10.1 G/DL (ref 13.1–17.5)
IMM GRANULOCYTES # BLD: 0.04 10*3/MM3 (ref 0–0.03)
IMM GRANULOCYTES NFR BLD: 0.6 % (ref 0–0.6)
LYMPHOCYTES # BLD AUTO: 1.02 10*3/MM3 (ref 0.6–4.8)
LYMPHOCYTES NFR BLD AUTO: 15.2 % (ref 24–44)
MCH RBC QN AUTO: 28.2 PG (ref 27–31)
MCHC RBC AUTO-ENTMCNC: 32.4 G/DL (ref 32–36)
MCV RBC AUTO: 87.2 FL (ref 80–99)
MONOCYTES # BLD AUTO: 1.02 10*3/MM3 (ref 0–1)
MONOCYTES NFR BLD AUTO: 15.2 % (ref 0–12)
NEUTROPHILS # BLD AUTO: 4.5 10*3/MM3 (ref 1.5–8.3)
NEUTROPHILS NFR BLD AUTO: 67.1 % (ref 41–71)
PLATELET # BLD AUTO: 288 10*3/MM3 (ref 150–450)
PMV BLD AUTO: 10.5 FL (ref 6–12)
POTASSIUM BLD-SCNC: 4.5 MMOL/L (ref 3.5–5.5)
RBC # BLD AUTO: 3.58 10*6/MM3 (ref 4.2–5.76)
SODIUM BLD-SCNC: 135 MMOL/L (ref 132–146)
WBC NRBC COR # BLD: 6.71 10*3/MM3 (ref 3.5–10.8)

## 2018-10-16 PROCEDURE — 71046 X-RAY EXAM CHEST 2 VIEWS: CPT

## 2018-10-16 PROCEDURE — 63710000001 INSULIN DETEMIR PER 5 UNITS: Performed by: PHYSICIAN ASSISTANT

## 2018-10-16 PROCEDURE — 87633 RESP VIRUS 12-25 TARGETS: CPT | Performed by: PHYSICIAN ASSISTANT

## 2018-10-16 PROCEDURE — 93970 EXTREMITY STUDY: CPT | Performed by: INTERNAL MEDICINE

## 2018-10-16 PROCEDURE — 82962 GLUCOSE BLOOD TEST: CPT

## 2018-10-16 PROCEDURE — 99232 SBSQ HOSP IP/OBS MODERATE 35: CPT | Performed by: INTERNAL MEDICINE

## 2018-10-16 PROCEDURE — 99233 SBSQ HOSP IP/OBS HIGH 50: CPT | Performed by: INTERNAL MEDICINE

## 2018-10-16 PROCEDURE — 85018 HEMOGLOBIN: CPT | Performed by: PHYSICIAN ASSISTANT

## 2018-10-16 PROCEDURE — 25010000002 MORPHINE PER 10 MG: Performed by: HOSPITALIST

## 2018-10-16 PROCEDURE — 85025 COMPLETE CBC W/AUTO DIFF WBC: CPT | Performed by: PHYSICIAN ASSISTANT

## 2018-10-16 PROCEDURE — 93970 EXTREMITY STUDY: CPT

## 2018-10-16 PROCEDURE — 25010000003 METHYLPREDNISOLONE PER 125 MG: Performed by: THORACIC SURGERY (CARDIOTHORACIC VASCULAR SURGERY)

## 2018-10-16 PROCEDURE — 99233 SBSQ HOSP IP/OBS HIGH 50: CPT | Performed by: PHYSICIAN ASSISTANT

## 2018-10-16 PROCEDURE — 87502 INFLUENZA DNA AMP PROBE: CPT | Performed by: PHYSICIAN ASSISTANT

## 2018-10-16 PROCEDURE — 94640 AIRWAY INHALATION TREATMENT: CPT

## 2018-10-16 PROCEDURE — 80048 BASIC METABOLIC PNL TOTAL CA: CPT | Performed by: PHYSICIAN ASSISTANT

## 2018-10-16 PROCEDURE — 85014 HEMATOCRIT: CPT | Performed by: PHYSICIAN ASSISTANT

## 2018-10-16 PROCEDURE — 63710000001 INSULIN DETEMIR PER 5 UNITS: Performed by: NURSE PRACTITIONER

## 2018-10-16 RX ORDER — OXYCODONE AND ACETAMINOPHEN 10; 325 MG/1; MG/1
1 TABLET ORAL EVERY 8 HOURS
Status: DISCONTINUED | OUTPATIENT
Start: 2018-10-16 | End: 2018-10-16

## 2018-10-16 RX ORDER — FERROUS SULFATE 325(65) MG
325 TABLET ORAL 2 TIMES DAILY WITH MEALS
Qty: 60 TABLET | Refills: 0 | Status: CANCELLED | OUTPATIENT
Start: 2018-10-16 | End: 2018-11-15

## 2018-10-16 RX ORDER — GUAIFENESIN/DEXTROMETHORPHAN 100-10MG/5
5 SYRUP ORAL EVERY 4 HOURS PRN
Status: DISCONTINUED | OUTPATIENT
Start: 2018-10-16 | End: 2018-10-16

## 2018-10-16 RX ORDER — BENZONATATE 100 MG/1
200 CAPSULE ORAL EVERY 8 HOURS
Status: DISCONTINUED | OUTPATIENT
Start: 2018-10-16 | End: 2018-10-20

## 2018-10-16 RX ORDER — LIDOCAINE HYDROCHLORIDE 40 MG/ML
4 INJECTION, SOLUTION RETROBULBAR; TOPICAL
Status: DISCONTINUED | OUTPATIENT
Start: 2018-10-16 | End: 2018-10-21

## 2018-10-16 RX ORDER — ZOLPIDEM TARTRATE 5 MG/1
5 TABLET ORAL NIGHTLY PRN
Status: DISCONTINUED | OUTPATIENT
Start: 2018-10-16 | End: 2018-10-22 | Stop reason: HOSPADM

## 2018-10-16 RX ORDER — CLOPIDOGREL BISULFATE 75 MG/1
75 TABLET ORAL DAILY
Qty: 30 TABLET | Refills: 11 | Status: CANCELLED | OUTPATIENT
Start: 2018-10-16

## 2018-10-16 RX ORDER — BENZONATATE 100 MG/1
200 CAPSULE ORAL 3 TIMES DAILY PRN
Status: DISCONTINUED | OUTPATIENT
Start: 2018-10-16 | End: 2018-10-16

## 2018-10-16 RX ORDER — ATORVASTATIN CALCIUM 40 MG/1
40 TABLET, FILM COATED ORAL NIGHTLY
Qty: 30 TABLET | Refills: 11 | Status: CANCELLED | OUTPATIENT
Start: 2018-10-16

## 2018-10-16 RX ORDER — BENZONATATE 100 MG/1
100 CAPSULE ORAL 3 TIMES DAILY PRN
Status: DISCONTINUED | OUTPATIENT
Start: 2018-10-16 | End: 2018-10-16

## 2018-10-16 RX ORDER — GUAIFENESIN AND DEXTROMETHORPHAN HYDROBROMIDE 600; 30 MG/1; MG/1
1 TABLET, EXTENDED RELEASE ORAL 2 TIMES DAILY
Status: DISCONTINUED | OUTPATIENT
Start: 2018-10-16 | End: 2018-10-16

## 2018-10-16 RX ORDER — DEXTROMETHORPHAN POLISTIREX 30 MG/5ML
60 SUSPENSION ORAL EVERY 12 HOURS SCHEDULED
Status: DISCONTINUED | OUTPATIENT
Start: 2018-10-16 | End: 2018-10-20

## 2018-10-16 RX ORDER — PANTOPRAZOLE SODIUM 40 MG/1
40 TABLET, DELAYED RELEASE ORAL
Status: DISCONTINUED | OUTPATIENT
Start: 2018-10-16 | End: 2018-10-22 | Stop reason: HOSPADM

## 2018-10-16 RX ORDER — OXYCODONE AND ACETAMINOPHEN 7.5; 325 MG/1; MG/1
1 TABLET ORAL EVERY 8 HOURS
Status: COMPLETED | OUTPATIENT
Start: 2018-10-16 | End: 2018-10-19

## 2018-10-16 RX ORDER — MORPHINE SULFATE 4 MG/ML
4 INJECTION, SOLUTION INTRAMUSCULAR; INTRAVENOUS ONCE
Status: COMPLETED | OUTPATIENT
Start: 2018-10-16 | End: 2018-10-16

## 2018-10-16 RX ORDER — OXYCODONE AND ACETAMINOPHEN 10; 325 MG/1; MG/1
1 TABLET ORAL EVERY 4 HOURS PRN
Status: DISCONTINUED | OUTPATIENT
Start: 2018-10-16 | End: 2018-10-22 | Stop reason: HOSPADM

## 2018-10-16 RX ADMIN — INSULIN LISPRO 7 UNITS: 100 INJECTION, SOLUTION INTRAVENOUS; SUBCUTANEOUS at 11:59

## 2018-10-16 RX ADMIN — SENNOSIDES AND DOCUSATE SODIUM 2 TABLET: 8.6; 5 TABLET ORAL at 09:09

## 2018-10-16 RX ADMIN — INSULIN DETEMIR 20 UNITS: 100 INJECTION, SOLUTION SUBCUTANEOUS at 20:24

## 2018-10-16 RX ADMIN — MORPHINE SULFATE 4 MG: 4 INJECTION INTRAVENOUS at 10:27

## 2018-10-16 RX ADMIN — Medication 325 MG: at 17:51

## 2018-10-16 RX ADMIN — SACUBITRIL AND VALSARTAN 1 TABLET: 24; 26 TABLET, FILM COATED ORAL at 20:20

## 2018-10-16 RX ADMIN — ALPRAZOLAM 0.25 MG: 0.25 TABLET ORAL at 07:23

## 2018-10-16 RX ADMIN — CLOPIDOGREL BISULFATE 75 MG: 75 TABLET ORAL at 09:09

## 2018-10-16 RX ADMIN — Medication 3 ML: at 20:19

## 2018-10-16 RX ADMIN — HYDROCODONE POLISTIREX AND CHLORPHENIRAMINE POLISTIREX 5 ML: 10; 8 SUSPENSION, EXTENDED RELEASE ORAL at 09:09

## 2018-10-16 RX ADMIN — INSULIN LISPRO 7 UNITS: 100 INJECTION, SOLUTION INTRAVENOUS; SUBCUTANEOUS at 09:10

## 2018-10-16 RX ADMIN — BENZONATATE 200 MG: 100 CAPSULE ORAL at 17:51

## 2018-10-16 RX ADMIN — OXYCODONE HYDROCHLORIDE AND ACETAMINOPHEN 2 TABLET: 5; 325 TABLET ORAL at 07:22

## 2018-10-16 RX ADMIN — OXYCODONE HYDROCHLORIDE AND ACETAMINOPHEN 1 TABLET: 10; 325 TABLET ORAL at 20:41

## 2018-10-16 RX ADMIN — OXYCODONE HYDROCHLORIDE AND ACETAMINOPHEN 1 TABLET: 7.5; 325 TABLET ORAL at 23:02

## 2018-10-16 RX ADMIN — INSULIN DETEMIR 16 UNITS: 100 INJECTION, SOLUTION SUBCUTANEOUS at 09:25

## 2018-10-16 RX ADMIN — CARVEDILOL 25 MG: 12.5 TABLET, FILM COATED ORAL at 09:09

## 2018-10-16 RX ADMIN — DEXTROMETHORPHAN 60 MG: 30 SUSPENSION, EXTENDED RELEASE ORAL at 20:19

## 2018-10-16 RX ADMIN — SACUBITRIL AND VALSARTAN 1 TABLET: 24; 26 TABLET, FILM COATED ORAL at 09:09

## 2018-10-16 RX ADMIN — Medication 325 MG: at 09:09

## 2018-10-16 RX ADMIN — ATORVASTATIN CALCIUM 40 MG: 40 TABLET, FILM COATED ORAL at 20:10

## 2018-10-16 RX ADMIN — PANTOPRAZOLE SODIUM 40 MG: 40 TABLET, DELAYED RELEASE ORAL at 15:14

## 2018-10-16 RX ADMIN — INSULIN LISPRO 7 UNITS: 100 INJECTION, SOLUTION INTRAVENOUS; SUBCUTANEOUS at 20:17

## 2018-10-16 RX ADMIN — OXYCODONE HYDROCHLORIDE AND ACETAMINOPHEN 1 TABLET: 10; 325 TABLET ORAL at 11:40

## 2018-10-16 RX ADMIN — BENZONATATE 200 MG: 100 CAPSULE ORAL at 09:09

## 2018-10-16 RX ADMIN — OXYCODONE HYDROCHLORIDE AND ACETAMINOPHEN 2 TABLET: 5; 325 TABLET ORAL at 00:38

## 2018-10-16 RX ADMIN — Medication 10 ML: at 20:19

## 2018-10-16 RX ADMIN — OXYCODONE HYDROCHLORIDE AND ACETAMINOPHEN 1 TABLET: 7.5; 325 TABLET ORAL at 15:14

## 2018-10-16 RX ADMIN — INSULIN LISPRO 4 UNITS: 100 INJECTION, SOLUTION INTRAVENOUS; SUBCUTANEOUS at 17:52

## 2018-10-16 RX ADMIN — TAMSULOSIN HYDROCHLORIDE 0.4 MG: 0.4 CAPSULE ORAL at 09:09

## 2018-10-16 RX ADMIN — FINASTERIDE 5 MG: 5 TABLET, FILM COATED ORAL at 09:24

## 2018-10-16 RX ADMIN — CARVEDILOL 25 MG: 12.5 TABLET, FILM COATED ORAL at 20:20

## 2018-10-16 RX ADMIN — SODIUM CHLORIDE 1 G: 9 INJECTION, SOLUTION INTRAVENOUS at 09:10

## 2018-10-16 RX ADMIN — LIDOCAINE HYDROCHLORIDE 4 ML: 40 INJECTION, SOLUTION RETROBULBAR; TOPICAL at 16:05

## 2018-10-16 RX ADMIN — Medication 3 ML: at 09:11

## 2018-10-16 RX ADMIN — GUAIFENESIN AND DEXTROMETHORPHAN HYDROBROMIDE 1 TABLET: 600; 30 TABLET, EXTENDED RELEASE ORAL at 09:08

## 2018-10-16 NOTE — PROGRESS NOTES
Continued Stay Note   [place]     Patient Name: Kar Mora  MRN: [id number]  Today's Date: 10/16/2018    Admit Date: 10/11/2018          Discharge Plan     Row Name 10/16/18 1406       Plan    Plan Home with family    Patient/Family in Agreement with Plan yes    Plan Comments Patient has received walker from [employer] and has at bedside.  Patient has insurance approval for Lifevest.  They will wait to fit patient for vest until chest incision looks is draining less.  Kayla will call case management to follow up with patient.  Unclear if patient will be discharged on Entresto at this time due to new develpment of cough.  If this is discharge med, patient may need prior auth for medication.  Case management will continue to follow for discharge planning needs.      Final Discharge Disposition Code 01 - home or self-care              Discharge Codes    No documentation.           Delaney Gann RN

## 2018-10-16 NOTE — PROGRESS NOTES
Intensivist Note     10/16/2018  Hospital Day: 5  5 Days Post-Op      Mr. Kar Mora, 68 y.o. male is followed for:    Multivessel CAD    S/P CABG x 4    Ischemic cardiomyopathy with LVEF 26-30% on echocardiogram 10/13/18    Insulin dependent type 2 diabetes mellitus (CMS/[facility])    Hypertensive cardiovascular disease    LOPEZ (acute kidney injury) (CMS/[facility])    Hyperlipidemia    Moderate obesity    Allergic rhinitis. Desensitization injections discontinued July 2018    Cough       SUBJECTIVE     68-year-old white male remote smoker (none for 37 years) with a history of hypertension and hypertensive cardiovascular disease, hyperlipidemia, diabetes mellitus, obesity, chronic kidney disease, allergic rhinitis (previously on desensitization injections), and gout. Has been followed for quite some time for what has been felt to be hypertensive cardiovascular disease but when seen 5/2018 mentioned a history of chest discomfort felt possibly due to angina. Echocardiogram at that time revealed LVEF of 40%. Patient subsequently underwent elective cardiac catheterization 7/5/18 revealing multivessel CAD.     Because of the above patient underwent elective CABG ×4 without complications other than a transient slight increase in creatinine from 1.63 preoperatively to 1.89. That however has since recovered to today's value of 1.4. Chest tubes and pacer wires were removed 10/15/18. Over the last 24-36 hours however the patient has developed a persistent intense nonproductive cough. This has caused him a great deal of discomfort due to chest pain associated with the cough. There has been no hemoptysis. He denies dyspnea and is well saturated on room air. He has not noted wheezing. He denies postnasal drip or significant reflux although he carries both of these diagnoses (allergies and GERD). He has not noted any pleuritic chest pain or lower extremity edema. There has been no fever or chills and white count has remained  normal.    Past Medical History:   Diagnosis Date   • Allergic rhinitis and previous desensitization injections     • Anesthesia     pt says he has woken up during surgery   • Chest pain/angina and previous MI     • Coronary artery disease and ICM (preoperative EF 40%)     • Hypertension     • Hyperlipidemia    • Diabetes mellitus. Hemoglobin A1c 7.5 (CMS/formerly [facility]) 2002   • Kidney stones     GERD     • Gout       Past Surgical History:   Procedure Laterality Date   • CARDIAC CATHETERIZATION N/A 7/20/2018    Procedure: Left Heart Cath;  Surgeon: Madi Moctezuma MD;  Location:  [facility];  Service: Cardiovascular   • COLONOSCOPY  2016   • CORONARY ARTERY BYPASS GRAFT N/A 10/11/2018    Procedure: MEDIAN STERNOTMY<CORONARY ARTERY BYPASS WITH INTERNAL MAMMARY ARTERY GRAFT x  4 with EVH of the right greater saphenous vein;  Surgeon: Migue Burton MD;  Location: [facility];  Service: Cardiothoracic   • KIDNEY STONE SURGERY     • NASAL POLYP SURGERY  1986       Current Outpatient Prescriptions on File Prior to Encounter   Medication Sig Dispense Refill   • atorvastatin (LIPITOR) 20 MG tablet Take 20 mg by mouth Every Night.  1   • carvedilol (COREG) 25 MG tablet take 1 tablet by mouth twice a day 180 tablet 3   • Empagliflozin (JARDIANCE) 25 MG tablet Take 25 mg by mouth Daily.     • fenofibrate 160 MG tablet Take 160 mg by mouth Daily.     • glipiZIDE (GLUCOTROL) 10 MG 24 hr tablet Take 10 mg by mouth Every Night.  0   • JANUVIA 100 MG tablet Take 100 mg by mouth Daily.  1   • losartan (COZAAR) 100 MG tablet Take 100 mg by mouth Every Night.  1   • metFORMIN (GLUCOPHAGE) 500 MG tablet Take 1,000 mg by mouth 2 (Two) Times a Day.  1   • nitroglycerin (NITROSTAT) 0.4 MG SL tablet 1 under the tongue as needed for angina, may repeat q5mins for up three doses (Patient taking differently: Place 0.4 mg under the tongue Every 5 (Five) Minutes As Needed. 1 under the tongue as needed for angina, may repeat q5mins for  "up three doses) 25 tablet 6   • raNITIdine (ZANTAC) 150 MG tablet Take 150 mg by mouth 2 (Two) Times a Day.  0   • TOUJEO SOLOSTAR 300 UNIT/ML solution pen-injector 70 Units Every Night.  1   • Triamcinolone Acetonide (NASACORT ALLERGY 24HR) 55 MCG/ACT nasal inhaler 2 sprays into each nostril Daily As Needed for Rhinitis.     • CIALIS 5 MG tablet Take 5 mg by mouth As Needed for erectile dysfunction.  0     Allergies   Allergen Reactions   • Asa [Aspirin] Other (See Comments)      upper airway congestion       The patient's relevant past medical, surgical and social history were reviewed and updated in Epic as appropriate.    OBJECTIVE     /56   Pulse 79   Temp 98.8 °F (37.1 °C) (Oral)   Resp 18   Ht 167.6 cm (66\")   Wt 94.6 kg (208 lb 9.6 oz)   SpO2 93%   BMI 33.67 kg/m²     Patient is presently on room air    Flowsheet Rows      First Filed Value   Admission Height  167.6 cm (66\") Documented at 10/11/2018 0604   Admission Weight  96.2 kg (212 lb) Documented at 10/11/2018 0604        Intake & Output (last day)       10/15 0701 - 10/16 0700 10/16 0701 - 10/17 0700    P.O.  480    Total Intake(mL/kg)  480 (5.1)    Urine (mL/kg/hr) 1900 (0.8)     Total Output 1900      Net -1900 +480                Exam:  General Exam:  Well-developed overweight white male sitting up in chair in NAD.  HEENT: Pupils equal and reactive. Nose and throat clear.  Neck:                          Supple, no JVD, thyromegaly, or adenopathy  Lungs: Clear to auscultation and percussion anteriorly and posteriorly.  Cardiovascular: RRR without murmurs or gallops. HR 80 bpm  Abdomen: Soft nontender without organomegaly or masses. Obese   and rectal: Deferred.  Extremities: No cyanosis or clubbing. No lower extremity edema but his right calf feels swollen. I note no Homans sign.  Neurologic:                 Symmetric strength but diffusely weak. No focal deficits. Slightly confused due to recent codeine and morphine doses given for " cough. Is cooperative and will answer most of my questions    Chest X-Ray 10/15/18: Cardiomegaly but no congestive changes or evidence of decompensation. Sternal wires in good position. Minimal increased markings in the right infrahilar region and a slight increase in markings in the left retrocardiac region (more prominent than 10/13/18)      Results from last 7 days  Lab Units 10/16/18  0517 10/15/18  0500 10/14/18  0321 10/13/18  0321   WBC 10*3/mm3 6.71  --  7.34 9.84   HEMOGLOBIN g/dL 10.1*  10.0* 9.9* 9.1* 10.0*   HEMATOCRIT % 31.2*  31.0* 30.7* 28.1* 31.5*   PLATELETS 10*3/mm3 288  --  158 160       Results from last 7 days  Lab Units 10/16/18  0517 10/15/18  0500   SODIUM mmol/L 135 133   POTASSIUM mmol/L 4.5 4.4   CHLORIDE mmol/L 104 101   CO2 mmol/L 21.0 23.0   BUN mg/dL 50* 50*   CREATININE mg/dL 1.40* 1.62*   GLUCOSE mg/dL 199* 212*   CALCIUM mg/dL 8.4* 9.1       Results from last 7 days  Lab Units 10/12/18  0323 10/11/18  1448 10/11/18  1305   MAGNESIUM mg/dL 2.6 2.6 2.6   PHOSPHORUS mg/dL 4.6 2.8 3.4       Results from last 7 days  Lab Units 10/10/18  1324   ALK PHOS U/L 35   BILIRUBIN mg/dL 0.4   ALT (SGPT) U/L 29   AST (SGOT) U/L 33       No results found for: SEDRATE  No results found for: BNP  No results found for: CKTOTAL, CKMB, CKMBINDEX, TROPONINI, TROPONINT  No results found for: TSH  No results found for: LACTATE  No results found for: CORTISOL      Results from last 7 days  Lab Units 10/11/18  1448 10/11/18  1409 10/11/18  1152 10/11/18  1008 10/11/18  0927   PH, ARTERIAL pH units 7.341* 7.324* 7.229* 7.29* 7.32*   PCO2, ARTERIAL mm Hg 39.7 41.2 52.0*  --   --    PO2 ART mm Hg 100.0 92.1 191.0*  --   --    HCO3 ART mmol/L 21.5 21.4 21.7  --   --    FIO2 % 40 40 100  --   --        I reviewed the patient's results, images and medication.    Assessment/Plan   ASSESSMENT        Multivessel CAD    S/P CABG x 4    Ischemic cardiomyopathy with LVEF 26-30% on echocardiogram 10/13/18    Insulin  dependent type 2 diabetes mellitus (CMS/[facility])    Hypertensive cardiovascular disease    LOPEZ (acute kidney injury) (CMS/[facility])    Hyperlipidemia    Moderate obesity    Allergic rhinitis. Desensitization injections discontinued July 2018    Cough      DISCUSSION: I can find nothing obvious to explain his cough. Although he does have some increased markings in the bases this is most likely some mild atelectasis from his postoperative state. (No evidence of an active infection in view of normal WBC and temperature curve). The only new medication he has received his Entresto and neither of the agents and it are known for causing cough (no ACE inhibitor). He is not volume overloaded and there is no evidence for occult PE (is oxygenating on room air). I think this is just going to resolve spontaneously. If persists, we will have to consider the possibility that an Entresto may be playing a role. I never seen this occur with Entresto and it does not have an ace inhibitor in it, but according to the drug insert, cough can be a complaint in 9% of patients.    PLAN     1. Continue Tessalon Perles and PRN narcotics for cough  2. Will use inhaled lidocaine to suppress the cough when more severe  3. Watch temperature curve, x-ray, and white count closely. If any evidence of infection will begin empiric antibiotics   4. Try to avoid further steroid use as will throw his sugars out of control  5. Rule out DVT and occult PE    I discussed the patient's findings and my recommendations with patient, family and nursing staff    Time spent Critical care 35 min (It does not include procedure time).    Branden Florence MD  Intensive Care Medicine  10/16/18 11:59 AM

## 2018-10-16 NOTE — PROGRESS NOTES
Kar Mora  [id number]  1950   LOS: 5 days   Patient Care Team:  Bro Kan MD as PCP - General  MontoyaHiren MD as Consulting Physician (Endocrinology)    Chief Complaint:  Cough, chest discomfort, CAD    Subjective      Patient has a nonproductive cough and states that he is miserable.  He is much worse than yesterday.  His wife states that he screams whenever he has to cough because it is so painful.  His incision is now draining as well. They did a chest x-ray which apparently was clear.  He denies any sputum production, increased shortness of breath, or edema.    Objective     Vital Sign Min/Max for last 24 hours  Temp  Min: 98.2 °F (36.8 °C)  Max: 98.8 °F (37.1 °C)   BP  Min: 93/48  Max: 140/77   Pulse  Min: 80  Max: 89   Resp  Min: 18  Max: 18   SpO2  Min: 92 %  Max: 93 %      Weight  Min: 94.6 kg (208 lb 9.6 oz)  Max: 94.6 kg (208 lb 9.6 oz)     1    10/15/18  0500 10/16/18  0422   Weight: 95.7 kg (211 lb) 94.6 kg (208 lb 9.6 oz)         Intake/Output Summary (Last 24 hours) at 10/16/18 0546  Last data filed at 10/15/18 1005   Gross per 24 hour   Intake                0 ml   Output              750 ml   Net             -750 ml       Physical Exam:     General Appearance:    Alert, cooperative, in no acute distress   Lungs:     Clear to auscultation,respirations regular, even and                   unlabored    Heart:    Regular and normal rate, normal S1 and S2, grade 1/6            murmur, no gallop, no rub, no click   Abdomen:  Extremities:   Soft, non-tender, bowel sounds audible x4    No edema, normal range of motion   Pulses:   Pulses palpable and equal bilaterally    Sternal incision with serosanguineous drainage  Results Review:     Results from last 7 days  Lab Units 10/15/18  0500 10/14/18  0321 10/13/18  0321   SODIUM mmol/L 133 132 134   POTASSIUM mmol/L 4.4 4.0 4.6   CHLORIDE mmol/L 101 104 106   CO2 mmol/L 23.0 21.0 19.0*   BUN mg/dL 50* 44* 36*   CREATININE  mg/dL 1.62* 1.56* 1.70*   GLUCOSE mg/dL 212* 141* 173*   CALCIUM mg/dL 9.1 7.8* 8.4*       Results from last 7 days  Lab Units 10/16/18  0517 10/15/18  0500 10/14/18  0321 10/13/18  0321 10/12/18  0323   WBC 10*3/mm3  --   --  7.34 9.84 8.44   HEMOGLOBIN g/dL 10.0* 9.9* 9.1* 10.0* 10.0*   HEMATOCRIT % 31.0* 30.7* 28.1* 31.5* 31.4*   PLATELETS 10*3/mm3  --   --  158 160 169       Results from last 7 days  Lab Units 10/10/18  1324   HEMOGLOBIN A1C % 7.50*     · ECG 10/15/18:  Normal sinus rhythm  Left bundle branch block  Abnormal ECG  When compared with ECG of 15-OCT-2018 15:38, (Unconfirmed)  No significant change was found  Confirmed by KELLY SHAVER MD ([id number]) on 10/15/2018 7:14:18 PM    · Chest x-ray 10/15/18: There is no pneumothorax following removal of a left chest  tube. There is minimal left basilar airspace disease    Medication Review: REVIEWED    Assessment/Plan      S/p CABG x 4 vessels (LIMA to LAD, SVG to the diagonal , circumflex, and PDA), with plans for discharge in next 24-48 hours per CTS. Persistent anemia and marginal renal function. EF 40% when measured in May 2018, EF now 26-30%.  Lifevest has been ordered for placement at discharge.   He will need close follow-up with a BMP for renal dysfunction with his primary care physician after discharge.  Patient is having a persistent nonproductive cough.  I will add Tessalon Perles.  He may not be able to tolerate the Entresto, and need to be switched back to Cozaar if his cough does not improve with the Tessalon Perles.  He has had a persistent cough for the past 2 days but Entresto was only initiated yesterday.  Chest X-ray acceptable.  No new BMP to review yet this morning. Would recommend the following discharge cardiac medications:     · Atorvastatin 40 mg daily  · Carvedilol 25 mg BID                          · Entresto 24/26 BID  · Clopidogrel 75 mg daily  · Ferrous sulfate 325 mg BID times month  · [facility] cardiology follow-up 6 weeks with limited  echocardiogram to assess LVEF  · Lifevest      Coronary artery disease (S/P CABG x 4)    Insulin dependent type 2 diabetes mellitus (CMS/[facility])    Labile hypertension    Dyslipidemia    Hypertensive cardiovascular disease    Moderate obesity    LOPEZ (acute kidney injury) (CMS/[facility])    Discussed with patient and wife in room.    Scribed for Todd Qureshi MD by EDGAR Carlton. 10/16/2018  5:46 AM    I, Todd Qureshi MD, [facility], personally performed the services described in this documentation as scribed by the above named individual in my presence, and it is both accurate and complete.     10/16/18  5:46 AM

## 2018-10-16 NOTE — PROGRESS NOTES
[facility]  PROGRESS NOTE    Patient Name: Kar Mora  : 1950  MRN: [id number]    Date of Admission: 10/11/2018  Length of Stay: 5  Primary Care Physician: Bro Kan MD    Subjective     CC: medical management    HPI:  Coughing persists. Severe, uncontrolled pain related to cough with drainage from mediastinal tubes. Wife is very upset/tearful and patient is quite uncomfortable. Cough is non-productive, no fevers/chills, labs WNL. CT surgery team giving solumedrol this morning.     Review of Systems  Gen- No fevers, chills  CV- (+) post-surgical chest pain, no palpitations  Resp- (+) cough, no dyspnea  GI- No N/V/D, abd pain    Otherwise ROS is negative except as mentioned in the HPI.    Objective     Vital Signs:   Temp:  [98.8 °F (37.1 °C)] 98.8 °F (37.1 °C)  Heart Rate:  [80-92] 88  Resp:  [18] 18  BP: ()/(48-77) 114/56        Physical Exam:  Constitutional: Drowsy, appears uncomfortable. Frequent coughing. Wife at bedside.   HENT: NCAT, mucous membranes moist  Respiratory: Mild wheezing bilaterally without rales or rhonchi. Normal respiratory effort. On room air.    Cardiovascular: RRR, no murmurs, rubs, or gallops, palpable pedal pulses bilaterally  Gastrointestinal: Positive bowel sounds, soft, nontender, nondistended  Musculoskeletal: No bilateral ankle edema  Psychiatric: Appropriate affect, cooperative  Neurologic: Oriented x 3, strength symmetric in all extremities, Cranial Nerves grossly intact to confrontation, speech clear  Skin: No rashes    Results Reviewed:  I have personally reviewed current lab, radiology, and data and agree.      Results from last 7 days  Lab Units 10/16/18  0517 10/15/18  0500 10/14/18  0321 10/13/18  0321 10/12/18  0323  10/11/18  1305  10/10/18  1324   WBC 10*3/mm3 6.71  --  7.34 9.84 8.44  < > 5.18  --  5.02   HEMOGLOBIN g/dL 10.1*  10.0* 9.9* 9.1* 10.0* 10.0*  < > 10.5*  --  12.2*   HEMOGLOBIN,  POC   --   --   --   --   --   --   --   < >  --    HEMATOCRIT % 31.2*  31.0* 30.7* 28.1* 31.5* 31.4*  < > 32.0*  --  37.3*   HEMATOCRIT POC   --   --   --   --   --   --   --   < >  --    PLATELETS 10*3/mm3 288  --  158 160 169  < > 114*  --  199   INR   --   --   --   --  1.02  --  1.07  --  0.99   < > = values in this interval not displayed.    Results from last 7 days  Lab Units 10/16/18  0517 10/15/18  0500 10/14/18  0321  10/10/18  1324   SODIUM mmol/L 135 133 132  < > 137   POTASSIUM mmol/L 4.5 4.4 4.0  < > 4.4   CHLORIDE mmol/L 104 101 104  < > 104   CO2 mmol/L 21.0 23.0 21.0  < > 26.0   BUN mg/dL 50* 50* 44*  < > 42*   CREATININE mg/dL 1.40* 1.62* 1.56*  < > 1.84*   GLUCOSE mg/dL 199* 212* 141*  < > 217*   CALCIUM mg/dL 8.4* 9.1 7.8*  < > 8.9   ALT (SGPT) U/L  --   --   --   --  29   AST (SGOT) U/L  --   --   --   --  33   < > = values in this interval not displayed.  Estimated Creatinine Clearance: 55.1 mL/min (A) (by C-G formula based on SCr of 1.4 mg/dL (H)).  No results found for: BNP    Microbiology Results Abnormal     None        Imaging Results (last 24 hours)     Procedure Component Value Units Date/Time    XR Chest 1 View [[id number]] Collected:  10/15/18 1600     Updated:  10/15/18 1619    Narrative:       EXAMINATION: XR CHEST 1 VW- 10/15/2018     INDICATION: Chest pain.; Z74.09-Other reduced mobility;  I25.118-Atherosclerotic heart disease of native coronary artery with  other forms of angina pectoris; I25.119-Atherosclerotic heart disease of  native coronary artery with unspecified angina pectoris; N17.9-Acute  kidney failure, unspecified; I25.9-Chronic ischemic heart disease,  unspecified      COMPARISON: 10/13/2018     FINDINGS: A left chest tube has been removed. There is no pneumothorax.  There is left basilar airspace disease, mild.           Impression:       There is no pneumothorax following removal of a left chest  tube. There is minimal left basilar airspace disease.     D:   10/15/2018  E:  10/15/2018     This report was finalized on 10/15/2018 4:16 PM by Dr. Adonis Hall MD.           Results for orders placed during the hospital encounter of 10/11/18   Adult Transthoracic Echo Complete W/ Cont if Necessary Per Protocol    Narrative · Estimated EF appears to be in the range of 26 - 30%.  · The following left ventricular wall segments are hypokinetic: mid   anterolateral. The following left ventricular wall segments are   dyskinetic: apical anterior.  · Right ventricular cavity is borderline dilated.  · Left atrial cavity size is mildly dilated.  · Mild mitral valve regurgitation is present          I have reviewed the medications.      atorvastatin 40 mg Oral Nightly   carvedilol 25 mg Oral BID   clopidogrel 75 mg Oral Daily   dextromethorphan polistirex ER 60 mg Oral Q12H   ferrous sulfate 325 mg Oral BID With Meals   finasteride 5 mg Oral Daily   guaifenesin-dextromethorphan 1 tablet Oral BID   insulin detemir 20 Units Subcutaneous BID   insulin lispro 0-9 Units Subcutaneous 4x Daily With Meals & Nightly   insulin lispro 9 Units Subcutaneous TID With Meals   Morphine 4 mg Intravenous Once   oxyCODONE-acetaminophen 1 tablet Oral Q8H   pharmacy consult - MTM  Does not apply Daily   sacubitril-valsartan 1 tablet Oral Q12H   sennosides-docusate sodium 2 tablet Oral BID   sodium chloride 3 mL Intravenous Q12H   sodium chloride 3-10 mL Intravenous Q8H   tamsulosin 0.4 mg Oral Daily     Assessment / Plan     Active Hospital Problems    Diagnosis   • **Coronary artery disease (S/P CABG x 4)   • Cough   • LOPEZ (acute kidney injury) (CMS/HCC)   • Insulin dependent type 2 diabetes mellitus (CMS/HCC)   • Labile hypertension   • Hypertensive cardiovascular disease     1. Probable hypertensive cardiovascular disease:  a. Abnormal EKG with abnormal acceptable echocardiographic GXT, September 2004.   b. Acceptable echocardiographic GXT with preserved exercise capacity and mild LVH, March 2008.    c. Acceptable Quantitative SPECT Gated Cardiolite GXT with nominal myocardial perfusion to 88% of predicted exercise capacity and 90% predicted maximum heart rate with preserved LVEF (0.65) with continued medical therapy felt warranted, December 2012.  d. Residual class I symptoms with recent documented abnormal EKG (LBBB/first-degree AV block) with abnormal echocardiogram demonstrating mild left ventricular chamber enlargement with mild reduction in the LVEF (0.42) without PE or pulmonary hypertension.  e. Resident class I symptoms with persistent abnormal echocardiogram (LVEF 0.40), with mild concentric LVH and mild left atrial enlargement without pulmonary arterial hypertension or pericardial effusion, July 2015.       • Moderate obesity   • Dyslipidemia     Brief Hospital Course to date:  Kar Mora is a 68 y.o. male with PMH significant for insulin-dependent DMII, HTN, hyperlipidemia, CKD III and CAD. Admitted to [facility] 10/11/2018 for CABG x 4. Transferred to the [facility]     Coronary artery disease s/p CABG x 4 (10/11/18)  - post-operative care per CT surgery team  - Continue DAPT, statin and BB     Cough  - Source unclear. CXR negative. Labs not consistent with infection. Will discuss possible CT chest with surgery team  - Check flu and respiratory virus PCRs  - Continue scheduled tussionex, stop mucinex and start Robitussin DM for improved cough suppression  - Morphine 4mg IV x 1, Oxycodone 10mg Q8H scheduled and Q4H PRN for breakthrough pain     Systolic congestive heart falure  - EF 26-30% - cardiology is following.   - Will need LifeVest placed prior to discharge  - Entresto started 10/15 - monitor BP and renal function     LOPEZ on CKD   - Baseline creatinine is unclear   - 7/2018 creatnine 1.68, GFR 41  - Creat currently 1.4, monitor     Non-insulin dependent DMII  - Hgb A1c 7.5% - At baseline, he is on Metformin, Toujeo, Glucotrol and Jardiance   - He follows with Dr. Hiren Montoya  every 3-4 months. Needs follow up in 1-2 weeks following d/c  - Will increase Levemir to 20 units BID and Lispro to 9 units TIC AC + SSI to account for the 1g Solumedrol he was given this morning      Urinary retention  - Started on Flomax and Proscar in the ICU  - Voiding trial this AM     DVT Prophylaxis: Mechanical    CODE STATUS:   Code Status and Medical Interventions:   Ordered at: 10/11/18 1052     Level Of Support Discussed With:    Patient     Code Status:    CPR     Medical Interventions (Level of Support Prior to Arrest):    Full     Disposition: I expect the patient to be discharged in a couple of days - pending improvement of his cough    Electronically signed by Charlene Gutiérrez PA-C, 10/16/18, 9:17 AM.

## 2018-10-16 NOTE — PROGRESS NOTES
"Kar Mora  [id number]  1950     LOS: 5 days   Patient Care Team:  Bro Kan MD as PCP - General  MontoyaHiren MD as Consulting Physician (Endocrinology)    Chief Complaint: Coronary artery disease      Subjective: Patient has severe cough which is developed over the last 24-36 hours    Objective:     Vital Sign Min/Max for last 24 hours  Temp  Min: 98.2 °F (36.8 °C)  Max: 98.8 °F (37.1 °C)   BP  Min: 93/48  Max: 140/77   Pulse  Min: 80  Max: 89   Resp  Min: 18  Max: 18   SpO2  Min: 92 %  Max: 93 %   No Data Recorded   Weight  Min: 94.6 kg (208 lb 9.6 oz)  Max: 94.6 kg (208 lb 9.6 oz)     Flowsheet Rows      First Filed Value   Admission Height  167.6 cm (66\") Documented at 10/11/2018 0604   Admission Weight  96.2 kg (212 lb) Documented at 10/11/2018 0604          Physical Exam:    Wound: Slight drainage from the lower portion the incision    Pulses:     Mediastinal and Chest Tube Drainage:       Results Review:     Results from last 7 days  Lab Units 10/16/18  0517  10/14/18  0321   WBC 10*3/mm3  --   --  7.34   HEMOGLOBIN g/dL 10.0*  < > 9.1*   HEMATOCRIT % 31.0*  < > 28.1*   PLATELETS 10*3/mm3  --   --  158   < > = values in this interval not displayed.    Results from last 7 days  Lab Units 10/16/18  0517   SODIUM mmol/L 135   POTASSIUM mmol/L 4.5   CHLORIDE mmol/L 104   CO2 mmol/L 21.0   BUN mg/dL 50*   CREATININE mg/dL 1.40*   GLUCOSE mg/dL 199*   CALCIUM mg/dL 8.4*       Results from last 7 days  Lab Units 10/11/18  1448   PH, ARTERIAL pH units 7.341*   PO2 ART mm Hg 100.0   PCO2, ARTERIAL mm Hg 39.7   HCO3 ART mmol/L 21.5         Assessment      Coronary artery disease (S/P CABG x 4)    Insulin dependent type 2 diabetes mellitus (CMS/HCC)    Labile hypertension    Dyslipidemia    Hypertensive cardiovascular disease    Moderate obesity    LOPEZ (acute kidney injury) (CMS/HCC)      Place patient on Tussionex 1 teaspoon every 12 hours around the clock, the patient also will " [facility] Medicine H&P - Resident HO-I Note    Admitting Team: [facility] Hospitalist Team A  Attending Physician: Little Encarnacion MD  Resident: Lew  Intern: Chris    Date of Admit: 7/19/2018    Chief Complaint     Abdominal pain and vomiting for 1 day    Subjective:      History of Present Illness:  Kaity Rebolledo is a 86 y.o female who  has a past medical history of Anticoagulant long-term use; Anxiety and depression; Chronic diastolic heart failure (9/2/2016); Chronic hypoxemic respiratory failure (2/6/2016); Chronic obstructive pulmonary disease (6/16/2016); Closed bilateral fracture of pubic rami (3/18/2016); Closed Colles' fracture of right radius (4/22/2017); Closed displaced fracture of neck of right femur s/p hemiarthroplasty on 4/23/2017 (4/22/2017); COPD (chronic obstructive pulmonary disease); Coronary artery disease involving native coronary artery without angina pectoris (6/16/2016); Displaced fracture of right femoral neck s/p hemiarthroplasty on 4/23/2017 (4/22/2017); Essential hypertension (8/6/2013); Fall; Hyperlipidemia; Osteoporosis (3/18/2016); Paroxysmal atrial fibrillation; Pneumonia; Pulmonary hypertension due to lung disease (3/30/2017); Rotator cuff injury; and Vitamin D deficiency disease (4/25/2017).. The patient presented to [facility] on 7/19/2018 with a primary complaint of abdominal pain/vomiting.    The patient was in her usual state of health until around 3:30 AM when she began to have sharp, bilateral lower abdominal pain. The pain was accompanied by weakness, dizziness, cold sweats, and 3 episodes of large-volume black emesis with thick clots in it. Pt endorses intermittent sharp abdominal pains over the past several weeks. She denied blood in stool. Nothing like this has ever happened before and pt denies hx GIB. Of note, pt with daily eliquis for Afib, NSAID use, and aspirin. During present interview, pt feels better and denies nausea or abd pain. She  denies any chest pain, increased SOB, dysuria, fever, or chills recently.    Past Medical History:  Past Medical History:   Diagnosis Date    Anticoagulant long-term use     Anxiety and depression     Chronic diastolic heart failure 9/2/2016    Chronic hypoxemic respiratory failure 2/6/2016    Chronic obstructive pulmonary disease 6/16/2016    Closed bilateral fracture of pubic rami 3/18/2016    Closed Colles' fracture of right radius 4/22/2017    Closed displaced fracture of neck of right femur s/p hemiarthroplasty on 4/23/2017 4/22/2017    COPD (chronic obstructive pulmonary disease)     Coronary artery disease involving native coronary artery without angina pectoris 6/16/2016    Displaced fracture of right femoral neck s/p hemiarthroplasty on 4/23/2017 4/22/2017    Essential hypertension 8/6/2013    Fall     patient  in  wheel  chair    Hyperlipidemia     Osteoporosis 3/18/2016    Paroxysmal atrial fibrillation     Pneumonia     Pulmonary hypertension due to lung disease 3/30/2017    Rotator cuff injury     R s/p therapy    Vitamin D deficiency disease 4/25/2017       Past Surgical History:  Past Surgical History:   Procedure Laterality Date    CATARACT EXTRACTION BILATERAL W/ ANTERIOR VITRECTOMY      EYE SURGERY      HIP FRACTURE SURGERY Right 04/23/2017    Hemiarthroplasty for femoral neck fracture    LEG SURGERY         Allergies:  Review of patient's allergies indicates:   Allergen Reactions    Advair diskus  [fluticasone-salmeterol]      Other reaction(s): Nausea    Morphine Hallucinations    Pravastatin      Other reaction(s): Muscle pain       Home Medications:  Prior to Admission medications    Medication Sig Start Date End Date Taking? Authorizing Provider   acetaminophen (TYLENOL) 325 MG tablet Take 2 tablets (650 mg total) by mouth every 6 (six) hours as needed (Mild pain). 5/1/17   Cecile Woo MD   apixaban 2.5 mg Tab Take 1 tablet (2.5 mg total) by mouth 2 (two)  need to be placed on Tussionex pearls around the clock, also need give 1 g of Solu-Medrol if this is an allergic type reaction.  Discussed with wife.        Migue Burton MD  10/16/18  7:06 AM      Please note that portions of this note were completed with a voice recognition program. Efforts were made to edit the dictations, but words may be mistranscribed   times daily. 10/3/17   Samuel Soriano MD   ascorbic acid (VITAMIN C) 500 MG tablet Take 500 mg by mouth 2 (two) times daily.    Historical Provider, MD   aspirin (ECOTRIN) 81 MG EC tablet Take 1 tablet (81 mg total) by mouth once daily. 9/10/15   Brenden Morrell MD   cyproheptadine (PERIACTIN) 4 mg tablet Please give the patient 4 mg 3 times daily for appetite stimulation, nasal congestion, and rhinorrhea. 12/27/17   Salma Saul MD   donepezil (ARICEPT) 5 MG tablet Take 5 mg by mouth every evening.    Historical Provider, MD   fluticasone-vilanterol (BREO) 100-25 mcg/dose diskus inhaler Inhale 1 puff into the lungs once daily. 6/7/17   Samuel Soriano MD   furosemide (LASIX) 20 MG tablet TAKE 1 TABLET BY MOUTH TWICE A DAY. 7/13/18   Samuel Soriano MD   hydrOXYzine pamoate (VISTARIL) 50 MG Cap Take 1 capsule (50 mg total) by mouth every 8 (eight) hours as needed. 4/16/18   Samuel Soriano MD   ipratropium (ATROVENT) 0.02 % nebulizer solution Take 2.5 mLs (500 mcg total) by nebulization every 6 (six) hours as needed for Wheezing. 12/31/17 12/31/18  Pallavi Sunkara, MD   levalbuterol (XOPENEX) 1.25 mg/0.5 mL nebulizer solution Take 0.5 mLs (1.25 mg total) by nebulization every 8 (eight) hours. 5/28/18 5/28/19  Samuel Soriano MD   memantine (NAMENDA) 5 MG Tab Take 5 mg by mouth 2 (two) times daily.    Historical Provider, MD   metoprolol succinate (TOPROL-XL) 50 MG 24 hr tablet Take 2 tablets (100 mg total) by mouth once daily. 7/27/17 7/27/18  Samuel Soriano MD   MUCINEX 600 mg 12 hr tablet TAKE 2 TABLETS BY MOUTH TWICE DAILY. 7/13/18   Samuel Soriano MD   multivitamin (ONE DAILY MULTIVITAMIN) per tablet Take 1 tablet by mouth once daily.    Historical Provider, MD   nitroGLYCERIN (NITROSTAT) 0.4 MG SL tablet Place 1 tablet (0.4 mg total) under the tongue every 5 (five) minutes as needed for Chest pain. 4/12/17 4/12/18  Carl Vang NP   potassium chloride (KLOR-CON) 10 MEQ TbSR TAKE 1 TABLET BY MOUTH ONCE  DAILY. 18   Samuel Soriano MD   potassium chloride (MICRO-K) 10 MEQ CpSR Take 1 capsule (10 mEq total) by mouth once daily. 17   Samuel Soriano MD   predniSONE (DELTASONE) 10 MG tablet Take 1 tablet (10 mg total) by mouth once daily. 18   Samuel Soriano MD   senna-docusate 8.6-50 mg (PERICOLACE) 8.6-50 mg per tablet Take 1 tablet by mouth 2 (two) times daily as needed. 3/19/18   Sidra Torres MD   sertraline (ZOLOFT) 50 MG tablet Take 50 mg by mouth once daily.    Historical Provider, MD   tiotropium (SPIRIVA WITH HANDIHALER) 18 mcg inhalation capsule Inhale 1 capsule (18 mcg total) into the lungs every morning. Controller 17   Brian Baeza MD   tramadol (ULTRAM) 50 mg tablet Take 1 tablet (50 mg total) by mouth every 24 hours as needed for Pain. 17   Samuel Soriano MD   VITAMIN B-12 100 MCG tablet TAKE 1 TABLET BY MOUTH ONCE DAILY. 18   Samuel Soriano MD       Family History:  History reviewed. No pertinent family history.  Mother: Alzheimers  Father: Diabetes    Social History:  Social History   Substance Use Topics    Smoking status: Former Smoker     Packs/day: 1.00     Years: 50.00     Types: Cigarettes     Quit date: 3/13/2003    Smokeless tobacco: Never Used    Alcohol use No     Tobacco: quit   ETOH: denies  Illicit drugs: denies  Other: [facility] at [facility] living [facility]    Review of Systems:  Pertinent items are noted in HPI. All other systems are reviewed and are negative.    Health Maintenance:   Primary Care Physician: Samuel Soriano    Immunizations:   TDap UTD 2014  Flu UTD  Pna UTD 2016    Cancer Screening:  PAP: no longer indicated  MMG: no longer indicated  Colonoscopy: not UTD     Objective:   Last 24 Hour Vital Signs:  BP  Min: 103/53  Max: 133/63  Temp  Av.1 °F (36.7 °C)  Min: 98 °F (36.7 °C)  Max: 98.2 °F (36.8 °C)  Pulse  Av.5  Min: 66  Max: 87  Resp  Av  Min: 16  Max: 22  SpO2  Av %  Min: 100 %  Max: 100 %  Height  Avg:  "5' 5" (165.1 cm)  Min: 5' 5" (165.1 cm)  Max: 5' 5" (165.1 cm)  Weight  Av.8 kg (112 lb)  Min: 50.8 kg (112 lb)  Max: 50.8 kg (112 lb)  Body mass index is 18.64 kg/m².  I/O last 3 completed shifts:  In: 1000 [I.V.:1000]  Out: -     Physical Examination:    BP (!) 104/51   Pulse 66   Temp 98.2 °F (36.8 °C) (Oral)   Resp 16   Ht 5' 5" (1.651 m)   Wt 50.8 kg (112 lb)   LMP  (LMP Unknown)   SpO2 100%   Breastfeeding? No   BMI 18.64 kg/m²      Orthostatics:  Laying down /53 with HR 65, Sitting up /65 with HR 72    General Appearance:    Alert, cooperative, no distress, appears stated age   Head:    Normocephalic, without obvious abnormality, atraumatic   Eyes:    PERRL, conjunctiva/corneas clear, EOM's intact   Nose:   Nares normal, septum midline, mucosa normal, no drainage    or sinus tenderness, no apparent blood   Throat:   Lips, mucosa, and tongue normal; teeth and gums normal without signs of bleeding   Neck:   Supple, symmetrical, trachea midline, no adenopathy;     thyroid:  no enlargement/tenderness/nodules; no carotid    bruit or JVD   Back:     Symmetric, no curvature, ROM normal, no CVA tenderness   Lungs:     Clear to auscultation bilaterally, respirations unlabored   Chest Wall:    No tenderness or deformity    Heart:    Regular rate and rhythm, S1 and S2 normal, no murmur, rub   or gallop   Abdomen:     Soft, non-tender to palpation in all quandrants, bowel sounds active all four quadrants, no masses, no organomegaly   Rectal:    FOBT+   Extremities:   Extremities normal, atraumatic, no cyanosis or edema   Pulses:   2+ and symmetric all extremities   Skin:   Skin color, texture, turgor normal, no rashes or lesions   Neurologic:   EOMI, CNII-XII grossly intact, normal strength and sensation throughout         Laboratory:  Most Recent Data:  CBC:   Lab Results   Component Value Date    WBC 8.16 2018    HGB 8.1 (L) 2018    HCT 27.1 (L) 2018     2018    "  (H) 07/19/2018    RDW 13.0 07/19/2018     WBC Differential: 67.4 % N, 0 % Bands, 20.6 % L, 9.8 % M, 2 % Eo, 0. % Baso, no additional cells seen  BMP:   Lab Results   Component Value Date     07/19/2018    K 4.5 07/19/2018    CL 94 (L) 07/19/2018    CO2 46 (HH) 07/19/2018    BUN 25 (H) 07/19/2018    CREATININE 1.1 07/19/2018     07/19/2018    CALCIUM 9.3 07/19/2018    MG 2.1 12/28/2017    PHOS 4.4 06/20/2017     LFTs:   Lab Results   Component Value Date    PROT 6.0 07/19/2018    ALBUMIN 3.2 (L) 07/19/2018    BILITOT 0.2 07/19/2018    AST 19 07/19/2018    ALKPHOS 52 (L) 07/19/2018    ALT 9 (L) 07/19/2018     Coags:   Lab Results   Component Value Date    INR 1.0 07/19/2018     FLP:   Lab Results   Component Value Date    CHOL 257 (H) 06/06/2018    HDL 82 (H) 06/06/2018    LDLCALC 156.4 06/06/2018    TRIG 93 06/06/2018    CHOLHDL 31.9 06/06/2018     DM:   Lab Results   Component Value Date    HGBA1C 5.1 03/19/2018    HGBA1C 5.3 03/28/2017    HGBA1C 5.5 02/28/2015    LDLCALC 156.4 06/06/2018    CREATININE 1.1 07/19/2018     Thyroid:   Lab Results   Component Value Date    TSH 0.992 06/20/2017     Anemia:   Lab Results   Component Value Date    IRON 41 06/06/2018    TIBC 278 06/06/2018    FERRITIN 82 06/06/2018    [id number] 872 06/06/2018    FOLATE 17.0 06/06/2018     Cardiac:   Lab Results   Component Value Date    TROPONINI <0.006 07/19/2018     (H) 03/27/2017     Urinalysis:   Lab Results   Component Value Date    LABURIN  04/12/2018     ENTEROCOCCUS FAECIUM  >100,000 cfu/ml  No other significant isolate      COLORU Yellow 07/19/2018    SPECGRAV 1.010 07/19/2018    NITRITE Negative 07/19/2018    KETONESU Negative 07/19/2018    UROBILINOGEN Negative 07/19/2018    WBCUA >100 (H) 04/12/2018       Trended Lab Data:    Recent Labs  Lab 07/19/18  0422   WBC 8.16   HGB 8.1*   HCT 27.1*      *   RDW 13.0      K 4.5   CL 94*   CO2 46*   BUN 25*   CREATININE 1.1      PROT  6.0   ALBUMIN 3.2*   BILITOT 0.2   AST 19   ALKPHOS 52*   ALT 9*       Trended Cardiac Data:    Recent Labs  Lab 07/19/18  0724   TROPONINI <0.006       Microbiology Data:  none    Other Results:  Radiology:  Imaging Results    None          Assessment:     Kaity Rebolledo is a 86 y.o. female with:  Patient Active Problem List    Diagnosis Date Noted    Gastrointestinal hemorrhage 07/19/2018    Shortness of breath 03/16/2018    Thrombocytosis 03/14/2018    COPD with acute exacerbation 09/28/2017    ILD (interstitial lung disease) 06/21/2017    Osteoporosis with current pathological fracture with routine healing     Vitamin D deficiency disease 04/25/2017    Debility 04/06/2017    Anticoagulant long-term use     Pulmonary hypertension due to lung disease 03/30/2017    Urinary retention 03/28/2017    Paroxysmal atrial fibrillation     Anxiety and depression     Acute on chronic diastolic heart failure 09/02/2016    Coronary artery disease involving native coronary artery without angina pectoris 06/16/2016    HLD (hyperlipidemia) 03/17/2016    Uterine prolapse 03/16/2016    Anemia of chronic disease 12/16/2015    Physical deconditioning 03/05/2015    Leukocytosis 03/02/2015    Cystocele 08/06/2013    Essential hypertension 08/06/2013        Plan:     GI Bleed, likely upper  - weak, dizzy, black thick vomit w/clots x 2, frequent NSAID use, on eliquis and daily ASA at home  - normotensive, no orthostasis on admit  - baseline H/H > 9.2/30.4 with H/H on admit 8.1/27.1, FOBT+  - started on pantoprazole 80 mg IV BID  - close monitoring with telemetry  - inpatient consult to [facility] for further evaluation and possible scope    Anemia, Macrocytic, hx Normocytic  - baseline H/H 9.2-9.8/30.4-32.8, MCV 95-97  - recent workup with iron studies from 6/6/18 with low iron sat, low transferrin  - H/H on admit 8.1/27.1, , FOBT+, PT 10.8, INR 1  - home supplements include B12  - f/u with PCP as  outpatient    COPD  - On 3L O2 at home  - home meds include Breo and spiriva inhaler + rescue inhalers  - asymptomatic and satting 100% on 3L O2 NC    Paroxysmal Atrial Fibrillation, on anticoagulation (holding)  - home meds include Toprol 50 mg and Eliquis  - holding anticoagulation in setting of likely GIB    Diastolic CHF  - currently asymptomatic  - last TTE 3/3/17 with EF 55%, LV diastolic dysfunction, mild MR/AR, moderate TR, pulm artery HTN 51  - home regimen includes Toprol 50 mg    CAD  - denies hx L heart cath or stent placement  - takes ASA at home, no statin 2/2 rxn  - holding ASA in setting of likely GIB    HTN  - normotensive on admit, holding home meds  - will monitor BP closely    Hypercholesterolemia  - recent panel 6/6/18, chol 257  - not on statin due to allergic rxn, f/u as outpt with PCP      Fluids & Electrolytes: replete as needed  Diet: NPO  DVT PPX: holding chemical ppx in setting of GIB  Precautions: fall precautions    Dispo: admit to obs for clinical improvement and evaluation by GI with likely EGD today    Code Status:     DNR  *note: pt is ok with transfusion if necessary for current admission    Eleonora Marinelli MD  [facility] Internal Medicine HO-I    [facility] Medicine Hospitalist Pager numbers:   [facility] Hospitalist Medicine Team A (Man/Shashank): [phone]  [facility] Hospitalist Medicine Team B (Zak/[place]):  [phone]

## 2018-10-16 NOTE — PLAN OF CARE
Problem: Patient Care Overview  Goal: Plan of Care Review  Outcome: Ongoing (interventions implemented as appropriate)   10/16/18 2409   Coping/Psychosocial   Plan of Care Reviewed With patient   Plan of Care Review   Progress declining   OTHER   Outcome Summary Pt had a rough night that has continued throughout today, he remains anxious, meds adjusted, pulmonary consulted, reinserted leong due to retention, continue to monitor, possible clips in the morning if coughing persist or drainage continues       Problem: Skin Injury Risk (Adult)  Goal: Skin Health and Integrity  Outcome: Ongoing (interventions implemented as appropriate)      Problem: Cardiac Surgery (Adult)  Goal: Signs and Symptoms of Listed Potential Problems Will be Absent, Minimized or Managed (Cardiac Surgery)  Outcome: Ongoing (interventions implemented as appropriate)

## 2018-10-17 LAB
BH CV LOW VAS RIGHT GREATER SAPH AK VESSEL: 1
BH CV LOWER VASCULAR LEFT COMMON FEMORAL AUGMENT: NORMAL
BH CV LOWER VASCULAR LEFT COMMON FEMORAL COMPRESS: NORMAL
BH CV LOWER VASCULAR LEFT COMMON FEMORAL PHASIC: NORMAL
BH CV LOWER VASCULAR LEFT COMMON FEMORAL SPONT: NORMAL
BH CV LOWER VASCULAR LEFT DISTAL FEMORAL AUGMENT: NORMAL
BH CV LOWER VASCULAR LEFT DISTAL FEMORAL COMPRESS: NORMAL
BH CV LOWER VASCULAR LEFT DISTAL FEMORAL PHASIC: NORMAL
BH CV LOWER VASCULAR LEFT DISTAL FEMORAL SPONT: NORMAL
BH CV LOWER VASCULAR LEFT GASTRONEMIUS COMPRESS: NORMAL
BH CV LOWER VASCULAR LEFT GREATER SAPH AK COMPRESS: NORMAL
BH CV LOWER VASCULAR LEFT GREATER SAPH BK COMPRESS: NORMAL
BH CV LOWER VASCULAR LEFT LESSER SAPH COMPRESS: NORMAL
BH CV LOWER VASCULAR LEFT MID FEMORAL AUGMENT: NORMAL
BH CV LOWER VASCULAR LEFT MID FEMORAL COMPRESS: NORMAL
BH CV LOWER VASCULAR LEFT MID FEMORAL PHASIC: NORMAL
BH CV LOWER VASCULAR LEFT MID FEMORAL SPONT: NORMAL
BH CV LOWER VASCULAR LEFT PERONEAL COMPRESS: NORMAL
BH CV LOWER VASCULAR LEFT POPLITEAL AUGMENT: NORMAL
BH CV LOWER VASCULAR LEFT POPLITEAL COMPRESS: NORMAL
BH CV LOWER VASCULAR LEFT POPLITEAL PHASIC: NORMAL
BH CV LOWER VASCULAR LEFT POPLITEAL SPONT: NORMAL
BH CV LOWER VASCULAR LEFT POSTERIOR TIBIAL COMPRESS: NORMAL
BH CV LOWER VASCULAR LEFT PROFUNDA FEMORAL COMPRESS: NORMAL
BH CV LOWER VASCULAR LEFT PROXIMAL FEMORAL AUGMENT: NORMAL
BH CV LOWER VASCULAR LEFT PROXIMAL FEMORAL COMPRESS: NORMAL
BH CV LOWER VASCULAR LEFT PROXIMAL FEMORAL PHASIC: NORMAL
BH CV LOWER VASCULAR LEFT PROXIMAL FEMORAL SPONT: NORMAL
BH CV LOWER VASCULAR LEFT SAPHENOFEMORAL JUNCTION COMPRESS: NORMAL
BH CV LOWER VASCULAR RIGHT COMMON FEMORAL AUGMENT: NORMAL
BH CV LOWER VASCULAR RIGHT COMMON FEMORAL COMPRESS: NORMAL
BH CV LOWER VASCULAR RIGHT COMMON FEMORAL PHASIC: NORMAL
BH CV LOWER VASCULAR RIGHT COMMON FEMORAL SPONT: NORMAL
BH CV LOWER VASCULAR RIGHT DISTAL FEMORAL AUGMENT: NORMAL
BH CV LOWER VASCULAR RIGHT DISTAL FEMORAL COMPRESS: NORMAL
BH CV LOWER VASCULAR RIGHT DISTAL FEMORAL PHASIC: NORMAL
BH CV LOWER VASCULAR RIGHT DISTAL FEMORAL SPONT: NORMAL
BH CV LOWER VASCULAR RIGHT GASTRONEMIUS COMPRESS: NORMAL
BH CV LOWER VASCULAR RIGHT GREATER SAPH AK COMPRESS: NORMAL
BH CV LOWER VASCULAR RIGHT GREATER SAPH BK COMPRESS: NORMAL
BH CV LOWER VASCULAR RIGHT LESSER SAPH COMPRESS: NORMAL
BH CV LOWER VASCULAR RIGHT MID FEMORAL AUGMENT: NORMAL
BH CV LOWER VASCULAR RIGHT MID FEMORAL COMPRESS: NORMAL
BH CV LOWER VASCULAR RIGHT MID FEMORAL PHASIC: NORMAL
BH CV LOWER VASCULAR RIGHT MID FEMORAL SPONT: NORMAL
BH CV LOWER VASCULAR RIGHT PERONEAL COMPRESS: NORMAL
BH CV LOWER VASCULAR RIGHT POPLITEAL AUGMENT: NORMAL
BH CV LOWER VASCULAR RIGHT POPLITEAL COMPRESS: NORMAL
BH CV LOWER VASCULAR RIGHT POPLITEAL PHASIC: NORMAL
BH CV LOWER VASCULAR RIGHT POPLITEAL SPONT: NORMAL
BH CV LOWER VASCULAR RIGHT POSTERIOR TIBIAL COMPRESS: NORMAL
BH CV LOWER VASCULAR RIGHT PROFUNDA FEMORAL COMPRESS: NORMAL
BH CV LOWER VASCULAR RIGHT PROXIMAL FEMORAL AUGMENT: NORMAL
BH CV LOWER VASCULAR RIGHT PROXIMAL FEMORAL COMPRESS: NORMAL
BH CV LOWER VASCULAR RIGHT PROXIMAL FEMORAL PHASIC: NORMAL
BH CV LOWER VASCULAR RIGHT PROXIMAL FEMORAL SPONT: NORMAL
BH CV LOWER VASCULAR RIGHT SAPHENOFEMORAL JUNCTION COMPRESS: NORMAL
GLUCOSE BLDC GLUCOMTR-MCNC: 263 MG/DL (ref 70–130)
GLUCOSE BLDC GLUCOMTR-MCNC: 275 MG/DL (ref 70–130)
GLUCOSE BLDC GLUCOMTR-MCNC: 363 MG/DL (ref 70–130)
GLUCOSE BLDC GLUCOMTR-MCNC: 384 MG/DL (ref 70–130)

## 2018-10-17 PROCEDURE — 63710000001 INSULIN DETEMIR PER 5 UNITS: Performed by: PHYSICIAN ASSISTANT

## 2018-10-17 PROCEDURE — 99232 SBSQ HOSP IP/OBS MODERATE 35: CPT | Performed by: HOSPITALIST

## 2018-10-17 PROCEDURE — 94799 UNLISTED PULMONARY SVC/PX: CPT

## 2018-10-17 PROCEDURE — 97530 THERAPEUTIC ACTIVITIES: CPT

## 2018-10-17 PROCEDURE — 82962 GLUCOSE BLOOD TEST: CPT

## 2018-10-17 PROCEDURE — 94640 AIRWAY INHALATION TREATMENT: CPT

## 2018-10-17 PROCEDURE — 63710000001 INSULIN DETEMIR PER 5 UNITS: Performed by: HOSPITALIST

## 2018-10-17 PROCEDURE — 99232 SBSQ HOSP IP/OBS MODERATE 35: CPT | Performed by: INTERNAL MEDICINE

## 2018-10-17 PROCEDURE — 94760 N-INVAS EAR/PLS OXIMETRY 1: CPT

## 2018-10-17 PROCEDURE — 99024 POSTOP FOLLOW-UP VISIT: CPT | Performed by: PHYSICIAN ASSISTANT

## 2018-10-17 PROCEDURE — 99233 SBSQ HOSP IP/OBS HIGH 50: CPT | Performed by: INTERNAL MEDICINE

## 2018-10-17 RX ORDER — ALBUTEROL SULFATE 2.5 MG/3ML
2.5 SOLUTION RESPIRATORY (INHALATION) ONCE
Status: COMPLETED | OUTPATIENT
Start: 2018-10-17 | End: 2018-10-17

## 2018-10-17 RX ORDER — TORSEMIDE 10 MG/1
5 TABLET ORAL DAILY
Status: DISCONTINUED | OUTPATIENT
Start: 2018-10-17 | End: 2018-10-22 | Stop reason: HOSPADM

## 2018-10-17 RX ORDER — AMOXICILLIN AND CLAVULANATE POTASSIUM 875; 125 MG/1; MG/1
1 TABLET, FILM COATED ORAL EVERY 12 HOURS SCHEDULED
Status: DISCONTINUED | OUTPATIENT
Start: 2018-10-17 | End: 2018-10-22 | Stop reason: HOSPADM

## 2018-10-17 RX ORDER — GUAIFENESIN 600 MG/1
1200 TABLET, EXTENDED RELEASE ORAL EVERY 12 HOURS SCHEDULED
Status: DISCONTINUED | OUTPATIENT
Start: 2018-10-17 | End: 2018-10-22 | Stop reason: HOSPADM

## 2018-10-17 RX ADMIN — AMOXICILLIN AND CLAVULANATE POTASSIUM 1 TABLET: 875; 125 TABLET, FILM COATED ORAL at 21:21

## 2018-10-17 RX ADMIN — FINASTERIDE 5 MG: 5 TABLET, FILM COATED ORAL at 09:53

## 2018-10-17 RX ADMIN — SENNOSIDES AND DOCUSATE SODIUM 2 TABLET: 8.6; 5 TABLET ORAL at 09:48

## 2018-10-17 RX ADMIN — DEXTROMETHORPHAN 60 MG: 30 SUSPENSION, EXTENDED RELEASE ORAL at 09:54

## 2018-10-17 RX ADMIN — OXYCODONE HYDROCHLORIDE AND ACETAMINOPHEN 1 TABLET: 7.5; 325 TABLET ORAL at 14:55

## 2018-10-17 RX ADMIN — DEXTROMETHORPHAN 60 MG: 30 SUSPENSION, EXTENDED RELEASE ORAL at 21:21

## 2018-10-17 RX ADMIN — OXYCODONE HYDROCHLORIDE AND ACETAMINOPHEN 1 TABLET: 7.5; 325 TABLET ORAL at 21:20

## 2018-10-17 RX ADMIN — Medication 325 MG: at 09:53

## 2018-10-17 RX ADMIN — LIDOCAINE HYDROCHLORIDE 4 ML: 40 INJECTION, SOLUTION RETROBULBAR; TOPICAL at 14:06

## 2018-10-17 RX ADMIN — BENZONATATE 200 MG: 100 CAPSULE ORAL at 01:24

## 2018-10-17 RX ADMIN — INSULIN LISPRO 8 UNITS: 100 INJECTION, SOLUTION INTRAVENOUS; SUBCUTANEOUS at 11:19

## 2018-10-17 RX ADMIN — LIDOCAINE HYDROCHLORIDE 4 ML: 40 INJECTION, SOLUTION RETROBULBAR; TOPICAL at 07:36

## 2018-10-17 RX ADMIN — Medication 3 ML: at 21:21

## 2018-10-17 RX ADMIN — LIDOCAINE HYDROCHLORIDE 4 ML: 40 INJECTION, SOLUTION RETROBULBAR; TOPICAL at 17:35

## 2018-10-17 RX ADMIN — SACUBITRIL AND VALSARTAN 1 TABLET: 24; 26 TABLET, FILM COATED ORAL at 21:21

## 2018-10-17 RX ADMIN — SENNOSIDES AND DOCUSATE SODIUM 2 TABLET: 8.6; 5 TABLET ORAL at 21:20

## 2018-10-17 RX ADMIN — Medication 3 ML: at 10:04

## 2018-10-17 RX ADMIN — INSULIN LISPRO 6 UNITS: 100 INJECTION, SOLUTION INTRAVENOUS; SUBCUTANEOUS at 17:31

## 2018-10-17 RX ADMIN — LIDOCAINE HYDROCHLORIDE 4 ML: 40 INJECTION, SOLUTION RETROBULBAR; TOPICAL at 10:43

## 2018-10-17 RX ADMIN — INSULIN LISPRO 6 UNITS: 100 INJECTION, SOLUTION INTRAVENOUS; SUBCUTANEOUS at 21:22

## 2018-10-17 RX ADMIN — GUAIFENESIN 1200 MG: 600 TABLET, EXTENDED RELEASE ORAL at 09:48

## 2018-10-17 RX ADMIN — INSULIN DETEMIR 25 UNITS: 100 INJECTION, SOLUTION SUBCUTANEOUS at 21:24

## 2018-10-17 RX ADMIN — Medication 10 ML: at 01:24

## 2018-10-17 RX ADMIN — GUAIFENESIN 1200 MG: 600 TABLET, EXTENDED RELEASE ORAL at 21:20

## 2018-10-17 RX ADMIN — Medication 5 ML: at 11:20

## 2018-10-17 RX ADMIN — TAMSULOSIN HYDROCHLORIDE 0.4 MG: 0.4 CAPSULE ORAL at 09:53

## 2018-10-17 RX ADMIN — INSULIN DETEMIR 20 UNITS: 100 INJECTION, SOLUTION SUBCUTANEOUS at 10:05

## 2018-10-17 RX ADMIN — ATORVASTATIN CALCIUM 40 MG: 40 TABLET, FILM COATED ORAL at 21:20

## 2018-10-17 RX ADMIN — LIDOCAINE HYDROCHLORIDE 4 ML: 40 INJECTION, SOLUTION RETROBULBAR; TOPICAL at 21:49

## 2018-10-17 RX ADMIN — BENZONATATE 200 MG: 100 CAPSULE ORAL at 16:54

## 2018-10-17 RX ADMIN — CARVEDILOL 25 MG: 12.5 TABLET, FILM COATED ORAL at 21:20

## 2018-10-17 RX ADMIN — TORSEMIDE 5 MG: 10 TABLET ORAL at 10:00

## 2018-10-17 RX ADMIN — Medication 325 MG: at 17:31

## 2018-10-17 RX ADMIN — ALBUTEROL SULFATE 2.5 MG: 2.5 SOLUTION RESPIRATORY (INHALATION) at 07:55

## 2018-10-17 RX ADMIN — CLOPIDOGREL BISULFATE 75 MG: 75 TABLET ORAL at 09:53

## 2018-10-17 RX ADMIN — ALPRAZOLAM 0.25 MG: 0.25 TABLET ORAL at 15:23

## 2018-10-17 RX ADMIN — AMOXICILLIN AND CLAVULANATE POTASSIUM 1 TABLET: 875; 125 TABLET, FILM COATED ORAL at 10:00

## 2018-10-17 RX ADMIN — BENZONATATE 200 MG: 100 CAPSULE ORAL at 09:52

## 2018-10-17 RX ADMIN — ALPRAZOLAM 0.25 MG: 0.25 TABLET ORAL at 01:31

## 2018-10-17 RX ADMIN — INSULIN LISPRO 8 UNITS: 100 INJECTION, SOLUTION INTRAVENOUS; SUBCUTANEOUS at 08:09

## 2018-10-17 RX ADMIN — PANTOPRAZOLE SODIUM 40 MG: 40 TABLET, DELAYED RELEASE ORAL at 06:43

## 2018-10-17 RX ADMIN — OXYCODONE HYDROCHLORIDE AND ACETAMINOPHEN 1 TABLET: 7.5; 325 TABLET ORAL at 06:43

## 2018-10-17 RX ADMIN — SACUBITRIL AND VALSARTAN 1 TABLET: 24; 26 TABLET, FILM COATED ORAL at 09:48

## 2018-10-17 RX ADMIN — Medication 5 MG: at 01:31

## 2018-10-17 NOTE — PROGRESS NOTES
"    [facility] Medicine Services  PROGRESS NOTE    Patient Name: Kar Mora  : 1950  MRN: [id number]    Date of Admission: 10/11/2018  Length of Stay: 6  Primary Care Physician: Bro Kan MD    Subjective   Subjective     CC:  F/u cough    HPI:  Pt's cough is improved this morning and dry (sounds \"wet\" but no production). Pain control is overall better but he is still very nervous about pain with coughing. Hyperglycemic due to steroids overnight. Feels that xylocaine nebs are helping. Drainage from inferior aspect of sternotomy incision persists.  No fevers or chills. Trial of voiding failed yesterday afternoon, leong remains in place.     Review of Systems  Gen- No fevers, chills  CV- No chest pain, palpitations  Resp- No cough, dyspnea  GI- No N/V/D, abd pain    Otherwise ROS is negative except as mentioned in the HPI.    Objective   Objective     Vital Signs:   Temp:  [98.1 °F (36.7 °C)-99 °F (37.2 °C)] 98.2 °F (36.8 °C)  Heart Rate:  [74-84] 76  Resp:  [16-18] 18  BP: (100-134)/(59-74) 100/59      Physical Exam:  Constitutional: No acute distress, awake, alert  HENT: NCAT, mucous membranes moist  Respiratory: Incomplete expansion but clear breath sounds with some mild crackles to bases, respiratory effort normal   Cardiovascular: RRR, no murmurs, rubs, or gallops, palpable pedal pulses bilaterally, median sternotomy incision intact superiorly, dressed inferiorly  Gastrointestinal: Positive bowel sounds, soft, nontender, nondistended  : leong in place   Musculoskeletal: 1+ bilateral ankle edema, right calf incision c/d/i  Psychiatric: Appropriate affect, cooperative  Neurologic: Oriented x 3, strength symmetric in all extremities, Cranial Nerves grossly intact to confrontation, speech clear  Skin: No rashes    Results Reviewed:  I have personally reviewed current lab, radiology, and data and agree.      Results from last 7 days  Lab Units 10/16/18  0517 " 10/15/18  0500 10/14/18  0321 10/13/18  0321 10/12/18  0323  10/11/18  1305  10/10/18  1324   WBC 10*3/mm3 6.71  --  7.34 9.84 8.44  < > 5.18  --  5.02   HEMOGLOBIN g/dL 10.1*  10.0* 9.9* 9.1* 10.0* 10.0*  < > 10.5*  --  12.2*   HEMOGLOBIN, POC   --   --   --   --   --   --   --   < >  --    HEMATOCRIT % 31.2*  31.0* 30.7* 28.1* 31.5* 31.4*  < > 32.0*  --  37.3*   HEMATOCRIT POC   --   --   --   --   --   --   --   < >  --    PLATELETS 10*3/mm3 288  --  158 160 169  < > 114*  --  199   INR   --   --   --   --  1.02  --  1.07  --  0.99   < > = values in this interval not displayed.    Results from last 7 days  Lab Units 10/16/18  0517 10/15/18  0500 10/14/18  0321  10/10/18  1324   SODIUM mmol/L 135 133 132  < > 137   POTASSIUM mmol/L 4.5 4.4 4.0  < > 4.4   CHLORIDE mmol/L 104 101 104  < > 104   CO2 mmol/L 21.0 23.0 21.0  < > 26.0   BUN mg/dL 50* 50* 44*  < > 42*   CREATININE mg/dL 1.40* 1.62* 1.56*  < > 1.84*   GLUCOSE mg/dL 199* 212* 141*  < > 217*   CALCIUM mg/dL 8.4* 9.1 7.8*  < > 8.9   ALT (SGPT) U/L  --   --   --   --  29   AST (SGOT) U/L  --   --   --   --  33   < > = values in this interval not displayed.  Estimated Creatinine Clearance: 55.1 mL/min (A) (by C-G formula based on SCr of 1.4 mg/dL (H)).  No results found for: BNP    Microbiology Results Abnormal     Procedure Component Value - Date/Time    Influenza A & B, RT PCR - Swab, Nasopharynx [[id number]]  (Normal) Collected:  10/16/18 1341    Lab Status:  Final result Specimen:  Swab from Nasopharynx Updated:  10/16/18 1924     Influenza A PCR Not Detected     Influenza B PCR Not Detected          Imaging Results (last 24 hours)     Procedure Component Value Units Date/Time    XR Chest PA & Lateral [[id number]] Collected:  10/16/18 1519     Updated:  10/16/18 1545    Narrative:       EXAMINATION: XR CHEST PA AND LATERAL- 10/16/2018     INDICATION: Cough; Z74.09-Other reduced mobility;  I25.118-Atherosclerotic heart disease of native coronary artery  with  other forms of angina pectoris; I25.119-Atherosclerotic heart disease of  native coronary artery with unspecified angina pectoris; N17.9-Acute  kidney failure, unspecified; I25.9-Chronic ischemic heart disease,  unspecified; I11.9-Hypertensive heart disease without heart failure     TECHNIQUE: Two view chest.     COMPARISONS: 10/15/2018     FINDINGS: Trace effusions. No focal airspace disease or pneumothorax.  Sternotomy wires. Unremarkable cardiomediastinal silhouette.       Impression:       Trace effusions.     D:  10/16/2018  E:  10/16/2018     This report was finalized on 10/16/2018 3:43 PM by Jay Beasley.           Results for orders placed during the hospital encounter of 10/11/18   Adult Transthoracic Echo Complete W/ Cont if Necessary Per Protocol    Narrative · Estimated EF appears to be in the range of 26 - 30%.  · The following left ventricular wall segments are hypokinetic: mid   anterolateral. The following left ventricular wall segments are   dyskinetic: apical anterior.  · Right ventricular cavity is borderline dilated.  · Left atrial cavity size is mildly dilated.  · Mild mitral valve regurgitation is present          I have reviewed the medications.      amoxicillin-clavulanate 1 tablet Oral Q12H   atorvastatin 40 mg Oral Nightly   benzonatate 200 mg Oral Q8H   carvedilol 25 mg Oral BID   clopidogrel 75 mg Oral Daily   dextromethorphan polistirex ER 60 mg Oral Q12H   ferrous sulfate 325 mg Oral BID With Meals   finasteride 5 mg Oral Daily   guaiFENesin 1,200 mg Oral Q12H   insulin detemir 20 Units Subcutaneous BID   insulin lispro 0-9 Units Subcutaneous 4x Daily With Meals & Nightly   insulin lispro 9 Units Subcutaneous TID With Meals   oxyCODONE-acetaminophen 1 tablet Oral Q8H   pantoprazole 40 mg Oral Q AM   pharmacy consult - MTM  Does not apply Daily   sacubitril-valsartan 1 tablet Oral Q12H   sennosides-docusate sodium 2 tablet Oral BID   sodium chloride 3 mL Intravenous Q12H   sodium  chloride 3-10 mL Intravenous Q8H   tamsulosin 0.4 mg Oral Daily   torsemide 5 mg Oral Daily         Assessment/Plan   Assessment / Plan     Active Hospital Problems    Diagnosis   • **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 7/5/18   • Cough   • S/P CABG x 4 on 10/11/18   • Ischemic cardiomyopathy with LVEF 26-30% on echocardiogram 10/13/18 (was 40% July 2018)   • LOPEZ (acute kidney injury) (CMS/[facility])   • Insulin dependent type 2 diabetes mellitus (CMS/[facility])   • Hypertensive cardiovascular disease     1. Probable hypertensive cardiovascular disease:  a. Abnormal EKG with abnormal acceptable echocardiographic GXT, September 2004.   b. Acceptable echocardiographic GXT with preserved exercise capacity and mild LVH, March 2008.   c. Acceptable Quantitative SPECT Gated Cardiolite GXT with nominal myocardial perfusion to 88% of predicted exercise capacity and 90% predicted maximum heart rate with preserved LVEF (0.65) with continued medical therapy felt warranted, December 2012.  d. Residual class I symptoms with recent documented abnormal EKG (LBBB/first-degree AV block) with abnormal echocardiogram demonstrating mild left ventricular chamber enlargement with mild reduction in the LVEF (0.42) without PE or pulmonary hypertension.  e. Resident class I symptoms with persistent abnormal echocardiogram (LVEF 0.40), with mild concentric LVH and mild left atrial enlargement without pulmonary arterial hypertension or pericardial effusion, July 2015.       • Moderate obesity   • Hyperlipidemia   • Allergic rhinitis. Desensitization injections discontinued July 2018     Brief Hospital Course to date:  Kar Mora is a 68 y.o. male with a remote hx of tobacco use, HTN, hypertensive heart disease, hyperlipidemia, insulin-dependent T2DM, obesity, CKD III (baseline 1.6), and gout who is admitted following elective CABG x 4 on 10/11/18:    Multivessel CAD S/p CABG x 4 on 10/11/18  Chronic LV Systolic Heart Failure due to ICM with LVEF  26-30%  - continue aspirin, plavix, entresto, carvedilol, and atorvastatin  - Plan for life vest at discharge, d/w CM  - Gentle diuresis with oral torsemide, Dr. Qureshi following   - CT surgery managing serosanguinous drainage from inferior aspect of sternal wound   - No aldactone due to renal dysfunction  - Optimize pain control    Urinary Retention, failed TOV 10/16  - continue flomax, finasteride  - start bladder training, I am ok with repeat trial of voiding tomorrow, 10/18    Nonproductive Cough, likely due to atelectasis, post-op pain   Bronchitis   - Improved, CXR negative for infiltrates   - Continue supportive care with tussionex, tessalon perles, xylocaine nebs, pain control   - Continue augmentin  - D/w Dr. Florence 10/16    CKD III  - Cr has gone as high as 1.8 this admit, now back to baseline     Insulin-dependent T2DM, A1c 7.5% at baseline   - exacerbated in past 24 hours by IV steroid dose (had previously been under moderate control)  - increased basal-bolus dosing today, will monitor     Hypertension, BP reviewed and stable on current regimen    Normocytic Anemia    DVT Prophylaxis:  mechanical   CODE STATUS:   Code Status and Medical Interventions:   Ordered at: 10/11/18 1052     Level Of Support Discussed With:    Patient     Code Status:    CPR     Medical Interventions (Level of Support Prior to Arrest):    Full       Disposition: I expect the patient to be discharged home in 1-2 days as per primary service.      Electronically signed by Felix Lan MD, 10/17/18, 11:06 AM.

## 2018-10-17 NOTE — PLAN OF CARE
Problem: Patient Care Overview  Goal: Plan of Care Review  Outcome: Ongoing (interventions implemented as appropriate)   10/17/18 9892   Coping/Psychosocial   Plan of Care Reviewed With patient;spouse   Plan of Care Review   Progress improving   OTHER   Outcome Summary ''able to amb 400 ft w/ R wx & CGA w/ 2 stand. rests, but limited by incis. pain, inc. drng. d/t coughing spells & dec HGB (10.0); Needs reinforcement of sternal precaut.

## 2018-10-17 NOTE — PLAN OF CARE
Problem: Patient Care Overview  Goal: Plan of Care Review  Outcome: Ongoing (interventions implemented as appropriate)   10/17/18 1573   Coping/Psychosocial   Plan of Care Reviewed With patient   Plan of Care Review   Progress improving   OTHER   Outcome Summary Patient rested on and off during the night. Patient did report pain and recieved scheduled and PRN pain medication. Vital signs remain stable. Patient is alert and orient and is on 2L O2 nasal canula. Patient does still cough some but cough seems to be better tonight. Patient is receiving medication for cough. Chest incision is draining some, dressing was changed twice during the night. Will continue to monitor.      Goal: Individualization and Mutuality  Outcome: Ongoing (interventions implemented as appropriate)    Goal: Discharge Needs Assessment  Outcome: Ongoing (interventions implemented as appropriate)      Problem: Skin Injury Risk (Adult)  Goal: Skin Health and Integrity  Outcome: Ongoing (interventions implemented as appropriate)      Problem: Cardiac Surgery (Adult)  Goal: Signs and Symptoms of Listed Potential Problems Will be Absent, Minimized or Managed (Cardiac Surgery)  Outcome: Ongoing (interventions implemented as appropriate)

## 2018-10-17 NOTE — PROGRESS NOTES
Intensivist Note     10/17/2018  Hospital Day: 6  6 Days Post-Op      Mr. Kar Mora, 68 y.o. male is followed for:    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 7/5/18    S/P CABG x 4 on 10/11/18    Ischemic cardiomyopathy with LVEF 26-30% on echocardiogram 10/13/18 (was 40% July 2018)    Insulin dependent type 2 diabetes mellitus (CMS/[facility])    Hypertensive cardiovascular disease    LOPEZ (acute kidney injury) (CMS/[facility])    Hyperlipidemia    Moderate obesity    Allergic rhinitis. Desensitization injections discontinued July 2018    Cough       SUBJECTIVE     68-year-old white male remote smoker (none for 37 years) with a history of hypertension and hypertensive cardiovascular disease, hyperlipidemia, diabetes mellitus, obesity, chronic kidney disease (baseline creatinine preop was 1.63), allergic rhinitis (previously on desensitization injections), and gout. Recently diagnosed with significant CAD on catheterization 7/5/18. In addition had LVEF of 40%. Underwent elective CABG ×4 on 10/11/18. Had a slight bump in his creatinine to 1.8 and but it returned to baseline. Around 10/14/18     Because of the above patient underwent elective CABG ×4 without complications other than a transient slight increase in creatinine from 1.63 preoperatively to 1.89 which returned to baseline. Around 10/14/18 the patient developed a persistent intense nonproductive cough. This has induced a great deal of discomfort due to chest pain associated with the cough and leading to concern about sternal stability. There has been no sputum or hemoptysis, and he has had no fever or chills. In addition white count was normal. He denies dyspnea or wheezing as well as dysphagia, postnasal drip or significant reflux processes although he does have a history of allergies and GERD.     He is on guaifenesin, dextromethorphan, Tessalon, and codeine derivatives to suppress his cough. In addition yesterday I started him on prn lidocaine treatments. He states  "that the first one given yesterday helped him but he did not ask for any more. Cough persists today but his wife states that he is definitely better.    The patient's relevant past medical, surgical and social history were reviewed and updated in Epic as appropriate.    OBJECTIVE     /67 (BP Location: Right arm, Patient Position: Lying)   Pulse 78   Temp 98.1 °F (36.7 °C) (Oral)   Resp 16   Ht 167.6 cm (66\")   Wt 94.6 kg (208 lb 9.6 oz)   SpO2 92%   BMI 33.67 kg/m²   Oxygen Concentration (%): 40  Flow (L/min): 2    Flowsheet Rows      First Filed Value   Admission Height  167.6 cm (66\") Documented at 10/11/2018 0604   Admission Weight  96.2 kg (212 lb) Documented at 10/11/2018 0604        Intake & Output (last day)       10/16 0701 - 10/17 0700 10/17 0701 - 10/18 0700    P.O. 720     Total Intake(mL/kg) 720 (7.6)     Urine (mL/kg/hr) 600 (0.3)     Total Output 600      Net +120                  Exam:  General Exam:  Well-developed older white male in NAD. Still coughing  HEENT: Pupils equal and reactive. Nose and throat clear.  Neck:                          Supple, no JVD, thyromegaly, or adenopathy  Lungs: Today I hear some rales in the left posterior base  Cardiovascular: RRR without murmurs or gallops.  Abdomen: Soft nontender without organomegaly or masses. Obese   and rectal: Deferred.  Extremities: No cyanosis clubbing edema.  Neurologic:                 Symmetric strength. No focal deficits.    Chest X-Ray: No film today. Yesterday's just reveals some bibasilar atelectasis left more prominent than right      Results from last 7 days  Lab Units 10/16/18  0517 10/15/18  0500 10/14/18  0321 10/13/18  0321   WBC 10*3/mm3 6.71  --  7.34 9.84   HEMOGLOBIN g/dL 10.1*  10.0* 9.9* 9.1* 10.0*   HEMATOCRIT % 31.2*  31.0* 30.7* 28.1* 31.5*   PLATELETS 10*3/mm3 288  --  158 160       Results from last 7 days  Lab Units 10/16/18  0517 10/15/18  0500   SODIUM mmol/L 135 133   POTASSIUM mmol/L 4.5 4.4 "   CHLORIDE mmol/L 104 101   CO2 mmol/L 21.0 23.0   BUN mg/dL 50* 50*   CREATININE mg/dL 1.40* 1.62*   GLUCOSE mg/dL 199* 212*   CALCIUM mg/dL 8.4* 9.1       Results from last 7 days  Lab Units 10/12/18  0323 10/11/18  1448 10/11/18  1305   MAGNESIUM mg/dL 2.6 2.6 2.6   PHOSPHORUS mg/dL 4.6 2.8 3.4       Results from last 7 days  Lab Units 10/10/18  1324   ALK PHOS U/L 35   BILIRUBIN mg/dL 0.4   ALT (SGPT) U/L 29   AST (SGOT) U/L 33       No results found for: SEDRATE  No results found for: BNP  No results found for: CKTOTAL, CKMB, CKMBINDEX, TROPONINI, TROPONINT  No results found for: TSH  No results found for: LACTATE  No results found for: CORTISOL      Results from last 7 days  Lab Units 10/11/18  1448 10/11/18  1409 10/11/18  1152 10/11/18  1008 10/11/18  0927   PH, ARTERIAL pH units 7.341* 7.324* 7.229* 7.29* 7.32*   PCO2, ARTERIAL mm Hg 39.7 41.2 52.0*  --   --    PO2 ART mm Hg 100.0 92.1 191.0*  --   --    HCO3 ART mmol/L 21.5 21.4 21.7  --   --    FIO2 % 40 40 100  --   --        I reviewed the patient's results, images and medication.    Assessment/Plan   ASSESSMENT        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 7/5/18    S/P CABG x 4 on 10/11/18    Ischemic cardiomyopathy with LVEF 26-30% on echocardiogram 10/13/18 (was 40% July 2018)    Insulin dependent type 2 diabetes mellitus (CMS/[facility])    Hypertensive cardiovascular disease    LOPEZ (acute kidney injury) (CMS/[facility])    Hyperlipidemia    Moderate obesity    Allergic rhinitis. Desensitization injections discontinued July 2018    Cough      DISCUSSION: Cough better but persists. I do hear some rales in the left posterior base and to be on the safe side I am going to place him on oral antimicrobial therapy.    PLAN     1. Augmentin 875 mg every 12 hours  2. I have encouraged the patient to ask for the Xylocaine treatment when his cough is a problem. These are available every 3 hours. I do not like to schedule these treatments as they are then given when not  necessary  3. Follow-up labs and chest x-ray in a.m.   4. Prior to this hospitalization he was on losartan (ARB) which is not associated with cough. This has been switched to Entresto. I have never considered this an agent which can induce cough as it also is an ARB plus sacubitril, neither of which should cause cough. I note however in looking at the package insert that there is a 9% incidence of dry cough with Entresto. Would not stop it at present, however if we cannot get the cough to resolve would consider switching back to losartan      Plan of care and goals reviewed with mulitdisciplinary team at daily rounds.    I discussed the patient's findings and my recommendations with patient, family and nursing staff    Time spent Critical care 25 min (It does not include procedure time).    Branden Florence MD  Intensive Care Medicine  10/17/18 7:55 AM

## 2018-10-17 NOTE — PROGRESS NOTES
Continued Stay Note   [place]     Patient Name: Kar Mora  MRN: [id number]  Today's Date: 10/17/2018    Admit Date: 10/11/2018          Discharge Plan     Row Name 10/17/18 1438       Plan    Plan Comments Patient's Entresto and Plavix will not require a prior auth. for medications.  Plavix will cost $10 dollars for a one month supply or $ 20 dollars for a 90 day supply.  Entresto will cost $50 dollars for a 30 day supply or $86 for a 90 day supply.      Row Name 10/17/18 1418       Plan    Plan Home with family    Patient/Family in Agreement with Plan yes    Plan Comments Spoke with patient at bedside.  Patient still has cough and moderate chest drainage from chest incision.  Insurance approval for [employer].  Will fit patient when appropriate.  Follow up with rep. Garza for [employer]vest fitting for patient [phone].  Patient has walker at bedside.  Trying to wean from 02 at this time.  Will continue to follow for discharge planning needs.     Final Discharge Disposition Code 01 - home or self-care              Discharge Codes    No documentation.           Delaney Gann RN

## 2018-10-17 NOTE — PLAN OF CARE
"Problem: Patient Care Overview  Goal: Plan of Care Review  Outcome: Ongoing (interventions implemented as appropriate)   10/17/18 4938   Coping/Psychosocial   Plan of Care Reviewed With patient;spouse   Plan of Care Review   Progress improving   OTHER   Outcome Summary Patient up to chair today, cough is better controlled with lidocaine nebs prn. Pain c/o one episode of \"popping sensation\" in chest when coughing. No pain, change in respirations, or change in chest wall on exam. MD notified - VSS - 2L, a/o will continue to monitor.        Problem: Skin Injury Risk (Adult)  Goal: Skin Health and Integrity  Outcome: Ongoing (interventions implemented as appropriate)      Problem: Cardiac Surgery (Adult)  Goal: Signs and Symptoms of Listed Potential Problems Will be Absent, Minimized or Managed (Cardiac Surgery)  Outcome: Ongoing (interventions implemented as appropriate)        "

## 2018-10-17 NOTE — PAYOR COMM NOTE
"Utilization Review [phone]  Brain Mora (68 y.o. Male)     Date of Birth Social Security Number Address Home Phone MRN    1950  [address] [facility] [address] [phone] [id number]    Taoism Marital Status          None        Admission Date Admission Type Admitting Provider Attending Provider Department, Room/Bed    10/11/18 Elective Migue Burton MD Rogers, Anthony G, MD [facility] 4H, S470/1    Discharge Date Discharge Disposition Discharge Destination                       Attending Provider:  Migue Burton MD    Allergies:  Asa [Aspirin]    Isolation:  None   Infection:  None   Code Status:  CPR    Ht:  167.6 cm (66\")   Wt:  95.2 kg (209 lb 12.8 oz)    Admission Cmt:  None   Principal Problem:  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 7/5/18 [I25.119]                 Active Insurance as of 10/11/2018     Primary Coverage     Payor Plan Insurance Group Employer/Plan Group    MISC COMMERCIAL MISC COMMERCIAL INS      Coverage Address Coverage Phone Number Effective From Effective To    [address] [phone] 7/1/2007     [facility] [id number]       Subscriber Name Subscriber Birth Date Member ID       BRAIN MORA 1950 [id number]                 Emergency Contacts      (Rel.) Home Phone Work Phone Mobile Phone    Laura Mora (Spouse) [phone] -- [phone]    Lucretia Galvan (Sister) [phone] -- --            Clinic-Administered Medications       Dose Frequency Start End    Chlorhexidine Gluconate Cloth 2 % pads 1 application 1 application Every 12 Hours PRN 10/10/2018     Sig - Route: Apply 1 application topically to the appropriate area as directed Every 12 (Twelve) Hours As Needed (12 hours night before surgery and repeat in AM prior to surgery.). - Topical      Hospital Medications (active)       Dose Frequency Start End    acetaminophen (TYLENOL) tablet 650 mg 650 mg Every 4 Hours PRN 10/11/2018     " Sig - Route: Take 2 tablets by mouth Every 4 (Four) Hours As Needed for Mild Pain . - Oral    albuterol (PROVENTIL) nebulizer solution 0.083% 2.5 mg/3mL 2.5 mg Once 10/17/2018 10/17/2018    Sig - Route: Take 2.5 mg by nebulization 1 (One) Time. - Nebulization    ALPRAZolam (XANAX) tablet 0.25 mg 0.25 mg 2 Times Daily PRN 10/15/2018 10/25/2018    Sig - Route: Take 1 tablet by mouth 2 (Two) Times a Day As Needed for Anxiety. - Oral    Cosign for Ordering: Accepted by Migue Burton MD on 10/16/2018  7:08 AM    amoxicillin-clavulanate (AUGMENTIN) 875-125 MG per tablet 1 tablet 1 tablet Every 12 Hours Scheduled 10/17/2018 10/19/2018    Sig - Route: Take 1 tablet by mouth Every 12 (Twelve) Hours. - Oral    atorvastatin (LIPITOR) tablet 40 mg 40 mg Nightly 10/11/2018     Sig - Route: Take 1 tablet by mouth Every Night. - Oral    benzonatate (TESSALON) capsule 200 mg 200 mg Every 8 Hours 10/16/2018     Sig - Route: Take 2 capsules by mouth Every 8 (Eight) Hours. - Oral    bisacodyl (DULCOLAX) EC tablet 10 mg 10 mg Daily PRN 10/11/2018     Sig - Route: Take 2 tablets by mouth Daily As Needed for Constipation. - Oral    calcium carbonate (TUMS) chewable tablet 500 mg (200 mg elemental) 2 tablet 3 Times Daily PRN 10/13/2018     Sig - Route: Chew 1,000 mg 3 (Three) Times a Day As Needed for Indigestion or Heartburn. - Oral    carvedilol (COREG) tablet 25 mg 25 mg 2 Times Daily 10/13/2018     Sig - Route: Take 2 tablets by mouth 2 (Two) Times a Day. - Oral    clopidogrel (PLAVIX) tablet 75 mg 75 mg Daily 10/15/2018     Sig - Route: Take 1 tablet by mouth Daily. - Oral    dextromethorphan polistirex ER (DELSYM) 30 MG/5ML oral suspension 60 mg 60 mg Every 12 Hours Scheduled 10/16/2018     Sig - Route: Take 60 mg by mouth Every 12 (Twelve) Hours. - Oral    dextrose (D50W) 25 g/ 50mL Intravenous Solution 25 g 25 g Every 15 Minutes PRN 10/12/2018     Sig - Route: Infuse 50 mL into a venous catheter Every 15 (Fifteen) Minutes As  Needed for Low Blood Sugar (Blood Sugar Less Than 70). - Intravenous    dextrose (GLUTOSE) oral gel 15 g 15 g Every 15 Minutes PRN 10/12/2018     Sig - Route: Take 15 g by mouth Every 15 (Fifteen) Minutes As Needed for Low Blood Sugar (Blood sugar less than 70). - Oral    docusate sodium (COLACE) capsule 100 mg 100 mg 2 Times Daily PRN 10/11/2018     Sig - Route: Take 1 capsule by mouth 2 (Two) Times a Day As Needed for Constipation. - Oral    ferrous sulfate tablet 325 mg 325 mg 2 Times Daily With Meals 10/15/2018     Sig - Route: Take 1 tablet by mouth 2 (Two) Times a Day With Meals. - Oral    finasteride (PROSCAR) tablet 5 mg 5 mg Daily 10/14/2018     Sig - Route: Take 1 tablet by mouth Daily. - Oral    glucagon (human recombinant) (GLUCAGEN DIAGNOSTIC) injection 1 mg 1 mg As Needed 10/12/2018     Sig - Route: Inject 1 mg under the skin into the appropriate area as directed As Needed (Blood Glucose Less Than 70). - Subcutaneous    guaiFENesin (MUCINEX) 12 hr tablet 1,200 mg 1,200 mg Every 12 Hours Scheduled 10/17/2018     Sig - Route: Take 2 tablets by mouth Every 12 (Twelve) Hours. - Oral    insulin detemir (LEVEMIR) injection 25 Units 25 Units 2 Times Daily 10/17/2018     Sig - Route: Inject 25 Units under the skin into the appropriate area as directed 2 (Two) Times a Day. - Subcutaneous    insulin lispro (humaLOG) injection 0-9 Units 0-9 Units 4 Times Daily With Meals & Nightly 10/12/2018     Sig - Route: Inject 0-9 Units under the skin into the appropriate area as directed 4 (Four) Times a Day With Meals & at Bedtime. - Subcutaneous    insulin lispro (humaLOG) injection 13 Units 13 Units 3 Times Daily With Meals 10/17/2018     Sig - Route: Inject 13 Units under the skin into the appropriate area as directed 3 (Three) Times a Day With Meals. - Subcutaneous    lidocaine (XYLOCAINE) 4 % nebulizer solution 4 mL 4 mL Every 3 Hours PRN 10/16/2018     Sig - Route: Take 4 mL by nebulization Every 3 (Three) Hours As  Needed (Cough). - Nebulization    melatonin sublingual tablet 5 mg 5 mg Nightly PRN 10/15/2018     Sig - Route: Place 1 tablet under the tongue At Night As Needed for Sleep. - Sublingual    Morphine sulfate (PF) injection 2 mg 2 mg Every 2 Hours PRN 10/12/2018     Sig - Route: Infuse 1 mL into a venous catheter Every 2 (Two) Hours As Needed for Severe Pain . - Intravenous    ondansetron (ZOFRAN) injection 4 mg 4 mg Every 6 Hours PRN 10/11/2018     Sig - Route: Infuse 2 mL into a venous catheter Every 6 (Six) Hours As Needed for Nausea or Vomiting. - Intravenous    oxyCODONE-acetaminophen (PERCOCET)  MG per tablet 1 tablet 1 tablet Every 4 Hours PRN 10/16/2018 10/26/2018    Sig - Route: Take 1 tablet by mouth Every 4 (Four) Hours As Needed for Moderate Pain  or Severe Pain . - Oral    oxyCODONE-acetaminophen (PERCOCET) 7.5-325 MG per tablet 1 tablet 1 tablet Every 8 Hours 10/16/2018 10/19/2018    Sig - Route: Take 1 tablet by mouth Every 8 (Eight) Hours. - Oral    pantoprazole (PROTONIX) EC tablet 40 mg 40 mg Every Early Morning 10/16/2018     Sig - Route: Take 1 tablet by mouth Every Morning. - Oral    Pharmacy Consult - [facility]  Daily 10/11/2018     Sig - Route: Daily. - Does not apply    phenol (CHLORASEPTIC) 1.4 % liquid 2 spray 2 spray Every 2 Hours PRN 10/12/2018     Sig - Route: Apply 2 sprays to the mouth or throat Every 2 (Two) Hours As Needed for Sore Throat. - Mouth/Throat    sacubitril-valsartan (ENTRESTO) 24-26 MG tablet 1 tablet 1 tablet Every 12 Hours Scheduled 10/15/2018     Sig - Route: Take 1 tablet by mouth Every 12 (Twelve) Hours. - Oral    sennosides-docusate sodium (SENOKOT-S) 8.6-50 MG tablet 2 tablet 2 tablet 2 Times Daily 10/11/2018     Sig - Route: Take 2 tablets by mouth 2 (Two) Times a Day. - Oral    sodium chloride 0.9 % flush 3 mL 3 mL Every 12 Hours Scheduled 10/14/2018     Sig - Route: Infuse 3 mL into a venous catheter Every 12 (Twelve) Hours. - Intravenous    sodium chloride 0.9 %  flush 3-10 mL 3-10 mL Every 8 Hours 10/14/2018     Sig - Route: Infuse 3-10 mL into a venous catheter Every 8 (Eight) Hours. - Intravenous    tamsulosin (FLOMAX) 24 hr capsule 0.4 mg 0.4 mg Daily 10/14/2018     Sig - Route: Take 1 capsule by mouth Daily. - Oral    torsemide (DEMADEX) tablet 5 mg 5 mg Daily 10/17/2018     Sig - Route: Take 0.5 tablets by mouth Daily. - Oral    zolpidem (AMBIEN) tablet 5 mg 5 mg Nightly PRN 10/16/2018 10/26/2018    Sig - Route: Take 1 tablet by mouth At Night As Needed for Sleep. - Oral    insulin detemir (LEVEMIR) injection 20 Units (Discontinued) 20 Units 2 Times Daily 10/16/2018 10/17/2018    Sig - Route: Inject 20 Units under the skin into the appropriate area as directed 2 (Two) Times a Day. - Subcutaneous    insulin lispro (humaLOG) injection 9 Units (Discontinued) 9 Units 3 Times Daily With Meals 10/16/2018 10/17/2018    Sig - Route: Inject 9 Units under the skin into the appropriate area as directed 3 (Three) Times a Day With Meals. - Subcutaneous             Physician Progress Notes (last 24 hours) (Notes from 10/16/2018  2:24 PM through 10/17/2018  2:24 PM)      Hank Smith PA at 10/17/2018  8:42 AM     Attestation signed by Migue Burton MD at 10/17/2018  9:59 AM    I have reviewed the documentation above and agree.                      CTS Progress Note      POD 6 s/pCoronary artery bypass graft ×4 with EVH.     Subjective  Up in chair.  No coughing present secondary to lidocaine and treatment and bronchodilator      Objective    Physical Exam:   Vital Signs   Temp:  [98.1 °F (36.7 °C)-99 °F (37.2 °C)] 98.2 °F (36.8 °C)  Heart Rate:  [74-88] 74  Resp:  [16-18] 18  BP: (114-134)/(56-74) 118/74   GEN: NAD   CV: Regular rate and rhythm    RESP: Mostly clear with a few rales at bases.   EXT: Warm with 2+ edema   Incision: Superior portion of sternal incision healing well.  Inferior portion with bandage noted with serosanguineous drainage no surrounding erythema or  "odor noted     Results       Results from last 7 days  Lab Units 10/16/18  0517   WBC 10*3/mm3 6.71   HEMOGLOBIN g/dL 10.1*  10.0*   HEMATOCRIT % 31.2*  31.0*   PLATELETS 10*3/mm3 288       Results from last 7 days  Lab Units 10/16/18  0517   SODIUM mmol/L 135   POTASSIUM mmol/L 4.5   CHLORIDE mmol/L 104   CO2 mmol/L 21.0   BUN mg/dL 50*   CREATININE mg/dL 1.40*   GLUCOSE mg/dL 199*   CALCIUM mg/dL 8.4*       Results from last 7 days  Lab Units 10/12/18  0323 10/11/18  1305   INR  1.02 1.07   APTT seconds  --  <24.0*         Assessment/Plan       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    Ischemic cardiomyopathy with LVEF 26-30% on echocardiogram 10/13/18 (was 40% July 2018)        Plan   Antibiotic started per pulmonary  Gentle diuresis per cardiology  Increase activity with ongoing sternal protection  Sternal dressing changes as needed keep sternum clean and dry  Question DC Valenzuela timing  ZAIDA Arzate  10/17/18  8:42 AM                    Electronically signed by Migue Burton MD at 10/17/2018  9:59 AM     Todd Qureshi MD at 10/17/2018  8:22 AM          Kar Mora  [id number]  1950   LOS: 6 days   Patient Care Team:  Bro Kan MD as PCP - General  Hiren Montoya MD as Consulting Physician (Endocrinology)    Chief Complaint:  IHD / CHF / CKD / DM    Subjective     Overall, \"better.\"  He still has some intermittent coarse heavy cough with scant production.  No hemoptysis, pleurisy, increased tachypnea/dyspnea, nausea, emesis, abdominal pain, headache, or focal motor-sensory changes.  He walked 3 times yesterday without difficulty or complaint.  No bowel movement past 24 " hours.    Objective     Vital Sign Min/Max for last 24 hours  Temp  Min: 98.1 °F (36.7 °C)  Max: 99 °F (37.2 °C)   BP  Min: 114/56  Max: 134/72   Pulse  Min: 74  Max: 88   Resp  Min: 16  Max: 18   SpO2  Min: 88 %  Max: 96 %           1    10/15/18  0500 10/16/18  0422   Weight: 95.7 kg (211 lb) 94.6 kg (208 lb 9.6 oz)         Intake/Output Summary (Last 24 hours) at 10/17/18 0822  Last data filed at 10/16/18 1800   Gross per 24 hour   Intake              480 ml   Output              600 ml   Net             -120 ml       Physical Exam:     General Appearance:    Alert, cooperative, in no acute distress   Lungs:     Clear to auscultation,respirations regular, even and                   unlabored    Heart:    Regular and normal rate, normal S1 and S2, no            murmur, no gallop, no rub, no click   Abdomen:  Extremities:   Soft, non-tender, bowel sounds audible x4    No edema, normal range of motion   Pulses:   Pulses palpable and equal bilaterally      Results Review:     Results from last 7 days  Lab Units 10/16/18  0517 10/15/18  0500 10/14/18  0321   SODIUM mmol/L 135 133 132   POTASSIUM mmol/L 4.5 4.4 4.0   CHLORIDE mmol/L 104 101 104   CO2 mmol/L 21.0 23.0 21.0   BUN mg/dL 50* 50* 44*   CREATININE mg/dL 1.40* 1.62* 1.56*   GLUCOSE mg/dL 199* 212* 141*   CALCIUM mg/dL 8.4* 9.1 7.8*       Results from last 7 days  Lab Units 10/16/18  0517 10/15/18  0500 10/14/18  0321 10/13/18  0321   WBC 10*3/mm3 6.71  --  7.34 9.84   HEMOGLOBIN g/dL 10.1*  10.0* 9.9* 9.1* 10.0*   HEMATOCRIT % 31.2*  31.0* 30.7* 28.1* 31.5*   PLATELETS 10*3/mm3 288  --  158 160       Results from last 7 days  Lab Units 10/10/18  1324   HEMOGLOBIN A1C % 7.50*     · NO EKG.    · CXR:    FINDINGS: Trace effusions. No focal airspace disease or pneumothorax.  Sternotomy wires. Unremarkable cardiomediastinal silhouette.     IMPRESSION: Trace effusions. REVIEWED.    Medication Review: REVIEWED; NO DRIPS.    Assessment/Plan     Chest x-ray suggest  small patchy bibasilar infiltrate and small bibasilar pleural effusions.  Would continue current treatment and will add low-dose torsemide and avoid nonsteroidal anti-inflammatory drugs as well as selective aldosterone receptor antagonist therapy at this time.  Appreciate the evaluation and assistance from Dr. Branden Florence.    Discussed with patient and wife in room.      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 Kar Mora, 68 y.o. 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 Desensitization injections discontinued July 2018    Cough       SUBJECTIVE     68-year-old white male remote smoker (none for 37 years) with a history of hypertension and hypertensive cardiovascular disease, hyperlipidemia, diabetes mellitus, obesity, chronic kidney disease (baseline creatinine preop was 1.63), allergic rhinitis (previously on desensitization injections), and gout. Recently diagnosed with significant CAD on catheterization 7/5/18. In addition had LVEF of 40%. Underwent elective CABG ×4 on 10/11/18. Had  "a slight bump in his creatinine to 1.8 and but it returned to baseline. Around 10/14/18     Because of the above patient underwent elective CABG ×4 without complications other than a transient slight increase in creatinine from 1.63 preoperatively to 1.89 which returned to baseline. Around 10/14/18 the patient developed a persistent intense nonproductive cough. This has induced a great deal of discomfort due to chest pain associated with the cough and leading to concern about sternal stability. There has been no sputum or hemoptysis, and he has had no fever or chills. In addition white count was normal. He denies dyspnea or wheezing as well as dysphagia, postnasal drip or significant reflux processes although he does have a history of allergies and GERD.     He is on guaifenesin, dextromethorphan, Tessalon, and codeine derivatives to suppress his cough. In addition yesterday I started him on prn lidocaine treatments. He states that the first one given yesterday helped him but he did not ask for any more. Cough persists today but his wife states that he is definitely better.    The patient's relevant past medical, surgical and social history were reviewed and updated in Epic as appropriate.    OBJECTIVE     /67 (BP Location: Right arm, Patient Position: Lying)   Pulse 78   Temp 98.1 °F (36.7 °C) (Oral)   Resp 16   Ht 167.6 cm (66\")   Wt 94.6 kg (208 lb 9.6 oz)   SpO2 92%   BMI 33.67 kg/m²    Oxygen Concentration (%): 40  Flow (L/min): 2    Flowsheet Rows      First Filed Value   Admission Height  167.6 cm (66\") Documented at 10/11/2018 0604   Admission Weight  96.2 kg (212 lb) Documented at 10/11/2018 0604        Intake & Output (last day)       10/16 0701 - 10/17 0700 10/17 0701 - 10/18 0700    P.O. 720     Total Intake(mL/kg) 720 (7.6)     Urine (mL/kg/hr) 600 (0.3)     Total Output 600      Net +120                  Exam:  General Exam:  Well-developed older white male in NAD. Still " coughing  HEENT: Pupils equal and reactive. Nose and throat clear.  Neck:                          Supple, no JVD, thyromegaly, or adenopathy  Lungs: Today I hear some rales in the left posterior base  Cardiovascular: RRR without murmurs or gallops.  Abdomen: Soft nontender without organomegaly or masses. Obese   and rectal: Deferred.  Extremities: No cyanosis clubbing edema.  Neurologic:                 Symmetric strength. No focal deficits.    Chest X-Ray: No film today. Yesterday's just reveals some bibasilar atelectasis left more prominent than right      Results from last 7 days  Lab Units 10/16/18  0517 10/15/18  0500 10/14/18  0321 10/13/18  0321   WBC 10*3/mm3 6.71  --  7.34 9.84   HEMOGLOBIN g/dL 10.1*  10.0* 9.9* 9.1* 10.0*   HEMATOCRIT % 31.2*  31.0* 30.7* 28.1* 31.5*   PLATELETS 10*3/mm3 288  --  158 160       Results from last 7 days  Lab Units 10/16/18  0517 10/15/18  0500   SODIUM mmol/L 135 133   POTASSIUM mmol/L 4.5 4.4   CHLORIDE mmol/L 104 101   CO2 mmol/L 21.0 23.0   BUN mg/dL 50* 50*   CREATININE mg/dL 1.40* 1.62*   GLUCOSE mg/dL 199* 212*   CALCIUM mg/dL 8.4* 9.1       Results from last 7 days  Lab Units 10/12/18  0323 10/11/18  1448 10/11/18  1305   MAGNESIUM mg/dL 2.6 2.6 2.6   PHOSPHORUS mg/dL 4.6 2.8 3.4       Results from last 7 days  Lab Units 10/10/18  1324   ALK PHOS U/L 35   BILIRUBIN mg/dL 0.4   ALT (SGPT) U/L 29   AST (SGOT) U/L 33       No results found for: SEDRATE  No results found for: BNP  No results found for: CKTOTAL, CKMB, CKMBINDEX, TROPONINI, TROPONINT  No results found for: TSH  No results found for: LACTATE  No results found for: CORTISOL      Results from last 7 days  Lab Units 10/11/18  1448 10/11/18  1409 10/11/18  1152 10/11/18  1008 10/11/18  0927   PH, ARTERIAL pH units 7.341* 7.324* 7.229* 7.29* 7.32*   PCO2, ARTERIAL mm Hg 39.7 41.2 52.0*  --   --    PO2 ART mm Hg 100.0 92.1 191.0*  --   --    HCO3 ART mmol/L 21.5 21.4 21.7  --   --    FIO2 % 40 40 100  --    --        I reviewed the patient's results, images and medication.    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 Desensitization injections discontinued July 2018    Cough      DISCUSSION: Cough better but persists. I do hear some rales in the left posterior base and to be on the safe side I am going to place him on oral antimicrobial therapy.    PLAN     1. Augmentin 875 mg every 12 hours  2. I have encouraged the patient to ask for the Xylocaine treatment when his cough is a problem. These are available every 3 hours. I do not like to schedule these treatments as they are then given when not necessary  3. Follow-up labs and chest x-ray in a.m.   4. Prior to this hospitalization he was on losartan (ARB) which is not associated with cough. This has been switched to Entresto. I have never considered this an agent which can induce cough as it also is an ARB plus sacubitril, neither of which should cause cough. I note however in looking at the package insert that there is a 9% incidence of dry cough with Entresto. Would not stop it at present, however if we cannot get the cough to resolve would consider switching back to losartan      Plan of care and goals reviewed with mulitdisciplinary team at daily rounds.    I discussed the patient's findings and my recommendations with patient, family and nursing staff    Time spent Critical care 25 min (It does not include procedure time).    Branden Florence MD  Intensive Care Medicine  10/17/18 7:55 AM       Electronically signed by Branden Florence MD at 10/17/2018  8:03 AM

## 2018-10-17 NOTE — PROGRESS NOTES
"Kar Mora  [id number]  1950   LOS: 6 days   Patient Care Team:  Bro Kan MD as PCP - General  MontoyaHiren MD as Consulting Physician (Endocrinology)    Chief Complaint:  IHD / CHF / CKD / DM    Subjective     Overall, \"better.\"  He still has some intermittent coarse heavy cough with scant production.  No hemoptysis, pleurisy, increased tachypnea/dyspnea, nausea, emesis, abdominal pain, headache, or focal motor-sensory changes.  He walked 3 times yesterday without difficulty or complaint.  No bowel movement past 24 hours.    Objective     Vital Sign Min/Max for last 24 hours  Temp  Min: 98.1 °F (36.7 °C)  Max: 99 °F (37.2 °C)   BP  Min: 114/56  Max: 134/72   Pulse  Min: 74  Max: 88   Resp  Min: 16  Max: 18   SpO2  Min: 88 %  Max: 96 %           1    10/15/18  0500 10/16/18  0422   Weight: 95.7 kg (211 lb) 94.6 kg (208 lb 9.6 oz)         Intake/Output Summary (Last 24 hours) at 10/17/18 0822  Last data filed at 10/16/18 1800   Gross per 24 hour   Intake              480 ml   Output              600 ml   Net             -120 ml       Physical Exam:     General Appearance:    Alert, cooperative, in no acute distress   Lungs:     Clear to auscultation,respirations regular, even and                   unlabored    Heart:    Regular and normal rate, normal S1 and S2, no            murmur, no gallop, no rub, no click   Abdomen:  Extremities:   Soft, non-tender, bowel sounds audible x4    No edema, normal range of motion   Pulses:   Pulses palpable and equal bilaterally      Results Review:     Results from last 7 days  Lab Units 10/16/18  0517 10/15/18  0500 10/14/18  0321   SODIUM mmol/L 135 133 132   POTASSIUM mmol/L 4.5 4.4 4.0   CHLORIDE mmol/L 104 101 104   CO2 mmol/L 21.0 23.0 21.0   BUN mg/dL 50* 50* 44*   CREATININE mg/dL 1.40* 1.62* 1.56*   GLUCOSE mg/dL 199* 212* 141*   CALCIUM mg/dL 8.4* 9.1 7.8*       Results from last 7 days  Lab Units 10/16/18  0517 10/15/18  0500 " 10/14/18  0321 10/13/18  0321   WBC 10*3/mm3 6.71  --  7.34 9.84   HEMOGLOBIN g/dL 10.1*  10.0* 9.9* 9.1* 10.0*   HEMATOCRIT % 31.2*  31.0* 30.7* 28.1* 31.5*   PLATELETS 10*3/mm3 288  --  158 160       Results from last 7 days  Lab Units 10/10/18  1324   HEMOGLOBIN A1C % 7.50*     · NO EKG.    · CXR:    FINDINGS: Trace effusions. No focal airspace disease or pneumothorax.  Sternotomy wires. Unremarkable cardiomediastinal silhouette.     IMPRESSION: Trace effusions. REVIEWED.    Medication Review: REVIEWED; NO DRIPS.    Assessment/Plan     Chest x-ray suggest small patchy bibasilar infiltrate and small bibasilar pleural effusions.  Would continue current treatment and will add low-dose torsemide and avoid nonsteroidal anti-inflammatory drugs as well as selective aldosterone receptor antagonist therapy at this time.  Appreciate the evaluation and assistance from Dr. Branden Florence.    Discussed with patient and wife in room.      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

## 2018-10-17 NOTE — PROGRESS NOTES
Continued Stay Note   [facility]     Patient Name: Kar Mora  MRN: [id number]  Today's Date: 10/17/2018    Admit Date: 10/11/2018          Discharge Plan     Row Name 10/17/18 1418       Plan    Plan Home with family    Patient/Family in Agreement with Plan yes    Plan Comments Spoke with patient at bedside.  Patient still has cough and moderate chest drainage from chest incision.  Insurance approval for [employer].  Will fit patient when appropriate.  Follow up with rep. Garza for [employer] fitting for patient [phone].  Patient has walker at bedside.  Trying to wean from 02 at this time.  Will continue to follow for discharge planning needs.     Final Discharge Disposition Code 01 - home or self-care              Discharge Codes    No documentation.           Delaney Gann RN

## 2018-10-17 NOTE — THERAPY TREATMENT NOTE
Acute Care - Physical Therapy Treatment Note  [facility]     Patient Name: Kar Mora  : 1950  MRN: [id number]  Today's Date: 10/17/2018  Onset of Illness/Injury or Date of Surgery: 10/11/18  Date of Referral to PT: 10/12/18  Referring Physician: MD Burton    Admit Date: 10/11/2018    Visit Dx:    ICD-10-CM ICD-9-CM   1. Impaired functional mobility, balance, gait, and endurance Z74.09 V49.89   2. Atherosclerosis of native coronary artery of native heart with stable angina pectoris (CMS/[facility]) I25.118 414.01     413.9   3. Coronary artery disease (S/P CABG x 4) I25.119 414.01     413.9   4. LOPEZ (acute kidney injury) (CMS/[facility]) N17.9 584.9   5. Ischemic heart disease I25.9 414.9   6. Hypertensive heart disease without heart failure I11.9 402.90     Patient Active Problem List   Diagnosis   • Insulin dependent type 2 diabetes mellitus (CMS/[facility])   • Labile hypertension   • Hyperlipidemia   • Erectile dysfunction   • Allergic rhinitis. 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   • Ischemic cardiomyopathy with LVEF 26-30% on echocardiogram 10/13/18 (was 40% 2018)       Therapy Treatment          Rehabilitation Treatment Summary     Row Name 10/17/18 1737             Treatment Time/Intention    Discipline physical therapist  -DM      Document Type therapy note (daily note)  -DM      Subjective Information complains of;weakness;pain  -DM      Mode of Treatment individual therapy;physical therapy  -DM      Patient/Family Observations [facility];Speaking to Screen rep;o2;tele; iv hep locked; leong; keshawn edema in legs; wife present  -DM      Care Plan Review care plan/treatment goals reviewed;patient/other agree to care plan  -DM      Therapy  "Frequency (PT Clinical Impression) daily  -DM      Patient Effort good  -DM      Existing Precautions/Restrictions cardiac;oxygen therapy device and L/min;sternal  -DM      Treatment Considerations/Comments noted incis. draining, inc. \"Popping\" in 2 places since coughing spell 10-16;tried binder last pm;felt like \"suit of armor; won't work\"; decl. further use  -DM      Recorded by [DM] Skylar Tillman, PT 10/17/18 1755      Row Name 10/17/18 5994             Vital Signs    Pre Systolic BP Rehab 112  -DM      Pre Treatment Diastolic BP 63  -DM      Post Systolic BP Rehab 121  -DM      Post Treatment Diastolic BP 69  -DM      Pretreatment Heart Rate (beats/min) 71  -DM      Intratreatment Heart Rate (beats/min) 101  -DM      Posttreatment Heart Rate (beats/min) 86  -DM      Pre SpO2 (%) 92  -DM      O2 Delivery Pre Treatment supplemental O2  -DM      Intra SpO2 (%) 87  -DM      O2 Delivery Intra Treatment supplemental O2  -DM      Post SpO2 (%) 95  -DM      O2 Delivery Post Treatment supplemental O2  -DM      Pre Patient Position Sitting  -DM      Intra Patient Position Standing  -DM      Post Patient Position Sitting   respirex 2250 cc  -DM      Recorded by [DM] Skylar Tillman, PT 10/17/18 1755      Row Name 10/17/18 5821             Cognitive Assessment/Intervention    Additional Documentation Cognitive Assessment/Intervention (Group)  -DM      Recorded by [DM] Skylar Tillman, PT 10/17/18 1755      Row Name 10/17/18 8596             Cognitive Assessment/Intervention- PT/OT    Orientation Status (Cognition) oriented x 4  -DM      Follows Commands (Cognition) WFL  -DM      Cognitive Function (Cognitive) WFL;safety deficit  -DM      Safety Deficit (Cognitive) mild deficit;impulsivity;safety precautions follow-through/compliance  -DM      Personal Safety Interventions fall prevention program maintained;gait belt;nonskid shoes/slippers when out of bed  -DM      Recorded by [DM] Skylar Tillman, PT 10/17/18 9013   "    Row Name 10/17/18 1737             Safety Issues, Functional Mobility    Impairments Affecting Function (Mobility) balance;endurance/activity tolerance;pain;shortness of breath  -DM      Recorded by [DM] Skylar Tillman, PT 10/17/18 1755      Row Name 10/17/18 1737             Bed Mobility Assessment/Treatment    Bed Mobility Assessment/Treatment --  -DM      Supine-Sit [place] (Bed Mobility) --  -DM      Bed Mobility, Safety Issues --  -DM      Assistive Device (Bed Mobility) --  -DM      Comment (Bed Mobility) UI  -DM      Recorded by [DM] Skylar Tillman, PT 10/17/18 1755      Row Name 10/17/18 1737             Transfer Assessment/Treatment    Transfer Assessment/Treatment sit-stand transfer;stand-sit transfer  -DM      Recorded by [DM] Skylar Tillman, PT 10/17/18 1755      Row Name 10/17/18 1737             Sit-Stand Transfer    Sit-Stand [place] (Transfers) contact guard;verbal cues  -DM      Assistive Device (Sit-Stand Transfers) walker, front-wheeled   DID not utilize AD for STS  -DM      Recorded by [DM] Skylar Tillman, PT 10/17/18 1755      Row Name 10/17/18 1737             Stand-Sit Transfer    Stand-Sit [place] (Transfers) contact guard;verbal cues  -DM      Assistive Device (Stand-Sit Transfers) walker, front-wheeled   DID not utilize AD (cues for splinting)  -DM      Recorded by [DM] Skylar Tillman, PT 10/17/18 1755      Row Name 10/17/18 1737             Gait/Stairs Assessment/Training    Gait/Stairs Assessment/Training gait/ambulation independence  -DM      [place] Level (Gait) verbal cues;contact guard  -DM      Assistive Device (Gait) walker, front-wheeled  -DM      Distance in Feet (Gait) 400   2standRests; PLB exer;discussed binder use w/ nsg supv@[employer]  -DM      Pattern (Gait) step-through  -DM      Deviations/Abnormal Patterns (Gait) base of support, narrow;diana decreased;stride length decreased  -DM      Bilateral Gait Deviations weight shift ability decreased   -DM      Recorded by [DM] Skylar Tillman, PT 10/17/18 1755      Row Name 10/17/18 1737             Motor Skills Assessment/Interventions    Additional Documentation Therapeutic Exercise (Group);Therapeutic Exercise Interventions (Group)  -DM      Recorded by [DM] Skylar Tillman, PT 10/17/18 1755      Row Name 10/17/18 1737             Therapeutic Exercise    45288 - PT Therapeutic Activity Minutes 18  -DM      Recorded by [DM] Skylar Tillman, PT 10/17/18 1755      Row Name 10/17/18 1737             Therapeutic Exercise    Lower Extremity (Therapeutic Exercise) LAQ (long arc quad), bilateral;marching while seated  -DM      Lower Extremity Range of Motion (Therapeutic Exercise) ankle dorsiflexion/plantar flexion, bilateral  -DM      Exercise Type (Therapeutic Exercise) AROM (active range of motion)  -DM      Position (Therapeutic Exercise) seated  -DM      Sets/Reps (Therapeutic Exercise) 1/10  -DM      Comment (Therapeutic Exercise) rests btwn sets; nsg in to discuss CXR, scan & give meds & assess chest  -DM      Recorded by [DM] Skylar Tillman, PT 10/17/18 1755      Row Name 10/17/18 1737             Positioning and Restraints    Pre-Treatment Position sitting in chair/recliner  -DM      Post Treatment Position chair  -DM      In Chair notified nsg;reclined;call light within reach;encouraged to call for assist;with family/caregiver;RUE elevated;LUE elevated;waffle cushion;legs elevated  -DM      Recorded by [DM] Skylar Tillman, PT 10/17/18 1755      Row Name 10/17/18 1737             Pain Assessment    Additional Documentation Pain Scale: Numbers Pre/Post-Treatment (Group)  -DM      Recorded by [DM] Skylar Tillman, PT 10/17/18 1755      Row Name 10/17/18 1737             Pain Scale: Numbers Pre/Post-Treatment    Pain Scale: Numbers, Pretreatment 6/10  -DM      Pain Scale: Numbers, Post-Treatment 7/10  -DM      Pain Location - Orientation incisional  -DM      Pain Location chest  -DM      Pain  Intervention(s) Repositioned;Rest  -DM      Recorded by [DM] Skylar Tillman, PT 10/17/18 1755      Row Name                Wound 10/11/18 0801 Other (See comments) chest incision    Wound - Properties Group Date first assessed: 10/11/18 [SH] Time first assessed: 0801 [SH] Side: Other (See comments) [SH] Location: chest [SH] Type: incision [SH] Recorded by:  [SH] Haase, Sherri L, RN 10/11/18 0801    Row Name                Wound 10/11/18 0801 Right leg incision    Wound - Properties Group Date first assessed: 10/11/18 [SH] Time first assessed: 0801 [SH] Side: Right [SH] Location: leg [SH] Type: incision [SH] Recorded by:  [SH] Haase, Sherri L, RN 10/11/18 0801    Row Name 10/17/18 1737             Coping    Observed Emotional State --  -DM      Verbalized Emotional State --  -DM      Recorded by [DM] Skylar Tillman, PT 10/17/18 1755      Row Name 10/17/18 1737             Plan of Care Review    Plan of Care Reviewed With patient;spouse  -DM      Recorded by [DM] Skylar Tillman, PT 10/17/18 1755      Row Name 10/17/18 1737             Outcome Summary/Treatment Plan (PT)    Daily Summary of Progress (PT) progress towards functional goals is fair  -DM      Anticipated Discharge Disposition (PT) home with OP services  -DM      Recorded by [DM] Skylar Tillman, PT 10/17/18 1755        User Key  (r) = Recorded By, (t) = Taken By, (c) = Cosigned By    Initials Name Effective Dates Discipline    DM Skylar Tillman, PT 06/19/15 -  PT    SH Haase, Sherri L, RN 06/16/16 -  Nurse          Wound 10/11/18 0801 Other (See comments) chest incision (Active)   Dressing Appearance moist drainage 10/17/2018  6:43 AM   Closure Liquid skin adhesive 10/17/2018  6:43 AM   Base clean;pink;yellow;white 10/17/2018  6:43 AM   Periwound intact;dry;pink 10/17/2018  6:43 AM   Periwound Temperature warm 10/17/2018  6:43 AM   Periwound Skin Turgor soft 10/17/2018  6:43 AM   Edges open 10/17/2018  6:43 AM   Drainage Characteristics/Odor  serosanguineous 10/17/2018  6:43 AM   Drainage Amount small 10/17/2018  6:43 AM   Care, Wound cleansed with;sterile normal saline 10/17/2018  6:43 AM   Dressing Care, Wound dressing changed 10/17/2018  6:43 AM       Wound 10/11/18 0801 Right leg incision (Active)   Dressing Appearance open to air 10/16/2018  8:00 PM   Closure Liquid skin adhesive 10/16/2018  8:00 PM   Drainage Amount none 10/16/2018  8:00 PM             Physical Therapy Education     Title: PT OT SLP Therapies (Active)     Topic: Physical Therapy (Active)     Point: Mobility training (Active)    Learning Progress Summary     Learner Status Readiness Method Response Comment Documented by    Patient Active Eager E,D NR  DM 10/17/18 1755     Active Acceptance E,D NR  SJ 10/14/18 1625     Active Acceptance E NR  VERONICA 10/12/18 0855     Active Acceptance E NR  VERONICA 10/12/18 0855    Significant Other Active Eager E,D NR  DM 10/17/18 1755     Active Acceptance E,D NR  SJ 10/14/18 1625          Point: Home exercise program (Active)    Learning Progress Summary     Learner Status Readiness Method Response Comment Documented by    Patient Active Eager E,D NR  DM 10/17/18 1755     Active Acceptance E,D NR  SJ 10/14/18 1625     Active Acceptance E NR  VERONICA 10/12/18 0855     Active Acceptance E NR  VERONICA 10/12/18 0855    Significant Other Active Eager E,D NR  DM 10/17/18 1755     Active Acceptance E,D NR  SJ 10/14/18 1625          Point: Body mechanics (Active)    Learning Progress Summary     Learner Status Readiness Method Response Comment Documented by    Patient Active Eager E,D NR  DM 10/17/18 1755     Active Acceptance E,D NR  SJ 10/14/18 1625     Done Acceptance E VU,NR  CC 10/14/18 1537     Active Acceptance E NR  VERONICA 10/12/18 0855     Active Acceptance E NR  VERONICA 10/12/18 0855    Significant Other Active Eager E,D NR  DM 10/17/18 1755     Active Acceptance E,D NR  SJ 10/14/18 1625          Point: Precautions (Active)    Learning Progress Summary     Learner Status  Readiness Method Response Comment Documented by    Patient Active Eager E,D NR  DM 10/17/18 1755     Active Acceptance E,D NR   10/14/18 1625     Active Acceptance E NR  VERONICA 10/12/18 0855     Active Acceptance E NR  VERONICA 10/12/18 0855    Significant Other Active Eager E,D NR   10/17/18 1755     Active Acceptance E,D NR   10/14/18 1625                      User Key     Initials Effective Dates Name Provider Type Discipline     06/19/15 -  Rhiannon Muñoz, PT Physical Therapist PT     06/19/15 -  Loly Jones PT Physical Therapist PT     06/19/15 -  Skylar Tillman PT Physical Therapist PT     06/16/16 -  Saige Grove RN Registered Nurse Nurse                    PT Recommendation and Plan  Anticipated Discharge Disposition (PT): home with OP services  Therapy Frequency (PT Clinical Impression): daily  Outcome Summary/Treatment Plan (PT)  Daily Summary of Progress (PT): progress towards functional goals is fair  Anticipated Discharge Disposition (PT): home with OP services  Plan of Care Reviewed With: patient, spouse  Progress: improving  Outcome Summary: ''able to amb 400 ft w/ R wx & CGA w/ 2 stand. rests, but limited by incis. pain, inc. drng. d/t coughing spells & dec HGB (10.0); Needs reinforcement of sternal precaut.          Outcome Measures     Row Name 10/17/18 2137             How much help from another person do you currently need...    Turning from your back to your side while in flat bed without using bedrails? 3  -DM      Moving from lying on back to sitting on the side of a flat bed without bedrails? 2  -DM      Moving to and from a bed to a chair (including a wheelchair)? 3  -DM      Standing up from a chair using your arms (e.g., wheelchair, bedside chair)? 3  -DM      Climbing 3-5 steps with a railing? 2  -DM      To walk in hospital room? 3  -DM      AM-PAC 6 Clicks Score 16  -DM         Functional Assessment    Outcome Measure Options AM-PAC 6 Clicks Basic Mobility (PT)   -DM        User Key  (r) = Recorded By, (t) = Taken By, (c) = Cosigned By    Initials Name Provider Type    DM Skylar Tillman, PT Physical Therapist           Time Calculation:         PT Charges     Row Name 10/17/18 1758 10/17/18 1737          Time Calculation    Start Time 1737  -DM  --     PT Received On 10/17/18  -DM  --     PT Goal Re-Cert Due Date 10/22/18  -DM  --        Time Calculation- PT    Total Timed Code Minutes- PT 18 minute(s)  -DM  --        Timed Charges    94688 - PT Therapeutic Activity Minutes  -- 18  -DM       User Key  (r) = Recorded By, (t) = Taken By, (c) = Cosigned By    Initials Name Provider Type    Skylar Harry, PT Physical Therapist        Therapy Suggested Charges     Code   Minutes Charges    37344 (CPT®) Hc Pt Neuromusc Re Education Ea 15 Min      22823 (CPT®) Hc Pt Ther Proc Ea 15 Min      54959 (CPT®) Hc Gait Training Ea 15 Min      89864 (CPT®) Hc Pt Therapeutic Act Ea 15 Min 18 1    77760 (CPT®) Hc Pt Manual Therapy Ea 15 Min      61054 (CPT®) Hc Pt Iontophoresis Ea 15 Min      74104 (CPT®) Hc Pt Elec Stim Ea-Per 15 Min      68395 (CPT®) Hc Pt Ultrasound Ea 15 Min      19903 (CPT®) Hc Pt Self Care/Mgmt/Train Ea 15 Min      88056 (CPT®) Hc Pt Prosthetic (S) Train Initial Encounter, Each 15 Min      50305 (CPT®) Hc Pt Orthotic(S)/Prosthetic(S) Encounter, Each 15 Min      94174 (CPT®) Hc Orthotic(S) Mgmt/Train Initial Encounter, Each 15min      Total  18 1        Therapy Charges for Today     Code Description Service Date Service Provider Modifiers Qty    40873568808 HC PT THERAPEUTIC ACT EA 15 MIN 10/17/2018 Skylar Tillman, PT GP 1          PT G-Codes  Outcome Measure Options: AM-PAC 6 Clicks Basic Mobility (PT)  AM-PAC 6 Clicks Score: 16    Skylar Tillman, PT  10/17/2018

## 2018-10-18 ENCOUNTER — APPOINTMENT (OUTPATIENT)
Dept: GENERAL RADIOLOGY | Facility: HOSPITAL | Age: 68
End: 2018-10-18

## 2018-10-18 LAB
ANION GAP SERPL CALCULATED.3IONS-SCNC: 8 MMOL/L (ref 3–11)
BASOPHILS # BLD AUTO: 0.02 10*3/MM3 (ref 0–0.2)
BASOPHILS NFR BLD AUTO: 0.2 % (ref 0–1)
BUN BLD-MCNC: 67 MG/DL (ref 9–23)
BUN/CREAT SERPL: 44.7 (ref 7–25)
CALCIUM SPEC-SCNC: 8.6 MG/DL (ref 8.7–10.4)
CHLORIDE SERPL-SCNC: 103 MMOL/L (ref 99–109)
CO2 SERPL-SCNC: 22 MMOL/L (ref 20–31)
CREAT BLD-MCNC: 1.5 MG/DL (ref 0.6–1.3)
DEPRECATED RDW RBC AUTO: 47 FL (ref 37–54)
EOSINOPHIL # BLD AUTO: 0.09 10*3/MM3 (ref 0–0.3)
EOSINOPHIL NFR BLD AUTO: 0.9 % (ref 0–3)
ERYTHROCYTE [DISTWIDTH] IN BLOOD BY AUTOMATED COUNT: 14.6 % (ref 11.3–14.5)
GFR SERPL CREATININE-BSD FRML MDRD: 47 ML/MIN/1.73
GLUCOSE BLD-MCNC: 296 MG/DL (ref 70–100)
GLUCOSE BLDC GLUCOMTR-MCNC: 105 MG/DL (ref 70–130)
GLUCOSE BLDC GLUCOMTR-MCNC: 143 MG/DL (ref 70–130)
GLUCOSE BLDC GLUCOMTR-MCNC: 256 MG/DL (ref 70–130)
GLUCOSE BLDC GLUCOMTR-MCNC: 269 MG/DL (ref 70–130)
HCT VFR BLD AUTO: 32.5 % (ref 38.9–50.9)
HGB BLD-MCNC: 10.4 G/DL (ref 13.1–17.5)
IMM GRANULOCYTES # BLD: 0.2 10*3/MM3 (ref 0–0.03)
IMM GRANULOCYTES NFR BLD: 1.9 % (ref 0–0.6)
LYMPHOCYTES # BLD AUTO: 2.21 10*3/MM3 (ref 0.6–4.8)
LYMPHOCYTES NFR BLD AUTO: 20.9 % (ref 24–44)
MCH RBC QN AUTO: 28 PG (ref 27–31)
MCHC RBC AUTO-ENTMCNC: 32 G/DL (ref 32–36)
MCV RBC AUTO: 87.6 FL (ref 80–99)
MONOCYTES # BLD AUTO: 1.1 10*3/MM3 (ref 0–1)
MONOCYTES NFR BLD AUTO: 10.4 % (ref 0–12)
NEUTROPHILS # BLD AUTO: 7.16 10*3/MM3 (ref 1.5–8.3)
NEUTROPHILS NFR BLD AUTO: 67.6 % (ref 41–71)
PLATELET # BLD AUTO: 385 10*3/MM3 (ref 150–450)
PMV BLD AUTO: 9.7 FL (ref 6–12)
POTASSIUM BLD-SCNC: 4.7 MMOL/L (ref 3.5–5.5)
RBC # BLD AUTO: 3.71 10*6/MM3 (ref 4.2–5.76)
SODIUM BLD-SCNC: 133 MMOL/L (ref 132–146)
WBC NRBC COR # BLD: 10.58 10*3/MM3 (ref 3.5–10.8)

## 2018-10-18 PROCEDURE — 94640 AIRWAY INHALATION TREATMENT: CPT

## 2018-10-18 PROCEDURE — 99232 SBSQ HOSP IP/OBS MODERATE 35: CPT | Performed by: INTERNAL MEDICINE

## 2018-10-18 PROCEDURE — 63710000001 INSULIN DETEMIR PER 5 UNITS: Performed by: HOSPITALIST

## 2018-10-18 PROCEDURE — 94799 UNLISTED PULMONARY SVC/PX: CPT

## 2018-10-18 PROCEDURE — 71045 X-RAY EXAM CHEST 1 VIEW: CPT

## 2018-10-18 PROCEDURE — 82962 GLUCOSE BLOOD TEST: CPT

## 2018-10-18 PROCEDURE — 25010000002 MORPHINE SULFATE (PF) 2 MG/ML SOLUTION: Performed by: THORACIC SURGERY (CARDIOTHORACIC VASCULAR SURGERY)

## 2018-10-18 PROCEDURE — 85025 COMPLETE CBC W/AUTO DIFF WBC: CPT | Performed by: INTERNAL MEDICINE

## 2018-10-18 PROCEDURE — 80048 BASIC METABOLIC PNL TOTAL CA: CPT | Performed by: INTERNAL MEDICINE

## 2018-10-18 PROCEDURE — 94760 N-INVAS EAR/PLS OXIMETRY 1: CPT

## 2018-10-18 PROCEDURE — 97530 THERAPEUTIC ACTIVITIES: CPT

## 2018-10-18 PROCEDURE — 99232 SBSQ HOSP IP/OBS MODERATE 35: CPT | Performed by: NURSE PRACTITIONER

## 2018-10-18 RX ORDER — BISACODYL 10 MG
10 SUPPOSITORY, RECTAL RECTAL ONCE
Status: COMPLETED | OUTPATIENT
Start: 2018-10-18 | End: 2018-10-18

## 2018-10-18 RX ADMIN — Medication 3 ML: at 20:45

## 2018-10-18 RX ADMIN — BENZONATATE 200 MG: 100 CAPSULE ORAL at 09:00

## 2018-10-18 RX ADMIN — MORPHINE SULFATE 2 MG: 2 INJECTION, SOLUTION INTRAMUSCULAR; INTRAVENOUS at 04:36

## 2018-10-18 RX ADMIN — ALPRAZOLAM 0.25 MG: 0.25 TABLET ORAL at 20:43

## 2018-10-18 RX ADMIN — INSULIN LISPRO 6 UNITS: 100 INJECTION, SOLUTION INTRAVENOUS; SUBCUTANEOUS at 08:14

## 2018-10-18 RX ADMIN — DEXTROMETHORPHAN 60 MG: 30 SUSPENSION, EXTENDED RELEASE ORAL at 09:01

## 2018-10-18 RX ADMIN — Medication 3 ML: at 09:03

## 2018-10-18 RX ADMIN — OXYCODONE HYDROCHLORIDE AND ACETAMINOPHEN 1 TABLET: 10; 325 TABLET ORAL at 00:07

## 2018-10-18 RX ADMIN — BENZONATATE 200 MG: 100 CAPSULE ORAL at 22:43

## 2018-10-18 RX ADMIN — BISACODYL 10 MG: 5 TABLET, COATED ORAL at 20:43

## 2018-10-18 RX ADMIN — Medication 325 MG: at 08:13

## 2018-10-18 RX ADMIN — CARVEDILOL 25 MG: 12.5 TABLET, FILM COATED ORAL at 09:01

## 2018-10-18 RX ADMIN — ALPRAZOLAM 0.25 MG: 0.25 TABLET ORAL at 00:30

## 2018-10-18 RX ADMIN — SACUBITRIL AND VALSARTAN 1 TABLET: 24; 26 TABLET, FILM COATED ORAL at 09:00

## 2018-10-18 RX ADMIN — LIDOCAINE HYDROCHLORIDE 4 ML: 40 INJECTION, SOLUTION RETROBULBAR; TOPICAL at 04:49

## 2018-10-18 RX ADMIN — OXYCODONE HYDROCHLORIDE AND ACETAMINOPHEN 1 TABLET: 7.5; 325 TABLET ORAL at 20:43

## 2018-10-18 RX ADMIN — TORSEMIDE 5 MG: 10 TABLET ORAL at 09:01

## 2018-10-18 RX ADMIN — Medication 5 MG: at 00:08

## 2018-10-18 RX ADMIN — CLOPIDOGREL BISULFATE 75 MG: 75 TABLET ORAL at 09:00

## 2018-10-18 RX ADMIN — AMOXICILLIN AND CLAVULANATE POTASSIUM 1 TABLET: 875; 125 TABLET, FILM COATED ORAL at 20:42

## 2018-10-18 RX ADMIN — PANTOPRAZOLE SODIUM 40 MG: 40 TABLET, DELAYED RELEASE ORAL at 05:58

## 2018-10-18 RX ADMIN — CARVEDILOL 25 MG: 12.5 TABLET, FILM COATED ORAL at 20:42

## 2018-10-18 RX ADMIN — INSULIN DETEMIR 30 UNITS: 100 INJECTION, SOLUTION SUBCUTANEOUS at 11:21

## 2018-10-18 RX ADMIN — Medication 325 MG: at 17:15

## 2018-10-18 RX ADMIN — TAMSULOSIN HYDROCHLORIDE 0.4 MG: 0.4 CAPSULE ORAL at 09:00

## 2018-10-18 RX ADMIN — ZOLPIDEM TARTRATE 5 MG: 5 TABLET ORAL at 22:43

## 2018-10-18 RX ADMIN — BENZONATATE 200 MG: 100 CAPSULE ORAL at 17:15

## 2018-10-18 RX ADMIN — LIDOCAINE HYDROCHLORIDE 4 ML: 40 INJECTION, SOLUTION RETROBULBAR; TOPICAL at 14:50

## 2018-10-18 RX ADMIN — BISACODYL 10 MG: 5 TABLET, COATED ORAL at 09:00

## 2018-10-18 RX ADMIN — GUAIFENESIN 1200 MG: 600 TABLET, EXTENDED RELEASE ORAL at 20:42

## 2018-10-18 RX ADMIN — DOCUSATE SODIUM 100 MG: 100 CAPSULE, LIQUID FILLED ORAL at 20:42

## 2018-10-18 RX ADMIN — DEXTROMETHORPHAN 60 MG: 30 SUSPENSION, EXTENDED RELEASE ORAL at 20:43

## 2018-10-18 RX ADMIN — OXYCODONE HYDROCHLORIDE AND ACETAMINOPHEN 1 TABLET: 10; 325 TABLET ORAL at 11:44

## 2018-10-18 RX ADMIN — BISACODYL 10 MG: 10 SUPPOSITORY RECTAL at 17:15

## 2018-10-18 RX ADMIN — SENNOSIDES AND DOCUSATE SODIUM 2 TABLET: 8.6; 5 TABLET ORAL at 09:00

## 2018-10-18 RX ADMIN — LIDOCAINE HYDROCHLORIDE 4 ML: 40 INJECTION, SOLUTION RETROBULBAR; TOPICAL at 19:42

## 2018-10-18 RX ADMIN — INSULIN DETEMIR 30 UNITS: 100 INJECTION, SOLUTION SUBCUTANEOUS at 21:19

## 2018-10-18 RX ADMIN — POLYETHYLENE GLYCOL 3350 17 G: 17 POWDER, FOR SOLUTION ORAL at 12:40

## 2018-10-18 RX ADMIN — ATORVASTATIN CALCIUM 40 MG: 40 TABLET, FILM COATED ORAL at 20:42

## 2018-10-18 RX ADMIN — GUAIFENESIN 1200 MG: 600 TABLET, EXTENDED RELEASE ORAL at 09:00

## 2018-10-18 RX ADMIN — SACUBITRIL AND VALSARTAN 1 TABLET: 24; 26 TABLET, FILM COATED ORAL at 20:40

## 2018-10-18 RX ADMIN — Medication 3 ML: at 04:36

## 2018-10-18 RX ADMIN — INSULIN LISPRO 6 UNITS: 100 INJECTION, SOLUTION INTRAVENOUS; SUBCUTANEOUS at 11:46

## 2018-10-18 RX ADMIN — BENZONATATE 200 MG: 100 CAPSULE ORAL at 00:07

## 2018-10-18 RX ADMIN — SENNOSIDES AND DOCUSATE SODIUM 2 TABLET: 8.6; 5 TABLET ORAL at 20:40

## 2018-10-18 RX ADMIN — OXYCODONE HYDROCHLORIDE AND ACETAMINOPHEN 1 TABLET: 7.5; 325 TABLET ORAL at 14:42

## 2018-10-18 RX ADMIN — OXYCODONE HYDROCHLORIDE AND ACETAMINOPHEN 1 TABLET: 10; 325 TABLET ORAL at 04:28

## 2018-10-18 RX ADMIN — FINASTERIDE 5 MG: 5 TABLET, FILM COATED ORAL at 09:00

## 2018-10-18 RX ADMIN — AMOXICILLIN AND CLAVULANATE POTASSIUM 1 TABLET: 875; 125 TABLET, FILM COATED ORAL at 09:01

## 2018-10-18 RX ADMIN — ALPRAZOLAM 0.25 MG: 0.25 TABLET ORAL at 11:44

## 2018-10-18 NOTE — PLAN OF CARE
Problem: Patient Care Overview  Goal: Plan of Care Review  Outcome: Ongoing (interventions implemented as appropriate)   10/18/18 1442 10/18/18 7320   Coping/Psychosocial   Plan of Care Reviewed With patient --    Plan of Care Review   Progress --  improving   OTHER   Outcome Summary --  Patient moving around room today, up with minimal assist. Ambulated x 2 during day. Rm air, VSS, pain a little elevated today but coughing is less. Bowel regimen started today - will continue to monitor.        Problem: Skin Injury Risk (Adult)  Goal: Skin Health and Integrity  Outcome: Ongoing (interventions implemented as appropriate)      Problem: Cardiac Surgery (Adult)  Goal: Signs and Symptoms of Listed Potential Problems Will be Absent, Minimized or Managed (Cardiac Surgery)  Outcome: Ongoing (interventions implemented as appropriate)

## 2018-10-18 NOTE — PROGRESS NOTES
"    [facility] Medicine Services  PROGRESS NOTE    Patient Name: Kar Mora  : 1950  MRN: [id number]    Date of Admission: 10/11/2018  Length of Stay: 7  Primary Care Physician: Bro Kan MD    Subjective   Subjective     CC:  F/u cough    HPI:  Seen resting up in chair in NAD.  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  Pt is awake and alert.  States he feels okay, maybe a little better.  Frustrated that he still needs to cough some and states \"I'm not supposed to because the surgery said don't\".  Still with swelling of BLE but states he keeps his legs hanging down due to increased cough when legs are elevated.  No n/v, abd pain, cp, soa, palpitations.  Old MT sites still with some drainage but a little better maybe.  No new issues.       Review of Systems  Gen- No fevers, chills  CV- No chest pain, palpitations  Resp- No cough, dyspnea  GI- No N/V/D, abd pain    Otherwise ROS is negative except as mentioned in the HPI.    Objective   Objective     Vital Signs:   Temp:  [97.5 °F (36.4 °C)-98.5 °F (36.9 °C)] 97.8 °F (36.6 °C)  Heart Rate:  [64-84] 82  Resp:  [16-18] 16  BP: (106-121)/(57-69) 114/57      Physical Exam:  Constitutional: No acute distress, awake, alert.  Sitting up in chair with wife at bs.   HENT: NCAT, mucous membranes moist  Respiratory: still with incomplete expansion and bilateral breath sounds with faint fine crackles, respiratory effort normal   Cardiovascular: RRR, no murmurs, rubs, or gallops, palpable pedal pulses bilaterally, midline sternal incision c/d/i superiorly, dressed inferiorly still with large MT site drsg covering distal tip.   Gastrointestinal: Positive bowel sounds, soft, nontender, nondistended. Obese   : leong in place   Musculoskeletal: 1+ bilateral LE edema, right calf VHS incision c/d/i  Psychiatric: slightly flat affect, cooperative and calm  Neurologic: Oriented x 3, strength symmetric in all extremities, " Cranial Nerves grossly intact to confrontation, speech clear. Follows commands   Skin: No rashes    Results Reviewed:  I have personally reviewed current lab, radiology, and data and agree.      Results from last 7 days  Lab Units 10/18/18  0530 10/16/18  0517 10/15/18  0500 10/14/18  0321  10/12/18  0323   WBC 10*3/mm3 10.58 6.71  --  7.34  < > 8.44   HEMOGLOBIN g/dL 10.4* 10.1*  10.0* 9.9* 9.1*  < > 10.0*   HEMATOCRIT % 32.5* 31.2*  31.0* 30.7* 28.1*  < > 31.4*   PLATELETS 10*3/mm3 385 288  --  158  < > 169   INR   --   --   --   --   --  1.02   < > = values in this interval not displayed.    Results from last 7 days  Lab Units 10/18/18  0530 10/16/18  0517 10/15/18  0500   SODIUM mmol/L 133 135 133   POTASSIUM mmol/L 4.7 4.5 4.4   CHLORIDE mmol/L 103 104 101   CO2 mmol/L 22.0 21.0 23.0   BUN mg/dL 67* 50* 50*   CREATININE mg/dL 1.50* 1.40* 1.62*   GLUCOSE mg/dL 296* 199* 212*   CALCIUM mg/dL 8.6* 8.4* 9.1     Estimated Creatinine Clearance: 51.6 mL/min (A) (by C-G formula based on SCr of 1.5 mg/dL (H)).  No results found for: BNP    Microbiology Results Abnormal     Procedure Component Value - Date/Time    Influenza A & B, RT PCR - Swab, Nasopharynx [[id number]]  (Normal) Collected:  10/16/18 1341    Lab Status:  Final result Specimen:  Swab from Nasopharynx Updated:  10/16/18 1924     Influenza A PCR Not Detected     Influenza B PCR Not Detected          Imaging Results (last 24 hours)     Procedure Component Value Units Date/Time    XR Chest 1 View [[id number]] Collected:  10/18/18 0847     Updated:  10/18/18 0858    Narrative:       EXAMINATION: XR CHEST 1 VW-10/18/2018:      INDICATION: Post CABG. Intractable cough; Z74.09-Other reduced mobility;  I25.118-Atherosclerotic heart disease of native coronary artery with  other forms of angina pectoris; I25.119-Atherosclerotic heart disease of  native coronary artery with unspecified angina pectoris; N17.9-Acute  kidney failure, unspecified; I25.9-Chronic ischemic  heart disease,  unspecified; I11.9-Hypertensive heart disease without heart.      TECHNIQUE:  Single view frontal chest.     COMPARISONS: 10/16/2018.     FINDINGS:  Perihilar atelectasis. Small volume left pleural effusion and  adjacent atelectasis, unchanged. Sternotomy wires. No pneumothorax.       Impression:       No significant interval change.     D:  10/18/2018  E:  10/18/2018     This report was finalized on 10/18/2018 8:56 AM by Jay Beasley.           Results for orders placed during the hospital encounter of 10/11/18   Adult Transthoracic Echo Complete W/ Cont if Necessary Per Protocol    Narrative · Estimated EF appears to be in the range of 26 - 30%.  · The following left ventricular wall segments are hypokinetic: mid   anterolateral. The following left ventricular wall segments are   dyskinetic: apical anterior.  · Right ventricular cavity is borderline dilated.  · Left atrial cavity size is mildly dilated.  · Mild mitral valve regurgitation is present          I have reviewed the medications.      amoxicillin-clavulanate 1 tablet Oral Q12H   atorvastatin 40 mg Oral Nightly   benzonatate 200 mg Oral Q8H   bisacodyl 10 mg Rectal Once   carvedilol 25 mg Oral BID   clopidogrel 75 mg Oral Daily   dextromethorphan polistirex ER 60 mg Oral Q12H   ferrous sulfate 325 mg Oral BID With Meals   finasteride 5 mg Oral Daily   guaiFENesin 1,200 mg Oral Q12H   insulin detemir 30 Units Subcutaneous BID   insulin lispro 0-9 Units Subcutaneous 4x Daily With Meals & Nightly   insulin lispro 15 Units Subcutaneous TID With Meals   oxyCODONE-acetaminophen 1 tablet Oral Q8H   pantoprazole 40 mg Oral Q AM   pharmacy consult - MTM  Does not apply Daily   polyethylene glycol 17 g Oral Daily   sacubitril-valsartan 1 tablet Oral Q12H   sennosides-docusate sodium 2 tablet Oral BID   sodium chloride 3 mL Intravenous Q12H   sodium chloride 3-10 mL Intravenous Q8H   tamsulosin 0.4 mg Oral Daily   torsemide 5 mg Oral Daily          Assessment/Plan   Assessment / Plan     Active Hospital Problems    Diagnosis   • **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 7/5/18   • Cough   • S/P CABG x 4 on 10/11/18   • Ischemic cardiomyopathy with LVEF 26-30% on echocardiogram 10/13/18 (was 40% July 2018)   • LOPEZ (acute kidney injury) (CMS/[facility])   • Insulin dependent type 2 diabetes mellitus (CMS/[facility])   • Hypertensive cardiovascular disease     1. Probable hypertensive cardiovascular disease:  a. Abnormal EKG with abnormal acceptable echocardiographic GXT, September 2004.   b. Acceptable echocardiographic GXT with preserved exercise capacity and mild LVH, March 2008.   c. Acceptable Quantitative SPECT Gated Cardiolite GXT with nominal myocardial perfusion to 88% of predicted exercise capacity and 90% predicted maximum heart rate with preserved LVEF (0.65) with continued medical therapy felt warranted, December 2012.  d. Residual class I symptoms with recent documented abnormal EKG (LBBB/first-degree AV block) with abnormal echocardiogram demonstrating mild left ventricular chamber enlargement with mild reduction in the LVEF (0.42) without PE or pulmonary hypertension.  e. Resident class I symptoms with persistent abnormal echocardiogram (LVEF 0.40), with mild concentric LVH and mild left atrial enlargement without pulmonary arterial hypertension or pericardial effusion, July 2015.       • Moderate obesity   • Hyperlipidemia   • Allergic rhinitis. Desensitization injections discontinued July 2018     Brief Hospital Course to date:  Kar Mora is a 68 y.o. male with a remote hx of tobacco use, HTN, hypertensive heart disease, hyperlipidemia, insulin-dependent T2DM, obesity, CKD III (baseline 1.6), and gout who is admitted following elective CABG x 4 on 10/11/18:    Multivessel CAD S/p CABG x 4 on 10/11/18  Chronic LV Systolic Heart Failure due to ICM with LVEF 26-30%  - continue aspirin, plavix, entresto, carvedilol, and atorvastatin  - Plan for life  vest at discharge, d/w CM  - Gentle diuresis with oral torsemide, Dr. Qureshi following   - CT surgery managing serosanguinous drainage from inferior aspect of sternal wound   - No aldactone due to renal dysfunction  - Optimize pain control    Urinary Retention, failed TOV 10/16  - continue flomax, finasteride  --dc leong  - attempt bladder training, repeat trial of voiding today    Nonproductive Cough, likely due to atelectasis, post-op pain   Bronchitis   - Improved, CXR negative for infiltrates   - Continue supportive care with tussionex, tessalon perles, xylocaine nebs, pain control   - Continue augmentin  - D/w Dr. Florence 10/16  --stable, cough a little better     CKD III  - Cr has gone as high as 1.8 this admit, was down to 1.4 yest, slightly back up today at 1.5.  --monitor labs     Insulin-dependent T2DM, A1c 7.5% at baseline   - exacerbated in past 24 hours by IV steroid dose (had previously been under moderate control)  - increased basal-bolus dosing yesterday and just began new doses of b/b this am.  Still a running a little high at times but hasn't had time to see effects full of new insulin dosing  -continue to monitor today.  No change.   --Continue MDSSI for now and monitor insulin needs.  Adjust further as indicated    Hypertension, BP reviewed and stable on current regimen    Normocytic Anemia    DVT Prophylaxis:  mechanical   CODE STATUS:   Code Status and Medical Interventions:   Ordered at: 10/11/18 1052     Level Of Support Discussed With:    Patient     Code Status:    CPR     Medical Interventions (Level of Support Prior to Arrest):    Full       Disposition: I expect the patient to be discharged home in 1-2 days as per primary CT service.      Electronically signed by Radha Grimes, EDGAR, 10/18/18, 1:31 PM.

## 2018-10-18 NOTE — PLAN OF CARE
Problem: Patient Care Overview  Goal: Plan of Care Review  Outcome: Ongoing (interventions implemented as appropriate)   10/18/18 8041   Coping/Psychosocial   Plan of Care Reviewed With patient   Plan of Care Review   Progress no change   OTHER   Outcome Summary VSS. pt did not rest well overnight. Still has sensation to cough when laying back. Pain controlled with PRN meds. lidocaine nebs and scheduled meds for cough given. Will continue to monitor.

## 2018-10-18 NOTE — PLAN OF CARE
Problem: Patient Care Overview  Goal: Plan of Care Review  Outcome: Ongoing (interventions implemented as appropriate)   10/18/18 8060   Coping/Psychosocial   Plan of Care Reviewed With patient   OTHER   Outcome Summary Pt ambulates 250 ft in [place] with CGA, verbal cues to avoid veering slightly to L and objects in [place]. Pt in bathroom with wife when PT exited.

## 2018-10-18 NOTE — PROGRESS NOTES
"Clinical Nutrition   Reason For Visit: [facility]    Patient Name: Kar Mora  YOB: 1950  MRN: [id number]  Date of Encounter: 10/18/18 12:06 PM  Admission date: 10/11/2018            Nutrition Assessment     Admission Problem List:  Multivessel CAD  LOPEZ   DM@  HTN    Applicable medical tests/procedures since admission:  S/p CABG x4 (10/11)      PMH: He  has a past medical history of Allergic rhinitis; Anesthesia; Chest pain; Coronary artery disease; Dyslipidemia; Erectile dysfunction; GERD (gastroesophageal reflux disease); Gout; Hyperlipidemia; Insulin dependent type 2 diabetes mellitus (CMS/HCC) (2002); Kidney stones; Labile hypertension (10/24/2016); Myocardial infarct (CMS/[facility]); Wears glasses; and Wears glasses.   PSxH: He  has a past surgical history that includes Nasal polyp surgery (1986); Colonoscopy (2016); Cardiac catheterization (N/A, 7/20/2018); Kidney stone surgery; and Coronary artery bypass graft (N/A, 10/11/2018).        Reported/Observed/Food/Nutrition Related History     Pt's wife reports pt is eating well, his appetite has returned post-op.  Pt and wife express no concerns at this time.       Anthropometrics   Height: 66\"  Weight: 209 lb 6.4 oz  BMI: 33.80  BMI classification: Obese Class I: 30-34.9kg/m2   IBW:  142 lbs        Labs reviewed   Labs reviewed: Yes    Medications reviewed   Medications reviewed: Yes  Pertinent:    Intake/Ouptut 24 hrs (7:00AM - 6:59 AM)     Intake & Output (last day)       10/17 0701 - 10/18 0700 10/18 0701 - 10/19 0700    P.O. 560 120    Total Intake(mL/kg) 560 (5.9) 120 (1.3)    Urine (mL/kg/hr) 2650 (1.2) 500 (1)    Total Output 2650 500    Net -2090 -380                    Current Nutrition Prescription   PO: Diet Regular; Consistent Carbohydrate, Cardiac      Evaluation of Received Nutrient/Fluid Intake:  3 Days:63% of 4 --pt/wife report good return of appetite post-op       Nutrition Diagnosis     Problem No nutrition diagnosis at this time "     Intervention   Intervention: Follow treatment progress, Care plan reviewed, Advise alternate selection, Interview for preferences      Goal:   General: Maintain nutrition, Nutrition support treatment  PO: Maintain intake        Monitoring/Evaluation:       Monitoring/Evaluation: Per protocol, I&O, PO intake  Will Continue to follow per protocol  Loly Perez, RD  Time Spent: 25 min

## 2018-10-18 NOTE — PROGRESS NOTES
"Kar Mora  [id number]  1950     LOS: 7 days   Patient Care Team:  Bro Kan MD as PCP - General  MontoyaHiren MD as Consulting Physician (Endocrinology)    Chief Complaint: Coronary artery disease      Subjective: Incisional pain, slight cough    Objective:     Vital Sign Min/Max for last 24 hours  Temp  Min: 97.5 °F (36.4 °C)  Max: 98.5 °F (36.9 °C)   BP  Min: 91/65  Max: 121/66   Pulse  Min: 64  Max: 84   Resp  Min: 16  Max: 18   SpO2  Min: 89 %  Max: 96 %   No Data Recorded   Weight  Min: 95 kg (209 lb 6.4 oz)  Max: 95.2 kg (209 lb 12.8 oz)     Flowsheet Rows      First Filed Value   Admission Height  167.6 cm (66\") Documented at 10/11/2018 0604   Admission Weight  96.2 kg (212 lb) Documented at 10/11/2018 0604          Physical Exam:    Wound: Only slight drainage, still sternum appears to be stable Pulses:     Mediastinal and Chest Tube Drainage:       Results Review:     Results from last 7 days  Lab Units 10/18/18  0530   WBC 10*3/mm3 10.58   HEMOGLOBIN g/dL 10.4*   HEMATOCRIT % 32.5*   PLATELETS 10*3/mm3 385       Results from last 7 days  Lab Units 10/18/18  0530   SODIUM mmol/L 133   POTASSIUM mmol/L 4.7   CHLORIDE mmol/L 103   CO2 mmol/L 22.0   BUN mg/dL 67*   CREATININE mg/dL 1.50*   GLUCOSE mg/dL 296*   CALCIUM mg/dL 8.6*       Results from last 7 days  Lab Units 10/11/18  1448   PH, ARTERIAL pH units 7.341*   PO2 ART mm Hg 100.0   PCO2, ARTERIAL mm Hg 39.7   HCO3 ART mmol/L 21.5         Assessment      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 7/5/18    Insulin dependent type 2 diabetes mellitus (CMS/[facility])    Hyperlipidemia    Allergic rhinitis. 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  We will DC " Valenzuela catheter.        Migue Burton MD  10/18/18  7:09 AM      Please note that portions of this note were completed with a voice recognition program. Efforts were made to edit the dictations, but words may be mistranscribed

## 2018-10-18 NOTE — PROGRESS NOTES
Intensivist Note     10/18/2018  Hospital Day: 7  7 Days Post-Op      Mr. Kar Mora, 68 y.o. male is followed for:    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 7/5/18    S/P CABG x 4 on 10/11/18    Ischemic cardiomyopathy with LVEF 26-30% on echocardiogram 10/13/18 (was 40% July 2018)    Insulin dependent type 2 diabetes mellitus (CMS/HCC)    Hypertensive cardiovascular disease    LOPEZ (acute kidney injury) (CMS/[facility])    Hyperlipidemia    Moderate obesity    Allergic rhinitis. Desensitization injections discontinued July 2018    Cough       SUBJECTIVE     68-year-old white male remote smoker (none for 37 years) with a history of hypertension and hypertensive cardiovascular disease, hyperlipidemia, diabetes mellitus, obesity, chronic kidney disease (baseline creatinine preop was 1.63), allergic rhinitis (previously on desensitization injections), and gout.  Underwent elective CABG ×4 on 10/11/18 CAD with LVEF 40%. Did well except for a bump in his creatinine which recovered and complaints of cough.     On 10/14/18 the patient developed a persistent intense nonproductive cough.There has been no sputum or hemoptysis, and he has had no fever or chills. In addition white count remains normal. He denies dyspnea or wheezing as well as dysphagia, postnasal drip or significant reflux processes although he does have a history of allergies and GERD.      Remains on guaifenesin, dextromethorphan, Tessalon, and codeine derivatives to suppress his cough. In addition has been receiving prn neb treatments with Xylocaine which she states are quite effective. Cough persists  but is definitely improved. Has been diuresed and is approximately 4.16 L negative since admission. Further diuresis will be held as in the last 24 hours his BUN and creatinine have increased slightly. Still with suboptimal glucose control       The patient's relevant past medical, surgical and social history were reviewed and updated in Epic as  "appropriate.    OBJECTIVE     /57 (BP Location: Right arm, Patient Position: Sitting)   Pulse 82   Temp 97.8 °F (36.6 °C) (Oral)   Resp 16   Ht 167.6 cm (66\")   Wt 95 kg (209 lb 6.4 oz)   SpO2 94%   BMI 33.80 kg/m²   Flow (L/min): 2    Flowsheet Rows      First Filed Value   Admission Height  167.6 cm (66\") Documented at 10/11/2018 0604   Admission Weight  96.2 kg (212 lb) Documented at 10/11/2018 0604        Intake & Output (last day)       10/17 0701 - 10/18 0700 10/18 0701 - 10/19 0700    P.O. 560 360    Total Intake(mL/kg) 560 (5.9) 360 (3.8)    Urine (mL/kg/hr) 2650 (1.2) 500 (0.6)    Total Output 2650 500    Net -2090 -140          Unmeasured Urine Occurrence  1 x          Exam:  General Exam:  Well-developed older white male in NAD. No cough at present.  HEENT: Pupils equal and reactive. Nose and throat clear.  Neck:                          Supple, no JVD, thyromegaly, or adenopathy  Lungs: Only a few crackles in the bases  Cardiovascular: RRR. No murmurs or gallops  Abdomen: Soft nontender without organomegaly or masses. Obese   and rectal: Deferred.  Extremities: No cyanosis clubbing edema.  Neurologic:                 Symmetric strength. No focal deficits.    Chest X-Ray: Cardiomegaly with some left lower lobe opacity that is unchanged (appears to be atelectasis). Also a small area of linear atelectasis persists radiating from the right hilum.      Results from last 7 days  Lab Units 10/18/18  0530 10/16/18  0517 10/15/18  0500 10/14/18  0321   WBC 10*3/mm3 10.58 6.71  --  7.34   HEMOGLOBIN g/dL 10.4* 10.1*  10.0* 9.9* 9.1*   HEMATOCRIT % 32.5* 31.2*  31.0* 30.7* 28.1*   PLATELETS 10*3/mm3 385 288  --  158       Results from last 7 days  Lab Units 10/18/18  0530 10/16/18  0517   SODIUM mmol/L 133 135   POTASSIUM mmol/L 4.7 4.5   CHLORIDE mmol/L 103 104   CO2 mmol/L 22.0 21.0   BUN mg/dL 67* 50*   CREATININE mg/dL 1.50* 1.40*   GLUCOSE mg/dL 296* 199*   CALCIUM mg/dL 8.6* 8.4* "       Results from last 7 days  Lab Units 10/12/18  0323   MAGNESIUM mg/dL 2.6   PHOSPHORUS mg/dL 4.6           No results found for: SEDRATE  No results found for: BNP  No results found for: CKTOTAL, CKMB, CKMBINDEX, TROPONINI, TROPONINT  No results found for: TSH  No results found for: LACTATE  No results found for: CORTISOL            I reviewed the patient's results, images and medication.    Assessment/Plan   ASSESSMENT        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 7/5/18    S/P CABG x 4 on 10/11/18    Ischemic cardiomyopathy with LVEF 26-30% on echocardiogram 10/13/18 (was 40% July 2018)    Insulin dependent type 2 diabetes mellitus (CMS/[facility])    Hypertensive cardiovascular disease    LOPEZ (acute kidney injury) (CMS/[facility])    Hyperlipidemia    Moderate obesity    Allergic rhinitis. Desensitization injections discontinued July 2018    Cough      DISCUSSION: Appears reasonably stable with some improvement in his cough.    PLAN     1. Continue oral Augmentin  2. Continue cough suppression and prn Xylocaine net treatments  3. Diuresis per cardiology  4. Follow-up renal function  5. Upon discharge would complete a total of 7 days Augmentin  6. Until discharge would place on low-dose heparin for DVT prophylaxis    Plan of care and goals reviewed with mulitdisciplinary team at daily rounds.    I discussed the patient's findings and my recommendations with patient and nursing staff    Time spent Critical care 20 min (It does not include procedure time).    Branden [place] MD  Intensive Care Medicine  10/18/18 3:40 PM

## 2018-10-18 NOTE — THERAPY TREATMENT NOTE
Acute Care - Physical Therapy Treatment Note  [facility]     Patient Name: Kar Mora  : 1950  MRN: [id number]  Today's Date: 10/18/2018  Onset of Illness/Injury or Date of Surgery: 10/11/18  Date of Referral to PT: 10/12/18  Referring Physician: MD Burton    Admit Date: 10/11/2018    Visit Dx:    ICD-10-CM ICD-9-CM   1. Impaired functional mobility, balance, gait, and endurance Z74.09 V49.89   2. Atherosclerosis of native coronary artery of native heart with stable angina pectoris (CMS/[facility]) I25.118 414.01     413.9   3. Coronary artery disease (S/P CABG x 4) I25.119 414.01     413.9   4. LOPEZ (acute kidney injury) (CMS/[facility]) N17.9 584.9   5. Ischemic heart disease I25.9 414.9   6. Hypertensive heart disease without heart failure I11.9 402.90     Patient Active Problem List   Diagnosis   • Insulin dependent type 2 diabetes mellitus (CMS/[facility])   • Labile hypertension   • Hyperlipidemia   • Erectile dysfunction   • Allergic rhinitis. Desensitization injections discontinued 2018   • Hypertensive cardiovascular disease   • Moderate obesity   • Precordial pain   • Left bundle branch block   • Ischemic heart disease   • 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 18   • LOPEZ (acute kidney injury) (CMS/[facility])   • Cough   • S/P CABG x 4 on 10/11/18   • Ischemic cardiomyopathy with LVEF 26-30% on echocardiogram 10/13/18 (was 40% 2018)       Therapy Treatment          Rehabilitation Treatment Summary     Row Name 10/18/18 1410             Treatment Time/Intention    Discipline physical therapist  -      Document Type therapy note (daily note)  -      Mode of Treatment individual therapy;physical therapy  -EH      Therapy Frequency (PT Clinical Impression) daily  -EH      Patient Effort good  -EH      Existing Precautions/Restrictions cardiac;oxygen therapy device and L/min;sternal  -EH      Treatment Considerations/Comments c/o pain with coughing. per chart declined binder  -EH      Recorded by [] Teo  Eva YEBOAH, PT 10/18/18 1430      Row Name 10/18/18 1410             Cognitive Assessment/Intervention- PT/OT    Orientation Status (Cognition) oriented x 4  -EH      Follows Commands (Cognition) WFL  -EH      Cognitive Function (Cognitive) WFL;safety deficit  -EH      Safety Deficit (Cognitive) mild deficit;impulsivity;safety precautions awareness;safety precautions follow-through/compliance  -      Personal Safety Interventions fall prevention program maintained;gait belt;nonskid shoes/slippers when out of bed  -EH      Recorded by [EH] Eva Bruce, PT 10/18/18 1430      Row Name 10/18/18 1410             Safety Issues, Functional Mobility    Safety Issues Affecting Function (Mobility) safety precaution awareness;safety precautions follow-through/compliance  -      Impairments Affecting Function (Mobility) balance;endurance/activity tolerance;pain;shortness of breath  -EH      Recorded by [EH] Eva Bruce, PT 10/18/18 1430      Row Name 10/18/18 1410             Bed Mobility Assessment/Treatment    Comment (Bed Mobility) UIC  -EH      Recorded by [] Eva Bruce, PT 10/18/18 1430      Row Name 10/18/18 1410             Transfer Assessment/Treatment    Transfer Assessment/Treatment sit-stand transfer;stand-sit transfer  -EH      Recorded by [] Eva Bruce, PT 10/18/18 1430      Row Name 10/18/18 1410             Sit-Stand Transfer    Sit-Stand [place] (Transfers) stand by assist  -      Assistive Device (Sit-Stand Transfers) walker, front-wheeled   Did not utilize AD for STS  -EH      Recorded by [] Eva Bruce, PT 10/18/18 1434      Row Name 10/18/18 1410             Stand-Sit Transfer    Stand-Sit [place] (Transfers) stand by assist  -      Assistive Device (Stand-Sit Transfers) walker, front-wheeled   Did not utilize AD (cues for splinting)  -EH      Recorded by [] Eva Bruce, PT 10/18/18 1434      Row Name 10/18/18 1410              Gait/Stairs Assessment/Training    Gait/Stairs Assessment/Training gait/ambulation independence  -      [place] Level (Gait) verbal cues;contact guard  -EH      Assistive Device (Gait) walker, front-wheeled  -      Distance in Feet (Gait) 250  -EH2      Pattern (Gait) step-through  -EH      Deviations/Abnormal Patterns (Gait) stride length decreased;diana decreased;base of support, narrow  -EH2      Bilateral Gait Deviations weight shift ability decreased  -      Comment (Gait/Stairs) veers to L, slow speed, verbal cues for awareness of direction, objects in [place]  -EH2      Recorded by [EH] Eva Bruce, PT 10/18/18 1430  [EH2] Eva Bruce, PT 10/18/18 1434      Row Name 10/18/18 1410             Pain Scale: Numbers Pre/Post-Treatment    Pain Scale: Numbers, Pretreatment 6/10  -EH      Pain Scale: Numbers, Post-Treatment 7/10  -EH      Pain Location - Orientation incisional  -      Pain Location chest  -EH      Recorded by [EH] Eva Bruce, PT 10/18/18 1430      Row Name                Wound 10/11/18 0801 Other (See comments) chest incision    Wound - Properties Group Date first assessed: 10/11/18 [SH] Time first assessed: 0801 [SH] Side: Other (See comments) [SH] Location: chest [SH] Type: incision [SH] Recorded by:  [SH] Haase, Sherri L, RN 10/11/18 0801    Row Name                Wound 10/11/18 0801 Right leg incision    Wound - Properties Group Date first assessed: 10/11/18 [SH] Time first assessed: 0801 [SH] Side: Right [SH] Location: leg [SH] Type: incision [SH] Recorded by:  [SH] Haase, Sherri L, RN 10/11/18 0801    Row Name 10/18/18 1410             Coping    Observed Emotional State accepting;calm;crying continuous/inconsolable  -EH      Recorded by [EH] Eva Bruce, PT 10/18/18 1434      Row Name 10/18/18 1410             Plan of Care Review    Plan of Care Reviewed With patient  -EH      Recorded by [EH] Eva Bruce, PT 10/18/18 1434      Row Name  10/18/18 1410             Outcome Summary/Treatment Plan (PT)    Daily Summary of Progress (PT) progress towards functional goals is fair  -EH      Anticipated Discharge Disposition (PT) --  -EH      Recorded by [] Eva Bruce, PT 10/18/18 1434        User Key  (r) = Recorded By, (t) = Taken By, (c) = Cosigned By    Initials Name Effective Dates Discipline     Eva Bruce, PT 06/19/15 -  PT    SH Haase, Sherri L, RN 06/16/16 -  Nurse          Wound 10/11/18 0801 Other (See comments) chest incision (Active)   Dressing Appearance moist drainage 10/17/2018  9:20 PM   Closure Liquid skin adhesive 10/17/2018  9:20 PM   Base clean;pink;yellow;white 10/17/2018  9:20 PM   Periwound intact;dry;pink 10/17/2018  9:20 PM   Periwound Temperature warm 10/17/2018  9:20 PM   Periwound Skin Turgor soft 10/17/2018  9:20 PM   Edges open 10/17/2018  9:20 PM   Drainage Characteristics/Odor serosanguineous 10/17/2018  9:20 PM   Drainage Amount moderate 10/17/2018  9:20 PM   Care, Wound cleansed with;sterile normal saline 10/17/2018  9:20 PM   Dressing Care, Wound dressing changed 10/17/2018  9:20 PM       Wound 10/11/18 0801 Right leg incision (Active)   Dressing Appearance open to air 10/17/2018  9:20 PM   Closure Liquid skin adhesive 10/17/2018  9:20 PM   Drainage Amount none 10/17/2018  9:20 PM   Dressing Care, Wound open to air 10/17/2018  9:20 PM             Physical Therapy Education     Title: PT OT SLP Therapies (Active)     Topic: Physical Therapy (Active)     Point: Mobility training (Active)    Learning Progress Summary     Learner Status Readiness Method Response Comment Documented by    Patient Done Acceptance E VU,NR   10/18/18 1441     Active Eager E,D NR  DM 10/17/18 1755     Active Acceptance E,D NR  SJ 10/14/18 1625     Active Acceptance E NR  VERONICA 10/12/18 0855     Active Acceptance E NR  VERONICA 10/12/18 0855    Family Done Acceptance E VU,NR   10/18/18 1441    Significant Other Active Eager E,D NR    10/17/18 1755     Active Acceptance E,D NR   10/14/18 1625          Point: Home exercise program (Active)    Learning Progress Summary     Learner Status Readiness Method Response Comment Documented by    Patient Done Acceptance E VU,NR   10/18/18 1441     Active Eager E,D NR   10/17/18 1755     Active Acceptance E,D NR   10/14/18 1625     Active Acceptance E NR   10/12/18 0855     Active Acceptance E NR   10/12/18 0855    Family Done Acceptance E VU,NR   10/18/18 1441    Significant Other Active Eager E,D NR   10/17/18 1755     Active Acceptance E,D NR   10/14/18 1625          Point: Body mechanics (Active)    Learning Progress Summary     Learner Status Readiness Method Response Comment Documented by    Patient Done Acceptance E VU,NR   10/18/18 1441     Active Eager E,D NR   10/17/18 1755     Active Acceptance E,D NR   10/14/18 1625     Done Acceptance E VU,NR   10/14/18 1537     Active Acceptance E NR   10/12/18 0855     Active Acceptance E NR   10/12/18 0855    Family Done Acceptance E VU,NR   10/18/18 1441    Significant Other Active Eager E,D NR   10/17/18 1755     Active Acceptance E,D NR   10/14/18 1625          Point: Precautions (Active)    Learning Progress Summary     Learner Status Readiness Method Response Comment Documented by    Patient Done Acceptance E VU,NR   10/18/18 1441     Active Eager E,D NR   10/17/18 1755     Active Acceptance E,D NR   10/14/18 1625     Active Acceptance E NR   10/12/18 0855     Active Acceptance E NR   10/12/18 0855    Family Done Acceptance E VU,NR   10/18/18 1441    Significant Other Active Eager E,D NR   10/17/18 1755     Active Acceptance E,D NR   10/14/18 1625                      User Key     Initials Effective Dates Name Provider Type Discipline     06/19/15 -  Rhiannon Muñoz, PT Physical Therapist PT     06/19/15 -  Eva Bruce, PT Physical Therapist PT    SJ 06/19/15 -  Loly Jones PT  Physical Therapist PT    DM 06/19/15 -  Skylar Tillman, PT Physical Therapist PT    CC 06/16/16 -  Saige Grove RN Registered Nurse Nurse                    PT Recommendation and Plan  Therapy Frequency (PT Clinical Impression): daily  Outcome Summary/Treatment Plan (PT)  Daily Summary of Progress (PT): progress towards functional goals is fair  Plan of Care Reviewed With: patient  Outcome Summary: Pt ambulates 250 ft in [place] with CGA, verbal cues to avoid veering slightly to L and objects in [place]. Pt in bathroom with wife when PT exited.          Outcome Measures     Row Name 10/18/18 1410 10/17/18 1737          How much help from another person do you currently need...    Turning from your back to your side while in flat bed without using bedrails? 3  -EH 3  -DM     Moving from lying on back to sitting on the side of a flat bed without bedrails? 2  -EH 2  -DM     Moving to and from a bed to a chair (including a wheelchair)? 3  -EH 3  -DM     Standing up from a chair using your arms (e.g., wheelchair, bedside chair)? 3  - 3  -DM     Climbing 3-5 steps with a railing? 3  -EH 2  -DM     To walk in hospital room? 3  -EH 3  -DM     AM-PAC 6 Clicks Score 17  -EH 16  -DM        Functional Assessment    Outcome Measure Options AM-PAC 6 Clicks Basic Mobility (PT)  - AM-PAC 6 Clicks Basic Mobility (PT)  -DM       User Key  (r) = Recorded By, (t) = Taken By, (c) = Cosigned By    Initials Name Provider Type     Eva Bruce, PT Physical Therapist    DM Skylar Tillman, PT Physical Therapist           Time Calculation:         PT Charges     Row Name 10/18/18 1410             Time Calculation    Start Time 1410  -      PT Received On 10/18/18  -      PT Goal Re-Cert Due Date 10/22/18  -         Time Calculation- PT    Total Timed Code Minutes- PT 12 minute(s)  -         Timed Charges    07548 - PT Therapeutic Activity Minutes 12  -        User Key  (r) = Recorded By, (t) = Taken By, (c) =  Cosigned By    Initials Name Provider Type     Eva Bruce, PT Physical Therapist        Therapy Suggested Charges     Code   Minutes Charges    18022 (CPT®) Hc Pt Neuromusc Re Education Ea 15 Min      26159 (CPT®) Hc Pt Ther Proc Ea 15 Min      57546 (CPT®) Hc Gait Training Ea 15 Min      67280 (CPT®) Hc Pt Therapeutic Act Ea 15 Min 12 1    02728 (CPT®) Hc Pt Manual Therapy Ea 15 Min      86146 (CPT®) Hc Pt Iontophoresis Ea 15 Min      61581 (CPT®) Hc Pt Elec Stim Ea-Per 15 Min      01788 (CPT®) Hc Pt Ultrasound Ea 15 Min      82913 (CPT®) Hc Pt Self Care/Mgmt/Train Ea 15 Min      73758 (CPT®) Hc Pt Prosthetic (S) Train Initial Encounter, Each 15 Min      72178 (CPT®) Hc Pt Orthotic(S)/Prosthetic(S) Encounter, Each 15 Min      97164 (CPT®) Hc Orthotic(S) Mgmt/Train Initial Encounter, Each 15min      Total  12 1        Therapy Charges for Today     Code Description Service Date Service Provider Modifiers Qty    57602594672 HC PT THERAPEUTIC ACT EA 15 MIN 10/18/2018 Eva Bruce, PT GP 1          PT G-Codes  Outcome Measure Options: AM-PAC 6 Clicks Basic Mobility (PT)  AM-PAC 6 Clicks Score: 17    Eva Bruce, PT  10/18/2018

## 2018-10-18 NOTE — PROGRESS NOTES
Kar Mora  [id number]  1950   LOS: 7 days   Patient Care Team:  Bro Kan MD as PCP - General  MontoyaHiren MD as Consulting Physician (Endocrinology)    Chief Complaint:  SOB / WEAKNESS / CHF / COUGH / CKD / ANEMIA    Subjective     Generally comfortable up in chair.  He still relates frequent cough although perhaps less severe and intense.  He denies significant sputum production, fever, chills, pleurisy, hemoptysis, increased incisional drainage, nausea, emesis, headache, or focal motor-sensory changes.  He states when he attempts to recline, the cough is somewhat more prominent.  Acceptable appetite and he continues to ambulate in the hallway without difficulty.  No current coughing in room.    Objective     Vital Sign Min/Max for last 24 hours  Temp  Min: 97.5 °F (36.4 °C)  Max: 98.5 °F (36.9 °C)   BP  Min: 91/65  Max: 121/66   Pulse  Min: 64  Max: 84   Resp  Min: 16  Max: 18   SpO2  Min: 89 %  Max: 96 %      Weight  Min: 95 kg (209 lb 6.4 oz)  Max: 95.2 kg (209 lb 12.8 oz)     1    10/17/18  1110 10/18/18  0441   Weight: 95.2 kg (209 lb 12.8 oz) 95 kg (209 lb 6.4 oz)         Intake/Output Summary (Last 24 hours) at 10/18/18 0735  Last data filed at 10/18/18 0703   Gross per 24 hour   Intake              560 ml   Output             3150 ml   Net            -2590 ml       Physical Exam:     General Appearance:    Alert, cooperative, in no acute distress   Lungs:     Dullness with diminished breath sounds and crackles left base,respirations regular, even and unlabored    Heart:    Regular and normal rate, normal S1 and S2, grade 1/6            murmur, no gallop, no rub, no click   Abdomen:  Extremities:   Soft, non-tender, bowel sounds audible x4    No edema, normal range of motion   Pulses:   Pulses palpable and equal bilaterally      Results Review:     Results from last 7 days  Lab Units 10/18/18  0530 10/16/18  0517 10/15/18  0500   SODIUM mmol/L 133 135 133   POTASSIUM  mmol/L 4.7 4.5 4.4   CHLORIDE mmol/L 103 104 101   CO2 mmol/L 22.0 21.0 23.0   BUN mg/dL 67* 50* 50*   CREATININE mg/dL 1.50* 1.40* 1.62*   GLUCOSE mg/dL 296* 199* 212*   CALCIUM mg/dL 8.6* 8.4* 9.1       Results from last 7 days  Lab Units 10/18/18  0530 10/16/18  0517 10/15/18  0500 10/14/18  0321   WBC 10*3/mm3 10.58 6.71  --  7.34   HEMOGLOBIN g/dL 10.4* 10.1*  10.0* 9.9* 9.1*   HEMATOCRIT % 32.5* 31.2*  31.0* 30.7* 28.1*   PLATELETS 10*3/mm3 385 288  --  158     · NO EKG.    · CXR: Mild-moderate cardiomegaly with left basilar atelectasis/effusion and minimal infiltrate in the perihilar areas without overt pulmonary vascular congestion/pneumothorax; formal official report pending.    · BLE VENOUS DUPLEX STUDY: NO DVT / SVT.    Medication Review: REVIEWED; NO DRIPS.    Assessment/Plan     Excellent diuresis with minimal decrease in GFR.  Marked elevation of serum glucose this morning with improving H&H without leukocytosis.  Abnormal chest x-ray but without marked probable left pleural effusion.  Would continue current medications.  Patient is allowed to have access to low-dose zolpidem at night as needed.  Tentative discharge on the following cardiac medications in the next one-2 days:    · Atorvastatin 40 mg daily  · Carvedilol 25 mg BID  · Clopidogrel 75 mg daily  · Mucinex DM BID when necessary  · Ferrous sulfate 325 mg  BID X one month  · Entresto 24/26 BID  · Defer spironolactone/eplerenone at this time until renal function remains stable as an outpatient  · Torsemide 5 mg daily when necessary edema/shortness of breath/weight gain  · Critical access hospital cardiology follow-up Dr. Qureshi in 4 - 6 weeks with echocardiogram  · Lifevest    Discussed with patient and wife in room.      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 Desensitization injections discontinued July 2018    Hypertensive cardiovascular disease    Moderate  obesity    LOPEZ (acute kidney injury) (CMS/HCC)    Cough    S/P CABG x 4 on 10/11/18    Ischemic cardiomyopathy with LVEF 26-30% on echocardiogram 10/13/18 (was 40% July 2018)        10/18/18  7:35 AM

## 2018-10-18 NOTE — PAYOR COMM NOTE
"Utilization Review [phone]  Brain Mora (68 y.o. Male)     Date of Birth Social Security Number Address Home Phone MRN    1950  [address] [address] [phone] [id number]    [place] Marital Status          None        Admission Date Admission Type Admitting Provider Attending Provider Department, Room/Bed    10/11/18 Elective Migue Burton MD Rogers, Anthony G, MD [facility] 4H, S470/1    Discharge Date Discharge Disposition Discharge Destination                       Attending Provider:  Migue Burton MD    Allergies:  Asa [Aspirin]    Isolation:  None   Infection:  None   Code Status:  CPR    Ht:  167.6 cm (66\")   Wt:  95 kg (209 lb 6.4 oz)    Admission Cmt:  None   Principal Problem:  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 7/5/18 [I25.119]                 Active Insurance as of 10/11/2018     Primary Coverage     Payor Plan Insurance Group Employer/Plan Group    MISC COMMERCIAL MISC COMMERCIAL INS      Coverage Address Coverage Phone Number Effective From Effective To    [address] [phone] 7/1/2007     [facility] [id number]       Subscriber Name Subscriber Birth Date Member ID       BRAIN MORA 1950 [id number]                 Emergency Contacts      (Rel.) Home Phone Work Phone Mobile Phone    Laura Mora (Spouse) [phone] -- [phone]    Lucretia Galvan (Sister) [phone] -- --               Physician Progress Notes (last 24 hours) (Notes from 10/17/2018 12:00 PM through 10/18/2018 12:00 PM)      Todd Qureshi MD at 10/18/2018  7:35 AM          Brain BEATRIZ Mora  [id number]  1950   LOS: 7 days   Patient Care Team:  Bro Kan MD as PCP - Hiren Baeza MD as Consulting Physician (Endocrinology)    Chief Complaint:  SOB / WEAKNESS / CHF / COUGH / CKD / ANEMIA    Subjective     Generally comfortable up in chair.  He still relates frequent cough " although perhaps less severe and intense.  He denies significant sputum production, fever, chills, pleurisy, hemoptysis, increased incisional drainage, nausea, emesis, headache, or focal motor-sensory changes.  He states when he attempts to recline, the cough is somewhat more prominent.  Acceptable appetite and he continues to ambulate in the hallway without difficulty.  No current coughing in room.    Objective     Vital Sign Min/Max for last 24 hours  Temp  Min: 97.5 °F (36.4 °C)  Max: 98.5 °F (36.9 °C)   BP  Min: 91/65  Max: 121/66   Pulse  Min: 64  Max: 84   Resp  Min: 16  Max: 18   SpO2  Min: 89 %  Max: 96 %      Weight  Min: 95 kg (209 lb 6.4 oz)  Max: 95.2 kg (209 lb 12.8 oz)     1    10/17/18  1110 10/18/18  0441   Weight: 95.2 kg (209 lb 12.8 oz) 95 kg (209 lb 6.4 oz)         Intake/Output Summary (Last 24 hours) at 10/18/18 0735  Last data filed at 10/18/18 0703   Gross per 24 hour   Intake              560 ml   Output             3150 ml   Net            -2590 ml       Physical Exam:     General Appearance:    Alert, cooperative, in no acute distress   Lungs:     Dullness with diminished breath sounds and crackles left base,respirations regular, even and unlabored    Heart:    Regular and normal rate, normal S1 and S2, grade 1/6            murmur, no gallop, no rub, no click   Abdomen:  Extremities:   Soft, non-tender, bowel sounds audible x4    No edema, normal range of motion   Pulses:   Pulses palpable and equal bilaterally      Results Review:     Results from last 7 days  Lab Units 10/18/18  0530 10/16/18  0517 10/15/18  0500   SODIUM mmol/L 133 135 133   POTASSIUM mmol/L 4.7 4.5 4.4   CHLORIDE mmol/L 103 104 101   CO2 mmol/L 22.0 21.0 23.0   BUN mg/dL 67* 50* 50*   CREATININE mg/dL 1.50* 1.40* 1.62*   GLUCOSE mg/dL 296* 199* 212*   CALCIUM mg/dL 8.6* 8.4* 9.1       Results from last 7 days  Lab Units 10/18/18  0530 10/16/18  0517 10/15/18  0500 10/14/18  0321   WBC 10*3/mm3 10.58 6.71  --  7.34    HEMOGLOBIN g/dL 10.4* 10.1*  10.0* 9.9* 9.1*   HEMATOCRIT % 32.5* 31.2*  31.0* 30.7* 28.1*   PLATELETS 10*3/mm3 385 288  --  158     · NO EKG.    · CXR: Mild-moderate cardiomegaly with left basilar atelectasis/effusion and minimal infiltrate in the perihilar areas without overt pulmonary vascular congestion/pneumothorax; formal official report pending.    · BLE VENOUS DUPLEX STUDY: NO DVT / SVT.    Medication Review: REVIEWED; NO DRIPS.    Assessment/Plan     Excellent diuresis with minimal decrease in GFR.  Marked elevation of serum glucose this morning with improving H&H without leukocytosis.  Abnormal chest x-ray but without marked probable left pleural effusion.  Would continue current medications.  Patient is allowed to have access to low-dose zolpidem at night as needed.  Tentative discharge on the following cardiac medications in the next one-2 days:    · Atorvastatin 40 mg daily  · Carvedilol 25 mg BID  · Clopidogrel 75 mg daily  · Mucinex DM BID when necessary  · Ferrous sulfate 325 mg  BID X one month  · Entresto 24/26 BID  · Defer spironolactone/eplerenone at this time until renal function remains stable as an outpatient  · Torsemide 5 mg daily when necessary edema/shortness of breath/weight gain  · [facility] cardiology follow-up Dr. Qureshi in 4 - 6 weeks with echocardiogram  · Lifevest    Discussed with patient and wife in room.      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    Ischemic cardiomyopathy with LVEF 26-30% on echocardiogram 10/13/18 (was 40% July 2018)        10/18/18  7:35 AM    Electronically signed by Todd Qureshi MD at 10/18/2018  7:54 AM     Migue Burton MD at 10/18/2018  7:09 AM          Kar HENDERSON  "Morgan  [id number]  1950     LOS: 7 days   Patient Care Team:  Bro Kan MD as PCP - General  Hiren Montoya MD as Consulting Physician (Endocrinology)    Chief Complaint: Coronary artery disease      Subjective: Incisional pain, slight cough    Objective:     Vital Sign Min/Max for last 24 hours  Temp  Min: 97.5 °F (36.4 °C)  Max: 98.5 °F (36.9 °C)   BP  Min: 91/65  Max: 121/66   Pulse  Min: 64  Max: 84   Resp  Min: 16  Max: 18   SpO2  Min: 89 %  Max: 96 %   No Data Recorded   Weight  Min: 95 kg (209 lb 6.4 oz)  Max: 95.2 kg (209 lb 12.8 oz)     Flowsheet Rows      First Filed Value   Admission Height  167.6 cm (66\") Documented at 10/11/2018 0604   Admission Weight  96.2 kg (212 lb) Documented at 10/11/2018 0604          Physical Exam:    Wound: Only slight drainage, still sternum appears to be stable Pulses:     Mediastinal and Chest Tube Drainage:       Results Review:     Results from last 7 days  Lab Units 10/18/18  0530   WBC 10*3/mm3 10.58   HEMOGLOBIN g/dL 10.4*   HEMATOCRIT % 32.5*   PLATELETS 10*3/mm3 385       Results from last 7 days  Lab Units 10/18/18  0530   SODIUM mmol/L 133   POTASSIUM mmol/L 4.7   CHLORIDE mmol/L 103   CO2 mmol/L 22.0   BUN mg/dL 67*   CREATININE mg/dL 1.50*   GLUCOSE mg/dL 296*   CALCIUM mg/dL 8.6*       Results from last 7 days  Lab Units 10/11/18  1448   PH, ARTERIAL pH units 7.341*   PO2 ART mm Hg 100.0   PCO2, ARTERIAL mm Hg 39.7   HCO3 ART mmol/L 21.5         Assessment      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  We will JOSE Valenzuela " catheter.        Migue Burton MD  10/18/18  7:09 AM      Please note that portions of this note were completed with a voice recognition program. Efforts were made to edit the dictations, but words may be mistranscribed    Electronically signed by Migue Burton MD at 10/18/2018  7:10 AM

## 2018-10-19 LAB
ANION GAP SERPL CALCULATED.3IONS-SCNC: 9 MMOL/L (ref 3–11)
BUN BLD-MCNC: 55 MG/DL (ref 9–23)
BUN/CREAT SERPL: 43.3 (ref 7–25)
CALCIUM SPEC-SCNC: 8.5 MG/DL (ref 8.7–10.4)
CHLORIDE SERPL-SCNC: 105 MMOL/L (ref 99–109)
CO2 SERPL-SCNC: 21 MMOL/L (ref 20–31)
CREAT BLD-MCNC: 1.27 MG/DL (ref 0.6–1.3)
DEPRECATED RDW RBC AUTO: 47 FL (ref 37–54)
ERYTHROCYTE [DISTWIDTH] IN BLOOD BY AUTOMATED COUNT: 14.6 % (ref 11.3–14.5)
FLUAV RNA SPEC QL NAA+PROBE: NEGATIVE
FLUBV RNA SPEC QL NAA+PROBE: NEGATIVE
GFR SERPL CREATININE-BSD FRML MDRD: 57 ML/MIN/1.73
GLUCOSE BLD-MCNC: 143 MG/DL (ref 70–100)
GLUCOSE BLDC GLUCOMTR-MCNC: 132 MG/DL (ref 70–130)
GLUCOSE BLDC GLUCOMTR-MCNC: 138 MG/DL (ref 70–130)
GLUCOSE BLDC GLUCOMTR-MCNC: 165 MG/DL (ref 70–130)
GLUCOSE BLDC GLUCOMTR-MCNC: 171 MG/DL (ref 70–130)
GLUCOSE BLDC GLUCOMTR-MCNC: 174 MG/DL (ref 70–130)
HADV DNA SPEC QL NAA+PROBE: NEGATIVE
HCT VFR BLD AUTO: 32.8 % (ref 38.9–50.9)
HGB BLD-MCNC: 10.6 G/DL (ref 13.1–17.5)
HMPV RNA SPEC QL NAA+PROBE: NEGATIVE
HPIV1 RNA SPEC QL NAA+PROBE: NEGATIVE
HPIV2 SPEC QL CULT: NEGATIVE
HPIV3 SPEC QL CULT: NEGATIVE
MCH RBC QN AUTO: 28.6 PG (ref 27–31)
MCHC RBC AUTO-ENTMCNC: 32.3 G/DL (ref 32–36)
MCV RBC AUTO: 88.4 FL (ref 80–99)
PLATELET # BLD AUTO: 390 10*3/MM3 (ref 150–450)
PMV BLD AUTO: 9.4 FL (ref 6–12)
POTASSIUM BLD-SCNC: 4.3 MMOL/L (ref 3.5–5.5)
RBC # BLD AUTO: 3.71 10*6/MM3 (ref 4.2–5.76)
RHINOVIRUS RNA SPEC QL NAA+PROBE: NEGATIVE
RSV A: NEGATIVE
RSV B RNA SPEC QL NAA+PROBE: NEGATIVE
SODIUM BLD-SCNC: 135 MMOL/L (ref 132–146)
WBC NRBC COR # BLD: 9.3 10*3/MM3 (ref 3.5–10.8)

## 2018-10-19 PROCEDURE — 94760 N-INVAS EAR/PLS OXIMETRY 1: CPT

## 2018-10-19 PROCEDURE — 97530 THERAPEUTIC ACTIVITIES: CPT

## 2018-10-19 PROCEDURE — 99231 SBSQ HOSP IP/OBS SF/LOW 25: CPT | Performed by: NURSE PRACTITIONER

## 2018-10-19 PROCEDURE — 63710000001 INSULIN DETEMIR PER 5 UNITS: Performed by: HOSPITALIST

## 2018-10-19 PROCEDURE — 94799 UNLISTED PULMONARY SVC/PX: CPT

## 2018-10-19 PROCEDURE — 94640 AIRWAY INHALATION TREATMENT: CPT

## 2018-10-19 PROCEDURE — 85027 COMPLETE CBC AUTOMATED: CPT | Performed by: NURSE PRACTITIONER

## 2018-10-19 PROCEDURE — 82962 GLUCOSE BLOOD TEST: CPT

## 2018-10-19 PROCEDURE — 99232 SBSQ HOSP IP/OBS MODERATE 35: CPT | Performed by: NURSE PRACTITIONER

## 2018-10-19 PROCEDURE — 99232 SBSQ HOSP IP/OBS MODERATE 35: CPT | Performed by: INTERNAL MEDICINE

## 2018-10-19 PROCEDURE — 80048 BASIC METABOLIC PNL TOTAL CA: CPT | Performed by: NURSE PRACTITIONER

## 2018-10-19 RX ADMIN — Medication 325 MG: at 08:41

## 2018-10-19 RX ADMIN — AMOXICILLIN AND CLAVULANATE POTASSIUM 1 TABLET: 875; 125 TABLET, FILM COATED ORAL at 20:54

## 2018-10-19 RX ADMIN — OXYCODONE HYDROCHLORIDE AND ACETAMINOPHEN 1 TABLET: 7.5; 325 TABLET ORAL at 13:19

## 2018-10-19 RX ADMIN — Medication 3 ML: at 20:54

## 2018-10-19 RX ADMIN — OXYCODONE HYDROCHLORIDE AND ACETAMINOPHEN 1 TABLET: 7.5; 325 TABLET ORAL at 06:50

## 2018-10-19 RX ADMIN — INSULIN LISPRO 2 UNITS: 100 INJECTION, SOLUTION INTRAVENOUS; SUBCUTANEOUS at 12:23

## 2018-10-19 RX ADMIN — INSULIN LISPRO 2 UNITS: 100 INJECTION, SOLUTION INTRAVENOUS; SUBCUTANEOUS at 20:53

## 2018-10-19 RX ADMIN — PANTOPRAZOLE SODIUM 40 MG: 40 TABLET, DELAYED RELEASE ORAL at 06:50

## 2018-10-19 RX ADMIN — DEXTROMETHORPHAN 60 MG: 30 SUSPENSION, EXTENDED RELEASE ORAL at 20:54

## 2018-10-19 RX ADMIN — GUAIFENESIN 1200 MG: 600 TABLET, EXTENDED RELEASE ORAL at 20:53

## 2018-10-19 RX ADMIN — OXYCODONE HYDROCHLORIDE AND ACETAMINOPHEN 1 TABLET: 10; 325 TABLET ORAL at 00:05

## 2018-10-19 RX ADMIN — INSULIN DETEMIR 30 UNITS: 100 INJECTION, SOLUTION SUBCUTANEOUS at 08:42

## 2018-10-19 RX ADMIN — BENZONATATE 200 MG: 100 CAPSULE ORAL at 08:41

## 2018-10-19 RX ADMIN — TAMSULOSIN HYDROCHLORIDE 0.4 MG: 0.4 CAPSULE ORAL at 08:41

## 2018-10-19 RX ADMIN — OXYCODONE HYDROCHLORIDE AND ACETAMINOPHEN 1 TABLET: 10; 325 TABLET ORAL at 18:56

## 2018-10-19 RX ADMIN — INSULIN LISPRO 2 UNITS: 100 INJECTION, SOLUTION INTRAVENOUS; SUBCUTANEOUS at 17:32

## 2018-10-19 RX ADMIN — LIDOCAINE HYDROCHLORIDE 4 ML: 40 INJECTION, SOLUTION RETROBULBAR; TOPICAL at 05:14

## 2018-10-19 RX ADMIN — ALPRAZOLAM 0.25 MG: 0.25 TABLET ORAL at 08:57

## 2018-10-19 RX ADMIN — CARVEDILOL 25 MG: 12.5 TABLET, FILM COATED ORAL at 08:41

## 2018-10-19 RX ADMIN — GUAIFENESIN 1200 MG: 600 TABLET, EXTENDED RELEASE ORAL at 08:41

## 2018-10-19 RX ADMIN — CARVEDILOL 25 MG: 12.5 TABLET, FILM COATED ORAL at 20:53

## 2018-10-19 RX ADMIN — POLYETHYLENE GLYCOL 3350 17 G: 17 POWDER, FOR SOLUTION ORAL at 08:43

## 2018-10-19 RX ADMIN — ATORVASTATIN CALCIUM 40 MG: 40 TABLET, FILM COATED ORAL at 20:53

## 2018-10-19 RX ADMIN — SACUBITRIL AND VALSARTAN 1 TABLET: 24; 26 TABLET, FILM COATED ORAL at 20:53

## 2018-10-19 RX ADMIN — SENNOSIDES AND DOCUSATE SODIUM 2 TABLET: 8.6; 5 TABLET ORAL at 08:42

## 2018-10-19 RX ADMIN — OXYCODONE HYDROCHLORIDE AND ACETAMINOPHEN 1 TABLET: 7.5; 325 TABLET ORAL at 22:59

## 2018-10-19 RX ADMIN — FINASTERIDE 5 MG: 5 TABLET, FILM COATED ORAL at 08:41

## 2018-10-19 RX ADMIN — BENZONATATE 200 MG: 100 CAPSULE ORAL at 17:31

## 2018-10-19 RX ADMIN — SENNOSIDES AND DOCUSATE SODIUM 2 TABLET: 8.6; 5 TABLET ORAL at 20:53

## 2018-10-19 RX ADMIN — CLOPIDOGREL BISULFATE 75 MG: 75 TABLET ORAL at 08:41

## 2018-10-19 RX ADMIN — LIDOCAINE HYDROCHLORIDE 4 ML: 40 INJECTION, SOLUTION RETROBULBAR; TOPICAL at 13:23

## 2018-10-19 RX ADMIN — ALPRAZOLAM 0.25 MG: 0.25 TABLET ORAL at 22:58

## 2018-10-19 RX ADMIN — AMOXICILLIN AND CLAVULANATE POTASSIUM 1 TABLET: 875; 125 TABLET, FILM COATED ORAL at 08:40

## 2018-10-19 RX ADMIN — DEXTROMETHORPHAN 60 MG: 30 SUSPENSION, EXTENDED RELEASE ORAL at 09:44

## 2018-10-19 RX ADMIN — Medication 325 MG: at 17:31

## 2018-10-19 RX ADMIN — TORSEMIDE 5 MG: 10 TABLET ORAL at 08:41

## 2018-10-19 RX ADMIN — SACUBITRIL AND VALSARTAN 1 TABLET: 24; 26 TABLET, FILM COATED ORAL at 08:40

## 2018-10-19 RX ADMIN — OXYCODONE HYDROCHLORIDE AND ACETAMINOPHEN 1 TABLET: 10; 325 TABLET ORAL at 04:12

## 2018-10-19 RX ADMIN — BENZONATATE 200 MG: 100 CAPSULE ORAL at 22:59

## 2018-10-19 RX ADMIN — INSULIN DETEMIR 30 UNITS: 100 INJECTION, SOLUTION SUBCUTANEOUS at 20:52

## 2018-10-19 NOTE — PLAN OF CARE
Problem: Patient Care Overview  Goal: Plan of Care Review  Outcome: Ongoing (interventions implemented as appropriate)   10/19/18 1015   Coping/Psychosocial   Plan of Care Reviewed With patient;spouse   Plan of Care Review   Progress improving   OTHER   Outcome Summary Able to amb 600 ft w/ R wx & 3 stand.rests for coughing episodes (raspy,non-prod);tried Ambien last pm but not resting well & notes fatigue; limited by dec HGB (10.6), elev BUN, incr. sternal pain w/ coughing, & dec. BP w/ activ. /upright position; issued SPC & willing to try next session;needs to practice 1 step p/t d/c

## 2018-10-19 NOTE — PROGRESS NOTES
Intensivist Note     10/19/2018  Hospital Day: 8  8 Days Post-Op      Mr. Kar Mora, 68 y.o. 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 Desensitization injections discontinued July 2018    Cough       SUBJECTIVE     68-year-old white male remote smoker (none for 37 years) with a history of hypertension and hypertensive cardiovascular disease, hyperlipidemia, diabetes mellitus, obesity, chronic kidney disease (baseline creatinine preop was 1.63), allergic rhinitis (previously on desensitization injections), and gout.  Underwent elective CABG ×4 on 10/11/18 CAD with LVEF 40%. Did well except for a bump in his creatinine which recovered as well as complaints of intractable cough.     On 10/14/18 the patient developed a persistent intense nonproductive cough.There has never been significant sputum reduction or hemoptysis, and he has had no fever or chills. In addition white count remains normal. Chest x-ray however did reveal some opacity in the left base (atelectasis versus infiltrate). He denies dyspnea or wheezing as well as dysphagia, postnasal drip or significant reflux  (although he does have a history of allergies and GERD).      Remains on guaifenesin, dextromethorphan, Tessalon, and codeine derivatives to suppress his cough. In addition has been receiving prn neb treatments with Xylocaine which he states are quite effective. Cough persists  but is definitely improved. Has been diuresed and is approximately 4.8 L negative since admission. Now on daily Bumex and BUN and creatinine are improved. In addition blood sugars are much improved.       The patient's relevant past  "medical, surgical and social history were reviewed and updated in Epic as appropriate.    OBJECTIVE     /72   Pulse 83   Temp 98.1 °F (36.7 °C) (Oral)   Resp 18   Ht 167.6 cm (66\")   Wt 95.3 kg (210 lb 3.2 oz)   SpO2 94%   BMI 33.93 kg/m²   Flow (L/min): 2    Flowsheet Rows      First Filed Value   Admission Height  167.6 cm (66\") Documented at 10/11/2018 0604   Admission Weight  96.2 kg (212 lb) Documented at 10/11/2018 0604        Intake & Output (last day)       10/18 0701 - 10/19 0700 10/19 0701 - 10/20 0700    P.O. 360 480    Total Intake(mL/kg) 360 (3.8) 480 (5)    Urine (mL/kg/hr) 1000 (0.4)     Total Output 1000      Net -640 +480          Unmeasured Urine Occurrence 3 x     Unmeasured Stool Occurrence 1 x           Exam:  General Exam:  Well-developed older white male in NAD.  HEENT: Pupils equal and reactive. Nose and throat clear.  Neck:                          Supple, no JVD, thyromegaly, or adenopathy  Lungs: No wheezes rales or rhonchi but diminished breath sounds and dullness to percussion left base.  Cardiovascular: RRR without murmurs or gallops.  Abdomen: Obese and very protuberant but soft and nontender   and rectal: Deferred.  Extremities: No cyanosis clubbing edema.  Neurologic:                 Symmetric strength. No focal deficits.    Chest X-Ray 10/18/18: Left lower lobe atelectasis/infiltrate      Results from last 7 days  Lab Units 10/19/18  0353 10/18/18  0530 10/16/18  0517   WBC 10*3/mm3 9.30 10.58 6.71   HEMOGLOBIN g/dL 10.6* 10.4* 10.1*  10.0*   HEMATOCRIT % 32.8* 32.5* 31.2*  31.0*   PLATELETS 10*3/mm3 390 385 288       Results from last 7 days  Lab Units 10/19/18  0353 10/18/18  0530   SODIUM mmol/L 135 133   POTASSIUM mmol/L 4.3 4.7   CHLORIDE mmol/L 105 103   CO2 mmol/L 21.0 22.0   BUN mg/dL 55* 67*   CREATININE mg/dL 1.27 1.50*   GLUCOSE mg/dL 143* 296*   CALCIUM mg/dL 8.5* 8.6*               No results found for: SEDRATE  No results found for: BNP  No results " found for: CKTOTAL, CKMB, CKMBINDEX, TROPONINI, TROPONINT  No results found for: TSH  No results found for: LACTATE  No results found for: CORTISOL      I reviewed the patient's results, images and medication.    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 Desensitization injections discontinued July 2018    Cough      DISCUSSION: Still with more cough than I would like but improved and is no longer requiring the nebulizer as much. Volume status looks good and he has had no fever or leukocytosis develop.    PLAN     1. Follow-up a good PA and lateral chest x-ray tomorrow  2. Complete a full course of Augmentin  3. Continue to mobilize  4. Should be ready for discharge Monday    Plan of care and goals reviewed with mulitdisciplinary team at daily rounds.    I discussed the patient's findings and my recommendations with patient and nursing staff    Time spent Critical care 20 min (It does not include procedure time).    Branden Florence MD  Intensive Care Medicine  10/19/18 4:30 PM

## 2018-10-19 NOTE — PLAN OF CARE
Problem: Patient Care Overview  Goal: Plan of Care Review  Outcome: Ongoing (interventions implemented as appropriate)   10/19/18 0531   Coping/Psychosocial   Plan of Care Reviewed With patient   Plan of Care Review   Progress no change   OTHER   Outcome Summary patient needed several doses of pain meds this shift, still has painful cough, tried ambien and still did not rest well.      Goal: Individualization and Mutuality  Outcome: Ongoing (interventions implemented as appropriate)    Goal: Discharge Needs Assessment  Outcome: Ongoing (interventions implemented as appropriate)    Goal: Interprofessional Rounds/Family Conf  Outcome: Ongoing (interventions implemented as appropriate)

## 2018-10-19 NOTE — PROGRESS NOTES
"Kar Mora  [id number]  1950     LOS: 8 days   Patient Care Team:  Bro Kan MD as PCP - General  MontoyaHiren MD as Consulting Physician (Endocrinology)    Chief Complaint: Coronary artery disease      Subjective: Feeling some better, cough markedly diminished    Objective:     Vital Sign Min/Max for last 24 hours  Temp  Min: 97.6 °F (36.4 °C)  Max: 98.3 °F (36.8 °C)   BP  Min: 102/70  Max: 116/62   Pulse  Min: 67  Max: 83   Resp  Min: 16  Max: 20   SpO2  Min: 93 %  Max: 95 %   No Data Recorded   Weight  Min: 95.3 kg (210 lb 3.2 oz)  Max: 95.3 kg (210 lb 3.2 oz)     Flowsheet Rows      First Filed Value   Admission Height  167.6 cm (66\") Documented at 10/11/2018 0604   Admission Weight  96.2 kg (212 lb) Documented at 10/11/2018 0604          Physical Exam:    Wound: No drainage.  Question instability of the lower portion    Pulses:     Mediastinal and Chest Tube Drainage:       Results Review:     Results from last 7 days  Lab Units 10/19/18  0353   WBC 10*3/mm3 9.30   HEMOGLOBIN g/dL 10.6*   HEMATOCRIT % 32.8*   PLATELETS 10*3/mm3 390       Results from last 7 days  Lab Units 10/19/18  0353   SODIUM mmol/L 135   POTASSIUM mmol/L 4.3   CHLORIDE mmol/L 105   CO2 mmol/L 21.0   BUN mg/dL 55*   CREATININE mg/dL 1.27   GLUCOSE mg/dL 143*   CALCIUM mg/dL 8.5*             Assessment      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 7/5/18    Insulin dependent type 2 diabetes mellitus (CMS/[facility])    Hyperlipidemia    Allergic rhinitis. Desensitization injections discontinued July 2018    Hypertensive cardiovascular disease    Moderate obesity    LOPEZ (acute kidney injury) (CMS/[facility])    Cough    S/P CABG x 4 on 10/11/18    Ischemic cardiomyopathy with LVEF 26-30% on echocardiogram 10/13/18 (was 40% July 2018)      Discussed again with wife and patient about situation.  I would keep the patient over the weekend and watch closely.  He appears to be doing much better from his coughing point of view.  " There may be some instability of his lower portion of his chest.  His wires in the upper part however appeared to be stable.        Migue Burton MD  10/19/18  7:14 AM      Please note that portions of this note were completed with a voice recognition program. Efforts were made to edit the dictations, but words may be mistranscribed

## 2018-10-19 NOTE — PROGRESS NOTES
Kar Mora  [id number]  1950   LOS: 8 days   Patient Care Team:  Bro Kan MD as PCP - General  MontoyaHiren MD as Consulting Physician (Endocrinology)    Chief Complaint:  Follow-up on CAD, hypertension    Subjective Patient is ambulating in the hallway this morning.His cough is improving but still there. He denies fevers, chills, SOB, or chest pain. There is concern about the sternal wires due to all his coughing. He did not sleep well last night despite the ambien.     Objective     Vital Sign Min/Max for last 24 hours  Temp  Min: 97.6 °F (36.4 °C)  Max: 98.3 °F (36.8 °C)   BP  Min: 102/70  Max: 114/57   Pulse  Min: 67  Max: 83   Resp  Min: 16  Max: 20   SpO2  Min: 93 %  Max: 95 %   No Data Recorded   Weight  Min: 95.3 kg (210 lb 3.2 oz)  Max: 95.3 kg (210 lb 3.2 oz)     1    10/18/18  0441 10/19/18  0415   Weight: 95 kg (209 lb 6.4 oz) 95.3 kg (210 lb 3.2 oz)         Intake/Output Summary (Last 24 hours) at 10/19/18 0823  Last data filed at 10/18/18 1700   Gross per 24 hour   Intake              360 ml   Output              500 ml   Net             -140 ml       Physical Exam:     General Appearance:    Alert, cooperative, in no acute distress   Lungs:     Diminished but clear breath sounds to auscultation,respirations regular, even and                   unlabored    Heart:    Regular and normal rate, normal S1 and S2,graded 1/6            murmur, no gallop, no rub, no click   Abdomen:  Extremities:   Soft, non-tender, bowel sounds audible x4    No edema, normal range of motion   Pulses:   Pulses palpable and equal bilaterally    Sternal incision CDI  Results Review:     Results from last 7 days  Lab Units 10/19/18  0353 10/18/18  0530 10/16/18  0517   SODIUM mmol/L 135 133 135   POTASSIUM mmol/L 4.3 4.7 4.5   CHLORIDE mmol/L 105 103 104   CO2 mmol/L 21.0 22.0 21.0   BUN mg/dL 55* 67* 50*   CREATININE mg/dL 1.27 1.50* 1.40*   GLUCOSE mg/dL 143* 296* 199*   CALCIUM mg/dL 8.5*  8.6* 8.4*       Results from last 7 days  Lab Units 10/19/18  0353 10/18/18  0530 10/16/18  0517   WBC 10*3/mm3 9.30 10.58 6.71   HEMOGLOBIN g/dL 10.6* 10.4* 10.1*  10.0*   HEMATOCRIT % 32.8* 32.5* 31.2*  31.0*   PLATELETS 10*3/mm3 390 385 288             ·   Chest x-ray 10/18/18:No significant interval change  · No new ECG to review    Medication Review: Reviewed    Assessment/Plan   Plans to monitor the patient over the weekend per CTS. Renal function improved with persistent anemia. Would recommend the following cardiac medications for eventual discharge:  · Atorvastatin 40 mg daily  · Carvedilol 25 mg BID  · Clopidogrel 75 mg daily  · Mucinex DM BID when necessary  · Ferrous sulfate 325 mg  BID X one month  · Entresto 24/26 BID  · Defer spironolactone/eplerenone at this time until renal function remains stable as an outpatient  · Torsemide 5 mg daily when necessary edema/shortness of breath/weight gain  · [facility] cardiology follow-up Dr. 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 10/19/2018  8:27 AM      10/19/18  8:23 AM

## 2018-10-19 NOTE — PAYOR COMM NOTE
"Utilization Review [phone]  Brain Mora (68 y.o. Male)     Date of Birth Social Security Number Address Home Phone MRN    1950  [id number] [facility] [address] [phone] [id number]    Taoist Marital Status          None        Admission Date Admission Type Admitting Provider Attending Provider Department, Room/Bed    10/11/18 Elective Migue Burton MD Rogers, Anthony G, MD [facility] 4H, S470/1    Discharge Date Discharge Disposition Discharge Destination                       Attending Provider:  Migue Burton MD    Allergies:  Asa [Aspirin]    Isolation:  None   Infection:  None   Code Status:  CPR    Ht:  167.6 cm (66\")   Wt:  95.3 kg (210 lb 3.2 oz)    Admission Cmt:  None   Principal Problem:  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 7/5/18 [I25.119]                 Active Insurance as of 10/11/2018     Primary Coverage     Payor Plan Insurance Group Employer/Plan Group    MISC COMMERCIAL MISC COMMERCIAL INS      Coverage Address Coverage Phone Number Effective From Effective To    [address] [phone] 7/1/2007     [facility] [address]       Subscriber Name Subscriber Birth Date Member ID       BRAIN MORA 1950 [id number]                 Emergency Contacts      (Rel.) Home Phone Work Phone Mobile Phone    Laura Mora (Spouse) [phone] -- [phone]    Lucretia Galvan (Sister) [phone] -- --            Clinic-Administered Medications       Dose Frequency Start End    Chlorhexidine Gluconate Cloth 2 % pads 1 application 1 application Every 12 Hours PRN 10/10/2018     Sig - Route: Apply 1 application topically to the appropriate area as directed Every 12 (Twelve) Hours As Needed (12 hours night before surgery and repeat in AM prior to surgery.). - Topical      Hospital Medications (active)       Dose Frequency Start End    acetaminophen (TYLENOL) tablet 650 mg 650 mg Every 4 Hours PRN 10/11/2018     " Sig - Route: Take 2 tablets by mouth Every 4 (Four) Hours As Needed for Mild Pain . - Oral    ALPRAZolam (XANAX) tablet 0.25 mg 0.25 mg 2 Times Daily PRN 10/15/2018 10/25/2018    Sig - Route: Take 1 tablet by mouth 2 (Two) Times a Day As Needed for Anxiety. - Oral    Cosign for Ordering: Accepted by Migue Burton MD on 10/16/2018  7:08 AM    amoxicillin-clavulanate (AUGMENTIN) 875-125 MG per tablet 1 tablet 1 tablet Every 12 Hours Scheduled 10/17/2018 10/24/2018    Sig - Route: Take 1 tablet by mouth Every 12 (Twelve) Hours. - Oral    atorvastatin (LIPITOR) tablet 40 mg 40 mg Nightly 10/11/2018     Sig - Route: Take 1 tablet by mouth Every Night. - Oral    benzonatate (TESSALON) capsule 200 mg 200 mg Every 8 Hours 10/16/2018     Sig - Route: Take 2 capsules by mouth Every 8 (Eight) Hours. - Oral    bisacodyl (DULCOLAX) EC tablet 10 mg 10 mg Daily PRN 10/11/2018     Sig - Route: Take 2 tablets by mouth Daily As Needed for Constipation. - Oral    bisacodyl (DULCOLAX) suppository 10 mg 10 mg Once 10/18/2018 10/18/2018    Sig - Route: Insert 1 suppository into the rectum 1 (One) Time. - Rectal    calcium carbonate (TUMS) chewable tablet 500 mg (200 mg elemental) 2 tablet 3 Times Daily PRN 10/13/2018     Sig - Route: Chew 1,000 mg 3 (Three) Times a Day As Needed for Indigestion or Heartburn. - Oral    carvedilol (COREG) tablet 25 mg 25 mg 2 Times Daily 10/13/2018     Sig - Route: Take 2 tablets by mouth 2 (Two) Times a Day. - Oral    clopidogrel (PLAVIX) tablet 75 mg 75 mg Daily 10/15/2018     Sig - Route: Take 1 tablet by mouth Daily. - Oral    dextromethorphan polistirex ER (DELSYM) 30 MG/5ML oral suspension 60 mg 60 mg Every 12 Hours Scheduled 10/16/2018     Sig - Route: Take 60 mg by mouth Every 12 (Twelve) Hours. - Oral    dextrose (D50W) 25 g/ 50mL Intravenous Solution 25 g 25 g Every 15 Minutes PRN 10/12/2018     Sig - Route: Infuse 50 mL into a venous catheter Every 15 (Fifteen) Minutes As Needed for Low  Blood Sugar (Blood Sugar Less Than 70). - Intravenous    dextrose (GLUTOSE) oral gel 15 g 15 g Every 15 Minutes PRN 10/12/2018     Sig - Route: Take 15 g by mouth Every 15 (Fifteen) Minutes As Needed for Low Blood Sugar (Blood sugar less than 70). - Oral    docusate sodium (COLACE) capsule 100 mg 100 mg 2 Times Daily PRN 10/11/2018     Sig - Route: Take 1 capsule by mouth 2 (Two) Times a Day As Needed for Constipation. - Oral    ferrous sulfate tablet 325 mg 325 mg 2 Times Daily With Meals 10/15/2018     Sig - Route: Take 1 tablet by mouth 2 (Two) Times a Day With Meals. - Oral    finasteride (PROSCAR) tablet 5 mg 5 mg Daily 10/14/2018     Sig - Route: Take 1 tablet by mouth Daily. - Oral    glucagon (human recombinant) (GLUCAGEN DIAGNOSTIC) injection 1 mg 1 mg As Needed 10/12/2018     Sig - Route: Inject 1 mg under the skin into the appropriate area as directed As Needed (Blood Glucose Less Than 70). - Subcutaneous    guaiFENesin (MUCINEX) 12 hr tablet 1,200 mg 1,200 mg Every 12 Hours Scheduled 10/17/2018     Sig - Route: Take 2 tablets by mouth Every 12 (Twelve) Hours. - Oral    insulin detemir (LEVEMIR) injection 30 Units 30 Units 2 Times Daily 10/18/2018     Sig - Route: Inject 30 Units under the skin into the appropriate area as directed 2 (Two) Times a Day. - Subcutaneous    insulin lispro (humaLOG) injection 0-9 Units 0-9 Units 4 Times Daily With Meals & Nightly 10/12/2018     Sig - Route: Inject 0-9 Units under the skin into the appropriate area as directed 4 (Four) Times a Day With Meals & at Bedtime. - Subcutaneous    insulin lispro (humaLOG) injection 15 Units 15 Units 3 Times Daily With Meals 10/18/2018     Sig - Route: Inject 15 Units under the skin into the appropriate area as directed 3 (Three) Times a Day With Meals. - Subcutaneous    lidocaine (XYLOCAINE) 4 % nebulizer solution 4 mL 4 mL Every 3 Hours PRN 10/16/2018     Sig - Route: Take 4 mL by nebulization Every 3 (Three) Hours As Needed (Cough).  - Nebulization    melatonin sublingual tablet 5 mg 5 mg Nightly PRN 10/15/2018     Sig - Route: Place 1 tablet under the tongue At Night As Needed for Sleep. - Sublingual    Morphine sulfate (PF) injection 2 mg 2 mg Every 2 Hours PRN 10/12/2018     Sig - Route: Infuse 1 mL into a venous catheter Every 2 (Two) Hours As Needed for Severe Pain . - Intravenous    ondansetron (ZOFRAN) injection 4 mg 4 mg Every 6 Hours PRN 10/11/2018     Sig - Route: Infuse 2 mL into a venous catheter Every 6 (Six) Hours As Needed for Nausea or Vomiting. - Intravenous    oxyCODONE-acetaminophen (PERCOCET)  MG per tablet 1 tablet 1 tablet Every 4 Hours PRN 10/16/2018 10/26/2018    Sig - Route: Take 1 tablet by mouth Every 4 (Four) Hours As Needed for Moderate Pain  or Severe Pain . - Oral    oxyCODONE-acetaminophen (PERCOCET) 7.5-325 MG per tablet 1 tablet 1 tablet Every 8 Hours 10/16/2018 10/19/2018    Sig - Route: Take 1 tablet by mouth Every 8 (Eight) Hours. - Oral    pantoprazole (PROTONIX) EC tablet 40 mg 40 mg Every Early Morning 10/16/2018     Sig - Route: Take 1 tablet by mouth Every Morning. - Oral    Pharmacy Consult - MT  Daily 10/11/2018     Sig - Route: Daily. - Does not apply    phenol (CHLORASEPTIC) 1.4 % liquid 2 spray 2 spray Every 2 Hours PRN 10/12/2018     Sig - Route: Apply 2 sprays to the mouth or throat Every 2 (Two) Hours As Needed for Sore Throat. - Mouth/Throat    polyethylene glycol 3350 powder (packet) 17 g Daily 10/18/2018     Sig - Route: Take 17 g by mouth Daily. - Oral    sacubitril-valsartan (ENTRESTO) 24-26 MG tablet 1 tablet 1 tablet Every 12 Hours Scheduled 10/15/2018     Sig - Route: Take 1 tablet by mouth Every 12 (Twelve) Hours. - Oral    sennosides-docusate sodium (SENOKOT-S) 8.6-50 MG tablet 2 tablet 2 tablet 2 Times Daily 10/11/2018     Sig - Route: Take 2 tablets by mouth 2 (Two) Times a Day. - Oral    sodium chloride 0.9 % flush 3 mL 3 mL Every 12 Hours Scheduled 10/14/2018     Sig - Route:  Infuse 3 mL into a venous catheter Every 12 (Twelve) Hours. - Intravenous    sodium chloride 0.9 % flush 3-10 mL 3-10 mL Every 8 Hours 10/14/2018     Sig - Route: Infuse 3-10 mL into a venous catheter Every 8 (Eight) Hours. - Intravenous    tamsulosin (FLOMAX) 24 hr capsule 0.4 mg 0.4 mg Daily 10/14/2018     Sig - Route: Take 1 capsule by mouth Daily. - Oral    torsemide (DEMADEX) tablet 5 mg 5 mg Daily 10/17/2018     Sig - Route: Take 0.5 tablets by mouth Daily. - Oral    zolpidem (AMBIEN) tablet 5 mg 5 mg Nightly PRN 10/16/2018 10/26/2018    Sig - Route: Take 1 tablet by mouth At Night As Needed for Sleep. - Oral             Physician Progress Notes (last 24 hours) (Notes from 10/18/2018  1:43 PM through 10/19/2018  1:43 PM)      Radha Grimes APRN at 10/19/2018 11:05 AM              [facility] Medicine Services  PROGRESS NOTE    Patient Name: Kar Mora  : 1950  MRN: [id number]    Date of Admission: 10/11/2018  Length of Stay: 8  Primary Care Physician: Bro Kan MD    Subjective   Subjective     CC:  F/u cough post CABG, medical management    HPI:  Pt is seen ambulating in room in [facility].  No visitors at bs.  States he feels better today.  Reports cough significantly better.  Tolerating diet.  No n/v, abd pain or soa.  On RA.  States old MT sites have stopped draining finally as of last pm.  Understands he's here over the weekend.  No new issues.     Review of Systems  Gen- No fevers, chills  CV- No chest pain, palpitations  Resp- occ cough, dyspnea  GI- No N/V/D, abd pain    Otherwise ROS is negative except as mentioned in the HPI.    Objective   Objective     Vital Signs:   Temp:  [97.6 °F (36.4 °C)-98.3 °F (36.8 °C)] 98.1 °F (36.7 °C)  Heart Rate:  [67-90] 85  Resp:  [16-20] 16  BP: (102-130)/(67-84) 119/72      Physical Exam:  Constitutional: No acute distress, awake, alert.  Up walking in room.  No visitors at bs  HENT: NCAT, mucous membranes  moist  Respiratory: respiratory effort and expansion better today.  Rare very faint crackle (much improved c/t yesterday).  No wheezes.  Talking in full sentences.   Cardiovascular: RRR, no murmurs, rubs, or gallops, palpable pedal pulses bilaterally, midline sternal incision c/d/i superiorly, dressed inferiorly still with large MT site drsg covering distal tip C/D/I.   Gastrointestinal: Positive bowel sounds, soft, nontender, nondistended. Obese   Musculoskeletal: 1+ bilateral LE edema but not as tight today c/t yesterday, right calf VHS incision c/d/i  Psychiatric: appropriate affect, cooperative and calm  Neurologic: Oriented x 3, strength symmetric in all extremities, Cranial Nerves grossly intact to confrontation, speech clear. Follows commands   Skin: No rashes    Results Reviewed:  I have personally reviewed current lab, radiology, and data and agree.      Results from last 7 days  Lab Units 10/19/18  0353 10/18/18  0530 10/16/18  0517   WBC 10*3/mm3 9.30 10.58 6.71   HEMOGLOBIN g/dL 10.6* 10.4* 10.1*  10.0*   HEMATOCRIT % 32.8* 32.5* 31.2*  31.0*   PLATELETS 10*3/mm3 390 385 288       Results from last 7 days  Lab Units 10/19/18  0353 10/18/18  0530 10/16/18  0517   SODIUM mmol/L 135 133 135   POTASSIUM mmol/L 4.3 4.7 4.5   CHLORIDE mmol/L 105 103 104   CO2 mmol/L 21.0 22.0 21.0   BUN mg/dL 55* 67* 50*   CREATININE mg/dL 1.27 1.50* 1.40*   GLUCOSE mg/dL 143* 296* 199*   CALCIUM mg/dL 8.5* 8.6* 8.4*     Estimated Creatinine Clearance: 61 mL/min (by C-G formula based on SCr of 1.27 mg/dL).  No results found for: BNP    Microbiology Results Abnormal     Procedure Component Value - Date/Time    Resp Virus Profile (RVP) PCR - Swab, Nasopharynx [[id number]] Collected:  10/16/18 1341    Lab Status:  Final result Specimen:  Swab from Nasopharynx Updated:  10/19/18 0618     Influenza A PCR Negative     Influenza B PCR Negative     RSV A Negative     RSV B Negative     Parainfluenza Virus 1 Negative     Parainfluenza  Virus 2 Negative     Parainfluenza Virus 3 Negative     Human Rhinovirus/Enterovirus Negative     Human Metapneumovirus Negative     Adenovirus Detection by PCR Negative    Narrative:       Performed at:  [address], [place], [place]  [id number]  : Bro Robertson MD, Phone:  [phone]    Influenza A & B, RT PCR - Swab, Nasopharynx [[id number]]  (Normal) Collected:  10/16/18 1341    Lab Status:  Final result Specimen:  Swab from Nasopharynx Updated:  10/16/18 1924     Influenza A PCR Not Detected     Influenza B PCR Not Detected          Imaging Results (last 24 hours)     ** No results found for the last 24 hours. **        Results for orders placed during the hospital encounter of 10/11/18   Adult Transthoracic Echo Complete W/ Cont if Necessary Per Protocol    Narrative · Estimated EF appears to be in the range of 26 - 30%.  · The following left ventricular wall segments are hypokinetic: mid   anterolateral. The following left ventricular wall segments are   dyskinetic: apical anterior.  · Right ventricular cavity is borderline dilated.  · Left atrial cavity size is mildly dilated.  · Mild mitral valve regurgitation is present          I have reviewed the medications.      amoxicillin-clavulanate 1 tablet Oral Q12H   atorvastatin 40 mg Oral Nightly   benzonatate 200 mg Oral Q8H   carvedilol 25 mg Oral BID   clopidogrel 75 mg Oral Daily   dextromethorphan polistirex ER 60 mg Oral Q12H   ferrous sulfate 325 mg Oral BID With Meals   finasteride 5 mg Oral Daily   guaiFENesin 1,200 mg Oral Q12H   insulin detemir 30 Units Subcutaneous BID   insulin lispro 0-9 Units Subcutaneous 4x Daily With Meals & Nightly   insulin lispro 15 Units Subcutaneous TID With Meals   oxyCODONE-acetaminophen 1 tablet Oral Q8H   pantoprazole 40 mg Oral Q AM   pharmacy consult - MTM  Does not apply Daily   polyethylene glycol 17 g Oral Daily   sacubitril-valsartan 1 tablet Oral Q12H    sennosides-docusate sodium 2 tablet Oral BID   sodium chloride 3 mL Intravenous Q12H   sodium chloride 3-10 mL Intravenous Q8H   tamsulosin 0.4 mg Oral Daily   torsemide 5 mg Oral Daily         Assessment/Plan   Assessment / Plan     Active Hospital Problems    Diagnosis   • **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 Probable hypertensive cardiovascular disease:  a. Abnormal EKG with abnormal acceptable echocardiographic GXT, September 2004.   b. Acceptable echocardiographic GXT with preserved exercise capacity and mild LVH, March 2008.   c. Acceptable Quantitative SPECT Gated Cardiolite GXT with nominal myocardial perfusion to 88% of predicted exercise capacity and 90% predicted maximum heart rate with preserved LVEF (0.65) with continued medical therapy felt warranted, December 2012.  d. Residual class I symptoms with recent documented abnormal EKG (LBBB/first-degree AV block) with abnormal echocardiogram demonstrating mild left ventricular chamber enlargement with mild reduction in the LVEF (0.42) without PE or pulmonary hypertension.  e. Resident class I symptoms with persistent abnormal echocardiogram (LVEF 0.40), with mild concentric LVH and mild left atrial enlargement without pulmonary arterial hypertension or pericardial effusion, July 2015.       • Moderate obesity   • Hyperlipidemia   • Allergic rhinitis. Desensitization injections discontinued July 2018     Brief Hospital Course to date:  Kar Mora is a 68 y.o. male with a remote hx of tobacco use, HTN, hypertensive heart disease, hyperlipidemia, insulin-dependent T2DM, obesity, CKD III (baseline 1.6), and gout who is admitted following elective CABG x 4 on  10/11/18:    Multivessel CAD S/p CABG x 4 on 10/11/18  Chronic LV Systolic Heart Failure due to ICM with LVEF 26-30%  - continue aspirin, plavix, entresto, carvedilol, and atorvastatin  - Plan for life vest at discharge, d/w CM  - Gentle diuresis with oral torsemide, Dr. Qureshi following   - CT surgery managing serosanguinous drainage from inferior aspect of sternal wound   - No aldactone due to renal dysfunction  - Optimize pain control  --CTS concerned about sternal wires/incision, especially with aggressive cough issues the last several days.  Now going to monitor pt in house over the weekend.  Plan dc home early next week if stable.     Urinary Retention, failed TOV 10/16  - continue flomax, finasteride  --dc leong  - attempt bladder training, repeat trial of voiding yesterday  --doing much better.  Voiding.     Nonproductive Cough, likely due to atelectasis, post-op pain   Bronchitis   - Improved, CXR negative for infiltrates   - Continue supportive care with tussionex, tessalon perles, xylocaine nebs, pain control   - Continue augmentin  - D/w Dr. Florence 10/16  --stable, cough significantly better today.    --continue to encourage splinting well with any cough or significant mvmt.      CKD III  --much better today.  Cr 1.27  --monitor    Insulin-dependent T2DM, A1c 7.5% at baseline   - exacerbated in last several days by IV steroid dose (had previously been under moderate control)  - Pt began new doses of b/b yesterday am.  Still a running a little high at times but generally fairly stable.  Will make no changes today -continue to monitor today.    --Continue MDSSI for now and monitor insulin needs.  Adjust further as indicated    Hypertension, BP reviewed and stable on current regimen    Normocytic Anemia    DVT Prophylaxis:  mechanical   CODE STATUS:   Code Status and Medical Interventions:   Ordered at: 10/11/18 1052     Level Of Support Discussed With:    Patient     Code Status:    CPR     Medical  "Interventions (Level of Support Prior to Arrest):    Full       Disposition: I expect the patient to be discharged home in 2-3 days as per primary CT service.      Electronically signed by EDGAR Sanchez, 10/19/18, 1:21 PM.        Electronically signed by Radha Grimes APRN at 10/19/2018  1:34 PM     Migue Burton MD at 10/19/2018  7:14 AM          Kar Mora  [id number]  1950     LOS: 8 days   Patient Care Team:  Bro Kan MD as PCP - General  Hiren Montoya MD as Consulting Physician (Endocrinology)    Chief Complaint: Coronary artery disease      Subjective: Feeling some better, cough markedly diminished    Objective:     Vital Sign Min/Max for last 24 hours  Temp  Min: 97.6 °F (36.4 °C)  Max: 98.3 °F (36.8 °C)   BP  Min: 102/70  Max: 116/62   Pulse  Min: 67  Max: 83   Resp  Min: 16  Max: 20   SpO2  Min: 93 %  Max: 95 %   No Data Recorded   Weight  Min: 95.3 kg (210 lb 3.2 oz)  Max: 95.3 kg (210 lb 3.2 oz)     Flowsheet Rows      First Filed Value   Admission Height  167.6 cm (66\") Documented at 10/11/2018 0604   Admission Weight  96.2 kg (212 lb) Documented at 10/11/2018 0604          Physical Exam:    Wound: No drainage.  Question instability of the lower portion    Pulses:     Mediastinal and Chest Tube Drainage:       Results Review:     Results from last 7 days  Lab Units 10/19/18  0353   WBC 10*3/mm3 9.30   HEMOGLOBIN g/dL 10.6*   HEMATOCRIT % 32.8*   PLATELETS 10*3/mm3 390       Results from last 7 days  Lab Units 10/19/18  0353   SODIUM mmol/L 135   POTASSIUM mmol/L 4.3   CHLORIDE mmol/L 105   CO2 mmol/L 21.0   BUN mg/dL 55*   CREATININE mg/dL 1.27   GLUCOSE mg/dL 143*   CALCIUM mg/dL 8.5*             Assessment      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 7/5/18    Insulin dependent type 2 diabetes mellitus (CMS/HCC)    Hyperlipidemia    Allergic rhinitis. Desensitization injections discontinued July 2018    Hypertensive cardiovascular " disease    Moderate obesity    LOPEZ (acute kidney injury) (CMS/[facility])    Cough    S/P CABG x 4 on 10/11/18    Ischemic cardiomyopathy with LVEF 26-30% on echocardiogram 10/13/18 (was 40% July 2018)      Discussed again with wife and patient about situation.  I would keep the patient over the weekend and watch closely.  He appears to be doing much better from his coughing point of view.  There may be some instability of his lower portion of his chest.  His wires in the upper part however appeared to be stable.        Migue Burton MD  10/19/18  7:14 AM      Please note that portions of this note were completed with a voice recognition program. Efforts were made to edit the dictations, but words may be mistranscribed    Electronically signed by Migue Burton MD at 10/19/2018  7:15 AM     Branden Florence MD at 10/18/2018  3:40 PM          Intensivist Note     10/18/2018  Hospital Day: 7  7 Days Post-Op      Mr. Kar Mora, 68 y.o. male is followed for:    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 7/5/18    S/P CABG x 4 on 10/11/18    Ischemic cardiomyopathy with LVEF 26-30% on echocardiogram 10/13/18 (was 40% July 2018)    Insulin dependent type 2 diabetes mellitus (CMS/[facility])    Hypertensive cardiovascular disease    LOPEZ (acute kidney injury) (CMS/[facility])    Hyperlipidemia    Moderate obesity    Allergic rhinitis. Desensitization injections discontinued July 2018    Cough       SUBJECTIVE     68-year-old white male remote smoker (none for 37 years) with a history of hypertension and hypertensive cardiovascular disease, hyperlipidemia, diabetes mellitus, obesity, chronic kidney disease (baseline creatinine preop was 1.63), allergic rhinitis (previously on desensitization injections), and gout.  Underwent elective CABG ×4 on 10/11/18 CAD with LVEF 40%. Did well except for a bump in his creatinine which recovered and complaints of cough.     On 10/14/18 the patient developed a persistent intense nonproductive  "cough.There has been no sputum or hemoptysis, and he has had no fever or chills. In addition white count remains normal. He denies dyspnea or wheezing as well as dysphagia, postnasal drip or significant reflux processes although he does have a history of allergies and GERD.      Remains on guaifenesin, dextromethorphan, Tessalon, and codeine derivatives to suppress his cough. In addition has been receiving prn neb treatments with Xylocaine which she states are quite effective. Cough persists   but is definitely improved. Has been diuresed and is approximately 4.16 L negative since admission. Further diuresis will be held as in the last 24 hours his BUN and creatinine have increased slightly. Still with suboptimal glucose control       The patient's relevant past medical, surgical and social history were reviewed and updated in Epic as appropriate.    OBJECTIVE     /57 (BP Location: Right arm, Patient Position: Sitting)   Pulse 82   Temp 97.8 °F (36.6 °C) (Oral)   Resp 16   Ht 167.6 cm (66\")   Wt 95 kg (209 lb 6.4 oz)   SpO2 94%   BMI 33.80 kg/m²    Flow (L/min): 2    Flowsheet Rows      First Filed Value   Admission Height  167.6 cm (66\") Documented at 10/11/2018 0604   Admission Weight  96.2 kg (212 lb) Documented at 10/11/2018 0604        Intake & Output (last day)       10/17 0701 - 10/18 0700 10/18 0701 - 10/19 0700    P.O. 560 360    Total Intake(mL/kg) 560 (5.9) 360 (3.8)    Urine (mL/kg/hr) 2650 (1.2) 500 (0.6)    Total Output 2650 500    Net -2090 -140          Unmeasured Urine Occurrence  1 x          Exam:  General Exam:  Well-developed older white male in NAD. No cough at present.  HEENT: Pupils equal and reactive. Nose and throat clear.  Neck:                          Supple, no JVD, thyromegaly, or adenopathy  Lungs: Only a few crackles in the bases  Cardiovascular: RRR. No murmurs or gallops  Abdomen: Soft nontender without organomegaly or masses. Obese   and " rectal: Deferred.  Extremities: No cyanosis clubbing edema.  Neurologic:                 Symmetric strength. No focal deficits.    Chest X-Ray: Cardiomegaly with some left lower lobe opacity that is unchanged (appears to be atelectasis). Also a small area of linear atelectasis persists radiating from the right hilum.      Results from last 7 days  Lab Units 10/18/18  0530 10/16/18  0517 10/15/18  0500 10/14/18  0321   WBC 10*3/mm3 10.58 6.71  --  7.34   HEMOGLOBIN g/dL 10.4* 10.1*  10.0* 9.9* 9.1*   HEMATOCRIT % 32.5* 31.2*  31.0* 30.7* 28.1*   PLATELETS 10*3/mm3 385 288  --  158       Results from last 7 days  Lab Units 10/18/18  0530 10/16/18  0517   SODIUM mmol/L 133 135   POTASSIUM mmol/L 4.7 4.5   CHLORIDE mmol/L 103 104   CO2 mmol/L 22.0 21.0   BUN mg/dL 67* 50*   CREATININE mg/dL 1.50* 1.40*   GLUCOSE mg/dL 296* 199*   CALCIUM mg/dL 8.6* 8.4*       Results from last 7 days  Lab Units 10/12/18  0323   MAGNESIUM mg/dL 2.6   PHOSPHORUS mg/dL 4.6           No results found for: SEDRATE  No results found for: BNP  No results found for: CKTOTAL, CKMB, CKMBINDEX, TROPONINI, TROPONINT  No results found for: TSH  No results found for: LACTATE  No results found for: CORTISOL            I reviewed the patient's results, images and medication.    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 Desensitization injections discontinued July 2018    Cough      DISCUSSION: Appears reasonably stable with some improvement in his cough.    PLAN     1. Continue oral Augmentin  2. Continue cough suppression and prn Xylocaine net treatments  3. Diuresis per cardiology  4. Follow-up renal  function  5. Upon discharge would complete a total of 7 days Augmentin  6. Until discharge would place on low-dose heparin for DVT prophylaxis    Plan of care and goals reviewed with mulitdisciplinary team at daily rounds.    I discussed the patient's findings and my recommendations with patient and nursing staff    Time spent Critical care 20 min (It does not include procedure time).    Branden Florence MD  Intensive Care Medicine  10/18/18 3:40 PM       Electronically signed by Branden Florence MD at 10/18/2018  3:53 PM

## 2018-10-19 NOTE — THERAPY TREATMENT NOTE
"Acute Care - Physical Therapy Treatment Note  [facility]     Patient Name: Kar Mora  : 1950  MRN: [id number]  Today's Date: 10/19/2018  Onset of Illness/Injury or Date of Surgery: 10/11/18  Date of Referral to PT: 10/12/18  Referring Physician: MD Burton    Admit Date: 10/11/2018    Visit Dx:    ICD-10-CM ICD-9-CM   1. Impaired functional mobility, balance, gait, and endurance Z74.09 V49.89   2. Atherosclerosis of native coronary artery of native heart with stable angina pectoris (CMS/[facility]) I25.118 414.01     413.9   3. Coronary artery disease (S/P CABG x 4) I25.119 414.01     413.9   4. LOPEZ (acute kidney injury) (CMS/[facility]) N17.9 584.9   5. Ischemic heart disease I25.9 414.9   6. Hypertensive heart disease without heart failure I11.9 402.90     Patient Active Problem List   Diagnosis   • Insulin dependent type 2 diabetes mellitus (CMS/[facility])   • Labile hypertension   • Hyperlipidemia   • Erectile dysfunction   • Allergic rhinitis. 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   • Ischemic cardiomyopathy with LVEF 26-30% on echocardiogram 10/13/18 (was 40% 2018)       Therapy Treatment          Rehabilitation Treatment Summary     Row Name 10/19/18 0910             Treatment Time/Intention    Discipline physical therapist  -DM      Document Type therapy note (daily note)  -DM      Subjective Information complains of;pain   rattling cough;has felt 2 \"pops\" inSternal incis w/coughing  -DM      Mode of Treatment individual therapy;physical therapy  -DM      Patient/Family Observations standing in room;tele,RA; wife present; PA in to assess/discuss sternal concerns  -DM2      Care Plan Review care plan/treatment goals reviewed;patient/other " "agree to care plan  -DM2      Care Plan Review, Other Participant(s) spouse  -DM2      Therapy Frequency (PT Clinical Impression) daily  -DM      Patient Effort good  -DM      Existing Precautions/Restrictions cardiac;oxygen therapy device and L/min;sternal   pt declinesChest binder(\"likeSuitOfArmor\"whenTried);splint'g  -DM2      Recorded by [DM] Skylar Tillman, PT 10/19/18 0913  [DM2] Skylar Tillman, PT 10/19/18 0921      Row Name 10/19/18 0910             Vital Signs    Pre Systolic BP Rehab 130  -DM      Pre Treatment Diastolic BP 81  -DM      Post Systolic BP Rehab 119  -DM      Post Treatment Diastolic BP 72  -DM      Pretreatment Heart Rate (beats/min) 85  -DM      Intratreatment Heart Rate (beats/min) 102  -DM      Posttreatment Heart Rate (beats/min) 88  -DM      Pre SpO2 (%) 96  -DM      O2 Delivery Pre Treatment supplemental O2  -DM      Post SpO2 (%) 95  -DM      O2 Delivery Post Treatment supplemental O2  -DM      Pre Patient Position Standing  -DM      Intra Patient Position Standing  -DM      Post Patient Position Sitting   respirex 800 cc  -DM      Recorded by [DM] Syklar Tillman, PT 10/19/18 0931      Row Name 10/19/18 0910             Cognitive Assessment/Intervention    Additional Documentation Cognitive Assessment/Intervention (Group)  -DM      Recorded by [DM] Skylar Tillman, PT 10/19/18 0931      Row Name 10/19/18 0910             Cognitive Assessment/Intervention- PT/OT    Orientation Status (Cognition) oriented x 4  -DM      Follows Commands (Cognition) WFL  -DM      Cognitive Function (Cognitive) WFL;safety deficit  -DM      Safety Deficit (Cognitive) mild deficit;insight into deficits/self awareness  -DM2      Personal Safety Interventions fall prevention program maintained;gait belt;nonskid shoes/slippers when out of bed  -DM2      Recorded by [DM] Skylar Tillman, PT 10/19/18 0913  [DM2] Skylar Tillman, PT 10/19/18 0931      Row Name 10/19/18 0910             Safety Issues, " "Functional Mobility    Impairments Affecting Function (Mobility) balance;endurance/activity tolerance;pain;shortness of breath  -DM      Recorded by [DM] Skylar Tillman, PT 10/19/18 0913      Row Name 10/19/18 0910             Bed Mobility Assessment/Treatment    Comment (Bed Mobility) standing in room  -DM      Recorded by [DM] Skylar Tillman, PT 10/19/18 0931      Row Name 10/19/18 0910             Transfer Assessment/Treatment    Transfer Assessment/Treatment sit-stand transfer;stand-sit transfer  -DM      Recorded by [DM] Skylar Tillman, PT 10/19/18 0913      Row Name 10/19/18 0910             Sit-Stand Transfer    Sit-Stand Hoople (Transfers) stand by assist  -DM      Assistive Device (Sit-Stand Transfers) walker, front-wheeled   Did not utilize AD for STS  -DM      Recorded by [DM] Skylar Tillman, PT 10/19/18 0913      Row Name 10/19/18 0910             Stand-Sit Transfer    Stand-Sit Hoople (Transfers) supervision;verbal cues  -DM      Assistive Device (Stand-Sit Transfers) walker, front-wheeled   Did not utilize AD (cues for splinting)  -DM2      Recorded by [DM] Skylar Tillman, PT 10/19/18 0931  [DM2] Skylar Tillman, PT 10/19/18 0913      Row Name 10/19/18 0910             Gait/Stairs Assessment/Training    Gait/Stairs Assessment/Training gait/ambulation independence  -DM      Hoople Level (Gait) verbal cues;contact guard  -DM      Assistive Device (Gait) walker, front-wheeled   prefersRwx thisSession;\"willTrySP nextRx\";issued caneS/P rx  -DM2      Distance in Feet (Gait) 600  -DM2      Pattern (Gait) step-through  -DM      Deviations/Abnormal Patterns (Gait) base of support, narrow;diana decreased;stride length decreased  -DM2      Bilateral Gait Deviations heel strike decreased;weight shift ability decreased  -DM2      Comment (Gait/Stairs) less drifting to L;3 stand.rests for splinted coughing (non-prod)  -DM2      Recorded by [DM] Skylar Tillman, PT 10/19/18 " 0913  [DM2] Skylar Tillman, PT 10/19/18 0931      Row Name 10/19/18 0910             Motor Skills Assessment/Interventions    Additional Documentation Therapeutic Exercise (Group);Therapeutic Exercise Interventions (Group)  -DM      Recorded by [DM] Skylar Tillman, PT 10/19/18 0931      Row Name 10/19/18 0910             Therapeutic Exercise    92220 - PT Therapeutic Activity Minutes 23  -DM      Recorded by [DM] Skylar Tillman, PT 10/19/18 0931      Row Name 10/19/18 0910             Therapeutic Exercise    Lower Extremity (Therapeutic Exercise) LAQ (long arc quad), bilateral;marching while seated;marching while standing  -DM      Lower Extremity Range of Motion (Therapeutic Exercise) ankle dorsiflexion/plantar flexion, bilateral  -DM      Exercise Type (Therapeutic Exercise) AROM (active range of motion)  -DM      Position (Therapeutic Exercise) seated;standing  -DM      Sets/Reps (Therapeutic Exercise) 1/10  -DM      Comment (Therapeutic Exercise) rests btwn sets; def.UE exer d/t sternal concerns  -DM      Recorded by [DM] Skylar Tillman, PT 10/19/18 0931      Row Name 10/19/18 0910             Positioning and Restraints    Pre-Treatment Position standing in room  -DM      Post Treatment Position chair  -DM      In Chair notified nsg;sitting;call light within reach;encouraged to call for assist;exit alarm on;with family/caregiver;with other staff;RUE elevated;LUE elevated;waffle cushion   req. LE'S dep. for meal; APRN assessing  -DM      Recorded by [DM] Skylar Tillman, PT 10/19/18 0931      Row Name 10/19/18 0910             Pain Assessment    Additional Documentation Pain Scale: Numbers Pre/Post-Treatment (Group)  -DM      Recorded by [DM] Skylar Tillman, PT 10/19/18 0931      Row Name 10/19/18 0910             Pain Scale: Numbers Pre/Post-Treatment    Pain Scale: Numbers, Pretreatment 3/10  -DM      Pain Scale: Numbers, Post-Treatment 4/10  -DM      Pain Location - Orientation incisional  -DM2       Pain Location chest  -DM2      Pain Intervention(s) Repositioned;Rest  -DM      Recorded by [DM] Skylar Tillman, PT 10/19/18 0931  [DM2] Skylar Tillman, PT 10/19/18 0913      Row Name                Wound 10/11/18 0801 Other (See comments) chest incision    Wound - Properties Group Date first assessed: 10/11/18 [SH] Time first assessed: 0801 [SH] Side: Other (See comments) [SH] Location: chest [SH] Type: incision [SH] Recorded by:  [SH] Haase, Sherri L, RN 10/11/18 0801    Row Name                Wound 10/11/18 0801 Right leg incision    Wound - Properties Group Date first assessed: 10/11/18 [SH] Time first assessed: 0801 [SH] Side: Right [SH] Location: leg [SH] Type: incision [SH] Recorded by:  [SH] Haase, Sherri L, RN 10/11/18 0801    Row Name 10/19/18 0910             Coping    Observed Emotional State --  -DM      Recorded by [DM] Skylar Tillman, PT 10/19/18 0931      Row Name 10/19/18 0910             Plan of Care Review    Plan of Care Reviewed With patient;spouse  -DM      Recorded by [DM] Skylar Tillman, PT 10/19/18 0931      Row Name 10/19/18 0910             Outcome Summary/Treatment Plan (PT)    Daily Summary of Progress (PT) progress toward functional goals is good  -DM      Anticipated Discharge Disposition (PT) home with OP services  -DM      Recorded by [DM] Skylar Tillman, PT 10/19/18 0931        User Key  (r) = Recorded By, (t) = Taken By, (c) = Cosigned By    Initials Name Effective Dates Discipline    DM Skylar Tillman, PT 06/19/15 -  PT    SH Haase, Sherri L, RN 06/16/16 -  Nurse          Wound 10/11/18 0801 Other (See comments) chest incision (Active)   Dressing Appearance moist drainage 10/18/2018  8:00 PM   Closure Liquid skin adhesive 10/18/2018  8:00 PM   Base clean;pink;yellow;white 10/18/2018  8:00 PM   Periwound intact;dry;pink 10/18/2018  8:00 PM   Periwound Temperature warm 10/18/2018  8:00 PM   Periwound Skin Turgor soft 10/18/2018  8:00 PM   Edges open 10/18/2018  8:00 PM    Drainage Characteristics/Odor serosanguineous 10/18/2018  8:00 PM   Drainage Amount scant 10/18/2018  8:00 PM   Dressing Care, Wound dressing changed 10/18/2018  8:00 PM       Wound 10/11/18 0801 Right leg incision (Active)   Dressing Appearance open to air 10/18/2018  8:00 PM   Closure Liquid skin adhesive 10/18/2018  8:00 PM   Drainage Amount none 10/18/2018  8:00 PM             Physical Therapy Education     Title: PT OT SLP Therapies (Active)     Topic: Physical Therapy (Active)     Point: Mobility training (Active)    Learning Progress Summary     Learner Status Readiness Method Response Comment Documented by    Patient Active Eager E,D NR  DM 10/19/18 0935     Done Acceptance E VU,NR  EH 10/18/18 1441     Active Eager E,D NR  DM 10/17/18 1755     Active Acceptance E,D NR  SJ 10/14/18 1625     Active Acceptance E NR  VERONICA 10/12/18 0855     Active Acceptance E NR  VERONICA 10/12/18 0855    Family Done Acceptance E VU,NR   10/18/18 1441    Significant Other Active Eager E,D NR  DM 10/19/18 0935     Active Eager E,D NR  DM 10/17/18 1755     Active Acceptance E,D NR   10/14/18 1625          Point: Home exercise program (Active)    Learning Progress Summary     Learner Status Readiness Method Response Comment Documented by    Patient Active Eager E,D NR  DM 10/19/18 0935     Done Acceptance E VU,NR   10/18/18 1441     Active Eager E,D NR  DM 10/17/18 1755     Active Acceptance E,D NR  SJ 10/14/18 1625     Active Acceptance E NR  VERONICA 10/12/18 0855     Active Acceptance E NR  VERONICA 10/12/18 0855    Family Done Acceptance E VU,NR   10/18/18 1441    Significant Other Active Eager E,D NR  DM 10/19/18 0935     Active Eager E,D NR  DM 10/17/18 1755     Active Acceptance E,D NR  SJ 10/14/18 1625          Point: Body mechanics (Active)    Learning Progress Summary     Learner Status Readiness Method Response Comment Documented by    Patient Active Eager E,D NR  DM 10/19/18 0935     Done Acceptance E VU,NR  EH 10/18/18 1441      Active Eager E,D NR   10/17/18 1755     Active Acceptance E,D NR   10/14/18 1625     Done Acceptance E VU,NR   10/14/18 1537     Active Acceptance E NR   10/12/18 0855     Active Acceptance E NR   10/12/18 0855    Family Done Acceptance E VU,NR   10/18/18 1441    Significant Other Active Eager E,D NR   10/19/18 0935     Active Eager E,D NR   10/17/18 1755     Active Acceptance E,D NR   10/14/18 1625          Point: Precautions (Active)    Learning Progress Summary     Learner Status Readiness Method Response Comment Documented by    Patient Active Eager E,D NR   10/19/18 0935     Done Acceptance E VU,NR   10/18/18 1441     Active Eager E,D NR   10/17/18 1755     Active Acceptance E,D NR   10/14/18 1625     Active Acceptance E NR   10/12/18 0855     Active Acceptance E NR   10/12/18 0855    Family Done Acceptance E VU,NR   10/18/18 1441    Significant Other Active Eager E,D NR   10/19/18 0935     Active Eager E,D NR   10/17/18 1755     Active Acceptance E,D NR   10/14/18 1625                      User Key     Initials Effective Dates Name Provider Type Discipline     06/19/15 -  Rhiannon Muñoz, PT Physical Therapist PT     06/19/15 -  Eva Bruce, PT Physical Therapist PT     06/19/15 -  Loly Jones, PT Physical Therapist PT     06/19/15 -  Skylar Tillman, PT Physical Therapist PT     06/16/16 -  Saige Grove, RN Registered Nurse Nurse                    PT Recommendation and Plan  Anticipated Discharge Disposition (PT): home with OP services  Therapy Frequency (PT Clinical Impression): daily  Outcome Summary/Treatment Plan (PT)  Daily Summary of Progress (PT): progress toward functional goals is good  Anticipated Discharge Disposition (PT): home with OP services  Plan of Care Reviewed With: patient, spouse  Progress: improving  Outcome Summary: Able to amb 600 ft w/ R wx & 3 stand.rests for coughing episodes (raspy,non-prod);tried Ambien last pm  but not resting well & notes fatigue; limited by dec HGB (10.6), elev BUN, incr. sternal pain w/ coughing, & dec. BP w/ activ. /upright  position; issued SPC & willing to try next session;needs to practice 1 step p/t d/c          Outcome Measures     Row Name 10/19/18 0910 10/18/18 1410 10/17/18 1737       How much help from another person do you currently need...    Turning from your back to your side while in flat bed without using bedrails? 3  -DM 3  -EH 3  -DM    Moving from lying on back to sitting on the side of a flat bed without bedrails? 2  -DM 2  -EH 2  -DM    Moving to and from a bed to a chair (including a wheelchair)? 3  -DM 3  -EH 3  -DM    Standing up from a chair using your arms (e.g., wheelchair, bedside chair)? 3  -DM 3  -EH 3  -DM    Climbing 3-5 steps with a railing? 3  -DM 3  -EH 2  -DM    To walk in hospital room? 3  -DM 3  -EH 3  -DM    AM-PAC 6 Clicks Score 17  -DM 17  -EH 16  -DM       Functional Assessment    Outcome Measure Options AM-PAC 6 Clicks Basic Mobility (PT)  -DM AM-PAC 6 Clicks Basic Mobility (PT)  -EH AM-PAC 6 Clicks Basic Mobility (PT)  -DM      User Key  (r) = Recorded By, (t) = Taken By, (c) = Cosigned By    Initials Name Provider Type     Eva Bruce, PT Physical Therapist    Skylar Harry, PT Physical Therapist           Time Calculation:         PT Charges     Row Name 10/19/18 0941 10/19/18 0910          Time Calculation    Start Time 0910  -DM  --     PT Received On 10/19/18  -DM  --     PT Goal Re-Cert Due Date 10/22/18  -DM  --        Time Calculation- PT    Total Timed Code Minutes- PT 23 minute(s)  -DM  --        Timed Charges    03272 - PT Therapeutic Activity Minutes  -- 23  -DM       User Key  (r) = Recorded By, (t) = Taken By, (c) = Cosigned By    Initials Name Provider Type    Skylar Harry, PT Physical Therapist        Therapy Suggested Charges     Code   Minutes Charges    75102 (CPT®) Hc Pt Neuromusc Re Education Ea 15 Min      62275  (CPT®) Hc Pt Ther Proc Ea 15 Min      47295 (CPT®) Hc Gait Training Ea 15 Min      48624 (CPT®) Hc Pt Therapeutic Act Ea 15 Min 23 2    44726 (CPT®) Hc Pt Manual Therapy Ea 15 Min      42357 (CPT®) Hc Pt Iontophoresis Ea 15 Min      37028 (CPT®) Hc Pt Elec Stim Ea-Per 15 Min      78833 (CPT®) Hc Pt Ultrasound Ea 15 Min      59903 (CPT®) Hc Pt Self Care/Mgmt/Train Ea 15 Min      98465 (CPT®) Hc Pt Prosthetic (S) Train Initial Encounter, Each 15 Min      85270 (CPT®) Hc Pt Orthotic(S)/Prosthetic(S) Encounter, Each 15 Min      75661 (CPT®) Hc Orthotic(S) Mgmt/Train Initial Encounter, Each 15min      Total  23 2        Therapy Charges for Today     Code Description Service Date Service Provider Modifiers Qty    20583921869 HC PT THERAPEUTIC ACT EA 15 MIN 10/19/2018 Skylar Tillman, PT GP 2          PT G-Codes  Outcome Measure Options: AM-PAC 6 Clicks Basic Mobility (PT)  AM-PAC 6 Clicks Score: 17    Skylar Tillman, PT  10/19/2018

## 2018-10-19 NOTE — PROGRESS NOTES
[facility] Medicine Services  PROGRESS NOTE    Patient Name: Kar Mora  : 1950  MRN: [id number]    Date of Admission: 10/11/2018  Length of Stay: 8  Primary Care Physician: Bro Kan MD    Subjective   Subjective     CC:  F/u cough post CABG, medical management    HPI:  Pt is seen ambulating in room in NAD.  No visitors at bs.  States he feels better today.  Reports cough significantly better.  Tolerating diet.  No n/v, abd pain or soa.  On RA.  States old MT sites have stopped draining finally as of last pm.  Understands he's here over the weekend.  No new issues.     Review of Systems  Gen- No fevers, chills  CV- No chest pain, palpitations  Resp- occ cough, dyspnea  GI- No N/V/D, abd pain    Otherwise ROS is negative except as mentioned in the HPI.    Objective   Objective     Vital Signs:   Temp:  [97.6 °F (36.4 °C)-98.3 °F (36.8 °C)] 98.1 °F (36.7 °C)  Heart Rate:  [67-90] 85  Resp:  [16-20] 16  BP: (102-130)/(67-84) 119/72      Physical Exam:  Constitutional: No acute distress, awake, alert.  Up walking in room.  No visitors at bs  HENT: NCAT, mucous membranes moist  Respiratory: respiratory effort and expansion better today.  Rare very faint crackle (much improved c/t yesterday).  No wheezes.  Talking in full sentences.   Cardiovascular: RRR, no murmurs, rubs, or gallops, palpable pedal pulses bilaterally, midline sternal incision c/d/i superiorly, dressed inferiorly still with large MT site drsg covering distal tip C/D/I.   Gastrointestinal: Positive bowel sounds, soft, nontender, nondistended. Obese   Musculoskeletal: 1+ bilateral LE edema but not as tight today c/t yesterday, right calf VHS incision c/d/i  Psychiatric: appropriate affect, cooperative and calm  Neurologic: Oriented x 3, strength symmetric in all extremities, Cranial Nerves grossly intact to confrontation, speech clear. Follows commands   Skin: No rashes    Results Reviewed:  I have  personally reviewed current lab, radiology, and data and agree.      Results from last 7 days  Lab Units 10/19/18  0353 10/18/18  0530 10/16/18  0517   WBC 10*3/mm3 9.30 10.58 6.71   HEMOGLOBIN g/dL 10.6* 10.4* 10.1*  10.0*   HEMATOCRIT % 32.8* 32.5* 31.2*  31.0*   PLATELETS 10*3/mm3 390 385 288       Results from last 7 days  Lab Units 10/19/18  0353 10/18/18  0530 10/16/18  0517   SODIUM mmol/L 135 133 135   POTASSIUM mmol/L 4.3 4.7 4.5   CHLORIDE mmol/L 105 103 104   CO2 mmol/L 21.0 22.0 21.0   BUN mg/dL 55* 67* 50*   CREATININE mg/dL 1.27 1.50* 1.40*   GLUCOSE mg/dL 143* 296* 199*   CALCIUM mg/dL 8.5* 8.6* 8.4*     Estimated Creatinine Clearance: 61 mL/min (by C-G formula based on SCr of 1.27 mg/dL).  No results found for: BNP    Microbiology Results Abnormal     Procedure Component Value - Date/Time    Resp Virus Profile (RVP) PCR - Swab, Nasopharynx [[id number]] Collected:  10/16/18 1341    Lab Status:  Final result Specimen:  Swab from Nasopharynx Updated:  10/19/18 0618     Influenza A PCR Negative     Influenza B PCR Negative     RSV A Negative     RSV B Negative     Parainfluenza Virus 1 Negative     Parainfluenza Virus 2 Negative     Parainfluenza Virus 3 Negative     Human Rhinovirus/Enterovirus Negative     Human Metapneumovirus Negative     Adenovirus Detection by PCR Negative    Narrative:       Performed at:  [address] [place] [address]  [id number]  : Bro Robertson MD, Phone:  [phone]    Influenza A & B, RT PCR - Swab, Nasopharynx [[id number]]  (Normal) Collected:  10/16/18 1341    Lab Status:  Final result Specimen:  Swab from Nasopharynx Updated:  10/16/18 1924     Influenza A PCR Not Detected     Influenza B PCR Not Detected          Imaging Results (last 24 hours)     ** No results found for the last 24 hours. **        Results for orders placed during the hospital encounter of 10/11/18   Adult Transthoracic Echo Complete W/ Cont if Necessary Per  Protocol    Narrative · Estimated EF appears to be in the range of 26 - 30%.  · The following left ventricular wall segments are hypokinetic: mid   anterolateral. The following left ventricular wall segments are   dyskinetic: apical anterior.  · Right ventricular cavity is borderline dilated.  · Left atrial cavity size is mildly dilated.  · Mild mitral valve regurgitation is present          I have reviewed the medications.      amoxicillin-clavulanate 1 tablet Oral Q12H   atorvastatin 40 mg Oral Nightly   benzonatate 200 mg Oral Q8H   carvedilol 25 mg Oral BID   clopidogrel 75 mg Oral Daily   dextromethorphan polistirex ER 60 mg Oral Q12H   ferrous sulfate 325 mg Oral BID With Meals   finasteride 5 mg Oral Daily   guaiFENesin 1,200 mg Oral Q12H   insulin detemir 30 Units Subcutaneous BID   insulin lispro 0-9 Units Subcutaneous 4x Daily With Meals & Nightly   insulin lispro 15 Units Subcutaneous TID With Meals   oxyCODONE-acetaminophen 1 tablet Oral Q8H   pantoprazole 40 mg Oral Q AM   pharmacy consult - MTM  Does not apply Daily   polyethylene glycol 17 g Oral Daily   sacubitril-valsartan 1 tablet Oral Q12H   sennosides-docusate sodium 2 tablet Oral BID   sodium chloride 3 mL Intravenous Q12H   sodium chloride 3-10 mL Intravenous Q8H   tamsulosin 0.4 mg Oral Daily   torsemide 5 mg Oral Daily         Assessment/Plan   Assessment / Plan     Active Hospital Problems    Diagnosis   • **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 7/5/18   • Cough   • S/P CABG x 4 on 10/11/18   • Ischemic cardiomyopathy with LVEF 26-30% on echocardiogram 10/13/18 (was 40% July 2018)   • LOPEZ (acute kidney injury) (CMS/[facility])   • Insulin dependent type 2 diabetes mellitus (CMS/[facility])   • Hypertensive cardiovascular disease     1. Probable hypertensive cardiovascular disease:  a. Abnormal EKG with abnormal acceptable echocardiographic GXT, September 2004.   b. Acceptable echocardiographic GXT with preserved exercise capacity and mild LVH, March 2008.    c. Acceptable Quantitative SPECT Gated Cardiolite GXT with nominal myocardial perfusion to 88% of predicted exercise capacity and 90% predicted maximum heart rate with preserved LVEF (0.65) with continued medical therapy felt warranted, December 2012.  d. Residual class I symptoms with recent documented abnormal EKG (LBBB/first-degree AV block) with abnormal echocardiogram demonstrating mild left ventricular chamber enlargement with mild reduction in the LVEF (0.42) without PE or pulmonary hypertension.  e. Resident class I symptoms with persistent abnormal echocardiogram (LVEF 0.40), with mild concentric LVH and mild left atrial enlargement without pulmonary arterial hypertension or pericardial effusion, July 2015.       • Moderate obesity   • Hyperlipidemia   • Allergic rhinitis. Desensitization injections discontinued July 2018     Brief Hospital Course to date:  Kar Mora is a 68 y.o. male with a remote hx of tobacco use, HTN, hypertensive heart disease, hyperlipidemia, insulin-dependent T2DM, obesity, CKD III (baseline 1.6), and gout who is admitted following elective CABG x 4 on 10/11/18:    Multivessel CAD S/p CABG x 4 on 10/11/18  Chronic LV Systolic Heart Failure due to ICM with LVEF 26-30%  - continue aspirin, plavix, entresto, carvedilol, and atorvastatin  - Plan for life vest at discharge, d/w CM  - Gentle diuresis with oral torsemide, Dr. Qureshi following   - CT surgery managing serosanguinous drainage from inferior aspect of sternal wound   - No aldactone due to renal dysfunction  - Optimize pain control  --CTS concerned about sternal wires/incision, especially with aggressive cough issues the last several days.  Now going to monitor pt in house over the weekend.  Plan dc home early next week if stable.     Urinary Retention, failed TOV 10/16  - continue flomax, finasteride  --dc leong  - attempt bladder training, repeat trial of voiding yesterday  --doing much better.  Voiding.      Nonproductive Cough, likely due to atelectasis, post-op pain   Bronchitis   - Improved, CXR negative for infiltrates   - Continue supportive care with tussionex, tessalon perles, xylocaine nebs, pain control   - Continue augmentin  - D/w Dr. Florence 10/16  --stable, cough significantly better today.    --continue to encourage splinting well with any cough or significant mvmt.      CKD III  --much better today.  Cr 1.27  --monitor    Insulin-dependent T2DM, A1c 7.5% at baseline   - exacerbated in last several days by IV steroid dose (had previously been under moderate control)  - Pt began new doses of b/b yesterday am.  Still a running a little high at times but generally fairly stable.  Will make no changes today -continue to monitor today.    --Continue MDSSI for now and monitor insulin needs.  Adjust further as indicated    Hypertension, BP reviewed and stable on current regimen    Normocytic Anemia    DVT Prophylaxis:  mechanical   CODE STATUS:   Code Status and Medical Interventions:   Ordered at: 10/11/18 1052     Level Of Support Discussed With:    Patient     Code Status:    CPR     Medical Interventions (Level of Support Prior to Arrest):    Full       Disposition: I expect the patient to be discharged home in 2-3 days as per primary CT service.      Electronically signed by EDGAR Sanchez, 10/19/18, 1:21 PM.

## 2018-10-20 ENCOUNTER — APPOINTMENT (OUTPATIENT)
Dept: GENERAL RADIOLOGY | Facility: HOSPITAL | Age: 68
End: 2018-10-20

## 2018-10-20 LAB
GLUCOSE BLDC GLUCOMTR-MCNC: 103 MG/DL (ref 70–130)
GLUCOSE BLDC GLUCOMTR-MCNC: 147 MG/DL (ref 70–130)
GLUCOSE BLDC GLUCOMTR-MCNC: 162 MG/DL (ref 70–130)
GLUCOSE BLDC GLUCOMTR-MCNC: 271 MG/DL (ref 70–130)

## 2018-10-20 PROCEDURE — 99232 SBSQ HOSP IP/OBS MODERATE 35: CPT | Performed by: INTERNAL MEDICINE

## 2018-10-20 PROCEDURE — 82962 GLUCOSE BLOOD TEST: CPT

## 2018-10-20 PROCEDURE — 99233 SBSQ HOSP IP/OBS HIGH 50: CPT | Performed by: INTERNAL MEDICINE

## 2018-10-20 PROCEDURE — 63710000001 INSULIN DETEMIR PER 5 UNITS: Performed by: HOSPITALIST

## 2018-10-20 PROCEDURE — 94799 UNLISTED PULMONARY SVC/PX: CPT

## 2018-10-20 PROCEDURE — 99232 SBSQ HOSP IP/OBS MODERATE 35: CPT | Performed by: NURSE PRACTITIONER

## 2018-10-20 PROCEDURE — 99024 POSTOP FOLLOW-UP VISIT: CPT | Performed by: PHYSICIAN ASSISTANT

## 2018-10-20 PROCEDURE — 71046 X-RAY EXAM CHEST 2 VIEWS: CPT

## 2018-10-20 RX ORDER — FERROUS SULFATE 325(65) MG
325 TABLET ORAL 2 TIMES DAILY WITH MEALS
Qty: 60 TABLET | Refills: 0 | Status: CANCELLED | OUTPATIENT
Start: 2018-10-20 | End: 2018-11-19

## 2018-10-20 RX ORDER — CLOPIDOGREL BISULFATE 75 MG/1
75 TABLET ORAL DAILY
Qty: 60 TABLET | Refills: 0 | Status: CANCELLED | OUTPATIENT
Start: 2018-10-21

## 2018-10-20 RX ORDER — BENZONATATE 100 MG/1
200 CAPSULE ORAL 3 TIMES DAILY PRN
Status: DISCONTINUED | OUTPATIENT
Start: 2018-10-20 | End: 2018-10-21

## 2018-10-20 RX ORDER — ATORVASTATIN CALCIUM 40 MG/1
40 TABLET, FILM COATED ORAL NIGHTLY
Qty: 90 TABLET | Refills: 0 | Status: CANCELLED | OUTPATIENT
Start: 2018-10-20

## 2018-10-20 RX ORDER — DEXTROMETHORPHAN POLISTIREX 30 MG/5ML
60 SUSPENSION ORAL 2 TIMES DAILY PRN
Status: DISCONTINUED | OUTPATIENT
Start: 2018-10-20 | End: 2018-10-21

## 2018-10-20 RX ORDER — TORSEMIDE 5 MG/1
5 TABLET ORAL DAILY PRN
Qty: 30 TABLET | Refills: 0 | Status: CANCELLED | OUTPATIENT
Start: 2018-10-20

## 2018-10-20 RX ADMIN — GUAIFENESIN 1200 MG: 600 TABLET, EXTENDED RELEASE ORAL at 08:30

## 2018-10-20 RX ADMIN — ALPRAZOLAM 0.25 MG: 0.25 TABLET ORAL at 22:09

## 2018-10-20 RX ADMIN — POLYETHYLENE GLYCOL 3350 17 G: 17 POWDER, FOR SOLUTION ORAL at 08:29

## 2018-10-20 RX ADMIN — PANTOPRAZOLE SODIUM 40 MG: 40 TABLET, DELAYED RELEASE ORAL at 05:18

## 2018-10-20 RX ADMIN — BENZONATATE 200 MG: 100 CAPSULE ORAL at 22:09

## 2018-10-20 RX ADMIN — DEXTROMETHORPHAN 60 MG: 30 SUSPENSION, EXTENDED RELEASE ORAL at 08:29

## 2018-10-20 RX ADMIN — SENNOSIDES AND DOCUSATE SODIUM 2 TABLET: 8.6; 5 TABLET ORAL at 21:49

## 2018-10-20 RX ADMIN — OXYCODONE HYDROCHLORIDE AND ACETAMINOPHEN 1 TABLET: 10; 325 TABLET ORAL at 22:09

## 2018-10-20 RX ADMIN — CARVEDILOL 25 MG: 12.5 TABLET, FILM COATED ORAL at 21:48

## 2018-10-20 RX ADMIN — Medication 325 MG: at 17:01

## 2018-10-20 RX ADMIN — OXYCODONE HYDROCHLORIDE AND ACETAMINOPHEN 1 TABLET: 10; 325 TABLET ORAL at 17:06

## 2018-10-20 RX ADMIN — TAMSULOSIN HYDROCHLORIDE 0.4 MG: 0.4 CAPSULE ORAL at 08:30

## 2018-10-20 RX ADMIN — INSULIN LISPRO 2 UNITS: 100 INJECTION, SOLUTION INTRAVENOUS; SUBCUTANEOUS at 22:09

## 2018-10-20 RX ADMIN — INSULIN DETEMIR 30 UNITS: 100 INJECTION, SOLUTION SUBCUTANEOUS at 22:10

## 2018-10-20 RX ADMIN — Medication 3 ML: at 08:30

## 2018-10-20 RX ADMIN — SENNOSIDES AND DOCUSATE SODIUM 2 TABLET: 8.6; 5 TABLET ORAL at 08:30

## 2018-10-20 RX ADMIN — SACUBITRIL AND VALSARTAN 1 TABLET: 24; 26 TABLET, FILM COATED ORAL at 08:30

## 2018-10-20 RX ADMIN — INSULIN LISPRO 6 UNITS: 100 INJECTION, SOLUTION INTRAVENOUS; SUBCUTANEOUS at 12:26

## 2018-10-20 RX ADMIN — INSULIN DETEMIR 30 UNITS: 100 INJECTION, SOLUTION SUBCUTANEOUS at 08:32

## 2018-10-20 RX ADMIN — OXYCODONE HYDROCHLORIDE AND ACETAMINOPHEN 1 TABLET: 10; 325 TABLET ORAL at 09:02

## 2018-10-20 RX ADMIN — SACUBITRIL AND VALSARTAN 1 TABLET: 24; 26 TABLET, FILM COATED ORAL at 21:49

## 2018-10-20 RX ADMIN — GUAIFENESIN 1200 MG: 600 TABLET, EXTENDED RELEASE ORAL at 21:49

## 2018-10-20 RX ADMIN — BENZONATATE 200 MG: 100 CAPSULE ORAL at 08:30

## 2018-10-20 RX ADMIN — Medication 325 MG: at 08:30

## 2018-10-20 RX ADMIN — FINASTERIDE 5 MG: 5 TABLET, FILM COATED ORAL at 08:29

## 2018-10-20 RX ADMIN — AMOXICILLIN AND CLAVULANATE POTASSIUM 1 TABLET: 875; 125 TABLET, FILM COATED ORAL at 08:30

## 2018-10-20 RX ADMIN — TORSEMIDE 5 MG: 10 TABLET ORAL at 08:29

## 2018-10-20 RX ADMIN — Medication 10 ML: at 12:26

## 2018-10-20 RX ADMIN — CLOPIDOGREL BISULFATE 75 MG: 75 TABLET ORAL at 08:30

## 2018-10-20 RX ADMIN — CARVEDILOL 25 MG: 12.5 TABLET, FILM COATED ORAL at 08:30

## 2018-10-20 RX ADMIN — ATORVASTATIN CALCIUM 40 MG: 40 TABLET, FILM COATED ORAL at 21:49

## 2018-10-20 RX ADMIN — AMOXICILLIN AND CLAVULANATE POTASSIUM 1 TABLET: 875; 125 TABLET, FILM COATED ORAL at 21:49

## 2018-10-20 NOTE — PROGRESS NOTES
[facility]  Cardiology Progress Note      Chief Complaint/Reason for service:    · F/U CAD s/p CABG  · Hypertension         Transferred to telemetry floor today. Feeling well. Walking the hallways 3 times a day. LifeVest as not been arranged of yet.    Past medical, surgical, social and family history reviewed in the patient's electronic medical record.           Vital Sign Min/Max for last 24 hours  Temp  Min: 98.1 °F (36.7 °C)  Max: 98.1 °F (36.7 °C)   BP  Min: 114/60  Max: 117/61   Pulse  Min: 78  Max: 90   Resp  Min: 17  Max: 17   SpO2  Min: 93 %  Max: 95 %   No Data Recorded      Intake/Output Summary (Last 24 hours) at 10/21/18 1201  Last data filed at 10/21/18 0819   Gross per 24 hour   Intake              120 ml   Output                0 ml   Net              120 ml           Physical Exam   Constitutional: He is oriented to person, place, and time. He appears well-developed and well-nourished.   HENT:   Head: Normocephalic and atraumatic.   Cardiovascular: Normal rate and regular rhythm.    No murmur heard.  Pulmonary/Chest: Effort normal.   Abdominal: Soft.   Neurological: He is alert and oriented to person, place, and time.       Results Review:   I reviewed the patient's recent labs in the electronic medical record.        Results from last 7 days  Lab Units 10/19/18  0353 10/18/18  0530 10/16/18  0517 10/15/18  0500   SODIUM mmol/L 135 133 135 133   POTASSIUM mmol/L 4.3 4.7 4.5 4.4   CHLORIDE mmol/L 105 103 104 101   BUN mg/dL 55* 67* 50* 50*   CREATININE mg/dL 1.27 1.50* 1.40* 1.62*           Results from last 7 days  Lab Units 10/19/18  0353 10/18/18  0530 10/16/18  0517   WBC 10*3/mm3 9.30 10.58 6.71   HEMOGLOBIN g/dL 10.6* 10.4* 10.1*  10.0*   HEMATOCRIT % 32.8* 32.5* 31.2*  31.0*   PLATELETS 10*3/mm3 390 385 288       Lab Results   Component Value Date    HGBA1C 7.50 (H) 10/10/2018       Lab Results   Component Value Date    CHOL 169 07/20/2018    TRIG 385  (H) 07/20/2018    HDL 27 (L) 07/20/2018    LDL 91 07/20/2018         Tele:  NSR         Active Hospital Problems    Diagnosis Date Noted   • **Coronary artery disease involving native coronary artery of native heart with angina pectoris (CMS/[facility]) [I25.119] 09/07/2018     Priority: High     · Cardiac catheterization (07/20/2018): Multivessel CAD  · CABG by Dr Mark Anthony Burton (10/11/2018): LIMA to LAD, SVG to diagonal/circumflex, SVG to RPDA      • Ischemic cardiomyopathy [I25.5] 10/16/2018     Priority: Medium     · Echo (10/14/2018): LVEF 30%. Left ventricular wall segments are hypokinetic: mid anterolateral. The following left ventricular wall segments are dyskinetic: apical anterior. Mild MR     • Type 2 diabetes mellitus, with long-term current use of insulin (CMS/[facility]) [E11.9, Z79.4] 10/24/2016   • Essential hypertension [I10] 10/24/2016   • Class 1 obesity due to excess calories in adult [E66.09] 10/24/2016   • Hyperlipidemia LDL goal <70 [E78.5]      68-year-old gentleman with coronary disease and ischemic cardiomyopathy who is postop day 10 from bypass surgery. He appears ready for discharge but has not had a LifeVest placed yet.         · Lifevest prior to discharge  · Probable discharge tomorrow unless LifeVest can be arranged    Alan Galindo IV, MD  10/21/2018

## 2018-10-20 NOTE — PROGRESS NOTES
[facility] Medicine Services  PROGRESS NOTE    Patient Name: Kar Mora  : 1950  MRN: [id number]    Date of Admission: 10/11/2018  Length of Stay: 9  Primary Care Physician: Bro Kan MD    Subjective   Subjective     CC:  F/u cough post CABG, medical management    HPI:  Sitting up in chair with no family at bedside.  Pain well controlled.  Feeling well.  Has been walking in the [place].  No overnight issues or complaints.      Review of Systems  Gen- No fevers, chills  CV- No chest pain, palpitations  Resp- occ cough, dyspnea  GI- No N/V/D, abd pain    Otherwise ROS is negative except as mentioned in the HPI.    Objective   Objective     Vital Signs:   Temp:  [98 °F (36.7 °C)-98.2 °F (36.8 °C)] 98 °F (36.7 °C)  Heart Rate:  [85-92] 92  Resp:  [16-18] 18  BP: (126-129)/(63-82) 129/63      Physical Exam:  Constitutional: No acute distress, awake, alert.  Sitting in chaire  HENT: NCAT, mucous membranes moist  Respiratory: clear to auscultation, regular unlabored breathing.   Cardiovascular: RRR, no murmurs, rubs, or gallops, palpable pedal pulses bilaterally, midline sternal incision c/d/i superiorly, dressed inferiorly still with large MT site drsg covering distal tip C/D/I.   Gastrointestinal: Positive bowel sounds, soft, nontender, nondistended. Obese   Musculoskeletal: 1+ bilateral LE edema   Psychiatric: appropriate affect, cooperative and calm  Neurologic: Oriented x 3, strength symmetric in all extremities, Cranial Nerves grossly intact to confrontation, speech clear. Follows commands   Skin: No rashes    Results Reviewed:  I have personally reviewed current lab, radiology, and data and agree.      Results from last 7 days  Lab Units 10/19/18  0353 10/18/18  0530 10/16/18  0517   WBC 10*3/mm3 9.30 10.58 6.71   HEMOGLOBIN g/dL 10.6* 10.4* 10.1*  10.0*   HEMATOCRIT % 32.8* 32.5* 31.2*  31.0*   PLATELETS 10*3/mm3 390 385 288       Results from last 7 days  Lab  Units 10/19/18  0353 10/18/18  0530 10/16/18  0517   SODIUM mmol/L 135 133 135   POTASSIUM mmol/L 4.3 4.7 4.5   CHLORIDE mmol/L 105 103 104   CO2 mmol/L 21.0 22.0 21.0   BUN mg/dL 55* 67* 50*   CREATININE mg/dL 1.27 1.50* 1.40*   GLUCOSE mg/dL 143* 296* 199*   CALCIUM mg/dL 8.5* 8.6* 8.4*     Estimated Creatinine Clearance: 60.8 mL/min (by C-G formula based on SCr of 1.27 mg/dL).  No results found for: BNP    Microbiology Results Abnormal     Procedure Component Value - Date/Time    Resp Virus Profile (RVP) PCR - Swab, Nasopharynx [[id number]] Collected:  10/16/18 1341    Lab Status:  Final result Specimen:  Swab from Nasopharynx Updated:  10/19/18 0618     Influenza A PCR Negative     Influenza B PCR Negative     RSV A Negative     RSV B Negative     Parainfluenza Virus 1 Negative     Parainfluenza Virus 2 Negative     Parainfluenza Virus 3 Negative     Human Rhinovirus/Enterovirus Negative     Human Metapneumovirus Negative     Adenovirus Detection by PCR Negative    Narrative:       Performed at:  [address] [place] [address]  [id number]  : Bro Robertson MD, Phone:  [phone]    Influenza A & B, RT PCR - Swab, Nasopharynx [[id number]]  (Normal) Collected:  10/16/18 1341    Lab Status:  Final result Specimen:  Swab from Nasopharynx Updated:  10/16/18 1924     Influenza A PCR Not Detected     Influenza B PCR Not Detected          Imaging Results (last 24 hours)     Procedure Component Value Units Date/Time    XR Chest 2 View [[id number]] Collected:  10/20/18 1128     Updated:  10/20/18 1349    Narrative:       EXAMINATION: XR CHEST 2 VW - 10/20/2018     INDICATION: Z74.09-Other reduced mobility; I25.118-Atherosclerotic heart  disease of native coronary artery with other forms of angina pectoris;  I25.119-Atherosclerotic heart disease of native coronary artery with  unspecified angina pectoris; N17.9-Acute kidney failure, unspecified;  I25.9-Chronic ischemic heart disease,  unspecified; I11.9-Hypertensive  heart . . .      TECHNIQUE: Two view chest.     COMPARISONS: 10/18/2018.     FINDINGS: Left basilar opacity probably represents small volume effusion  and adjacent atelectasis. No pneumothorax. Sternotomy wires. Unchanged  cardiomediastinal silhouette.       Impression:       No significant interval change.     DICTATED:   10/20/2018  EDITED/ls :   10/20/2018      This report was finalized on 10/20/2018 1:47 PM by Jay Beasley.           Results for orders placed during the hospital encounter of 10/11/18   Adult Transthoracic Echo Complete W/ Cont if Necessary Per Protocol    Narrative · Estimated EF appears to be in the range of 26 - 30%.  · The following left ventricular wall segments are hypokinetic: mid   anterolateral. The following left ventricular wall segments are   dyskinetic: apical anterior.  · Right ventricular cavity is borderline dilated.  · Left atrial cavity size is mildly dilated.  · Mild mitral valve regurgitation is present          I have reviewed the medications.      amoxicillin-clavulanate 1 tablet Oral Q12H   atorvastatin 40 mg Oral Nightly   carvedilol 25 mg Oral BID   clopidogrel 75 mg Oral Daily   ferrous sulfate 325 mg Oral BID With Meals   finasteride 5 mg Oral Daily   guaiFENesin 1,200 mg Oral Q12H   insulin detemir 30 Units Subcutaneous BID   insulin lispro 0-9 Units Subcutaneous 4x Daily With Meals & Nightly   insulin lispro 15 Units Subcutaneous TID With Meals   pantoprazole 40 mg Oral Q AM   pharmacy consult - MTM  Does not apply Daily   polyethylene glycol 17 g Oral Daily   sacubitril-valsartan 1 tablet Oral Q12H   sennosides-docusate sodium 2 tablet Oral BID   sodium chloride 3 mL Intravenous Q12H   sodium chloride 3-10 mL Intravenous Q8H   tamsulosin 0.4 mg Oral Daily   torsemide 5 mg Oral Daily         Assessment/Plan   Assessment / Plan     Active Hospital Problems    Diagnosis   • **Coronary artery disease involving native coronary artery of  native heart with angina pectoris (CMS/[facility])     · Cardiac cath (07/20/2018): Multi vessel CAD.  Refer to CT for bypass grafting  · CABG with Dr Mark Anthony Burton (10/11/2018): SVG to diagonal and circumflex. SVG to RPDA. LIMA to LAD  S     • Cough   • S/P CABG x 4 on 10/11/18   • Ischemic cardiomyopathy with LVEF 26-30% on echocardiogram 10/13/18 (was 40% July 2018)     · Echo (10/14/2018): LVEF= 26 - 30%. Left ventricular wall segments are hypokinetic: mid anterolateral. The following left ventricular wall segments are dyskinetic: apical anterior. Mild MR     • LOPEZ (acute kidney injury) (CMS/[facility])   • Insulin dependent type 2 diabetes mellitus (CMS/[facility])   • Hypertensive cardiovascular disease     · Abnormal EKG with abnormal acceptable echocardiographic GXT, September 2004.   · Acceptable echocardiographic GXT with preserved exercise capacity and mild LVH, March 2008.   · Stress test (12/2012): Normal  · Resident class I symptoms with persistent abnormal echocardiogram (LVEF 0.40), with mild concentric LVH and mild left atrial enlargement without pulmonary arterial hypertension or pericardial effusion, July 2015.       • Moderate obesity   • Hyperlipidemia   • Allergic rhinitis. Desensitization injections discontinued July 2018     Brief Hospital Course to date:  Kar Mora is a 68 y.o. male with a remote hx of tobacco use, HTN, hypertensive heart disease, hyperlipidemia, insulin-dependent T2DM, obesity, CKD III (baseline 1.6), and gout who is admitted following elective CABG x 4 on 10/11/18:    Multivessel CAD S/p CABG x 4 on 10/11/18  Chronic LV Systolic Heart Failure due to ICM with LVEF 26-30%  - continue aspirin, plavix, entresto, carvedilol, and atorvastatin  - Plan for life vest at discharge, d/w CM  - Gentle diuresis with oral torsemide, Dr. Qureshi following   - CT surgery managing serosanguinous drainage from inferior aspect of sternal wound   - No aldactone due to renal dysfunction  - Optimize pain  control  --CTS concerned about sternal wires/incision, especially with aggressive cough issues the last several days.  Now going to monitor pt in house over the weekend.  Plan dc home early next week if stable.     Urinary Retention, failed TOV 10/16  - continue flomax, finasteride  --leong has been discontinued and he is voiding with no issues.      Nonproductive Cough, likely due to atelectasis, post-op pain   --improving.    Bronchitis   - Improved, CXR negative for infiltrates   - Continue supportive care with tussionex, tessalon perles, xylocaine nebs, pain control   - Continue augmentin  --stable, cough significantly better today.    --continue to encourage splinting well with any cough or significant mvmt.   --pulm is following and will see him in follow up in 2-4 weeks after discharge.       CKD III  --much better today.  Cr 1.27  --monitor    Insulin-dependent T2DM, A1c 7.5% at baseline   - exacerbated in last several days by IV steroid dose (had previously been under moderate control)  - Pt began new doses of b/b yesterday am.  Still a running a little high at times but generally fairly stable.  Will make no changes today -continue to monitor today.    --Continue MDSSI for now and monitor insulin needs.  Adjust further as indicated    Hypertension, BP reviewed and stable on current regimen    Normocytic Anemia    DVT Prophylaxis:  mechanical   CODE STATUS:   Code Status and Medical Interventions:   Ordered at: 10/11/18 1052     Level Of Support Discussed With:    Patient     Code Status:    CPR     Medical Interventions (Level of Support Prior to Arrest):    Full       Disposition: I expect the patient to be discharged home in 2-3 days as per primary CT service.      Electronically signed by EDGAR Gonzalez, 10/20/18, 3:32 PM.

## 2018-10-20 NOTE — PROGRESS NOTES
INPATIENT PULMONARY SERVICE  PROGRESS NOTE     Hospital LOS: 9 days    Mr. 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 Desensitization injections discontinued July 2018    Hypertensive cardiovascular disease    Moderate obesity    LOPEZ (acute kidney injury) (CMS/[facility])    Cough    S/P CABG x 4 on 10/11/18    Ischemic cardiomyopathy with LVEF 26-30% on echocardiogram 10/13/18 (was 40% July 2018)      Subjective   S   68-year-old white male remote smoker (none for 37 years) with a history of hypertension and hypertensive cardiovascular disease, hyperlipidemia, diabetes mellitus, obesity, chronic kidney disease (baseline creatinine preop was 1.63), allergic rhinitis (previously on desensitization injections), and gout.  Underwent elective CABG ×4 on 10/11/18 CAD with LVEF 40%. Did well except for a bump in his creatinine which recovered as well as complaints of intractable cough.     On 10/14/18 the patient developed a persistent intense nonproductive cough.There has never been significant sputum reduction or hemoptysis, and he has had no fever or chills. In addition white count remains normal. Chest x-ray however did reveal some opacity in the left base (atelectasis versus infiltrate). He denies dyspnea or wheezing as well as dysphagia, postnasal drip or significant reflux  (although he does have a history of allergies and GERD).      Remains on guaifenesin, dextromethorphan, Tessalon, and codeine derivatives to suppress his cough. In addition has been receiving prn neb treatments with Xylocaine which he states are quite effective. Cough persists  but is definitely improved. Has been diuresed and is approximately 4.8 L negative since admission. Now on daily Bumex and BUN and creatinine are improved. In addition blood sugars are much improved.      Interval  History:  No coughing in 2 days. He feels improved. He is worried about his sternal wires.        The patient's relevant past medical, surgical and social history were reviewed and updated in Epic as appropriate.      ROS:   Constitutional: Negative for fever.   Respiratory: Negative for dyspnea.   Cardiovascular: Negative for chest pain.   Gastrointestinal: Negative for  nausea, vomiting and diarrhea.     Objective   O     Vitals  Temp  Min: 98 °F (36.7 °C)  Max: 98.2 °F (36.8 °C)  BP  Min: 126/82  Max: 129/63  Pulse  Min: 83  Max: 90  Resp  Min: 16  Max: 18  SpO2  Min: 94 %  Max: 95 % Flow (L/min)  Min: 1  Max: 1    I/O 24 HR (7:00 AM-6:59AM):  Intake/Output       10/19/18 0700 - 10/20/18 0659    Intake (ml) 600    Output (ml) --    Net (ml) 600    Last Weight  94.6 kg (208 lb 9.6 oz)          Medications (Drips):       Physical Examination    Telemetry: Normal sinus rhythm.    Constitutional:  No acute distress.  Conversant.   Cardiovascular: Regular rate and rhythm.  No murmurs, rub or gallop.   Respiratory: Normal symmetric chest expansion.  Normal respiratory effort.  Clear to ascultation.  Diminished at left base.    Abdominal:  Soft. No masses.   Non-tender. No distension.   No hepatosplenomegaly.   Extremities: No digital cyanosis or clubbing.  No peripheral edema.   Neurological:   Alert and Oriented to person, place, and time.   Moves all extremities.              Results from last 7 days  Lab Units 10/19/18  0353 10/18/18  0530 10/16/18  0517   WBC 10*3/mm3 9.30 10.58 6.71   HEMOGLOBIN g/dL 10.6* 10.4* 10.1*  10.0*   MCV fL 88.4 87.6 87.2   PLATELETS 10*3/mm3 390 385 288       Results from last 7 days  Lab Units 10/19/18  0353 10/18/18  0530 10/16/18  0517   SODIUM mmol/L 135 133 135   POTASSIUM mmol/L 4.3 4.7 4.5   CO2 mmol/L 21.0 22.0 21.0   CREATININE mg/dL 1.27 1.50* 1.40*     Serum creatinine: 1.27 mg/dL 10/19/18 0353  Estimated creatinine clearance: 60.8 mL/min              Images:     Imaging  Results (last 24 hours)     Procedure Component Value Units Date/Time    XR Chest 2 View [[id number]] Updated:  10/20/18 0856          I reviewed the patient's new clinical results.  I reviewed the patient's new imaging results and agree with the interpretation.    Assessment/Plan   A / P     Mr. Mora is a 67yo M who underwent CABG on 10/11/18. He developed a persistent cough several days after surgery. His cough has now resolved with anti-tussive medications, treatment with Augmentin and PRN Xylocaine nebs.     Diet Regular; Consistent Carbohydrate, Cardiac  Code Status and Medical Interventions:   Ordered at: 10/11/18 1052     Level Of Support Discussed With:    Patient     Code Status:    CPR     Medical Interventions (Level of Support Prior to Arrest):    Full       Active Hospital Problems    Diagnosis   • **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 7/5/18   • Cough   • S/P CABG x 4 on 10/11/18   • Ischemic cardiomyopathy with LVEF 26-30% on echocardiogram 10/13/18 (was 40% July 2018)   • LOPZE (acute kidney injury) (CMS/Formerly [facility])   • Insulin dependent type 2 diabetes mellitus (CMS/[facility])   • Hypertensive cardiovascular disease     1. Probable hypertensive cardiovascular disease:  a. Abnormal EKG with abnormal acceptable echocardiographic GXT, September 2004.   b. Acceptable echocardiographic GXT with preserved exercise capacity and mild LVH, March 2008.   c. Acceptable Quantitative SPECT Gated Cardiolite GXT with nominal myocardial perfusion to 88% of predicted exercise capacity and 90% predicted maximum heart rate with preserved LVEF (0.65) with continued medical therapy felt warranted, December 2012.  d. Residual class I symptoms with recent documented abnormal EKG (LBBB/first-degree AV block) with abnormal echocardiogram demonstrating mild left ventricular chamber enlargement with mild reduction in the LVEF (0.42) without PE or pulmonary hypertension.  e. Resident class I symptoms with persistent abnormal echocardiogram  (LVEF 0.40), with mild concentric LVH and mild left atrial enlargement without pulmonary arterial hypertension or pericardial effusion, July 2015.       • Moderate obesity   • Hyperlipidemia   • Allergic rhinitis. Desensitization injections discontinued July 2018       Assessment / Plan:  1. Complete course of Augmentin.   2. Change anti-tussives to PRN.   3. He will try and not use the nebulizer treatments today and see how his cough does.   4. F/u radiology read of PA/Lateral CXR. Left lower lobe airspace disease/atelectasis appears improved.   5. Follow up with [facility] in 2-4 weeks after discharge for cough.     I discussed the patient's findings and my recommendations with patient and family    Time:  I spent 35 minutes, face to face, with the patient and family or on the bro coordinating care with other health care providers.   I spent > 50% percent of this time, counseling and discussing current status and management .     Agnes Toth, DO  Pulmonary and Critical Care Medicine

## 2018-10-20 NOTE — PROGRESS NOTES
9 Days Post-Op       LOS: 9 days   Patient Care Team:  Bro Kan MD as PCP - General  Hiren Montoya MD as Consulting Physician (Endocrinology)    Chief complaint: Coronary artery disease    Subjective   Denies chest pain, denies shortness of breath    Objective    Vital Signs  Temp:  [98 °F (36.7 °C)-98.2 °F (36.8 °C)] 98 °F (36.7 °C)  Heart Rate:  [83-90] 85  Resp:  [16-18] 18  BP: (126-129)/(63-82) 129/63    Physical Exam:   General Appearance: alert, appears stated age and cooperative   Lungs: clear bilaterally   Heart: Regular rate and rhythm   Skin:  Incision c/d/i   Some instability of the lower sternum  Results     Results from last 7 days  Lab Units 10/19/18  0353   WBC 10*3/mm3 9.30   HEMOGLOBIN g/dL 10.6*   HEMATOCRIT % 32.8*   PLATELETS 10*3/mm3 390       Results from last 7 days  Lab Units 10/19/18  0353   SODIUM mmol/L 135   POTASSIUM mmol/L 4.3   CHLORIDE mmol/L 105   CO2 mmol/L 21.0   BUN mg/dL 55*   CREATININE mg/dL 1.27   GLUCOSE mg/dL 143*   CALCIUM mg/dL 8.5*               Assessment      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 7/5/18    Insulin dependent type 2 diabetes mellitus (CMS/[facility])    Hyperlipidemia    Allergic rhinitis. Desensitization injections discontinued July 2018    Hypertensive cardiovascular disease    Moderate obesity    LOPEZ (acute kidney injury) (CMS/[facility])    Cough    S/P CABG x 4 on 10/11/18    Ischemic cardiomyopathy with LVEF 26-30% on echocardiogram 10/13/18 (was 40% July 2018)        Plan   Await PA and lateral x-ray  Continue to follow lower sternum for instability    Supa Kevin PA-C  10/20/18  10:13 AM     As above.  Sternum is multiple no evidence of infection, drainage, or erythema. I have reviewed, verified, and confirmed the above history and current status.  I have examined the patient and confirmed the above physical findings.Above plan and treatment regimen discussed in detail with patient.  Options of treatment, attendant risks vs  benefits, and my recommendations were discussed and all questions answered.    Scottie Alcocer MD  CTSurgery  10/20/18   11:58 AM

## 2018-10-20 NOTE — PLAN OF CARE
Problem: Patient Care Overview  Goal: Plan of Care Review  Outcome: Ongoing (interventions implemented as appropriate)    Goal: Individualization and Mutuality  Outcome: Ongoing (interventions implemented as appropriate)    Goal: Discharge Needs Assessment  Outcome: Ongoing (interventions implemented as appropriate)    Goal: Interprofessional Rounds/Family Conf  Outcome: Ongoing (interventions implemented as appropriate)      Problem: Skin Injury Risk (Adult)  Goal: Skin Health and Integrity  Outcome: Ongoing (interventions implemented as appropriate)      Problem: Cardiac Surgery (Adult)  Goal: Signs and Symptoms of Listed Potential Problems Will be Absent, Minimized or Managed (Cardiac Surgery)  Outcome: Ongoing (interventions implemented as appropriate)

## 2018-10-21 PROBLEM — R05.9 COUGH: Status: RESOLVED | Noted: 2018-10-16 | Resolved: 2018-10-21

## 2018-10-21 PROBLEM — N17.9 AKI (ACUTE KIDNEY INJURY): Status: RESOLVED | Noted: 2018-10-14 | Resolved: 2018-10-21

## 2018-10-21 PROBLEM — R07.2 PRECORDIAL PAIN: Status: RESOLVED | Noted: 2018-05-25 | Resolved: 2018-10-21

## 2018-10-21 PROBLEM — Z95.1 S/P CABG X 4: Status: RESOLVED | Noted: 2018-10-16 | Resolved: 2018-10-21

## 2018-10-21 PROBLEM — I25.9 ISCHEMIC HEART DISEASE: Status: RESOLVED | Noted: 2018-08-01 | Resolved: 2018-10-21

## 2018-10-21 LAB
GLUCOSE BLDC GLUCOMTR-MCNC: 140 MG/DL (ref 70–130)
GLUCOSE BLDC GLUCOMTR-MCNC: 209 MG/DL (ref 70–130)
GLUCOSE BLDC GLUCOMTR-MCNC: 220 MG/DL (ref 70–130)
GLUCOSE BLDC GLUCOMTR-MCNC: 245 MG/DL (ref 70–130)

## 2018-10-21 PROCEDURE — 99024 POSTOP FOLLOW-UP VISIT: CPT | Performed by: THORACIC SURGERY (CARDIOTHORACIC VASCULAR SURGERY)

## 2018-10-21 PROCEDURE — 99232 SBSQ HOSP IP/OBS MODERATE 35: CPT | Performed by: INTERNAL MEDICINE

## 2018-10-21 PROCEDURE — 99232 SBSQ HOSP IP/OBS MODERATE 35: CPT | Performed by: NURSE PRACTITIONER

## 2018-10-21 PROCEDURE — 63710000001 INSULIN DETEMIR PER 5 UNITS: Performed by: HOSPITALIST

## 2018-10-21 PROCEDURE — 63710000001 INSULIN LISPRO (HUMAN) PER 5 UNITS: Performed by: INTERNAL MEDICINE

## 2018-10-21 PROCEDURE — 94799 UNLISTED PULMONARY SVC/PX: CPT

## 2018-10-21 PROCEDURE — 82962 GLUCOSE BLOOD TEST: CPT

## 2018-10-21 RX ADMIN — SENNOSIDES AND DOCUSATE SODIUM 2 TABLET: 8.6; 5 TABLET ORAL at 08:20

## 2018-10-21 RX ADMIN — ALPRAZOLAM 0.25 MG: 0.25 TABLET ORAL at 22:12

## 2018-10-21 RX ADMIN — GUAIFENESIN 1200 MG: 600 TABLET, EXTENDED RELEASE ORAL at 08:19

## 2018-10-21 RX ADMIN — OXYCODONE HYDROCHLORIDE AND ACETAMINOPHEN 1 TABLET: 10; 325 TABLET ORAL at 12:57

## 2018-10-21 RX ADMIN — ZOLPIDEM TARTRATE 5 MG: 5 TABLET ORAL at 00:36

## 2018-10-21 RX ADMIN — AMOXICILLIN AND CLAVULANATE POTASSIUM 1 TABLET: 875; 125 TABLET, FILM COATED ORAL at 22:12

## 2018-10-21 RX ADMIN — SENNOSIDES AND DOCUSATE SODIUM 2 TABLET: 8.6; 5 TABLET ORAL at 22:12

## 2018-10-21 RX ADMIN — INSULIN LISPRO 4 UNITS: 100 INJECTION, SOLUTION INTRAVENOUS; SUBCUTANEOUS at 12:05

## 2018-10-21 RX ADMIN — ATORVASTATIN CALCIUM 40 MG: 40 TABLET, FILM COATED ORAL at 22:13

## 2018-10-21 RX ADMIN — SACUBITRIL AND VALSARTAN 1 TABLET: 24; 26 TABLET, FILM COATED ORAL at 08:18

## 2018-10-21 RX ADMIN — TORSEMIDE 5 MG: 10 TABLET ORAL at 08:19

## 2018-10-21 RX ADMIN — Medication 10 ML: at 22:21

## 2018-10-21 RX ADMIN — TAMSULOSIN HYDROCHLORIDE 0.4 MG: 0.4 CAPSULE ORAL at 08:19

## 2018-10-21 RX ADMIN — Medication 3 ML: at 22:14

## 2018-10-21 RX ADMIN — FINASTERIDE 5 MG: 5 TABLET, FILM COATED ORAL at 08:19

## 2018-10-21 RX ADMIN — AMOXICILLIN AND CLAVULANATE POTASSIUM 1 TABLET: 875; 125 TABLET, FILM COATED ORAL at 08:19

## 2018-10-21 RX ADMIN — CLOPIDOGREL BISULFATE 75 MG: 75 TABLET ORAL at 08:19

## 2018-10-21 RX ADMIN — POLYETHYLENE GLYCOL 3350 17 G: 17 POWDER, FOR SOLUTION ORAL at 08:20

## 2018-10-21 RX ADMIN — PANTOPRAZOLE SODIUM 40 MG: 40 TABLET, DELAYED RELEASE ORAL at 04:53

## 2018-10-21 RX ADMIN — CARVEDILOL 25 MG: 12.5 TABLET, FILM COATED ORAL at 08:18

## 2018-10-21 RX ADMIN — INSULIN LISPRO 4 UNITS: 100 INJECTION, SOLUTION INTRAVENOUS; SUBCUTANEOUS at 08:27

## 2018-10-21 RX ADMIN — Medication 5 MG: at 00:36

## 2018-10-21 RX ADMIN — INSULIN DETEMIR 30 UNITS: 100 INJECTION, SOLUTION SUBCUTANEOUS at 08:18

## 2018-10-21 RX ADMIN — GUAIFENESIN 1200 MG: 600 TABLET, EXTENDED RELEASE ORAL at 22:14

## 2018-10-21 RX ADMIN — Medication 325 MG: at 08:19

## 2018-10-21 RX ADMIN — SACUBITRIL AND VALSARTAN 1 TABLET: 24; 26 TABLET, FILM COATED ORAL at 22:13

## 2018-10-21 RX ADMIN — CARVEDILOL 25 MG: 12.5 TABLET, FILM COATED ORAL at 22:13

## 2018-10-21 RX ADMIN — INSULIN LISPRO 4 UNITS: 100 INJECTION, SOLUTION INTRAVENOUS; SUBCUTANEOUS at 22:17

## 2018-10-21 RX ADMIN — Medication 325 MG: at 17:15

## 2018-10-21 RX ADMIN — INSULIN DETEMIR 30 UNITS: 100 INJECTION, SOLUTION SUBCUTANEOUS at 22:18

## 2018-10-21 RX ADMIN — OXYCODONE HYDROCHLORIDE AND ACETAMINOPHEN 1 TABLET: 10; 325 TABLET ORAL at 22:14

## 2018-10-21 RX ADMIN — OXYCODONE HYDROCHLORIDE AND ACETAMINOPHEN 1 TABLET: 10; 325 TABLET ORAL at 04:53

## 2018-10-21 NOTE — PLAN OF CARE
Problem: Patient Care Overview  Goal: Plan of Care Review  Outcome: Ongoing (interventions implemented as appropriate)   10/21/18 1522   Coping/Psychosocial   Plan of Care Reviewed With patient   Plan of Care Review   Progress no change   OTHER   Outcome Summary Pt ambulating independently in room. Decreased complaints of pain. not sleeping well at night per pt report r/t chair/bed being uncomfortable. plan to DC early this week, possibly tomorrow. Lifevest needed prior to DC. minute output from chest incision. Decreased coughing during shift with DC of cough suppressants today. continue to monitor.       Problem: Skin Injury Risk (Adult)  Goal: Skin Health and Integrity  Outcome: Ongoing (interventions implemented as appropriate)      Problem: Cardiac Surgery (Adult)  Goal: Signs and Symptoms of Listed Potential Problems Will be Absent, Minimized or Managed (Cardiac Surgery)  Outcome: Ongoing (interventions implemented as appropriate)

## 2018-10-21 NOTE — PROGRESS NOTES
INPATIENT PULMONARY SERVICE  PROGRESS NOTE     Hospital LOS: 10 days    Mr. Kar Mora, is followed for a Chief Complaint of: Cough      Coronary artery disease involving native coronary artery of native heart with angina pectoris (CMS/[facility])    Insulin dependent type 2 diabetes mellitus (CMS/[facility])    Hyperlipidemia    Allergic rhinitis. Desensitization injections discontinued July 2018    Hypertensive cardiovascular disease    Moderate obesity    LOPEZ (acute kidney injury) (CMS/[facility])    Cough    S/P CABG x 4 on 10/11/18    Ischemic cardiomyopathy with LVEF 26-30% on echocardiogram 10/13/18 (was 40% July 2018)      Subjective   S   68-year-old white male remote smoker (none for 37 years) with a history of hypertension and hypertensive cardiovascular disease, hyperlipidemia, diabetes mellitus, obesity, chronic kidney disease (baseline creatinine preop was 1.63), allergic rhinitis (previously on desensitization injections), and gout.  Underwent elective CABG ×4 on 10/11/18 CAD with LVEF 40%. Did well except for a bump in his creatinine which recovered as well as complaints of intractable cough.     On 10/14/18 the patient developed a persistent intense nonproductive cough.There has never been significant sputum reduction or hemoptysis, and he has had no fever or chills. In addition white count remains normal. Chest x-ray however did reveal some opacity in the left base (atelectasis versus infiltrate). He denies dyspnea or wheezing as well as dysphagia, postnasal drip or significant reflux  (although he does have a history of allergies and GERD).      Remains on guaifenesin, dextromethorphan, Tessalon, and codeine derivatives to suppress his cough. In addition has been receiving prn neb treatments with Xylocaine which he states are quite effective. Cough persists  but is definitely improved. Has been diuresed and is approximately 4.8 L negative since admission. Now on daily Bumex and BUN and creatinine are improved.  In addition blood sugars are much improved.      Interval History:  No coughing overnight and he did not have to use any medications for cough PRN.        The patient's relevant past medical, surgical and social history were reviewed and updated in Epic as appropriate.      ROS:   Constitutional: Negative for fever.   Respiratory: Negative for dyspnea.   Cardiovascular: Negative for chest pain.   Gastrointestinal: Negative for  nausea, vomiting and diarrhea.     Objective   O     Vitals  Temp  Min: 98.1 °F (36.7 °C)  Max: 98.1 °F (36.7 °C)  BP  Min: 114/60  Max: 117/61  Pulse  Min: 78  Max: 89  No Data Recorded  SpO2  Min: 93 %  Max: 96 % No Data Recorded    I/O 24 HR (7:00 AM-6:59AM):  Intake/Output     None          Medications (Drips):       Physical Examination    Telemetry: Normal sinus rhythm.    Constitutional:  No acute distress.  Conversant.   Cardiovascular: Regular rate and rhythm.  No murmurs, rub or gallop.   Respiratory: Normal symmetric chest expansion.  Normal respiratory effort.  Clear to ascultation.   Abdominal:  Soft. No masses.   Non-tender. No distension.   No hepatosplenomegaly.   Extremities: No digital cyanosis or clubbing.  No peripheral edema.   Neurological:   Alert and Oriented to person, place, and time.   Moves all extremities.              Results from last 7 days  Lab Units 10/19/18  0353 10/18/18  0530 10/16/18  0517   WBC 10*3/mm3 9.30 10.58 6.71   HEMOGLOBIN g/dL 10.6* 10.4* 10.1*  10.0*   MCV fL 88.4 87.6 87.2   PLATELETS 10*3/mm3 390 385 288       Results from last 7 days  Lab Units 10/19/18  0353 10/18/18  0530 10/16/18  0517   SODIUM mmol/L 135 133 135   POTASSIUM mmol/L 4.3 4.7 4.5   CO2 mmol/L 21.0 22.0 21.0   CREATININE mg/dL 1.27 1.50* 1.40*     Serum creatinine: 1.27 mg/dL 10/19/18 0353  Estimated creatinine clearance: 60.7 mL/min              Images:     Imaging Results (last 24 hours)     Procedure Component Value Units Date/Time    XR Chest 2 View [[id number]]  Collected:  10/20/18 1128     Updated:  10/20/18 1349    Narrative:       EXAMINATION: XR CHEST 2 VW - 10/20/2018     INDICATION: Z74.09-Other reduced mobility; I25.118-Atherosclerotic heart  disease of native coronary artery with other forms of angina pectoris;  I25.119-Atherosclerotic heart disease of native coronary artery with  unspecified angina pectoris; N17.9-Acute kidney failure, unspecified;  I25.9-Chronic ischemic heart disease, unspecified; I11.9-Hypertensive  heart . . .      TECHNIQUE: Two view chest.     COMPARISONS: 10/18/2018.     FINDINGS: Left basilar opacity probably represents small volume effusion  and adjacent atelectasis. No pneumothorax. Sternotomy wires. Unchanged  cardiomediastinal silhouette.       Impression:       No significant interval change.     DICTATED:   10/20/2018  EDITED/ls :   10/20/2018      This report was finalized on 10/20/2018 1:47 PM by Jay Beasley.             I reviewed the patient's new clinical results.  I reviewed the patient's new imaging results and agree with the interpretation.    Assessment/Plan   A / P     Mr. Mora is a 67yo M who underwent CABG on 10/11/18. He developed a persistent cough several days after surgery. His cough has now resolved with anti-tussive medications, treatment with Augmentin and PRN Xylocaine nebs. He has not needed any PRN medications for cough.     Diet Regular; Consistent Carbohydrate, Cardiac  Code Status and Medical Interventions:   Ordered at: 10/11/18 1052     Level Of Support Discussed With:    Patient     Code Status:    CPR     Medical Interventions (Level of Support Prior to Arrest):    Full       Active Hospital Problems    Diagnosis   • **Coronary artery disease involving native coronary artery of native heart with angina pectoris (CMS/[facility])     · Cardiac cath (07/20/2018): Multi vessel CAD.  Refer to CT for bypass grafting  · CABG with Dr Mark Anthony Burton (10/11/2018): SVG to diagonal and circumflex. SVG to RPDA. LIMA to LAD   S     • Cough   • S/P CABG x 4 on 10/11/18   • Ischemic cardiomyopathy with LVEF 26-30% on echocardiogram 10/13/18 (was 40% July 2018)     · Echo (10/14/2018): LVEF= 26 - 30%. Left ventricular wall segments are hypokinetic: mid anterolateral. The following left ventricular wall segments are dyskinetic: apical anterior. Mild MR     • LOPEZ (acute kidney injury) (CMS/[facility])   • Insulin dependent type 2 diabetes mellitus (CMS/[facility])   • Hypertensive cardiovascular disease     · Abnormal EKG with abnormal acceptable echocardiographic GXT, September 2004.   · Acceptable echocardiographic GXT with preserved exercise capacity and mild LVH, March 2008.   · Stress test (12/2012): Normal  · Resident class I symptoms with persistent abnormal echocardiogram (LVEF 0.40), with mild concentric LVH and mild left atrial enlargement without pulmonary arterial hypertension or pericardial effusion, July 2015.       • Moderate obesity   • Hyperlipidemia   • Allergic rhinitis. Desensitization injections discontinued July 2018       Assessment / Plan:  1. Complete course of Augmentin.   2. Discontinue PRN cough suppressants.    3. [facility] with [facility] in 2-4 weeks after discharge for cough.     I discussed the patient's findings and my recommendations with patient and family    Time:  I spent 25 minutes, face to face, with the patient and family or on the bro coordinating care with other health care providers.   I spent > 50% percent of this time, counseling and discussing current status and management .     Agnes Toth, DO  Pulmonary and Critical Care Medicine

## 2018-10-21 NOTE — PROGRESS NOTES
[facility] Medicine Services  PROGRESS NOTE    Patient Name: Kar Mora  : 1950  MRN: [id number]    Date of Admission: 10/11/2018  Length of Stay: 10  Primary Care Physician: Bro Kan MD    Subjective   Subjective     CC:  F/u cough post CABG, medical management    HPI:    Up ambulating in the room.  Denies cough.  Hasn't needed any neb tx.  Feeling well.        Review of Systems  Gen- No fevers, chills  CV- No chest pain, palpitations  Resp- occ cough, dyspnea  GI- No N/V/D, abd pain    Otherwise ROS is negative except as mentioned in the HPI.    Objective   Objective     Vital Signs:   Temp:  [98.1 °F (36.7 °C)] 98.1 °F (36.7 °C)  Heart Rate:  [78-90] 84  Resp:  [17] 17  BP: (114-117)/(60-61) 117/61      Physical Exam:  Constitutional: No acute distress, awake, alert, ambulating in room  HENT: NCAT, mucous membranes moist  Respiratory: clear to auscultation, regular unlabored breathing on room air   Cardiovascular: RRR, no murmurs, rubs, or gallops, palpable pedal pulses bilaterally, midline sternal incision c/d/i superiorly, dressed inferiorly still with large MT site drsg covering distal tip C/D/I.   Gastrointestinal: Positive bowel sounds, soft, nontender, nondistended. Obese   Musculoskeletal: 1+ bilateral LE edema   Psychiatric: appropriate affect, cooperative and calm  Neurologic: Oriented x 3, strength symmetric in all extremities, Cranial Nerves grossly intact to confrontation, speech clear. Follows commands   Skin: No rashes    Results Reviewed:  I have personally reviewed current lab, radiology, and data and agree.      Results from last 7 days  Lab Units 10/19/18  0353 10/18/18  0530 10/16/18  0517   WBC 10*3/mm3 9.30 10.58 6.71   HEMOGLOBIN g/dL 10.6* 10.4* 10.1*  10.0*   HEMATOCRIT % 32.8* 32.5* 31.2*  31.0*   PLATELETS 10*3/mm3 390 385 288       Results from last 7 days  Lab Units 10/19/18  0353 10/18/18  0530 10/16/18  0517   SODIUM mmol/L  135 133 135   POTASSIUM mmol/L 4.3 4.7 4.5   CHLORIDE mmol/L 105 103 104   CO2 mmol/L 21.0 22.0 21.0   BUN mg/dL 55* 67* 50*   CREATININE mg/dL 1.27 1.50* 1.40*   GLUCOSE mg/dL 143* 296* 199*   CALCIUM mg/dL 8.5* 8.6* 8.4*     Estimated Creatinine Clearance: 60.7 mL/min (by C-G formula based on SCr of 1.27 mg/dL).  No results found for: BNP    Microbiology Results Abnormal     Procedure Component Value - Date/Time    Resp Virus Profile (RVP) PCR - Swab, Nasopharynx [[id number]] Collected:  10/16/18 1341    Lab Status:  Final result Specimen:  Swab from Nasopharynx Updated:  10/19/18 0618     Influenza A PCR Negative     Influenza B PCR Negative     RSV A Negative     RSV B Negative     Parainfluenza Virus 1 Negative     Parainfluenza Virus 2 Negative     Parainfluenza Virus 3 Negative     Human Rhinovirus/Enterovirus Negative     Human Metapneumovirus Negative     Adenovirus Detection by PCR Negative    Narrative:       Performed at:  [address] [place] [address]  [id number]  : Bro Robertson MD, Phone:  [phone]    Influenza A & B, RT PCR - Swab, Nasopharynx [[id number]]  (Normal) Collected:  10/16/18 1341    Lab Status:  Final result Specimen:  Swab from Nasopharynx Updated:  10/16/18 1924     Influenza A PCR Not Detected     Influenza B PCR Not Detected          Imaging Results (last 24 hours)     Procedure Component Value Units Date/Time    XR Chest 2 View [[id number]] Collected:  10/20/18 1128     Updated:  10/20/18 1349    Narrative:       EXAMINATION: XR CHEST 2 VW - 10/20/2018     INDICATION: Z74.09-Other reduced mobility; I25.118-Atherosclerotic heart  disease of native coronary artery with other forms of angina pectoris;  I25.119-Atherosclerotic heart disease of native coronary artery with  unspecified angina pectoris; N17.9-Acute kidney failure, unspecified;  I25.9-Chronic ischemic heart disease, unspecified; I11.9-Hypertensive  heart . . .      TECHNIQUE: Two view  chest.     COMPARISONS: 10/18/2018.     FINDINGS: Left basilar opacity probably represents small volume effusion  and adjacent atelectasis. No pneumothorax. Sternotomy wires. Unchanged  cardiomediastinal silhouette.       Impression:       No significant interval change.     DICTATED:   10/20/2018  EDITED/ls :   10/20/2018      This report was finalized on 10/20/2018 1:47 PM by Jay Beasley.           Results for orders placed during the hospital encounter of 10/11/18   Adult Transthoracic Echo Complete W/ Cont if Necessary Per Protocol    Narrative · Estimated EF appears to be in the range of 26 - 30%.  · The following left ventricular wall segments are hypokinetic: mid   anterolateral. The following left ventricular wall segments are   dyskinetic: apical anterior.  · Right ventricular cavity is borderline dilated.  · Left atrial cavity size is mildly dilated.  · Mild mitral valve regurgitation is present          I have reviewed the medications.      amoxicillin-clavulanate 1 tablet Oral Q12H   atorvastatin 40 mg Oral Nightly   carvedilol 25 mg Oral BID   clopidogrel 75 mg Oral Daily   ferrous sulfate 325 mg Oral BID With Meals   finasteride 5 mg Oral Daily   guaiFENesin 1,200 mg Oral Q12H   insulin detemir 30 Units Subcutaneous BID   insulin lispro 0-9 Units Subcutaneous 4x Daily With Meals & Nightly   insulin lispro 20 Units Subcutaneous TID With Meals   pantoprazole 40 mg Oral Q AM   pharmacy consult - MTM  Does not apply Daily   polyethylene glycol 17 g Oral Daily   sacubitril-valsartan 1 tablet Oral Q12H   sennosides-docusate sodium 2 tablet Oral BID   sodium chloride 3 mL Intravenous Q12H   sodium chloride 3-10 mL Intravenous Q8H   tamsulosin 0.4 mg Oral Daily   torsemide 5 mg Oral Daily         Assessment/Plan   Assessment / Plan     Active Hospital Problems    Diagnosis   • **Coronary artery disease involving native coronary artery of native heart with angina pectoris (CMS/HCC)     · Cardiac catheterization  (07/20/2018): Multivessel CAD  · CABG by Dr Mark Anthony Burton (10/11/2018): LIMA to LAD, SVG to diagonal/circumflex, SVG to RPDA      • Ischemic cardiomyopathy     · Echo (10/14/2018): LVEF 30%. Left ventricular wall segments are hypokinetic: mid anterolateral. The following left ventricular wall segments are dyskinetic: apical anterior. Mild MR     • Type 2 diabetes mellitus, with long-term current use of insulin (CMS/[facility])   • Essential hypertension   • Class 1 obesity due to excess calories in adult   • Hyperlipidemia LDL goal <70     Brief Hospital Course to date:  Kar Mora is a 68 y.o. male with a remote hx of tobacco use, HTN, hypertensive heart disease, hyperlipidemia, insulin-dependent T2DM, obesity, CKD III (baseline 1.6), and gout who is admitted following elective CABG x 4 on 10/11/18:    Multivessel CAD S/p CABG x 4 on 10/11/18  Chronic LV Systolic Heart Failure due to ICM with LVEF 26-30%  - continue aspirin, plavix, entresto, carvedilol, and atorvastatin  - Plan for life vest at discharge, d/w CM  - Gentle diuresis with oral torsemide, Dr. Qureshi following   - CT surgery managing serosanguinous drainage from inferior aspect of sternal wound   - No aldactone due to renal dysfunction  - Optimize pain control  --CTS concerned about sternal wires/incision, especially with aggressive cough issues the last several days.  Now going to monitor pt in house over the weekend.  Plan dc home early next week if stable.     Urinary Retention, failed TOV 10/16  - continue flomax, finasteride  --leong has been discontinued and he is voiding with no issues.      Nonproductive Cough, likely due to atelectasis, post-op pain   --improving.    Bronchitis   - Improved, CXR negative for infiltrates   - Continue supportive care with tussionex, tessalon perles, xylocaine nebs, pain control   - Continue augmentin  --stable, cough significantly better today.    --continue to encourage splinting well with any cough or significant  mvmt.   --pulm is following and will see him in follow up in 2-4 weeks after discharge.       CKD III  --much better today.  Cr 1.27  --monitor    Insulin-dependent T2DM, A1c 7.5% at baseline   - exacerbated in last several days by IV steroid dose (had previously been under moderate control)  - will increase mealtime insulin to 20 units TID    Hypertension, BP reviewed and stable on current regimen    Normocytic Anemia    DVT Prophylaxis:  mechanical   CODE STATUS:   Code Status and Medical Interventions:   Ordered at: 10/11/18 1052     Level Of Support Discussed With:    Patient     Code Status:    CPR     Medical Interventions (Level of Support Prior to Arrest):    Full       Disposition: I expect the patient to be discharged home in 2-3 days as per primary CT service.      Electronically signed by EDGAR Gonzalez, 10/21/18, 1:17 PM.

## 2018-10-21 NOTE — PROGRESS NOTES
[facility]  Cardiology Progress Note      Chief Complaint/Reason for service:    · F/U CAD s/p CABG  · Hypertension         Transferred to telemetry floor today. Feeling well. Walking the hallways 3 times a day. LifeVest as not been arranged of yet.    Past medical, surgical, social and family history reviewed in the patient's electronic medical record.           Vital Sign Min/Max for last 24 hours  Temp  Min: 98.1 °F (36.7 °C)  Max: 98.1 °F (36.7 °C)   BP  Min: 114/60  Max: 117/61   Pulse  Min: 78  Max: 90   Resp  Min: 17  Max: 17   SpO2  Min: 93 %  Max: 95 %   No Data Recorded      Intake/Output Summary (Last 24 hours) at 10/21/18 1203  Last data filed at 10/21/18 0819   Gross per 24 hour   Intake              120 ml   Output                0 ml   Net              120 ml           Physical Exam   Constitutional: He is oriented to person, place, and time. He appears well-developed and well-nourished.   HENT:   Head: Normocephalic and atraumatic.   Cardiovascular: Normal rate and regular rhythm.    No murmur heard.  Pulmonary/Chest: Effort normal.   Abdominal: Soft.   Neurological: He is alert and oriented to person, place, and time.       Results Review:   I reviewed the patient's recent labs in the electronic medical record.        Results from last 7 days  Lab Units 10/19/18  0353 10/18/18  0530 10/16/18  0517 10/15/18  0500   SODIUM mmol/L 135 133 135 133   POTASSIUM mmol/L 4.3 4.7 4.5 4.4   CHLORIDE mmol/L 105 103 104 101   BUN mg/dL 55* 67* 50* 50*   CREATININE mg/dL 1.27 1.50* 1.40* 1.62*           Results from last 7 days  Lab Units 10/19/18  0353 10/18/18  0530 10/16/18  0517   WBC 10*3/mm3 9.30 10.58 6.71   HEMOGLOBIN g/dL 10.6* 10.4* 10.1*  10.0*   HEMATOCRIT % 32.8* 32.5* 31.2*  31.0*   PLATELETS 10*3/mm3 390 385 288       Lab Results   Component Value Date    HGBA1C 7.50 (H) 10/10/2018       Lab Results   Component Value Date    CHOL 169 07/20/2018    TRIG 385  (H) 07/20/2018    HDL 27 (L) 07/20/2018    LDL 91 07/20/2018         Tele:  NSR         Active Hospital Problems    Diagnosis Date Noted   • **Coronary artery disease involving native coronary artery of native heart with angina pectoris (CMS/[facility]) [I25.119] 09/07/2018     Priority: High     · Cardiac catheterization (07/20/2018): Multivessel CAD  · CABG by Dr Mark Anthony Burton (10/11/2018): LIMA to LAD, SVG to diagonal/circumflex, SVG to RPDA      • Ischemic cardiomyopathy [I25.5] 10/16/2018     Priority: Medium     · Echo (10/14/2018): LVEF 30%. Left ventricular wall segments are hypokinetic: mid anterolateral. The following left ventricular wall segments are dyskinetic: apical anterior. Mild MR     • Type 2 diabetes mellitus, with long-term current use of insulin (CMS/[facility]) [E11.9, Z79.4] 10/24/2016   • Essential hypertension [I10] 10/24/2016   • Class 1 obesity due to excess calories in adult [E66.09] 10/24/2016   • Hyperlipidemia LDL goal <70 [E78.5]      68-year-old gentleman with coronary disease and ischemic cardiomyopathy who is postop day 10 from bypass surgery. He appears ready for discharge but has not had a LifeVest placed yet.         · Lifevest prior to discharge  · Probable discharge tomorrow unless LifeVest can be arranged    Alan Galindo IV, MD  10/21/2018

## 2018-10-21 NOTE — PROGRESS NOTES
10 Days Post-Op       LOS: 10 days   Patient Care Team:  Bro Kan MD as PCP - General Montoya, Hiren Brunson MD as Consulting Physician (Endocrinology)  Todd Qureshi MD as Cardiologist (Cardiology)    Chief complaint: Coronary artery disease    Subjective   Denies chest pain, denies shortness of breath    Objective    Vital Signs  Temp:  [98.1 °F (36.7 °C)] 98.1 °F (36.7 °C)  Heart Rate:  [78-90] 84  Resp:  [17] 17  BP: (114-117)/(60-61) 117/61    Physical Exam:   General Appearance: alert, appears stated age and cooperative   Lungs: clear bilaterally   Heart: Regular rate and rhythm   Skin:  Incision c/d/i     Results     Results from last 7 days  Lab Units 10/19/18  0353   WBC 10*3/mm3 9.30   HEMOGLOBIN g/dL 10.6*   HEMATOCRIT % 32.8*   PLATELETS 10*3/mm3 390       Results from last 7 days  Lab Units 10/19/18  0353   SODIUM mmol/L 135   POTASSIUM mmol/L 4.3   CHLORIDE mmol/L 105   CO2 mmol/L 21.0   BUN mg/dL 55*   CREATININE mg/dL 1.27   GLUCOSE mg/dL 143*   CALCIUM mg/dL 8.5*               Assessment      Coronary artery disease involving native coronary artery of native heart with angina pectoris (CMS/[facility])    Type 2 diabetes mellitus, with long-term current use of insulin (CMS/[facility])    Hyperlipidemia LDL goal <70    Essential hypertension    Class 1 obesity due to excess calories in adult    Ischemic cardiomyopathy    cardiomyopathy EF of 30%    Plan   Doing well today.  He would like to go home.  However, we are waiting a LifeVest    Supa Kevin PA-C  10/21/18  12:42 PM     As above. GREGG.      Scottie Alcocer MD  CTSurgery  10/21/18   12:51 PM

## 2018-10-22 VITALS
BODY MASS INDEX: 33.43 KG/M2 | DIASTOLIC BLOOD PRESSURE: 59 MMHG | RESPIRATION RATE: 18 BRPM | HEART RATE: 86 BPM | TEMPERATURE: 98.4 F | OXYGEN SATURATION: 94 % | SYSTOLIC BLOOD PRESSURE: 122 MMHG | WEIGHT: 208 LBS | HEIGHT: 66 IN

## 2018-10-22 LAB
GLUCOSE BLDC GLUCOMTR-MCNC: 153 MG/DL (ref 70–130)
GLUCOSE BLDC GLUCOMTR-MCNC: 194 MG/DL (ref 70–130)

## 2018-10-22 PROCEDURE — 99232 SBSQ HOSP IP/OBS MODERATE 35: CPT | Performed by: INTERNAL MEDICINE

## 2018-10-22 PROCEDURE — 99231 SBSQ HOSP IP/OBS SF/LOW 25: CPT | Performed by: NURSE PRACTITIONER

## 2018-10-22 PROCEDURE — 99233 SBSQ HOSP IP/OBS HIGH 50: CPT | Performed by: NURSE PRACTITIONER

## 2018-10-22 PROCEDURE — 63710000001 INSULIN DETEMIR PER 5 UNITS: Performed by: HOSPITALIST

## 2018-10-22 PROCEDURE — 82962 GLUCOSE BLOOD TEST: CPT

## 2018-10-22 RX ORDER — OXYCODONE AND ACETAMINOPHEN 7.5; 325 MG/1; MG/1
1 TABLET ORAL EVERY 8 HOURS PRN
Status: DISCONTINUED | OUTPATIENT
Start: 2018-10-22 | End: 2018-10-22 | Stop reason: HOSPADM

## 2018-10-22 RX ORDER — OXYCODONE AND ACETAMINOPHEN 7.5; 325 MG/1; MG/1
1 TABLET ORAL EVERY 8 HOURS PRN
Qty: 30 TABLET | Refills: 0
Start: 2018-10-22 | End: 2018-11-01

## 2018-10-22 RX ORDER — GUAIFENESIN 600 MG/1
1200 TABLET, EXTENDED RELEASE ORAL EVERY 12 HOURS SCHEDULED
Qty: 60 TABLET | Refills: 1 | Status: SHIPPED | OUTPATIENT
Start: 2018-10-22 | End: 2018-10-25

## 2018-10-22 RX ORDER — BENZONATATE 100 MG/1
100 CAPSULE ORAL 3 TIMES DAILY PRN
Status: DISCONTINUED | OUTPATIENT
Start: 2018-10-22 | End: 2018-10-22 | Stop reason: HOSPADM

## 2018-10-22 RX ORDER — BENZONATATE 100 MG/1
100 CAPSULE ORAL 3 TIMES DAILY PRN
Qty: 90 CAPSULE | Refills: 1
Start: 2018-10-22 | End: 2018-10-25

## 2018-10-22 RX ORDER — AMOXICILLIN AND CLAVULANATE POTASSIUM 875; 125 MG/1; MG/1
1 TABLET, FILM COATED ORAL EVERY 12 HOURS SCHEDULED
Qty: 3 TABLET | Refills: 0 | Status: SHIPPED | OUTPATIENT
Start: 2018-10-22 | End: 2018-10-24

## 2018-10-22 RX ORDER — FINASTERIDE 5 MG/1
5 TABLET, FILM COATED ORAL DAILY
Qty: 30 TABLET | Refills: 0 | Status: SHIPPED | OUTPATIENT
Start: 2018-10-23 | End: 2018-11-29

## 2018-10-22 RX ORDER — TAMSULOSIN HYDROCHLORIDE 0.4 MG/1
0.4 CAPSULE ORAL DAILY
Qty: 30 CAPSULE | Refills: 0 | Status: SHIPPED | OUTPATIENT
Start: 2018-10-23 | End: 2018-11-29

## 2018-10-22 RX ORDER — CLOPIDOGREL BISULFATE 75 MG/1
75 TABLET ORAL DAILY
Qty: 30 TABLET | Refills: 11 | Status: SHIPPED | OUTPATIENT
Start: 2018-10-23 | End: 2018-10-25

## 2018-10-22 RX ORDER — ATORVASTATIN CALCIUM 40 MG/1
40 TABLET, FILM COATED ORAL NIGHTLY
Qty: 30 TABLET | Refills: 11 | Status: SHIPPED | OUTPATIENT
Start: 2018-10-22 | End: 2018-10-25

## 2018-10-22 RX ORDER — FERROUS SULFATE 325(65) MG
325 TABLET ORAL 2 TIMES DAILY WITH MEALS
Qty: 60 TABLET | Refills: 0 | Status: SHIPPED | OUTPATIENT
Start: 2018-10-22 | End: 2018-11-21

## 2018-10-22 RX ORDER — TORSEMIDE 5 MG/1
5 TABLET ORAL DAILY PRN
Qty: 30 TABLET | Refills: 11 | Status: SHIPPED | OUTPATIENT
Start: 2018-10-22 | End: 2018-10-25

## 2018-10-22 RX ADMIN — CARVEDILOL 25 MG: 12.5 TABLET, FILM COATED ORAL at 08:17

## 2018-10-22 RX ADMIN — INSULIN LISPRO 2 UNITS: 100 INJECTION, SOLUTION INTRAVENOUS; SUBCUTANEOUS at 12:22

## 2018-10-22 RX ADMIN — Medication 3 ML: at 04:47

## 2018-10-22 RX ADMIN — TORSEMIDE 5 MG: 10 TABLET ORAL at 08:16

## 2018-10-22 RX ADMIN — FINASTERIDE 5 MG: 5 TABLET, FILM COATED ORAL at 08:16

## 2018-10-22 RX ADMIN — SACUBITRIL AND VALSARTAN 1 TABLET: 24; 26 TABLET, FILM COATED ORAL at 08:16

## 2018-10-22 RX ADMIN — OXYCODONE HYDROCHLORIDE AND ACETAMINOPHEN 1 TABLET: 7.5; 325 TABLET ORAL at 08:17

## 2018-10-22 RX ADMIN — INSULIN DETEMIR 30 UNITS: 100 INJECTION, SOLUTION SUBCUTANEOUS at 08:19

## 2018-10-22 RX ADMIN — INSULIN LISPRO 2 UNITS: 100 INJECTION, SOLUTION INTRAVENOUS; SUBCUTANEOUS at 08:18

## 2018-10-22 RX ADMIN — TAMSULOSIN HYDROCHLORIDE 0.4 MG: 0.4 CAPSULE ORAL at 08:17

## 2018-10-22 RX ADMIN — ZOLPIDEM TARTRATE 5 MG: 5 TABLET ORAL at 01:29

## 2018-10-22 RX ADMIN — AMOXICILLIN AND CLAVULANATE POTASSIUM 1 TABLET: 875; 125 TABLET, FILM COATED ORAL at 08:17

## 2018-10-22 RX ADMIN — PANTOPRAZOLE SODIUM 40 MG: 40 TABLET, DELAYED RELEASE ORAL at 08:16

## 2018-10-22 RX ADMIN — CLOPIDOGREL BISULFATE 75 MG: 75 TABLET ORAL at 08:16

## 2018-10-22 RX ADMIN — POLYETHYLENE GLYCOL 3350 17 G: 17 POWDER, FOR SOLUTION ORAL at 08:16

## 2018-10-22 RX ADMIN — GUAIFENESIN 1200 MG: 600 TABLET, EXTENDED RELEASE ORAL at 08:17

## 2018-10-22 RX ADMIN — Medication 325 MG: at 08:17

## 2018-10-22 RX ADMIN — SENNOSIDES AND DOCUSATE SODIUM 2 TABLET: 8.6; 5 TABLET ORAL at 08:17

## 2018-10-22 NOTE — PROGRESS NOTES
INPATIENT PULMONARY SERVICE  PROGRESS NOTE     Hospital LOS: 11 days    Mr. Kar Mora, is followed for a Chief Complaint of: Cough      Coronary artery disease involving native coronary artery of native heart with angina pectoris (CMS/[facility])    Type 2 diabetes mellitus, with long-term current use of insulin (CMS/[facility])    Hyperlipidemia LDL goal <70    Essential hypertension    Class 1 obesity due to excess calories in adult    Ischemic cardiomyopathy      Subjective   S   68-year-old white male remote smoker (none for 37 years) with a history of hypertension and hypertensive cardiovascular disease, hyperlipidemia, diabetes mellitus, obesity, chronic kidney disease (baseline creatinine preop was 1.63), allergic rhinitis (previously on desensitization injections), and gout.  Underwent elective CABG ×4 on 10/11/18 CAD with LVEF 40%. Did well except for a bump in his creatinine which recovered as well as complaints of intractable cough.     On 10/14/18 the patient developed a persistent intense nonproductive cough.There has never been significant sputum reduction or hemoptysis, and he has had no fever or chills. In addition white count remains normal. Chest x-ray however did reveal some opacity in the left base (atelectasis versus infiltrate). He denies dyspnea or wheezing as well as dysphagia, postnasal drip or significant reflux  (although he does have a history of allergies and GERD).      Remains on guaifenesin, dextromethorphan, Tessalon, and codeine derivatives to suppress his cough. In addition has been receiving prn neb treatments with Xylocaine which he states are quite effective. Cough persists  but is definitely improved. Has been diuresed and is approximately 4.8 L negative since admission. Now on daily Bumex and BUN and creatinine are improved. In addition blood sugars are much improved.      Interval History:  No significant coughing in several days. He is worried about seasonal allergies.         The  patient's relevant past medical, surgical and social history were reviewed and updated in Epic as appropriate.      ROS:   Constitutional: Negative for fever.   Respiratory: Negative for dyspnea.   Cardiovascular: Negative for chest pain.   Gastrointestinal: Negative for  nausea, vomiting and diarrhea.     Objective   O     Vitals  Temp  Min: 98.4 °F (36.9 °C)  Max: 98.8 °F (37.1 °C)  BP  Min: 105/67  Max: 154/74  Pulse  Min: 76  Max: 93  Resp  Min: 18  Max: 18  SpO2  Min: 93 %  Max: 95 % No Data Recorded    I/O 24 HR (7:00 AM-6:59AM):  Intake/Output       10/21/18 0700 - 10/22/18 0659    Intake (ml) 480    Output (ml) --    Net (ml) 480          Medications (Drips):       Physical Examination    Telemetry: Normal sinus rhythm.    Constitutional:  No acute distress.  Conversant.   Cardiovascular: Regular rate and rhythm.  No murmurs, rub or gallop.   Respiratory: Normal symmetric chest expansion.  Normal respiratory effort.  Clear to ascultation.   Abdominal:  Soft. No masses.   Non-tender. No distension.   No hepatosplenomegaly.   Extremities: No digital cyanosis or clubbing.  No peripheral edema.   Neurological:   Alert and Oriented to person, place, and time.   Moves all extremities.              Results from last 7 days  Lab Units 10/19/18  0353 10/18/18  0530 10/16/18  0517   WBC 10*3/mm3 9.30 10.58 6.71   HEMOGLOBIN g/dL 10.6* 10.4* 10.1*  10.0*   MCV fL 88.4 87.6 87.2   PLATELETS 10*3/mm3 390 385 288       Results from last 7 days  Lab Units 10/19/18  0353 10/18/18  0530 10/16/18  0517   SODIUM mmol/L 135 133 135   POTASSIUM mmol/L 4.3 4.7 4.5   CO2 mmol/L 21.0 22.0 21.0   CREATININE mg/dL 1.27 1.50* 1.40*     Serum creatinine: 1.27 mg/dL 10/19/18 0353  Estimated creatinine clearance: 60.7 mL/min              Images:     Imaging Results (last 24 hours)     ** No results found for the last 24 hours. **          I reviewed the patient's new clinical results.  I reviewed the patient's new imaging results and  agree with the interpretation.    Assessment/Plan   A / P     Mr. Mora is a 67yo M who underwent CABG on 10/11/18. He developed a persistent cough several days after surgery. His cough has now resolved with anti-tussive medications, treatment with Augmentin and PRN Xylocaine nebs. He has not needed any PRN medications for cough.     Diet Regular; Consistent Carbohydrate, Cardiac  Code Status and Medical Interventions:   Ordered at: 10/11/18 1052     Level Of Support Discussed With:    Patient     Code Status:    CPR     Medical Interventions (Level of Support Prior to Arrest):    Full       Active Hospital Problems    Diagnosis   • **Coronary artery disease involving native coronary artery of native heart with angina pectoris (CMS/[facility])     · Cardiac catheterization (07/20/2018): Multivessel CAD  · CABG by Dr Mark Anthony Burton (10/11/2018): LIMA to LAD, SVG to diagonal/circumflex, SVG to RPDA      • Ischemic cardiomyopathy     · Echo (10/14/2018): LVEF 30%. Left ventricular wall segments are hypokinetic: mid anterolateral. The following left ventricular wall segments are dyskinetic: apical anterior. Mild MR     • Type 2 diabetes mellitus, with long-term current use of insulin (CMS/[facility])   • Essential hypertension   • Class 1 obesity due to excess calories in adult   • Hyperlipidemia LDL goal <70       Assessment / Plan:  1. Complete course of Augmentin.   2. I have advised him that he may use Claritin, Zyrtec or Allegra for seasonal allergies.   3. [facility] with [facility] in 2-4 weeks after discharge for cough.   4. Okay for discharge from a Pulmonary perspective.     I discussed the patient's findings and my recommendations with patient and family    Time:  I spent 25 minutes, face to face, with the patient and family or on the bro coordinating care with other health care providers.   I spent > 50% percent of this time, counseling and discussing current status and management .     Agnes Toth,  DO  Pulmonary and Critical Care Medicine

## 2018-10-22 NOTE — PROGRESS NOTES
Case Management Discharge Note    Final Note: Patient will be discharging home today with family.  Family will transport at discharge.  Kayla Maria will see patient at bedside to fit for Lifevest before discharge.  Patient has walker that has been delivered per [facility] at bedside.  Entresto has been ordered meds to beds.  Cost for patient will be $50 dollars for a month supply.  Patient will have no issues covering the cost of his medications.  Patient has no further needs at this time.      Destination     No service has been selected for the patient.      Durable Medical Equipment - Selection Complete     Service Request Status Selected Specialties Address Phone Number Fax Number    [facility] [place] Selected DME Services [address] [place] [id number] [phone]      Dialysis/Infusion     No service has been selected for the patient.      Home Medical Care     No service has been selected for the patient.      Social Care     No service has been selected for the patient.             Final Discharge Disposition Code: 01 - home or self-care

## 2018-10-22 NOTE — PAYOR COMM NOTE
"Updated clinicals for continued hospitalization  Currently last date covered was 10/18  Patient is being discharged to home today, 10/22/18    auth # [id number]    Thank You,  Merle Orr RN  Utilization Review  [phone]  Fax [phone]    Brain Mora (68 y.o. Male)     Date of Birth Social Security Number Address Home Phone MRN    1950  [address] [facility] [phone] [id number]    [employer] Marital Status          None        Admission Date Admission Type Admitting Provider Attending Provider Department, Room/Bed    10/11/18 Elective Migue Burton MD Rogers, Anthony G, MD [address], S[address]/1    Discharge Date Discharge Disposition Discharge Destination         Home or Self Care              Attending Provider:  Migue Burton MD    Allergies:  Asa [Aspirin]    Isolation:  None   Infection:  None   Code Status:  CPR    Ht:  167.6 cm (66\")   Wt:  94.3 kg (208 lb)    Admission Cmt:  None   Principal Problem:  Coronary artery disease involving native coronary artery of native heart with angina pectoris (CMS/[facility]) [I25.119] More...                 Active Insurance as of 10/11/2018     Primary Coverage     Payor Plan Insurance Group Employer/Plan Group    MISC COMMERCIAL MISC COMMERCIAL INS      Coverage Address Coverage Phone Number Effective From Effective To    [address] [phone] 7/1/2007     [facility] [address]       Subscriber Name Subscriber Birth Date Member ID       BRAIN MORA 1950 [id number]                 Emergency Contacts      (Rel.) Home Phone Work Phone Mobile Phone    Laura Mora (Spouse) [phone] -- [phone]    Lucretia Galvan (Sister) [phone] -- --                  ICU Vital Signs     Row Name 10/22/18 1202 10/22/18 1002 10/22/18 0816 10/22/18 0730 10/22/18 0727       Vitals    Temp  --  --  -- 98.4 °F (36.9 °C)  --    Temp src  --  --  -- Oral  --    Pulse  -- " 86 93 85 89    Heart Rate Source  --  -- Monitor Monitor  --    Resp  --  -- 18 18  --    Resp Rate Source  --  -- Visual Visual  --    BP  --  -- 122/59 105/67  --    Noninvasive MAP (mmHg)  --  -- 77 83  --    BP Location  --  -- Right arm Right arm  --    BP Method  --  -- Automatic Automatic  --    Patient Position  --  -- Sitting Sitting  --       Oxygen Therapy    SpO2  --  -- 94 %  --  --    Device (Oxygen Therapy) room air room air room air  -- room air    Row Name 10/22/18 0705 10/22/18 0600 10/22/18 0400 10/22/18 0200 10/22/18 0000       Height and Weight    Weight 94.3 kg (208 lb)  --  --  --  --       Oxygen Therapy    Device (Oxygen Therapy)  -- room air room air room air room air    Row Name 10/21/18 2212 10/21/18 2042 10/21/18 2000 10/21/18 1802 10/21/18 1602       Vitals    Temp  -- 98.8 °F (37.1 °C)  --  --  --    Temp src  -- Oral  --  --  --    Pulse 86  --  -- 86 81    Heart Rate Source  -- Monitor  --  --  --    Resp  -- 18  --  --  --    Resp Rate Source  -- Visual  --  --  --    /71 154/74  --  --  --    BP Location  -- Right arm  --  --  --    BP Method  -- Automatic  --  --  --    Patient Position  -- Lying  --  --  --       Oxygen Therapy    SpO2  --  --  --  -- 95 %    Device (Oxygen Therapy) room air  -- room air room air room air    [employer] Name 10/21/18 1545 10/21/18 1402 10/21/18 1202 10/21/18 1002 10/21/18 0819       Vitals    Temp 98.5 °F (36.9 °C)  --  --  --  --    Temp src Oral  --  --  --  --    Pulse 76 83 84 90 88    Heart Rate Source Monitor  --  --  -- Monitor    Resp  --  --  --  -- 17    Resp Rate Source  --  --  --  -- Visual    /65  --  --  -- 117/61    Noninvasive MAP (mmHg) 79  --  --  --  --    BP Location Right arm  --  --  -- Right arm    BP Method Automatic  --  --  -- Automatic    Patient Position Lying  --  --  -- Sitting       Oxygen Therapy    SpO2 93 %  --  --  -- 95 %    Pulse Oximetry Type Continuous  --  --  --  --    Device (Oxygen Therapy)  -- room  air room air room air room air    Row Name 10/21/18 0818 10/21/18 0720 10/21/18 0710 10/21/18 0700 10/21/18 0500       Height and Weight    Weight  --  --  --  -- 94.3 kg (208 lb)       Vitals    Temp  --  -- 98.1 °F (36.7 °C)  --  --    Temp src  --  -- Oral  --  --    Pulse 88 78 83 83  --    Heart Rate Source  --  -- Monitor  --  --    /61  -- 117/61  --  --    Noninvasive MAP (mmHg)  --  -- 79  --  --    BP Location  --  -- Right arm  --  --    BP Method  --  -- Automatic  --  --    Patient Position  --  -- Lying  --  --       Oxygen Therapy    SpO2  --  -- 94 % 93 %  --    Pulse Oximetry Type  --  -- Continuous  --  --    Device (Oxygen Therapy)  -- room air  --  --  --    Row Name 10/21/18 0453 10/21/18 0200 10/21/18 0000 10/20/18 2148 10/20/18 2145       Vitals    Pulse  --  --  -- 89  --    BP  --  --  -- 114/60  --       Oxygen Therapy    Device (Oxygen Therapy) room air room air room air  -- room air    Row Name 10/20/18 1945 10/20/18 1800 10/20/18 1600 10/20/18 1200 10/20/18 1100       Vitals    Pulse  --  --  --  -- 84       Oxygen Therapy    SpO2  --  --  --  -- 96 %    Device (Oxygen Therapy) room air room air room air room air room air    Row Name 10/20/18 1000 10/20/18 0900 10/20/18 0800 10/20/18 0600 10/20/18 0500       Height and Weight    Weight  --  --  --  -- 94.6 kg (208 lb 9.6 oz)       Vitals    Pulse  -- 92  --  --  --       Oxygen Therapy    SpO2  -- 93 %  --  --  --    Device (Oxygen Therapy) room air room air room air nasal cannula  --    Flow (L/min)  --  --  -- 1  --    Row Name 10/20/18 0400 10/20/18 0200 10/20/18 0000 10/19/18 2200 10/19/18 2053       Vitals    Pulse  --  --  --  -- 85       Oxygen Therapy    Device (Oxygen Therapy) nasal cannula nasal cannula nasal cannula nasal cannula  --    Flow (L/min) 1 1 1 1  --    Row Name 10/19/18 2050 10/19/18 2021 10/19/18 1900 10/19/18 1820 10/19/18 1635       Vitals    Temp  -- 98 °F (36.7 °C)  --  --  --    Temp src  -- Oral  --   --  --    Pulse  -- 90  --  --  --    Heart Rate Source  -- Monitor  --  --  --    Resp  -- 18  --  --  --    Resp Rate Source  -- Visual  --  --  --    BP  -- 129/63  --  --  --    Noninvasive MAP (mmHg)  -- 92  --  --  --    BP Location  -- Right arm  --  --  --    BP Method  -- Automatic  --  --  --    Patient Position  -- Standing  --  --  --       Oxygen Therapy    SpO2  --  --  --  -- 95 %    Pulse Oximetry Type  --  --  --  -- Intermittent    Device (Oxygen Therapy) nasal cannula   pt request   -- room air room air room air    Flow (L/min) 1  --  --  --  --    Row Name 10/19/18 1630                   Vitals    Temp 98.2 °F (36.8 °C)        Temp src Oral        Pulse 89        Heart Rate Source Monitor        Resp 16        Resp Rate Source Visual        /82        Noninvasive MAP (mmHg) 93            Intake & Output (last 3 days)       10/19 0701 - 10/20 0700 10/20 0701 - 10/21 0700 10/21 0701 - 10/22 0700 10/22 0701 - 10/23 0700    P.O. 600  480 360    Total Intake(mL/kg) 600 (6.3)  480 (5.1) 360 (3.8)    Net +600   +480 +360            Unmeasured Urine Occurrence   3 x 1 x    Unmeasured Stool Occurrence   1 x         Lines, Drains & Airways    Active LDAs     None                Clinic-Administered Medications       Dose Frequency Start End    Chlorhexidine Gluconate Cloth 2 % pads 1 application 1 application Every 12 Hours PRN 10/10/2018     Sig - Route: Apply 1 application topically to the appropriate area as directed Every 12 (Twelve) Hours As Needed (12 hours night before surgery and repeat in AM prior to surgery.). - Topical      Hospital Medications (active)       Dose Frequency Start End    acetaminophen (TYLENOL) tablet 650 mg 650 mg Every 4 Hours PRN 10/11/2018     Sig - Route: Take 2 tablets by mouth Every 4 (Four) Hours As Needed for Mild Pain . - Oral    ALPRAZolam (XANAX) tablet 0.25 mg 0.25 mg 2 Times Daily PRN 10/15/2018 10/25/2018    Sig - Route: Take 1 tablet by mouth 2 (Two) Times a  Day As Needed for Anxiety. - Oral    Cosign for Ordering: Accepted by Migue Burton MD on 10/16/2018  7:08 AM    amoxicillin-clavulanate (AUGMENTIN) 875-125 MG per tablet 1 tablet 1 tablet Every 12 Hours Scheduled 10/17/2018 10/24/2018    Sig - Route: Take 1 tablet by mouth Every 12 (Twelve) Hours. - Oral    atorvastatin (LIPITOR) tablet 40 mg 40 mg Nightly 10/11/2018     Sig - Route: Take 1 tablet by mouth Every Night. - Oral    benzonatate (TESSALON) capsule 100 mg 100 mg 3 Times Daily PRN 10/22/2018     Sig - Route: Take 1 capsule by mouth 3 (Three) Times a Day As Needed for Cough. - Oral    bisacodyl (DULCOLAX) EC tablet 10 mg 10 mg Daily PRN 10/11/2018     Sig - Route: Take 2 tablets by mouth Daily As Needed for Constipation. - Oral    calcium carbonate (TUMS) chewable tablet 500 mg (200 mg elemental) 2 tablet 3 Times Daily PRN 10/13/2018     Sig - Route: Chew 1,000 mg 3 (Three) Times a Day As Needed for Indigestion or Heartburn. - Oral    carvedilol (COREG) tablet 25 mg 25 mg 2 Times Daily 10/13/2018     Sig - Route: Take 2 tablets by mouth 2 (Two) Times a Day. - Oral    clopidogrel (PLAVIX) tablet 75 mg 75 mg Daily 10/15/2018     Sig - Route: Take 1 tablet by mouth Daily. - Oral    dextrose (D50W) 25 g/ 50mL Intravenous Solution 25 g 25 g Every 15 Minutes PRN 10/12/2018     Sig - Route: Infuse 50 mL into a venous catheter Every 15 (Fifteen) Minutes As Needed for Low Blood Sugar (Blood Sugar Less Than 70). - Intravenous    dextrose (GLUTOSE) oral gel 15 g 15 g Every 15 Minutes PRN 10/12/2018     Sig - Route: Take 15 g by mouth Every 15 (Fifteen) Minutes As Needed for Low Blood Sugar (Blood sugar less than 70). - Oral    docusate sodium (COLACE) capsule 100 mg 100 mg 2 Times Daily PRN 10/11/2018     Sig - Route: Take 1 capsule by mouth 2 (Two) Times a Day As Needed for Constipation. - Oral    ferrous sulfate tablet 325 mg 325 mg 2 Times Daily With Meals 10/15/2018     Sig - Route: Take 1 tablet by mouth 2  (Two) Times a Day With Meals. - Oral    finasteride (PROSCAR) tablet 5 mg 5 mg Daily 10/14/2018     Sig - Route: Take 1 tablet by mouth Daily. - Oral    glucagon (human recombinant) (GLUCAGEN DIAGNOSTIC) injection 1 mg 1 mg As Needed 10/12/2018     Sig - Route: Inject 1 mg under the skin into the appropriate area as directed As Needed (Blood Glucose Less Than 70). - Subcutaneous    guaiFENesin (MUCINEX) 12 hr tablet 1,200 mg 1,200 mg Every 12 Hours Scheduled 10/17/2018     Sig - Route: Take 2 tablets by mouth Every 12 (Twelve) Hours. - Oral    HYDROcod Polst-CPM Polst ER (TUSSIONEX PENNKINETIC) 10-8 MG/5ML ER suspension 5 mL 5 mL Every 8 Hours PRN 10/22/2018 11/1/2018    Sig - Route: Take 5 mL by mouth Every 8 (Eight) Hours As Needed for Cough. - Oral    insulin detemir (LEVEMIR) injection 30 Units 30 Units 2 Times Daily 10/18/2018     Sig - Route: Inject 30 Units under the skin into the appropriate area as directed 2 (Two) Times a Day. - Subcutaneous    insulin lispro (humaLOG) injection 0-9 Units 0-9 Units 4 Times Daily With Meals & Nightly 10/12/2018     Sig - Route: Inject 0-9 Units under the skin into the appropriate area as directed 4 (Four) Times a Day With Meals & at Bedtime. - Subcutaneous    insulin lispro (humaLOG) injection 20 Units 20 Units 3 Times Daily With Meals 10/21/2018     Sig - Route: Inject 20 Units under the skin into the appropriate area as directed 3 (Three) Times a Day With Meals. - Subcutaneous    melatonin sublingual tablet 5 mg 5 mg Nightly PRN 10/15/2018     Sig - Route: Place 1 tablet under the tongue At Night As Needed for Sleep. - Sublingual    Morphine sulfate (PF) injection 2 mg 2 mg Every 2 Hours PRN 10/12/2018     Sig - Route: Infuse 1 mL into a venous catheter Every 2 (Two) Hours As Needed for Severe Pain . - Intravenous    ondansetron (ZOFRAN) injection 4 mg 4 mg Every 6 Hours PRN 10/11/2018     Sig - Route: Infuse 2 mL into a venous catheter Every 6 (Six) Hours As Needed for  Nausea or Vomiting. - Intravenous    oxyCODONE-acetaminophen (PERCOCET)  MG per tablet 1 tablet 1 tablet Every 4 Hours PRN 10/16/2018 10/26/2018    Sig - Route: Take 1 tablet by mouth Every 4 (Four) Hours As Needed for Moderate Pain  or Severe Pain . - Oral    oxyCODONE-acetaminophen (PERCOCET) 7.5-325 MG per tablet 1 tablet 1 tablet Every 8 Hours PRN 10/22/2018 11/1/2018    Sig - Route: Take 1 tablet by mouth Every 8 (Eight) Hours As Needed for Moderate Pain . - Oral    pantoprazole (PROTONIX) EC tablet 40 mg 40 mg Every Early Morning 10/16/2018     Sig - Route: Take 1 tablet by mouth Every Morning. - Oral    Pharmacy Consult - MT  Daily 10/11/2018     Sig - Route: Daily. - Does not apply    phenol (CHLORASEPTIC) 1.4 % liquid 2 spray 2 spray Every 2 Hours PRN 10/12/2018     Sig - Route: Apply 2 sprays to the mouth or throat Every 2 (Two) Hours As Needed for Sore Throat. - Mouth/Throat    polyethylene glycol 3350 powder (packet) 17 g Daily 10/18/2018     Sig - Route: Take 17 g by mouth Daily. - Oral    sacubitril-valsartan (ENTRESTO) 24-26 MG tablet 1 tablet 1 tablet Every 12 Hours Scheduled 10/15/2018     Sig - Route: Take 1 tablet by mouth Every 12 (Twelve) Hours. - Oral    sennosides-docusate sodium (SENOKOT-S) 8.6-50 MG tablet 2 tablet 2 tablet 2 Times Daily 10/11/2018     Sig - Route: Take 2 tablets by mouth 2 (Two) Times a Day. - Oral    sodium chloride 0.9 % flush 3 mL 3 mL Every 12 Hours Scheduled 10/14/2018     Sig - Route: Infuse 3 mL into a venous catheter Every 12 (Twelve) Hours. - Intravenous    sodium chloride 0.9 % flush 3-10 mL 3-10 mL Every 8 Hours 10/14/2018     Sig - Route: Infuse 3-10 mL into a venous catheter Every 8 (Eight) Hours. - Intravenous    tamsulosin (FLOMAX) 24 hr capsule 0.4 mg 0.4 mg Daily 10/14/2018     Sig - Route: Take 1 capsule by mouth Daily. - Oral    torsemide (DEMADEX) tablet 5 mg 5 mg Daily 10/17/2018     Sig - Route: Take 0.5 tablets by mouth Daily. - Oral     zolpidem (AMBIEN) tablet 5 mg 5 mg Nightly PRN 10/16/2018 10/26/2018    Sig - Route: Take 1 tablet by mouth At Night As Needed for Sleep. - Oral          Blood Administration Record     None        Lab Results (last 72 hours)     Procedure Component Value Units Date/Time    POC Glucose Once [[id number]]  (Abnormal) Collected:  10/22/18 1124    Specimen:  Blood Updated:  10/22/18 1153     Glucose 194 (H) mg/dL     Narrative:       Meter: [id number] : [id number] Bhavin Bustos    POC Glucose Once [[id number]]  (Abnormal) Collected:  10/22/18 0733    Specimen:  Blood Updated:  10/22/18 0743     Glucose 153 (H) mg/dL     Narrative:       Meter: [id number] : [id number] Bhavingricelda Bustos    POC Glucose Once [[id number]]  (Abnormal) Collected:  10/21/18 2053    Specimen:  Blood Updated:  10/21/18 2053     Glucose 220 (H) mg/dL     Narrative:       Meter: [id number] : [id number] Karan Harkins    POC Glucose Once [[id number]]  (Abnormal) Collected:  10/21/18 1621    Specimen:  Blood Updated:  10/21/18 1622     Glucose 140 (H) mg/dL     Narrative:       Meter: [id number] : [id number] Bassem Spring    POC Glucose Once [[id number]]  (Abnormal) Collected:  10/21/18 1152    Specimen:  Blood Updated:  10/21/18 1156     Glucose 245 (H) mg/dL     Narrative:       Meter: [id number] : [id number] Bassem Spring    POC Glucose Once [[id number]]  (Abnormal) Collected:  10/21/18 0707    Specimen:  Blood Updated:  10/21/18 0708     Glucose 209 (H) mg/dL     Narrative:       Meter: [id number] : [id number] Bassem Spring    POC Glucose Once [[id number]]  (Abnormal) Collected:  10/20/18 2019    Specimen:  Blood Updated:  10/20/18 2020     Glucose 162 (H) mg/dL     Narrative:       Meter: [id number] : [id number] Dileep Luciano    POC Glucose Once [[id number]]  (Normal) Collected:  10/20/18 1630    Specimen:  Blood Updated:  10/20/18 1632     Glucose 103 mg/dL     Narrative:       Meter: [id number] : [id number]  Tamika Shama    POC Glucose Once [[id number]]  (Abnormal) Collected:  10/20/18 1131    Specimen:  Blood Updated:  10/20/18 1133     Glucose 271 (H) mg/dL     Narrative:       Meter: [id number] : [id number] Tamika Sesay    POC Glucose Once [[id number]]  (Abnormal) Collected:  10/20/18 0737    Specimen:  Blood Updated:  10/20/18 0740     Glucose 147 (H) mg/dL     Narrative:       Meter: [id number] : [id number] Carleen Lorenzana    POC Glucose Once [[id number]]  (Abnormal) Collected:  10/19/18 2226    Specimen:  Blood Updated:  10/19/18 2238     Glucose 132 (H) mg/dL     Narrative:       Meter: [id number] : [id number] Dileep Luciano    POC Glucose Once [[id number]]  (Abnormal) Collected:  10/19/18 2025    Specimen:  Blood Updated:  10/19/18 2041     Glucose 174 (H) mg/dL     Narrative:       Meter: [id number] : [id number] Dileep Luciano    POC Glucose Once [[id number]]  (Abnormal) Collected:  10/19/18 1634    Specimen:  Blood Updated:  10/19/18 1636     Glucose 171 (H) mg/dL     Narrative:       Meter: [id number] : [id number] Savi Marla          Imaging Results (last 72 hours)     Procedure Component Value Units Date/Time    XR Chest 2 View [[id number]] Collected:  10/20/18 1128     Updated:  10/20/18 1349    Narrative:       EXAMINATION: XR CHEST 2 VW - 10/20/2018     INDICATION: Z74.09-Other reduced mobility; I25.118-Atherosclerotic heart  disease of native coronary artery with other forms of angina pectoris;  I25.119-Atherosclerotic heart disease of native coronary artery with  unspecified angina pectoris; N17.9-Acute kidney failure, unspecified;  I25.9-Chronic ischemic heart disease, unspecified; I11.9-Hypertensive  heart . . .      TECHNIQUE: Two view chest.     COMPARISONS: 10/18/2018.     FINDINGS: Left basilar opacity probably represents small volume effusion  and adjacent atelectasis. No pneumothorax. Sternotomy wires. Unchanged  cardiomediastinal silhouette.       Impression:        No significant interval change.     DICTATED:   10/20/2018  EDITED/ls :   10/20/2018      This report was finalized on 10/20/2018 1:47 PM by Jay Beasley.             ECG/EMG Results (last 72 hours)     ** No results found for the last 72 hours. **           Physician Progress Notes (last 72 hours) (Notes from 10/19/2018  2:12 PM through 10/22/2018  2:12 PM)      Case, Agnes CARLISLE, DO at 10/22/2018 11:43 AM          INPATIENT PULMONARY SERVICE  PROGRESS NOTE     Hospital LOS: 11 days    Mr. Kar Mora, is followed for a Chief Complaint of: Cough      Coronary artery disease involving native coronary artery of native heart with angina pectoris (CMS/[facility])    Type 2 diabetes mellitus, with long-term current use of insulin (CMS/[facility])    Hyperlipidemia LDL goal <70    Essential hypertension    Class 1 obesity due to excess calories in adult    Ischemic cardiomyopathy      Subjective   S   68-year-old white male remote smoker (none for 37 years) with a history of hypertension and hypertensive cardiovascular disease, hyperlipidemia, diabetes mellitus, obesity, chronic kidney disease (baseline creatinine preop was 1.63), allergic rhinitis (previously on desensitization injections), and gout.  Underwent elective CABG ×4 on 10/11/18 CAD with LVEF 40%. Did well except for a bump in his creatinine which recovered as well as complaints of intractable cough.     On 10/14/18 the patient developed a persistent intense nonproductive cough.There has never been significant sputum reduction or hemoptysis, and he has had no fever or chills. In addition white count remains normal. Chest x-ray however did reveal some opacity in the left base (atelectasis versus infiltrate). He denies dyspnea or wheezing as well as dysphagia, postnasal drip or significant reflux  (although he does have a history of allergies and GERD).      Remains on guaifenesin, dextromethorphan, Tessalon, and codeine derivatives to suppress his cough. In addition has  been receiving prn neb treatments with Xylocaine which he states are quite effective. Cough persists  but is definitely improved. Has been diuresed and is approximately 4.8 L negative since admission. Now on daily Bumex and BUN and creatinine are improved. In addition blood sugars are much improved.      Interval History:  No significant coughing in several days. He is worried about seasonal allergies.         The patient's relevant past medical, surgical and social history were reviewed and updated in Epic as appropriate.      ROS:   Constitutional: Negative for fever.   Respiratory: Negative for dyspnea.   Cardiovascular: Negative for chest pain.   Gastrointestinal: Negative for  nausea, vomiting and diarrhea.     Objective   O     Vitals  Temp  Min: 98.4 °F (36.9 °C)  Max: 98.8 °F (37.1 °C)  BP  Min: 105/67  Max: 154/74  Pulse  Min: 76  Max: 93  Resp  Min: 18  Max: 18  SpO2  Min: 93 %  Max: 95 % No Data Recorded    I/O 24 HR (7:00 AM-6:59AM):  Intake/Output       10/21/18 0700 - 10/22/18 0659    Intake (ml) 480    Output (ml) --    Net (ml) 480          Medications (Drips):       Physical Examination    Telemetry: Normal sinus rhythm.    Constitutional:  No acute distress.  Conversant.   Cardiovascular: Regular rate and rhythm.  No murmurs, rub or gallop.   Respiratory: Normal symmetric chest expansion.  Normal respiratory effort.  Clear to ascultation.   Abdominal:  Soft. No masses.   Non-tender. No distension.   No hepatosplenomegaly.   Extremities: No digital cyanosis or clubbing.  No peripheral edema.   Neurological:   Alert and Oriented to person, place, and time.   Moves all extremities.              Results from last 7 days  Lab Units 10/19/18  0353 10/18/18  0530 10/16/18  0517   WBC 10*3/mm3 9.30 10.58 6.71   HEMOGLOBIN g/dL 10.6* 10.4* 10.1*  10.0*   MCV fL 88.4 87.6 87.2   PLATELETS 10*3/mm3 390 385 288       Results from last 7 days  Lab Units 10/19/18  0353 10/18/18  0530 10/16/18  0517   SODIUM  mmol/L 135 133 135   POTASSIUM mmol/L 4.3 4.7 4.5   CO2 mmol/L 21.0 22.0 21.0   CREATININE mg/dL 1.27 1.50* 1.40*     Serum creatinine: 1.27 mg/dL 10/19/18 0353  Estimated creatinine clearance: 60.7 mL/min              Images:     Imaging Results (last 24 hours)     ** No results found for the last 24 hours. **          I reviewed the patient's new clinical results.  I reviewed the patient's new imaging results and agree with the interpretation.    Assessment/Plan   A / P     Mr. Mora is a 67yo M who underwent CABG on 10/11/18. He developed a persistent cough several days after surgery. His cough has now resolved with anti-tussive medications, treatment with Augmentin and PRN Xylocaine nebs. He has not needed any PRN medications for cough.     Diet Regular; Consistent Carbohydrate, Cardiac  Code Status and Medical Interventions:   Ordered at: 10/11/18 1052     Level Of Support Discussed With:    Patient     Code Status:    CPR     Medical Interventions (Level of Support Prior to Arrest):    Full       Active Hospital Problems    Diagnosis   • **Coronary artery disease involving native coronary artery of native heart with angina pectoris (CMS/[facility])     · Cardiac catheterization (07/20/2018): Multivessel CAD  · CABG by Dr Mark Anthony Burton (10/11/2018): LIMA to LAD, SVG to diagonal/circumflex, SVG to RPDA      • Ischemic cardiomyopathy     · Echo (10/14/2018): LVEF 30%. Left ventricular wall segments are hypokinetic: mid anterolateral. The following left ventricular wall segments are dyskinetic: apical anterior. Mild MR     • Type 2 diabetes mellitus, with long-term current use of insulin (CMS/[facility])   • Essential hypertension   • Class 1 obesity due to excess calories in adult   • Hyperlipidemia LDL goal <70       Assessment / Plan:  1. Complete course of Augmentin.   2. I have advised him that he may use Claritin, Zyrtec or Allegra for seasonal allergies.   3. [facility] in 2-4 weeks after  "discharge for cough.   4. Okay for discharge from a Pulmonary perspective.     I discussed the patient's findings and my recommendations with patient and family    Time:  I spent 25 minutes, face to face, with the patient and family or on the bro coordinating care with other health care providers.   I spent > 50% percent of this time, counseling and discussing current status and management .     Agnes Toth DO  Pulmonary and Critical Care Medicine          Electronically signed by Agnes Toth DO at 10/22/2018 11:45 AM     Migue Burton MD at 10/22/2018  6:57 AM          Kar Mora  [id number]  1950     LOS: 11 days   Patient Care Team:  Bro Kan MD as PCP - General  MontoyaHiren MD as Consulting Physician (Endocrinology)  Todd Qureshi MD as Cardiologist (Cardiology)    Chief Complaint: Coronary artery disease      Subjective: Denies any chest pain or discomfort or external drainage    Objective:     Vital Sign Min/Max for last 24 hours  Temp  Min: 98.1 °F (36.7 °C)  Max: 98.8 °F (37.1 °C)   BP  Min: 115/65  Max: 154/74   Pulse  Min: 76  Max: 90   Resp  Min: 17  Max: 18   SpO2  Min: 93 %  Max: 95 %   No Data Recorded   No Data Recorded     Flowsheet Rows      First Filed Value   Admission Height  167.6 cm (66\") Documented at 10/11/2018 0604   Admission Weight  96.2 kg (212 lb) Documented at 10/11/2018 0604          Physical Exam:    Wound: Satisfactory.    Pulses:     Mediastinal and Chest Tube Drainage:       Results Review:     Results from last 7 days  Lab Units 10/19/18  0353   WBC 10*3/mm3 9.30   HEMOGLOBIN g/dL 10.6*   HEMATOCRIT % 32.8*   PLATELETS 10*3/mm3 390       Results from last 7 days  Lab Units 10/19/18  0353   SODIUM mmol/L 135   POTASSIUM mmol/L 4.3   CHLORIDE mmol/L 105   CO2 mmol/L 21.0   BUN mg/dL 55*   CREATININE mg/dL 1.27   GLUCOSE mg/dL 143*   CALCIUM mg/dL 8.5*             Assessment      Coronary artery disease involving native " coronary artery of native heart with angina pectoris (CMS/[facility])    Type 2 diabetes mellitus, with long-term current use of insulin (CMS/[facility])    Hyperlipidemia LDL goal <70    Essential hypertension    Class 1 obesity due to excess calories in adult    Ischemic cardiomyopathy      We'll discharge when okay with Dr. Qureshi.        Migue Burton MD  10/22/18  6:57 AM      Please note that portions of this note were completed with a voice recognition program. Efforts were made to edit the dictations, but words may be mistranscribed    Electronically signed by Migue Burton MD at 10/22/2018  6:58 AM     Shaneka Mariano APRN at 10/21/2018  1:17 PM              [facility] Medicine Services  PROGRESS NOTE    Patient Name: Kar Mora  : 1950  MRN: [id number]    Date of Admission: 10/11/2018  Length of Stay: 10  Primary Care Physician: Bro Kan MD    Subjective   Subjective     CC:  F/u cough post CABG, medical management    HPI:    Up ambulating in the room.  Denies cough.  Hasn't needed any neb tx.  Feeling well.        Review of Systems  Gen- No fevers, chills  CV- No chest pain, palpitations  Resp- occ cough, dyspnea  GI- No N/V/D, abd pain    Otherwise ROS is negative except as mentioned in the HPI.    Objective   Objective     Vital Signs:   Temp:  [98.1 °F (36.7 °C)] 98.1 °F (36.7 °C)  Heart Rate:  [78-90] 84  Resp:  [17] 17  BP: (114-117)/(60-61) 117/61      Physical Exam:  Constitutional: No acute distress, awake, alert, ambulating in room  HENT: NCAT, mucous membranes moist  Respiratory: clear to auscultation, regular unlabored breathing on room air   Cardiovascular: RRR, no murmurs, rubs, or gallops, palpable pedal pulses bilaterally, midline sternal incision c/d/i superiorly, dressed inferiorly still with large MT site drsg covering distal tip C/D/I.   Gastrointestinal: Positive bowel sounds, soft, nontender, nondistended. Obese   Musculoskeletal: 1+  bilateral LE edema   Psychiatric: appropriate affect, cooperative and calm  Neurologic: Oriented x 3, strength symmetric in all extremities, Cranial Nerves grossly intact to confrontation, speech clear. Follows commands   Skin: No rashes    Results Reviewed:  I have personally reviewed current lab, radiology, and data and agree.      Results from last 7 days  Lab Units 10/19/18  0353 10/18/18  0530 10/16/18  0517   WBC 10*3/mm3 9.30 10.58 6.71   HEMOGLOBIN g/dL 10.6* 10.4* 10.1*  10.0*   HEMATOCRIT % 32.8* 32.5* 31.2*  31.0*   PLATELETS 10*3/mm3 390 385 288       Results from last 7 days  Lab Units 10/19/18  0353 10/18/18  0530 10/16/18  0517   SODIUM mmol/L 135 133 135   POTASSIUM mmol/L 4.3 4.7 4.5   CHLORIDE mmol/L 105 103 104   CO2 mmol/L 21.0 22.0 21.0   BUN mg/dL 55* 67* 50*   CREATININE mg/dL 1.27 1.50* 1.40*   GLUCOSE mg/dL 143* 296* 199*   CALCIUM mg/dL 8.5* 8.6* 8.4*     Estimated Creatinine Clearance: 60.7 mL/min (by C-G formula based on SCr of 1.27 mg/dL).  No results found for: BNP    Microbiology Results Abnormal     Procedure Component Value - Date/Time    Resp Virus Profile (RVP) PCR - Swab, Nasopharynx [[id number]] Collected:  10/16/18 1341    Lab Status:  Final result Specimen:  Swab from Nasopharynx Updated:  10/19/18 0618     Influenza A PCR Negative     Influenza B PCR Negative     RSV A Negative     RSV B Negative     Parainfluenza Virus 1 Negative     Parainfluenza Virus 2 Negative     Parainfluenza Virus 3 Negative     Human Rhinovirus/Enterovirus Negative     Human Metapneumovirus Negative     Adenovirus Detection by PCR Negative    Narrative:       Performed at:  [address] [place] [address]  [id number]  : Bro Robertson MD, Phone:  [phone]    Influenza A & B, RT PCR - Swab, Nasopharynx [[id number]]  (Normal) Collected:  10/16/18 1341    Lab Status:  Final result Specimen:  Swab from Nasopharynx Updated:  10/16/18 1924     Influenza A PCR Not  Detected     Influenza B PCR Not Detected          Imaging Results (last 24 hours)     Procedure Component Value Units Date/Time    XR Chest 2 View [[id number]] Collected:  10/20/18 1128     Updated:  10/20/18 1349    Narrative:       EXAMINATION: XR CHEST 2 VW - 10/20/2018     INDICATION: Z74.09-Other reduced mobility; I25.118-Atherosclerotic heart  disease of native coronary artery with other forms of angina pectoris;  I25.119-Atherosclerotic heart disease of native coronary artery with  unspecified angina pectoris; N17.9-Acute kidney failure, unspecified;  I25.9-Chronic ischemic heart disease, unspecified; I11.9-Hypertensive  heart . . .      TECHNIQUE: Two view chest.     COMPARISONS: 10/18/2018.     FINDINGS: Left basilar opacity probably represents small volume effusion  and adjacent atelectasis. No pneumothorax. Sternotomy wires. Unchanged  cardiomediastinal silhouette.       Impression:       No significant interval change.     DICTATED:   10/20/2018  EDITED/ls :   10/20/2018      This report was finalized on 10/20/2018 1:47 PM by Jay Beasley.           Results for orders placed during the hospital encounter of 10/11/18   Adult Transthoracic Echo Complete W/ Cont if Necessary Per Protocol    Narrative · Estimated EF appears to be in the range of 26 - 30%.  · The following left ventricular wall segments are hypokinetic: mid   anterolateral. The following left ventricular wall segments are   dyskinetic: apical anterior.  · Right ventricular cavity is borderline dilated.  · Left atrial cavity size is mildly dilated.  · Mild mitral valve regurgitation is present          I have reviewed the medications.      amoxicillin-clavulanate 1 tablet Oral Q12H   atorvastatin 40 mg Oral Nightly   carvedilol 25 mg Oral BID   clopidogrel 75 mg Oral Daily   ferrous sulfate 325 mg Oral BID With Meals   finasteride 5 mg Oral Daily   guaiFENesin 1,200 mg Oral Q12H   insulin detemir 30 Units Subcutaneous BID   insulin lispro 0-9  Units Subcutaneous 4x Daily With Meals & Nightly   insulin lispro 20 Units Subcutaneous TID With Meals   pantoprazole 40 mg Oral Q AM   pharmacy consult - MTM  Does not apply Daily   polyethylene glycol 17 g Oral Daily   sacubitril-valsartan 1 tablet Oral Q12H   sennosides-docusate sodium 2 tablet Oral BID   sodium chloride 3 mL Intravenous Q12H   sodium chloride 3-10 mL Intravenous Q8H   tamsulosin 0.4 mg Oral Daily   torsemide 5 mg Oral Daily         Assessment/Plan   Assessment / Plan     Active Hospital Problems    Diagnosis   • **Coronary artery disease involving native coronary artery of native heart with angina pectoris (CMS/HCC)     · Cardiac catheterization (07/20/2018): Multivessel CAD  · CABG by Dr Mark Anthony Burton (10/11/2018): LIMA to LAD, SVG to diagonal/circumflex, SVG to RPDA      • Ischemic cardiomyopathy     · Echo (10/14/2018): LVEF 30%. Left ventricular wall segments are hypokinetic: mid anterolateral. The following left ventricular wall segments are dyskinetic: apical anterior. Mild MR     • Type 2 diabetes mellitus, with long-term current use of insulin (CMS/[facility])   • Essential hypertension   • Class 1 obesity due to excess calories in adult   • Hyperlipidemia LDL goal <70     Brief Hospital Course to date:  Kar Mora is a 68 y.o. male with a remote hx of tobacco use, HTN, hypertensive heart disease, hyperlipidemia, insulin-dependent T2DM, obesity, CKD III (baseline 1.6), and gout who is admitted following elective CABG x 4 on 10/11/18:    Multivessel CAD S/p CABG x 4 on 10/11/18  Chronic LV Systolic Heart Failure due to ICM with LVEF 26-30%  - continue aspirin, plavix, entresto, carvedilol, and atorvastatin  - Plan for life vest at discharge, d/w CM  - Gentle diuresis with oral torsemide, Dr. Qureshi following   - CT surgery managing serosanguinous drainage from inferior aspect of sternal wound   - No aldactone due to renal dysfunction  - Optimize pain control  --CTS concerned about sternal  wires/incision, especially with aggressive cough issues the last several days.  Now going to monitor pt in house over the weekend.  Plan dc home early next week if stable.     Urinary Retention, failed TOV 10/16  - continue flomax, finasteride  --leong has been discontinued and he is voiding with no issues.      Nonproductive Cough, likely due to atelectasis, post-op pain   --improving.    Bronchitis   - Improved, CXR negative for infiltrates   - Continue supportive care with tussionex, tessalon perles, xylocaine nebs, pain control   - Continue augmentin  --stable, cough significantly better today.    --continue to encourage splinting well with any cough or significant mvmt.   --pulm is following and will see him in follow up in 2-4 weeks after discharge.       CKD III  --much better today.  Cr 1.27  --monitor    Insulin-dependent T2DM, A1c 7.5% at baseline   - exacerbated in last several days by IV steroid dose (had previously been under moderate control)  - will increase mealtime insulin to 20 units TID    Hypertension, BP reviewed and stable on current regimen    Normocytic Anemia    DVT Prophylaxis:  mechanical   CODE STATUS:   Code Status and Medical Interventions:   Ordered at: 10/11/18 1052     Level Of Support Discussed With:    Patient     Code Status:    CPR     Medical Interventions (Level of Support Prior to Arrest):    Full       Disposition: I expect the patient to be discharged home in 2-3 days as per primary CT service.      Electronically signed by EDGAR Gonzalez, 10/21/18, 1:17 PM.        Electronically signed by Shaneka Mariano APRN at 10/21/2018  1:34 PM     Scottie Alcocer MD at 10/21/2018 12:42 PM              10 Days Post-Op       LOS: 10 days   Patient Care Team:  Bro Kan MD as PCP - Hiren Baeza MD as Consulting Physician (Endocrinology)  Todd Qureshi MD as Cardiologist (Cardiology)    Chief complaint: Coronary artery disease    Subjective   Denies  chest pain, denies shortness of breath    Objective    Vital Signs  Temp:  [98.1 °F (36.7 °C)] 98.1 °F (36.7 °C)  Heart Rate:  [78-90] 84  Resp:  [17] 17  BP: (114-117)/(60-61) 117/61    Physical Exam:   General Appearance: alert, appears stated age and cooperative   Lungs: clear bilaterally   Heart: Regular rate and rhythm   Skin:  Incision c/d/i     Results     Results from last 7 days  Lab Units 10/19/18  0353   WBC 10*3/mm3 9.30   HEMOGLOBIN g/dL 10.6*   HEMATOCRIT % 32.8*   PLATELETS 10*3/mm3 390       Results from last 7 days  Lab Units 10/19/18  0353   SODIUM mmol/L 135   POTASSIUM mmol/L 4.3   CHLORIDE mmol/L 105   CO2 mmol/L 21.0   BUN mg/dL 55*   CREATININE mg/dL 1.27   GLUCOSE mg/dL 143*   CALCIUM mg/dL 8.5*               Assessment      Coronary artery disease involving native coronary artery of native heart with angina pectoris (CMS/[facility])    Type 2 diabetes mellitus, with long-term current use of insulin (CMS/[facility])    Hyperlipidemia LDL goal <70    Essential hypertension    Class 1 obesity due to excess calories in adult    Ischemic cardiomyopathy    cardiomyopathy EF of 30%    Plan   Doing well today.  He would like to go home.  However, we are waiting a LifeVest    Supa Kevin PA-C  10/21/18  12:42 PM     As above. NTA.      Scottie Alcocer MD  CTSurgery  10/21/18   12:51 PM          Electronically signed by Scottie Alcocer MD at 10/21/2018 12:51 PM     Alan Galindo IV, MD at 10/21/2018 12:03 PM          [facility]  Cardiology Progress Note      Chief Complaint/Reason for service:    · F/U CAD s/p CABG  · Hypertension    Subjective     Transferred to telemetry floor today. Feeling well. Walking the hallways 3 times a day. LifeVest as not been arranged of yet.    Past medical, surgical, social and family history reviewed in the patient's electronic medical record.        Objective   Vital Sign Min/Max for last 24 hours  Temp  Min: 98.1 °F (36.7 °C)   Max: 98.1 °F (36.7 °C)   BP  Min: 114/60  Max: 117/61   Pulse  Min: 78  Max: 90   Resp  Min: 17  Max: 17   SpO2  Min: 93 %  Max: 95 %   No Data Recorded      Intake/Output Summary (Last 24 hours) at 10/21/18 1203  Last data filed at 10/21/18 0819   Gross per 24 hour   Intake              120 ml   Output                0 ml   Net              120 ml           Physical Exam   Constitutional: He is oriented to person, place, and time. He appears well-developed and well-nourished.   HENT:   Head: Normocephalic and atraumatic.   Cardiovascular: Normal rate and regular rhythm.    No murmur heard.  Pulmonary/Chest: Effort normal.   Abdominal: Soft.   Neurological: He is alert and oriented to person, place, and time.       Results Review:   I reviewed the patient's recent labs in the electronic medical record.        Results from last 7 days  Lab Units 10/19/18  0353 10/18/18  0530 10/16/18  0517 10/15/18  0500   SODIUM mmol/L 135 133 135 133   POTASSIUM mmol/L 4.3 4.7 4.5 4.4   CHLORIDE mmol/L 105 103 104 101   BUN mg/dL 55* 67* 50* 50*   CREATININE mg/dL 1.27 1.50* 1.40* 1.62*           Results from last 7 days  Lab Units 10/19/18  0353 10/18/18  0530 10/16/18  0517   WBC 10*3/mm3 9.30 10.58 6.71   HEMOGLOBIN g/dL 10.6* 10.4* 10.1*  10.0*   HEMATOCRIT % 32.8* 32.5* 31.2*  31.0*   PLATELETS 10*3/mm3 390 385 288       Lab Results   Component Value Date    HGBA1C 7.50 (H) 10/10/2018       Lab Results   Component Value Date    CHOL 169 07/20/2018    TRIG 385 (H) 07/20/2018    HDL 27 (L) 07/20/2018    LDL 91 07/20/2018         Tele:  NSR    Assessment     Active Hospital Problems    Diagnosis Date Noted   • **Coronary artery disease involving native coronary artery of native heart with angina pectoris (CMS/HCC) [I25.119] 09/07/2018     Priority: High     · Cardiac catheterization (07/20/2018): Multivessel CAD  · CABG by Dr Mark Anthony Burton (10/11/2018): LIMA to LAD, SVG to diagonal/circumflex, SVG to RPDA      • Ischemic  cardiomyopathy [I25.5] 10/16/2018     Priority: Medium     · Echo (10/14/2018): LVEF 30%. Left ventricular wall segments are hypokinetic: mid anterolateral. The following left ventricular wall segments are dyskinetic: apical anterior. Mild MR     • Type 2 diabetes mellitus, with long-term current use of insulin (CMS/[facility]) [E11.9, Z79.4] 10/24/2016   • Essential hypertension [I10] 10/24/2016   • Class 1 obesity due to excess calories in adult [E66.09] 10/24/2016   • Hyperlipidemia LDL goal <70 [E78.5]      68-year-old gentleman with coronary disease and ischemic cardiomyopathy who is postop day 10 from bypass surgery. He appears ready for discharge but has not had a LifeVest placed yet.    Plan     · Lifevest prior to discharge  · Probable discharge tomorrow unless LifeVest can be arranged    Alan Galindo IV, MD  10/21/2018    Electronically signed by Alan Galindo IV, MD at 10/21/2018 12:03 PM     Case, Agnes CARLISLE DO at 10/21/2018  9:39 AM          INPATIENT PULMONARY SERVICE  PROGRESS NOTE     Hospital LOS: 10 days    Mr. Kar Mora, is followed for a Chief Complaint of: Cough      Coronary artery disease involving native coronary artery of native heart with angina pectoris (CMS/[facility])    Insulin dependent type 2 diabetes mellitus (CMS/[facility])    Hyperlipidemia    Allergic rhinitis. Desensitization injections discontinued July 2018    Hypertensive cardiovascular disease    Moderate obesity    LOPEZ (acute kidney injury) (CMS/[facility])    Cough    S/P CABG x 4 on 10/11/18    Ischemic cardiomyopathy with LVEF 26-30% on echocardiogram 10/13/18 (was 40% July 2018)      Subjective   S   68-year-old white male remote smoker (none for 37 years) with a history of hypertension and hypertensive cardiovascular disease, hyperlipidemia, diabetes mellitus, obesity, chronic kidney disease (baseline creatinine preop was 1.63), allergic rhinitis (previously on desensitization injections), and gout.  Underwent  elective CABG ×4 on 10/11/18 CAD with LVEF 40%. Did well except for a bump in his creatinine which recovered as well as complaints of intractable cough.     On 10/14/18 the patient developed a persistent intense nonproductive cough.There has never been significant sputum reduction or hemoptysis, and he has had no fever or chills. In addition white count remains normal. Chest x-ray however did reveal some opacity in the left base (atelectasis versus infiltrate). He denies dyspnea or wheezing as well as dysphagia, postnasal drip or significant reflux  (although he does have a history of allergies and GERD).      Remains on guaifenesin, dextromethorphan, Tessalon, and codeine derivatives to suppress his cough. In addition has been receiving prn neb treatments with Xylocaine which he states are quite effective. Cough persists  but is definitely improved. Has been diuresed and is approximately 4.8 L negative since admission. Now on daily Bumex and BUN and creatinine are improved. In addition blood sugars are much improved.      Interval History:  No coughing overnight and he did not have to use any medications for cough PRN.        The patient's relevant past medical, surgical and social history were reviewed and updated in Epic as appropriate.      ROS:   Constitutional: Negative for fever.   Respiratory: Negative for dyspnea.   Cardiovascular: Negative for chest pain.   Gastrointestinal: Negative for  nausea, vomiting and diarrhea.     Objective   O     Vitals  Temp  Min: 98.1 °F (36.7 °C)  Max: 98.1 °F (36.7 °C)  BP  Min: 114/60  Max: 117/61  Pulse  Min: 78  Max: 89  No Data Recorded  SpO2  Min: 93 %  Max: 96 % No Data Recorded    I/O 24 HR (7:00 AM-6:59AM):  Intake/Output     None          Medications (Drips):       Physical Examination    Telemetry: Normal sinus rhythm.    Constitutional:  No acute distress.  Conversant.   Cardiovascular: Regular rate and rhythm.  No murmurs, rub or gallop.   Respiratory: Normal  symmetric chest expansion.  Normal respiratory effort.  Clear to ascultation.   Abdominal:  Soft. No masses.   Non-tender. No distension.   No hepatosplenomegaly.   Extremities: No digital cyanosis or clubbing.  No peripheral edema.   Neurological:   Alert and Oriented to person, place, and time.   Moves all extremities.              Results from last 7 days  Lab Units 10/19/18  0353 10/18/18  0530 10/16/18  0517   WBC 10*3/mm3 9.30 10.58 6.71   HEMOGLOBIN g/dL 10.6* 10.4* 10.1*  10.0*   MCV fL 88.4 87.6 87.2   PLATELETS 10*3/mm3 390 385 288       Results from last 7 days  Lab Units 10/19/18  0353 10/18/18  0530 10/16/18  0517   SODIUM mmol/L 135 133 135   POTASSIUM mmol/L 4.3 4.7 4.5   CO2 mmol/L 21.0 22.0 21.0   CREATININE mg/dL 1.27 1.50* 1.40*     Serum creatinine: 1.27 mg/dL 10/19/18 0353  Estimated creatinine clearance: 60.7 mL/min              Images:     Imaging Results (last 24 hours)     Procedure Component Value Units Date/Time    XR Chest 2 View [[id number]] Collected:  10/20/18 1128     Updated:  10/20/18 1349    Narrative:       EXAMINATION: XR CHEST 2 VW - 10/20/2018     INDICATION: Z74.09-Other reduced mobility; I25.118-Atherosclerotic heart  disease of native coronary artery with other forms of angina pectoris;  I25.119-Atherosclerotic heart disease of native coronary artery with  unspecified angina pectoris; N17.9-Acute kidney failure, unspecified;  I25.9-Chronic ischemic heart disease, unspecified; I11.9-Hypertensive  heart . . .      TECHNIQUE: Two view chest.     COMPARISONS: 10/18/2018.     FINDINGS: Left basilar opacity probably represents small volume effusion  and adjacent atelectasis. No pneumothorax. Sternotomy wires. Unchanged  cardiomediastinal silhouette.       Impression:       No significant interval change.     DICTATED:   10/20/2018  EDITED/ls :   10/20/2018      This report was finalized on 10/20/2018 1:47 PM by Jay Beasley.             I reviewed the patient's new clinical  results.  I reviewed the patient's new imaging results and agree with the interpretation.    Assessment/Plan   A / P     Mr. Mora is a 69yo M who underwent CABG on 10/11/18. He developed a persistent cough several days after surgery. His cough has now resolved with anti-tussive medications, treatment with Augmentin and PRN Xylocaine nebs. He has not needed any PRN medications for cough.     Diet Regular; Consistent Carbohydrate, Cardiac  Code Status and Medical Interventions:   Ordered at: 10/11/18 1052     Level Of Support Discussed With:    Patient     Code Status:    CPR     Medical Interventions (Level of Support Prior to Arrest):    Full       Active Hospital Problems    Diagnosis   • **Coronary artery disease involving native coronary artery of native heart with angina pectoris (CMS/[facility])     · Cardiac cath (07/20/2018): Multi vessel CAD.  Refer to CT for bypass grafting  · CABG with Dr Mark Anthony Burton (10/11/2018): SVG to diagonal and circumflex. SVG to RPDA. LIMA to LAD  S     • Cough   • S/P CABG x 4 on 10/11/18   • Ischemic cardiomyopathy with LVEF 26-30% on echocardiogram 10/13/18 (was 40% July 2018)     · Echo (10/14/2018): LVEF= 26 - 30%. Left ventricular wall segments are hypokinetic: mid anterolateral. The following left ventricular wall segments are dyskinetic: apical anterior. Mild MR     • LOPEZ (acute kidney injury) (CMS/[facility])   • Insulin dependent type 2 diabetes mellitus (CMS/[facility])   • Hypertensive cardiovascular disease     · Abnormal EKG with abnormal acceptable echocardiographic GXT, September 2004.   · Acceptable echocardiographic GXT with preserved exercise capacity and mild LVH, March 2008.   · Stress test (12/2012): Normal  · Resident class I symptoms with persistent abnormal echocardiogram (LVEF 0.40), with mild concentric LVH and mild left atrial enlargement without pulmonary arterial hypertension or pericardial effusion, July 2015.       • Moderate obesity   • Hyperlipidemia   • Allergic  rhinitis. Desensitization injections discontinued 2018       Assessment / Plan:  5. Complete course of Augmentin.   6. Discontinue PRN cough suppressants.    7. Follow up with [facility] in 2-4 weeks after discharge for cough.     I discussed the patient's findings and my recommendations with patient and family    Time:  I spent 25 minutes, face to face, with the patient and family or on the bro coordinating care with other health care providers.   I spent > 50% percent of this time, counseling and discussing current status and management .     Agnes Toth DO  Pulmonary and Critical Care Medicine            Electronically signed by Agnes Toth DO at 10/21/2018  9:40 AM     Shaneka Mariano APRN at 10/20/2018  3:32 PM              [facility] Medicine Services  PROGRESS NOTE    Patient Name: Kar Mora  : 1950  MRN: [id number]    Date of Admission: 10/11/2018  Length of Stay: 9  Primary Care Physician: Bro Kan MD    Subjective   Subjective     CC:  F/u cough post CABG, medical management    HPI:  Sitting up in chair with no family at bedside.  Pain well controlled.  Feeling well.  Has been walking in the [place].  No overnight issues or complaints.      Review of Systems  Gen- No fevers, chills  CV- No chest pain, palpitations  Resp- occ cough, dyspnea  GI- No N/V/D, abd pain    Otherwise ROS is negative except as mentioned in the HPI.    Objective   Objective     Vital Signs:   Temp:  [98 °F (36.7 °C)-98.2 °F (36.8 °C)] 98 °F (36.7 °C)  Heart Rate:  [85-92] 92  Resp:  [16-18] 18  BP: (126-129)/(63-82) 129/63      Physical Exam:  Constitutional: No acute distress, awake, alert.  Sitting in chaire  HENT: NCAT, mucous membranes moist  Respiratory: clear to auscultation, regular unlabored breathing.   Cardiovascular: RRR, no murmurs, rubs, or gallops, palpable pedal pulses bilaterally, midline sternal incision c/d/i superiorly, dressed inferiorly  still with large MT site drsg covering distal tip C/D/I.   Gastrointestinal: Positive bowel sounds, soft, nontender, nondistended. Obese   Musculoskeletal: 1+ bilateral LE edema   Psychiatric: appropriate affect, cooperative and calm  Neurologic: Oriented x 3, strength symmetric in all extremities, Cranial Nerves grossly intact to confrontation, speech clear. Follows commands   Skin: No rashes    Results Reviewed:  I have personally reviewed current lab, radiology, and data and agree.      Results from last 7 days  Lab Units 10/19/18  0353 10/18/18  0530 10/16/18  0517   WBC 10*3/mm3 9.30 10.58 6.71   HEMOGLOBIN g/dL 10.6* 10.4* 10.1*  10.0*   HEMATOCRIT % 32.8* 32.5* 31.2*  31.0*   PLATELETS 10*3/mm3 390 385 288       Results from last 7 days  Lab Units 10/19/18  0353 10/18/18  0530 10/16/18  0517   SODIUM mmol/L 135 133 135   POTASSIUM mmol/L 4.3 4.7 4.5   CHLORIDE mmol/L 105 103 104   CO2 mmol/L 21.0 22.0 21.0   BUN mg/dL 55* 67* 50*   CREATININE mg/dL 1.27 1.50* 1.40*   GLUCOSE mg/dL 143* 296* 199*   CALCIUM mg/dL 8.5* 8.6* 8.4*     Estimated Creatinine Clearance: 60.8 mL/min (by C-G formula based on SCr of 1.27 mg/dL).  No results found for: BNP    Microbiology Results Abnormal     Procedure Component Value - Date/Time    Resp Virus Profile (RVP) PCR - Swab, Nasopharynx [[id number]] Collected:  10/16/18 1341    Lab Status:  Final result Specimen:  Swab from Nasopharynx Updated:  10/19/18 0618     Influenza A PCR Negative     Influenza B PCR Negative     RSV A Negative     RSV B Negative     Parainfluenza Virus 1 Negative     Parainfluenza Virus 2 Negative     Parainfluenza Virus 3 Negative     Human Rhinovirus/Enterovirus Negative     Human Metapneumovirus Negative     Adenovirus Detection by PCR Negative    Narrative:       Performed at:  [address] [place] [address]  [id number]  : Bro Robertson MD, Phone:  [phone]    Influenza A & B, RT PCR - Swab, Nasopharynx  [[id number]]  (Normal) Collected:  10/16/18 1341    Lab Status:  Final result Specimen:  Swab from Nasopharynx Updated:  10/16/18 1924     Influenza A PCR Not Detected     Influenza B PCR Not Detected          Imaging Results (last 24 hours)     Procedure Component Value Units Date/Time    XR Chest 2 View [[id number]] Collected:  10/20/18 1128     Updated:  10/20/18 1349    Narrative:       EXAMINATION: XR CHEST 2 VW - 10/20/2018     INDICATION: Z74.09-Other reduced mobility; I25.118-Atherosclerotic heart  disease of native coronary artery with other forms of angina pectoris;  I25.119-Atherosclerotic heart disease of native coronary artery with  unspecified angina pectoris; N17.9-Acute kidney failure, unspecified;  I25.9-Chronic ischemic heart disease, unspecified; I11.9-Hypertensive  heart . . .      TECHNIQUE: Two view chest.     COMPARISONS: 10/18/2018.     FINDINGS: Left basilar opacity probably represents small volume effusion  and adjacent atelectasis. No pneumothorax. Sternotomy wires. Unchanged  cardiomediastinal silhouette.       Impression:       No significant interval change.     DICTATED:   10/20/2018  EDITED/ls :   10/20/2018      This report was finalized on 10/20/2018 1:47 PM by Jay Beasley.           Results for orders placed during the hospital encounter of 10/11/18   Adult Transthoracic Echo Complete W/ Cont if Necessary Per Protocol    Narrative · Estimated EF appears to be in the range of 26 - 30%.  · The following left ventricular wall segments are hypokinetic: mid   anterolateral. The following left ventricular wall segments are   dyskinetic: apical anterior.  · Right ventricular cavity is borderline dilated.  · Left atrial cavity size is mildly dilated.  · Mild mitral valve regurgitation is present          I have reviewed the medications.      amoxicillin-clavulanate 1 tablet Oral Q12H   atorvastatin 40 mg Oral Nightly   carvedilol 25 mg Oral BID   clopidogrel 75 mg Oral Daily   ferrous  sulfate 325 mg Oral BID With Meals   finasteride 5 mg Oral Daily   guaiFENesin 1,200 mg Oral Q12H   insulin detemir 30 Units Subcutaneous BID   insulin lispro 0-9 Units Subcutaneous 4x Daily With Meals & Nightly   insulin lispro 15 Units Subcutaneous TID With Meals   pantoprazole 40 mg Oral Q AM   pharmacy consult - MTM  Does not apply Daily   polyethylene glycol 17 g Oral Daily   sacubitril-valsartan 1 tablet Oral Q12H   sennosides-docusate sodium 2 tablet Oral BID   sodium chloride 3 mL Intravenous Q12H   sodium chloride 3-10 mL Intravenous Q8H   tamsulosin 0.4 mg Oral Daily   torsemide 5 mg Oral Daily         Assessment/Plan   Assessment / Plan     Active Hospital Problems    Diagnosis   • **Coronary artery disease involving native coronary artery of native heart with angina pectoris (CMS/[facility])     · Cardiac cath (07/20/2018): Multi vessel CAD.  Refer to CT for bypass grafting  · CABG with Dr Mark Anthony Burton (10/11/2018): SVG to diagonal and circumflex. SVG to RPDA. LIMA to LAD  S     • Cough   • S/P CABG x 4 on 10/11/18   • Ischemic cardiomyopathy with LVEF 26-30% on echocardiogram 10/13/18 (was 40% July 2018)     · Echo (10/14/2018): LVEF= 26 - 30%. Left ventricular wall segments are hypokinetic: mid anterolateral. The following left ventricular wall segments are dyskinetic: apical anterior. Mild MR     • LOPEZ (acute kidney injury) (CMS/[facility])   • Insulin dependent type 2 diabetes mellitus (CMS/[facility])   • Hypertensive cardiovascular disease     · Abnormal EKG with abnormal acceptable echocardiographic GXT, September 2004.   · Acceptable echocardiographic GXT with preserved exercise capacity and mild LVH, March 2008.   · Stress test (12/2012): Normal  · Resident class I symptoms with persistent abnormal echocardiogram (LVEF 0.40), with mild concentric LVH and mild left atrial enlargement without pulmonary arterial hypertension or pericardial effusion, July 2015.       • Moderate obesity   • Hyperlipidemia   • Allergic  rhinitis. Desensitization injections discontinued July 2018     Brief Hospital Course to date:  Kar Mora is a 68 y.o. male with a remote hx of tobacco use, HTN, hypertensive heart disease, hyperlipidemia, insulin-dependent T2DM, obesity, CKD III (baseline 1.6), and gout who is admitted following elective CABG x 4 on 10/11/18:    Multivessel CAD S/p CABG x 4 on 10/11/18  Chronic LV Systolic Heart Failure due to ICM with LVEF 26-30%  - continue aspirin, plavix, entresto, carvedilol, and atorvastatin  - Plan for life vest at discharge, d/w CM  - Gentle diuresis with oral torsemide, Dr. Qureshi following   - CT surgery managing serosanguinous drainage from inferior aspect of sternal wound   - No aldactone due to renal dysfunction  - Optimize pain control  --CTS concerned about sternal wires/incision, especially with aggressive cough issues the last several days.  Now going to monitor pt in house over the weekend.  Plan dc home early next week if stable.     Urinary Retention, failed TOV 10/16  - continue flomax, finasteride  --leong has been discontinued and he is voiding with no issues.      Nonproductive Cough, likely due to atelectasis, post-op pain   --improving.    Bronchitis   - Improved, CXR negative for infiltrates   - Continue supportive care with tussionex, tessalon perles, xylocaine nebs, pain control   - Continue augmentin  --stable, cough significantly better today.    --continue to encourage splinting well with any cough or significant mvmt.   --pulm is following and will see him in follow up in 2-4 weeks after discharge.       CKD III  --much better today.  Cr 1.27  --monitor    Insulin-dependent T2DM, A1c 7.5% at baseline   - exacerbated in last several days by IV steroid dose (had previously been under moderate control)  - Pt began new doses of b/b yesterday am.  Still a running a little high at times but generally fairly stable.  Will make no changes today -continue to monitor today.     --Continue MDSSI for now and monitor insulin needs.  Adjust further as indicated    Hypertension, BP reviewed and stable on current regimen    Normocytic Anemia    DVT Prophylaxis:  mechanical   CODE STATUS:   Code Status and Medical Interventions:   Ordered at: 10/11/18 1052     Level Of Support Discussed With:    Patient     Code Status:    CPR     Medical Interventions (Level of Support Prior to Arrest):    Full       Disposition: I expect the patient to be discharged home in 2-3 days as per primary CT service.      Electronically signed by EDGAR Gonzalez, 10/20/18, 3:32 PM.        Electronically signed by Shaneka Mariano APRN at 10/20/2018  3:45 PM     Alan Galindo IV, MD at 10/20/2018 12:47 PM          [facility]  Cardiology Progress Note      Chief Complaint/Reason for service:    · F/U CAD s/p CABG  · Hypertension    Subjective     Transferred to telemetry floor today. Feeling well. Walking the hallways 3 times a day. LifeVest as not been arranged of yet.    Past medical, surgical, social and family history reviewed in the patient's electronic medical record.        Objective   Vital Sign Min/Max for last 24 hours  Temp  Min: 98.1 °F (36.7 °C)  Max: 98.1 °F (36.7 °C)   BP  Min: 114/60  Max: 117/61   Pulse  Min: 78  Max: 90   Resp  Min: 17  Max: 17   SpO2  Min: 93 %  Max: 95 %   No Data Recorded      Intake/Output Summary (Last 24 hours) at 10/21/18 1201  Last data filed at 10/21/18 0819   Gross per 24 hour   Intake              120 ml   Output                0 ml   Net              120 ml           Physical Exam   Constitutional: He is oriented to person, place, and time. He appears well-developed and well-nourished.   HENT:   Head: Normocephalic and atraumatic.   Cardiovascular: Normal rate and regular rhythm.    No murmur heard.  Pulmonary/Chest: Effort normal.   Abdominal: Soft.   Neurological: He is alert and oriented to person, place, and time.        Results Review:   I reviewed the patient's recent labs in the electronic medical record.        Results from last 7 days  Lab Units 10/19/18  0353 10/18/18  0530 10/16/18  0517 10/15/18  0500   SODIUM mmol/L 135 133 135 133   POTASSIUM mmol/L 4.3 4.7 4.5 4.4   CHLORIDE mmol/L 105 103 104 101   BUN mg/dL 55* 67* 50* 50*   CREATININE mg/dL 1.27 1.50* 1.40* 1.62*           Results from last 7 days  Lab Units 10/19/18  0353 10/18/18  0530 10/16/18  0517   WBC 10*3/mm3 9.30 10.58 6.71   HEMOGLOBIN g/dL 10.6* 10.4* 10.1*  10.0*   HEMATOCRIT % 32.8* 32.5* 31.2*  31.0*   PLATELETS 10*3/mm3 390 385 288       Lab Results   Component Value Date    HGBA1C 7.50 (H) 10/10/2018       Lab Results   Component Value Date    CHOL 169 07/20/2018    TRIG 385 (H) 07/20/2018    HDL 27 (L) 07/20/2018    LDL 91 07/20/2018         Tele:  NSR    Assessment     Active Hospital Problems    Diagnosis Date Noted   • **Coronary artery disease involving native coronary artery of native heart with angina pectoris (CMS/[facility]) [I25.119] 09/07/2018     Priority: High     · Cardiac catheterization (07/20/2018): Multivessel CAD  · CABG by Dr Mark Anthony Burton (10/11/2018): LIMA to LAD, SVG to diagonal/circumflex, SVG to RPDA      • Ischemic cardiomyopathy [I25.5] 10/16/2018     Priority: Medium     · Echo (10/14/2018): LVEF 30%. Left ventricular wall segments are hypokinetic: mid anterolateral. The following left ventricular wall segments are dyskinetic: apical anterior. Mild MR     • Type 2 diabetes mellitus, with long-term current use of insulin (CMS/[facility]) [E11.9, Z79.4] 10/24/2016   • Essential hypertension [I10] 10/24/2016   • Class 1 obesity due to excess calories in adult [E66.09] 10/24/2016   • Hyperlipidemia LDL goal <70 [E78.5]      68-year-old gentleman with coronary disease and ischemic cardiomyopathy who is postop day 10 from bypass surgery. He appears ready for discharge but has not had a LifeVest placed yet.    Plan     · Lifevest prior to  discharge  · Probable discharge tomorrow unless LifeVest can be arranged    Alan Galindo IV, MD  10/21/2018    Electronically signed by Alan Galindo IV, MD at 10/21/2018 12:02 PM     Case, Agnes FONTAINEDO Jacinta at 10/20/2018 10:16 AM          INPATIENT PULMONARY SERVICE  PROGRESS NOTE     Hospital LOS: 9 days    Mr. Kar Mora, is followed for a Chief Complaint of: Cough      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 7/5/18    Insulin dependent type 2 diabetes mellitus (CMS/[facility])    Hyperlipidemia    Allergic rhinitis. Desensitization injections discontinued July 2018    Hypertensive cardiovascular disease    Moderate obesity    LOPEZ (acute kidney injury) (CMS/[facility])    Cough    S/P CABG x 4 on 10/11/18    Ischemic cardiomyopathy with LVEF 26-30% on echocardiogram 10/13/18 (was 40% July 2018)      Subjective   S   68-year-old white male remote smoker (none for 37 years) with a history of hypertension and hypertensive cardiovascular disease, hyperlipidemia, diabetes mellitus, obesity, chronic kidney disease (baseline creatinine preop was 1.63), allergic rhinitis (previously on desensitization injections), and gout.  Underwent elective CABG ×4 on 10/11/18 CAD with LVEF 40%. Did well except for a bump in his creatinine which recovered as well as complaints of intractable cough.     On 10/14/18 the patient developed a persistent intense nonproductive cough.There has never been significant sputum reduction or hemoptysis, and he has had no fever or chills. In addition white count remains normal. Chest x-ray however did reveal some opacity in the left base (atelectasis versus infiltrate). He denies dyspnea or wheezing as well as dysphagia, postnasal drip or significant reflux  (although he does have a history of allergies and GERD).      Remains on guaifenesin, dextromethorphan, Tessalon, and codeine derivatives to suppress his cough. In addition has been receiving prn neb treatments with Xylocaine which he  states are quite effective. Cough persists  but is definitely improved. Has been diuresed and is approximately 4.8 L negative since admission. Now on daily Bumex and BUN and creatinine are improved. In addition blood sugars are much improved.      Interval History:  No coughing in 2 days. He feels improved. He is worried about his sternal wires.        The patient's relevant past medical, surgical and social history were reviewed and updated in Epic as appropriate.      ROS:   Constitutional: Negative for fever.   Respiratory: Negative for dyspnea.   Cardiovascular: Negative for chest pain.   Gastrointestinal: Negative for  nausea, vomiting and diarrhea.     Objective   O     Vitals  Temp  Min: 98 °F (36.7 °C)  Max: 98.2 °F (36.8 °C)  BP  Min: 126/82  Max: 129/63  Pulse  Min: 83  Max: 90  Resp  Min: 16  Max: 18  SpO2  Min: 94 %  Max: 95 % Flow (L/min)  Min: 1  Max: 1    I/O 24 HR (7:00 AM-6:59AM):  Intake/Output       10/19/18 0700 - 10/20/18 0659    Intake (ml) 600    Output (ml) --    Net (ml) 600    Last Weight  94.6 kg (208 lb 9.6 oz)          Medications (Drips):       Physical Examination    Telemetry: Normal sinus rhythm.    Constitutional:  No acute distress.  Conversant.   Cardiovascular: Regular rate and rhythm.  No murmurs, rub or gallop.   Respiratory: Normal symmetric chest expansion.  Normal respiratory effort.  Clear to ascultation.  Diminished at left base.    Abdominal:  Soft. No masses.   Non-tender. No distension.   No hepatosplenomegaly.   Extremities: No digital cyanosis or clubbing.  No peripheral edema.   Neurological:   Alert and Oriented to person, place, and time.   Moves all extremities.              Results from last 7 days  Lab Units 10/19/18  0353 10/18/18  0530 10/16/18  0517   WBC 10*3/mm3 9.30 10.58 6.71   HEMOGLOBIN g/dL 10.6* 10.4* 10.1*  10.0*   MCV fL 88.4 87.6 87.2   PLATELETS 10*3/mm3 390 385 288       Results from last 7 days  Lab Units 10/19/18  0353 10/18/18  0530  10/16/18  0517   SODIUM mmol/L 135 133 135   POTASSIUM mmol/L 4.3 4.7 4.5   CO2 mmol/L 21.0 22.0 21.0   CREATININE mg/dL 1.27 1.50* 1.40*     Serum creatinine: 1.27 mg/dL 10/19/18 0353  Estimated creatinine clearance: 60.8 mL/min              Images:     Imaging Results (last 24 hours)     Procedure Component Value Units Date/Time    XR Chest 2 View [[id number]] Updated:  10/20/18 0856          I reviewed the patient's new clinical results.  I reviewed the patient's new imaging results and agree with the interpretation.    Assessment/Plan   A / P     Mr. Mora is a 67yo M who underwent CABG on 10/11/18. He developed a persistent cough several days after surgery. His cough has now resolved with anti-tussive medications, treatment with Augmentin and PRN Xylocaine nebs.     Diet Regular; Consistent Carbohydrate, Cardiac  Code Status and Medical Interventions:   Ordered at: 10/11/18 1052     Level Of Support Discussed With:    Patient     Code Status:    CPR     Medical Interventions (Level of Support Prior to Arrest):    Full       Active Hospital Problems    Diagnosis   • **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 7/5/18   • Cough   • S/P CABG x 4 on 10/11/18   • Ischemic cardiomyopathy with LVEF 26-30% on echocardiogram 10/13/18 (was 40% July 2018)   • LOPEZ (acute kidney injury) (CMS/[facility])   • Insulin dependent type 2 diabetes mellitus (CMS/[facility])   • Hypertensive cardiovascular disease     1. Probable hypertensive cardiovascular disease:  a. Abnormal EKG with abnormal acceptable echocardiographic GXT, September 2004.   b. Acceptable echocardiographic GXT with preserved exercise capacity and mild LVH, March 2008.   c. Acceptable Quantitative SPECT Gated Cardiolite GXT with nominal myocardial perfusion to 88% of predicted exercise capacity and 90% predicted maximum heart rate with preserved LVEF (0.65) with continued medical therapy felt warranted, December 2012.  d. Residual class I symptoms with recent documented abnormal  EKG (LBBB/first-degree AV block) with abnormal echocardiogram demonstrating mild left ventricular chamber enlargement with mild reduction in the LVEF (0.42) without PE or pulmonary hypertension.  e. Resident class I symptoms with persistent abnormal echocardiogram (LVEF 0.40), with mild concentric LVH and mild left atrial enlargement without pulmonary arterial hypertension or pericardial effusion, July 2015.       • Moderate obesity   • Hyperlipidemia   • Allergic rhinitis. Desensitization injections discontinued July 2018       Assessment / Plan:  8. Complete course of Augmentin.   9. Change anti-tussives to PRN.   10. He will try and not use the nebulizer treatments today and see how his cough does.   11. F/u radiology read of PA/Lateral CXR. Left lower lobe airspace disease/atelectasis appears improved.   12. [facility] with [facility] in 2-4 weeks after discharge for cough.     I discussed the patient's findings and my recommendations with patient and family    Time:  I spent 35 minutes, face to face, with the patient and family or on the bro coordinating care with other health care providers.   I spent > 50% percent of this time, counseling and discussing current status and management .     Agnes Toth DO  Pulmonary and Critical Care Medicine              Electronically signed by Agnes Toth DO at 10/20/2018 10:21 AM     Scottie Alcocer MD at 10/20/2018 10:13 AM              9 Days Post-Op       LOS: 9 days   Patient Care Team:  Bro Kan MD as PCP - Hiren Baeza MD as Consulting Physician (Endocrinology)    Chief complaint: Coronary artery disease    Subjective   Denies chest pain, denies shortness of breath    Objective    Vital Signs  Temp:  [98 °F (36.7 °C)-98.2 °F (36.8 °C)] 98 °F (36.7 °C)  Heart Rate:  [83-90] 85  Resp:  [16-18] 18  BP: (126-129)/(63-82) 129/63    Physical Exam:   General Appearance: alert, appears stated age and  cooperative   Lungs: clear bilaterally   Heart: Regular rate and rhythm   Skin:  Incision c/d/i   Some instability of the lower sternum  Results     Results from last 7 days  Lab Units 10/19/18  0353   WBC 10*3/mm3 9.30   HEMOGLOBIN g/dL 10.6*   HEMATOCRIT % 32.8*   PLATELETS 10*3/mm3 390       Results from last 7 days  Lab Units 10/19/18  0353   SODIUM mmol/L 135   POTASSIUM mmol/L 4.3   CHLORIDE mmol/L 105   CO2 mmol/L 21.0   BUN mg/dL 55*   CREATININE mg/dL 1.27   GLUCOSE mg/dL 143*   CALCIUM mg/dL 8.5*               Assessment      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 7/5/18    Insulin dependent type 2 diabetes mellitus (CMS/[facility])    Hyperlipidemia    Allergic rhinitis. 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    Ischemic cardiomyopathy with LVEF 26-30% on echocardiogram 10/13/18 (was 40% July 2018)        Plan   Await PA and lateral x-ray  Continue to follow lower sternum for instability    Supa Kevin PA-C  10/20/18  10:13 AM     As above.  Sternum is multiple no evidence of infection, drainage, or erythema. I have reviewed, verified, and confirmed the above history and current status.  I have examined the patient and confirmed the above physical findings.Above plan and treatment regimen discussed in detail with patient.  Options of treatment, attendant risks vs benefits, and my recommendations were discussed and all questions answered.    Scottie Alcocer MD  CTSurgery  10/20/18   11:58 AM    Electronically signed by Scottie Alcocer MD at 10/20/2018 11:58 AM     Branden Florence MD at 10/19/2018  4:30 PM          Intensivist Note     10/19/2018  Hospital Day: 8  8 Days Post-Op      Mr. Kar Mora, 68 y.o. male is followed for:    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 7/5/18    S/P CABG x 4 on 10/11/18    Ischemic cardiomyopathy with LVEF 26-30% on echocardiogram  "10/13/18 (was 40% July 2018)    Insulin dependent type 2 diabetes mellitus (CMS/[facility])    Hypertensive cardiovascular disease    LOPEZ (acute kidney injury) (CMS/[facility])    Hyperlipidemia    Moderate obesity    Allergic rhinitis. Desensitization injections discontinued July 2018    Cough       SUBJECTIVE     68-year-old white male remote smoker (none for 37 years) with a history of hypertension and hypertensive cardiovascular disease, hyperlipidemia, diabetes mellitus, obesity, chronic kidney disease (baseline creatinine preop was 1.63), allergic rhinitis (previously on desensitization injections), and gout.  Underwent elective CABG ×4 on 10/11/18 CAD with LVEF 40%. Did well except for a bump in his creatinine which recovered as well as complaints of intractable cough.     On 10/14/18 the patient developed a persistent intense nonproductive cough.There has never been significant sputum reduction or hemoptysis, and he has had no fever or chills. In addition white count remains normal.  Chest x-ray however did reveal some opacity in the left base (atelectasis versus infiltrate). He denies dyspnea or wheezing as well as dysphagia, postnasal drip or significant reflux  (although he does have a history of allergies and GERD).      Remains on guaifenesin, dextromethorphan, Tessalon, and codeine derivatives to suppress his cough. In addition has been receiving prn neb treatments with Xylocaine which he states are quite effective. Cough persists  but is definitely improved. Has been diuresed and is approximately 4.8 L negative since admission. Now on daily Bumex and BUN and creatinine are improved. In addition blood sugars are much improved.       The patient's relevant past medical, surgical and social history were reviewed and updated in Epic as appropriate.    OBJECTIVE     /72   Pulse 83   Temp 98.1 °F (36.7 °C) (Oral)   Resp 18   Ht 167.6 cm (66\")   Wt 95.3 kg (210 lb 3.2 oz)   SpO2 94%   BMI 33.93 kg/m²    Flow " "(L/min): 2    Flowsheet Rows      First Filed Value   Admission Height  167.6 cm (66\") Documented at 10/11/2018 0604   Admission Weight  96.2 kg (212 lb) Documented at 10/11/2018 0604        Intake & Output (last day)       10/18 0701 - 10/19 0700 10/19 0701 - 10/20 0700    P.O. 360 480    Total Intake(mL/kg) 360 (3.8) 480 (5)    Urine (mL/kg/hr) 1000 (0.4)     Total Output 1000      Net -640 +480          Unmeasured Urine Occurrence 3 x     Unmeasured Stool Occurrence 1 x           Exam:  General Exam:  Well-developed older white male in NAD.  HEENT: Pupils equal and reactive. Nose and throat clear.  Neck:                          Supple, no JVD, thyromegaly, or adenopathy  Lungs: No wheezes rales or rhonchi but diminished breath sounds and dullness to percussion left base.  Cardiovascular: RRR without murmurs or gallops.  Abdomen: Obese and very protuberant but soft and nontender   and rectal: Deferred.  Extremities: No cyanosis clubbing edema.  Neurologic:                 Symmetric strength. No focal deficits.    Chest X-Ray 10/18/18: Left lower lobe atelectasis/infiltrate      Results from last 7 days  Lab Units 10/19/18  0353 10/18/18  0530 10/16/18  0517   WBC 10*3/mm3 9.30 10.58 6.71   HEMOGLOBIN g/dL 10.6* 10.4* 10.1*  10.0*   HEMATOCRIT % 32.8* 32.5* 31.2*  31.0*   PLATELETS 10*3/mm3 390 385 288       Results from last 7 days  Lab Units 10/19/18  0353 10/18/18  0530   SODIUM mmol/L 135 133   POTASSIUM mmol/L 4.3 4.7   CHLORIDE mmol/L 105 103   CO2 mmol/L 21.0 22.0   BUN mg/dL 55* 67*   CREATININE mg/dL 1.27 1.50*   GLUCOSE mg/dL 143* 296*   CALCIUM mg/dL 8.5* 8.6*               No results found for: SEDRATE  No results found for: BNP  No results found for: CKTOTAL, CKMB, CKMBINDEX, TROPONINI, TROPONINT  No results found for: TSH  No results found for: LACTATE  No results found for: CORTISOL      I reviewed the patient's results, images and medication.    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)    Hypertensive cardiovascular disease    LOPEZ (acute kidney injury) (CMS/[facility])    Hyperlipidemia    Moderate obesity    Allergic rhinitis. Desensitization injections discontinued July 2018    Cough      DISCUSSION: Still with more cough than I would like but improved and is no longer requiring the nebulizer as much. Volume status looks good and he has had no fever or leukocytosis develop.    PLAN     1. Follow-up a good PA and lateral chest x-ray tomorrow  2. Complete a full course of Augmentin  3. Continue to mobilize  4. Should be ready for discharge Monday    Plan of care and goals reviewed with mulitdisciplinary team at daily rounds.    I discussed the patient's findings and my recommendations with patient and nursing staff    Time spent Critical care 20 min (It does not include procedure time).    Branden Florence MD  Intensive Care Medicine  10/19/18 4:30 PM       Electronically signed by Branden Florence MD at 10/19/2018  4:37 PM       Consult Notes (last 72 hours) (Notes from 10/19/2018  2:12 PM through 10/22/2018  2:12 PM)     No notes of this type exist for this encounter.

## 2018-10-22 NOTE — THERAPY TREATMENT NOTE
Acute Care - Physical Therapy Treatment Note  [facility]     Patient Name: Kar Mora  : 1950  MRN: [id number]  Today's Date: 10/22/2018  Onset of Illness/Injury or Date of Surgery: 10/11/18  Date of Referral to PT: 10/12/18  Referring Physician: MD Burton    Admit Date: 10/11/2018    Visit Dx:    ICD-10-CM ICD-9-CM   1. Impaired functional mobility, balance, gait, and endurance Z74.09 V49.89   2. Atherosclerosis of native coronary artery of native heart with stable angina pectoris (CMS/[facility]) I25.118 414.01     413.9   3. Coronary artery disease (S/P CABG x 4) I25.119 414.01     413.9   4. LOPEZ (acute kidney injury) (CMS/[facility]) N17.9 584.9   5. Ischemic heart disease I25.9 414.9   6. Hypertensive heart disease without heart failure I11.9 402.90   7. Ischemic cardiomyopathy I25.5 414.8     Patient Active Problem List   Diagnosis   • Type 2 diabetes mellitus, with long-term current use of insulin (CMS/[facility])   • Hyperlipidemia LDL goal <70   • Erectile dysfunction   • Allergic rhinitis. Desensitization injections discontinued 2018   • Essential hypertension   • Class 1 obesity due to excess calories in adult   • Left bundle branch block   • Coronary artery disease involving native coronary artery of native heart with angina pectoris (CMS/[facility])   • Ischemic cardiomyopathy       Therapy Treatment          Rehabilitation Treatment Summary     Row Name 10/22/18 1121             Treatment Time/Intention    Discipline physical therapist  -EH      Document Type therapy note (daily note)  -EH      Subjective Information no complaints  -EH      Mode of Treatment individual therapy;physical therapy  -EH      Patient Effort excellent  -EH      Existing Precautions/Restrictions cardiac;sternal  -EH      Recorded by [EH] Eva Bruce, PT 10/22/18 1215      Row Name 10/22/18 1121             Cognitive Assessment/Intervention- PT/OT    Orientation Status (Cognition) oriented x 4  -EH      Safety Deficit  (Cognitive) safety precautions awareness;safety precautions follow-through/compliance;insight into deficits/self awareness  -EH      Personal Safety Interventions fall prevention program maintained;gait belt;nonskid shoes/slippers when out of bed  -EH      Recorded by [EH] Eva Bruce, PT 10/22/18 1215      Row Name 10/22/18 1121             Safety Issues, Functional Mobility    Safety Issues Affecting Function (Mobility) safety precaution awareness;safety precautions follow-through/compliance  -EH      Recorded by [EH] Eva Bruce, PT 10/22/18 1215      Row Name 10/22/18 1121             Transfer Assessment/Treatment    Transfer Assessment/Treatment stand-sit transfer;sit-stand transfer  -EH      Recorded by [EH] Eva Bruce, PT 10/22/18 1215      Row Name 10/22/18 1121             Sit-Stand Transfer    Sit-Stand Str[place] (Transfers) independent  -EH      Recorded by [EH] Eva Bruce, PT 10/22/18 1215      Row Name 10/22/18 1121             Stand-Sit Transfer    Stand-Sit [place] (Transfers) independent  -EH      Recorded by [EH] Eva Bruce, PT 10/22/18 1215      Row Name 10/22/18 1121             Gait/Stairs Assessment/Training    61846 - Gait Training Minutes  15  -EH      [place]quist Level (Gait) verbal cues;stand by assist  -EH2      Assistive Device (Gait) cane, straight  -EH3      Distance in Feet (Gait) 120  -EH3      Pattern (Gait) step-through  -EH3      [place] Level (Stairs) contact guard  -EH3      Assistive Device (Stairs) cane, straight  -EH3      Handrail Location (Stairs) none  -EH3      Number of Steps (Stairs) 11  -EH3      Ascending Technique (Stairs) step-to-step  -EH3      Descending Technique (Stairs) step-to-step  -EH3      Comment (Gait/Stairs) Cues for technique with cane. Pt's cane and walker fitted to his height. Judieguanakojuana practiced ambulation with walker after fitting.  -EH3      Recorded by [] Eva Bruce, PT 10/22/18  1310  [EH2] Eva Bruce, PT 10/22/18 1314  [EH3] Eva Bruce, PT 10/22/18 1215      Row Name 10/22/18 1121             Therapeutic Exercise    Additional Documentation --   Pt educated on HEP, walking program, precautions upon home   -      88969 - PT Therapeutic Exercise Minutes 9  -EH      Recorded by [] Eva Bruce, PT 10/22/18 1310      Row Name 10/22/18 1121             Positioning and Restraints    Pre-Treatment Position sitting in chair/recliner  -EH      Post Treatment Position chair  -EH      In Chair sitting;with family/caregiver  -EH      Recorded by [] Eva Bruce, PT 10/22/18 1310      Row Name                Wound 10/11/18 0801 Other (See comments) chest incision    Wound - Properties Group Date first assessed: 10/11/18 [SH] Time first assessed: 0801 [SH] Side: Other (See comments) [SH] Location: chest [SH] Type: incision [SH] Recorded by:  [SH] Haase, Sherri L, RN 10/11/18 0801    Row Name                Wound 10/11/18 0801 Right leg incision    Wound - Properties Group Date first assessed: 10/11/18 [SH] Time first assessed: 0801 [SH] Side: Right [SH] Location: leg [SH] Type: incision [SH] Recorded by:  [SH] Haase, Sherri L, RN 10/11/18 0801    Row Name 10/22/18 1121             Coping    Observed Emotional State accepting;cooperative  -EH      Recorded by [] Eva Bruce, PT 10/22/18 1310      Row Name 10/22/18 1121             Outcome Summary/Treatment Plan (PT)    Daily Summary of Progress (PT) progress toward functional goals is good  -EH      Recorded by [] Eva Bruce, PT 10/22/18 1310        User Key  (r) = Recorded By, (t) = Taken By, (c) = Cosigned By    Initials Name Effective Dates Discipline     Eva Bruce, PT 06/19/15 -  PT    SH Haase, Sherri L, RN 06/16/16 -  Nurse          Wound 10/11/18 0801 Other (See comments) chest incision (Active)   Dressing Appearance dry;intact 10/22/2018  8:16 AM   Closure Liquid skin adhesive  10/22/2018  6:00 AM   Base dry;pink 10/22/2018  6:00 AM   Drainage Characteristics/Odor yellow 10/22/2018  6:00 AM   Drainage Amount none 10/22/2018  8:16 AM   Dressing Care, Wound border dressing 10/22/2018  8:16 AM       Wound 10/11/18 0801 Right leg incision (Active)   Dressing Appearance open to air 10/22/2018  8:16 AM   Closure Liquid skin adhesive 10/22/2018  6:00 AM   Drainage Amount none 10/22/2018  8:16 AM   Dressing Care, Wound open to air 10/22/2018  6:00 AM               PT Rehab Goals     Row Name 10/22/18 1121             Bed Mobility Goal 1 (PT)    Activity/Assistive Device (Bed Mobility Goal 1, PT) sit to supine/supine to sit  -EH      Wheeler Level/Cues Needed (Bed Mobility Goal 1, PT) independent  -EH      Time Frame (Bed Mobility Goal 1, PT) long term goal (LTG);10 days  -EH      Progress/Outcomes (Bed Mobility Goal 1, PT) goal not met  -EH         Transfer Goal 1 (PT)    Activity/Assistive Device (Transfer Goal 1, PT) sit-to-stand/stand-to-sit  -EH      Wheeler Level/Cues Needed (Transfer Goal 1, PT) independent  -EH      Time Frame (Transfer Goal 1, PT) long term goal (LTG);10 days  -EH      Progress/Outcome (Transfer Goal 1, PT) goal met  -EH         Gait Training Goal 1 (PT)    Activity/Assistive Device (Gait Training Goal 1, PT) gait (walking locomotion)  -EH      Wheeler Level (Gait Training Goal 1, PT) independent  -EH      Distance (Gait Goal 1, PT) 400  -EH      Time Frame (Gait Training Goal 1, PT) long term goal (LTG);10 days  -EH      Progress/Outcome (Gait Training Goal 1, PT) goal not met  -EH         Patient Education Goal (PT)    Activity (Patient Education Goal, PT) HEP  -EH      Wheeler/Cues/Accuracy (Memory Goal 2, PT) verbalizes understanding  -EH      Time Frame (Patient Education Goal, PT) long term goal (LTG);10 days  -EH      Progress/Outcome (Patient Education Goal, PT) goal met  -EH        User Key  (r) = Recorded By, (t) = Taken By, (c) = Cosigned By     Initials Name Provider Type Discipline     Eva Bruce PT Physical Therapist PT          Physical Therapy Education     Title: PT OT SLP Therapies (Resolved)     Topic: Physical Therapy (Resolved)     Point: Mobility training (Resolved)    Learning Progress Summary     Learner Status Readiness Method Response Comment Documented by    Patient Active Eager E,D NR  DM 10/19/18 0935     Done Acceptance E VU,NR  EH 10/18/18 1441     Active Eager E,D NR   10/17/18 1755     Active Acceptance E,D NR   10/14/18 1625     Active Acceptance E NR  VERONICA 10/12/18 0855     Active Acceptance E NR  VERONICA 10/12/18 0855    Family Done Acceptance E VU,NR   10/18/18 1441    Significant Other Active Eager E,D NR   10/19/18 0935     Active Eager E,D NR   10/17/18 1755     Active Acceptance E,D NR   10/14/18 1625          Point: Home exercise program (Resolved)    Learning Progress Summary     Learner Status Readiness Method Response Comment Documented by    Patient Active Eager E,D NR   10/19/18 0935     Done Acceptance E VU,NR   10/18/18 1441     Active Eager E,D NR   10/17/18 1755     Active Acceptance E,D NR   10/14/18 1625     Active Acceptance E NR  VERONICA 10/12/18 0855     Active Acceptance E NR  VERONICA 10/12/18 0855    Family Done Acceptance E VU,NR   10/18/18 1441    Significant Other Active Eager E,D NR   10/19/18 0935     Active Eager E,D NR   10/17/18 1755     Active Acceptance E,D NR   10/14/18 1625          Point: Body mechanics (Resolved)    Learning Progress Summary     Learner Status Readiness Method Response Comment Documented by    Patient Active Eager E,D NR  DM 10/19/18 0935     Done Acceptance E VU,NR  EH 10/18/18 1441     Active Eager E,D NR   10/17/18 1755     Active Acceptance E,D NR   10/14/18 1625     Done Acceptance E VU,NR   10/14/18 1537     Active Acceptance E NR  VERONICA 10/12/18 0855     Active Acceptance E NR  VERONICA 10/12/18 0855    Family Done Acceptance E VU,NR   10/18/18 1441     Significant Other Active Eager E,D NR   10/19/18 0935     Active Eager E,D NR   10/17/18 1755     Active Acceptance E,D NR   10/14/18 1625          Point: Precautions (Resolved)    Learning Progress Summary     Learner Status Readiness Method Response Comment Documented by    Patient Active Eager E,D NR   10/19/18 0935     Done Acceptance E VU,NR   10/18/18 1441     Active Eager E,D NR   10/17/18 1755     Active Acceptance E,D NR   10/14/18 1625     Active Acceptance E NR   10/12/18 0855     Active Acceptance E NR   10/12/18 0855    Family Done Acceptance E VU,NR   10/18/18 1441    Significant Other Active Eager E,D NR   10/19/18 0935     Active Eager E,D NR   10/17/18 1755     Active Acceptance E,D NR   10/14/18 1625                      User Key     Initials Effective Dates Name Provider Type Discipline     06/19/15 -  Rhiannon Muñoz, PT Physical Therapist PT     06/19/15 -  Eva Bruce, PT Physical Therapist PT     06/19/15 -  Loly Jones PT Physical Therapist PT     06/19/15 -  Skylar Tillman, PT Physical Therapist PT     06/16/16 -  Saige Grove, RN Registered Nurse Nurse                    PT Recommendation and Plan  Therapy Frequency (PT Clinical Impression): daily  Outcome Summary/Treatment Plan (PT)  Daily Summary of Progress (PT): progress toward functional goals is good  Plan of Care Reviewed With: patient  Outcome Summary: Pt ambulates in [place] and up/down 11 steps with cane after education on use of cane. HEP isssued to pt and discussed with customization for pt. Precautions discussed with pts wife and pt. Pt expected to d/c home this date.          Outcome Measures     Row Name 10/22/18 1121             How much help from another person do you currently need...    Turning from your back to your side while in flat bed without using bedrails? 3  -EH      Moving from lying on back to sitting on the side of a flat bed without bedrails? 2  -EH       Moving to and from a bed to a chair (including a wheelchair)? 4  -EH      Standing up from a chair using your arms (e.g., wheelchair, bedside chair)? 4  -EH      Climbing 3-5 steps with a railing? 3  -EH      To walk in hospital room? 4  -EH      AM-PAC 6 Clicks Score 20  -         Functional Assessment    Outcome Measure Options AM-PAC 6 Clicks Basic Mobility (PT)  -        User Key  (r) = Recorded By, (t) = Taken By, (c) = Cosigned By    Initials Name Provider Type     Eva Bruce, PT Physical Therapist           Time Calculation:         PT Charges     Row Name 10/22/18 1121             Time Calculation    Start Time 1121  -      PT Received On 10/22/18  -      PT Goal Re-Cert Due Date 11/01/18  -         Time Calculation- PT    Total Timed Code Minutes- PT 24 minute(s)  -         Timed Charges    51396 - PT Therapeutic Exercise Minutes 9  -EH      68569 - Gait Training Minutes  15  -EH        User Key  (r) = Recorded By, (t) = Taken By, (c) = Cosigned By    Initials Name Provider Type     Eva Bruce, PT Physical Therapist        Therapy Suggested Charges     Code   Minutes Charges    32413 (CPT®) Hc Pt Neuromusc Re Education Ea 15 Min      15909 (CPT®) Hc Pt Ther Proc Ea 15 Min 9 1    59206 (CPT®) Hc Gait Training Ea 15 Min 15 1    69240 (CPT®) Hc Pt Therapeutic Act Ea 15 Min      48553 (CPT®) Hc Pt Manual Therapy Ea 15 Min      79473 (CPT®) Hc Pt Iontophoresis Ea 15 Min      74369 (CPT®) Hc Pt Elec Stim Ea-Per 15 Min      32018 (CPT®) Hc Pt Ultrasound Ea 15 Min      87151 (CPT®) Hc Pt Self Care/Mgmt/Train Ea 15 Min      96736 (CPT®) Hc Pt Prosthetic (S) Train Initial Encounter, Each 15 Min      50218 (CPT®) Hc Pt Orthotic(S)/Prosthetic(S) Encounter, Each 15 Min      33687 (CPT®) Hc Orthotic(S) Mgmt/Train Initial Encounter, Each 15min      Total  24 2            PT G-Codes  Outcome Measure Options: AM-PAC 6 Clicks Basic Mobility (PT)  AM-PAC 6 Clicks Score: 20    Eva MASON  Teo, PT  10/22/2018

## 2018-10-22 NOTE — PROGRESS NOTES
Kar Mora  [id number]  1950   LOS: 11 days   Patient Care Team:  Bro Kan MD as PCP - General  Montoya, Hiren Brunson MD as Consulting Physician (Endocrinology)  Todd Qureshi MD as Cardiologist (Cardiology)    Chief Complaint:  Follow-up on CAD, hypertension    Subjective Patient denies any chest pain, shortness of breath, palpitations, presyncope, or syncope.  His cough has improved and he had a uneventful weekend.    Objective     Vital Sign Min/Max for last 24 hours  Temp  Min: 98.4 °F (36.9 °C)  Max: 98.8 °F (37.1 °C)   BP  Min: 105/67  Max: 154/74   Pulse  Min: 76  Max: 93   Resp  Min: 18  Max: 18   SpO2  Min: 93 %  Max: 95 %   No Data Recorded   Weight  Min: 94.3 kg (208 lb)  Max: 94.3 kg (208 lb)     1    10/21/18  0500 10/22/18  0705   Weight: 94.3 kg (208 lb) 94.3 kg (208 lb)         Intake/Output Summary (Last 24 hours) at 10/22/18 0844  Last data filed at 10/21/18 1700   Gross per 24 hour   Intake              360 ml   Output                0 ml   Net              360 ml       Physical Exam:     General Appearance:    Alert, cooperative, in no acute distress   Lungs:     Clear to auscultation,respirations regular, even and                   unlabored    Heart:    Regular and normal rate, normal S1 and S2, grade 1/6            murmur, no gallop, no rub, no click   Abdomen:  Extremities:   Soft, non-tender, bowel sounds audible x4    No edema, normal range of motion   Pulses:   Pulses palpable and equal bilaterally    Sternal incision CDI  Results Review:     Results from last 7 days  Lab Units 10/19/18  0353 10/18/18  0530 10/16/18  0517   SODIUM mmol/L 135 133 135   POTASSIUM mmol/L 4.3 4.7 4.5   CHLORIDE mmol/L 105 103 104   CO2 mmol/L 21.0 22.0 21.0   BUN mg/dL 55* 67* 50*   CREATININE mg/dL 1.27 1.50* 1.40*   GLUCOSE mg/dL 143* 296* 199*   CALCIUM mg/dL 8.5* 8.6* 8.4*       Results from last 7 days  Lab Units 10/19/18  0353 10/18/18  0530 10/16/18  0517   WBC 10*3/mm3  9.30 10.58 6.71   HEMOGLOBIN g/dL 10.6* 10.4* 10.1*  10.0*   HEMATOCRIT % 32.8* 32.5* 31.2*  31.0*   PLATELETS 10*3/mm3 390 385 288                   Medication Review: Reviewed    Assessment/Plan   Patient to be discharged today.  Cardiac status stable.  He will need a lifevest before discharge-rep has been contacted.  We would recommend the following cardiac medications:  · Atorvastatin 40 mg daily  · Carvedilol 25 mg BID  · Clopidogrel 75 mg daily  · Mucinex DM BID when necessary  · Ferrous sulfate 325 mg  BID X one month  · Entresto 24/26 BID  · Defer spironolactone/eplerenone at this time until renal function remains stable as an outpatient  · Torsemide 5 mg daily when necessary edema/shortness of breath/weight gain  · [facility] cardiology follow-up Dr. Qureshi in 4 - 6 weeks with echocardiogram  · Lifevest       Coronary artery disease involving native coronary artery of native heart with angina pectoris (CMS/[facility])    Type 2 diabetes mellitus, with long-term current use of insulin (CMS/[facility])    Hyperlipidemia LDL goal <70    Essential hypertension    Class 1 obesity due to excess calories in adult    Ischemic cardiomyopathy    Madhavi Stout, APRN    10/22/18  8:44 AM

## 2018-10-22 NOTE — PROGRESS NOTES
"Kar Mora  [id number]  1950     LOS: 11 days   Patient Care Team:  Bro Kan MD as PCP - General  MontoyaHiren MD as Consulting Physician (Endocrinology)  Todd Qureshi MD as Cardiologist (Cardiology)    Chief Complaint: Coronary artery disease      Subjective: Denies any chest pain or discomfort or external drainage    Objective:     Vital Sign Min/Max for last 24 hours  Temp  Min: 98.1 °F (36.7 °C)  Max: 98.8 °F (37.1 °C)   BP  Min: 115/65  Max: 154/74   Pulse  Min: 76  Max: 90   Resp  Min: 17  Max: 18   SpO2  Min: 93 %  Max: 95 %   No Data Recorded   No Data Recorded     Flowsheet Rows      First Filed Value   Admission Height  167.6 cm (66\") Documented at 10/11/2018 0604   Admission Weight  96.2 kg (212 lb) Documented at 10/11/2018 0604          Physical Exam:    Wound: Satisfactory.    Pulses:     Mediastinal and Chest Tube Drainage:       Results Review:     Results from last 7 days  Lab Units 10/19/18  0353   WBC 10*3/mm3 9.30   HEMOGLOBIN g/dL 10.6*   HEMATOCRIT % 32.8*   PLATELETS 10*3/mm3 390       Results from last 7 days  Lab Units 10/19/18  0353   SODIUM mmol/L 135   POTASSIUM mmol/L 4.3   CHLORIDE mmol/L 105   CO2 mmol/L 21.0   BUN mg/dL 55*   CREATININE mg/dL 1.27   GLUCOSE mg/dL 143*   CALCIUM mg/dL 8.5*             Assessment      Coronary artery disease involving native coronary artery of native heart with angina pectoris (CMS/[facility])    Type 2 diabetes mellitus, with long-term current use of insulin (CMS/[facility])    Hyperlipidemia LDL goal <70    Essential hypertension    Class 1 obesity due to excess calories in adult    Ischemic cardiomyopathy      We'll discharge when okay with Dr. Qureshi.        Migue Burton MD  10/22/18  6:57 AM      Please note that portions of this note were completed with a voice recognition program. Efforts were made to edit the dictations, but words may be mistranscribed  "

## 2018-10-22 NOTE — PLAN OF CARE
Problem: Patient Care Overview  Goal: Plan of Care Review  Outcome: Ongoing (interventions implemented as appropriate)   10/22/18 0556   Coping/Psychosocial   Plan of Care Reviewed With patient   Plan of Care Review   Progress improving   OTHER   Outcome Summary no changes over night, VSS, patient looking forward to going home in am

## 2018-10-22 NOTE — PLAN OF CARE
Problem: Patient Care Overview  Goal: Plan of Care Review  Outcome: Ongoing (interventions implemented as appropriate)   10/22/18 1310   Coping/Psychosocial   Plan of Care Reviewed With patient   OTHER   Outcome Summary Pt ambulates in [place] and up/down 11 steps with cane after education on use of cane. HEP isssued to pt and discussed with customization for pt. Precautions discussed with pts wife and pt. Pt expected to d/c home this date.

## 2018-10-22 NOTE — PROGRESS NOTES
[facility] Medicine Services  PROGRESS NOTE    Patient Name: Kar Mora  : 1950  MRN: [id number]    Date of Admission: 10/11/2018  Length of Stay: 11  Primary Care Physician: Bro Kan MD    Subjective   Subjective     CC:  F/u cough post CABG, medical management    HPI:      Patient up walking in room this morning with wife at bedside.  States that he is going home today.  Waiting on life vest placement.        Review of Systems    Gen- No fevers, chills  CV- No chest pain, palpitations  Resp- occ cough, dyspnea  GI- No N/V/D, abd pain    Otherwise ROS is negative except as mentioned in the HPI.    Objective   Objective     Vital Signs:   Temp:  [98.4 °F (36.9 °C)-98.8 °F (37.1 °C)] 98.4 °F (36.9 °C)  Heart Rate:  [76-93] 86  Resp:  [18] 18  BP: (105-154)/(59-74) 122/59      Physical Exam:  Constitutional: No acute distress, awake, alert, ambulating in room, wife at bedside.   HENT: NCAT, mucous membranes moist  Respiratory: clear to auscultation, regular unlabored breathing on room air   Cardiovascular: RRR, no murmurs, rubs, or gallops, palpable pedal pulses bilaterally, midline sternal incision c/d/i  Gastrointestinal: Positive bowel sounds, soft, nontender, nondistended. Obese   Musculoskeletal: 1+ bilateral LE edema   Psychiatric: appropriate affect, cooperative and calm  Neurologic: Oriented x 3, strength symmetric in all extremities, Cranial Nerves grossly intact to confrontation, speech clear. Follows commands   Skin: No rashes    Results Reviewed:  I have personally reviewed current lab, radiology, and data and agree.      Results from last 7 days  Lab Units 10/19/18  0353 10/18/18  0530 10/16/18  05   WBC 10*3/mm3 9.30 10.58 6.71   HEMOGLOBIN g/dL 10.6* 10.4* 10.1*  10.0*   HEMATOCRIT % 32.8* 32.5* 31.2*  31.0*   PLATELETS 10*3/mm3 390 385 288       Results from last 7 days  Lab Units 10/19/18  0353 10/18/18  0530 10/16/18  05   SODIUM mmol/L  135 133 135   POTASSIUM mmol/L 4.3 4.7 4.5   CHLORIDE mmol/L 105 103 104   CO2 mmol/L 21.0 22.0 21.0   BUN mg/dL 55* 67* 50*   CREATININE mg/dL 1.27 1.50* 1.40*   GLUCOSE mg/dL 143* 296* 199*   CALCIUM mg/dL 8.5* 8.6* 8.4*     Estimated Creatinine Clearance: 60.7 mL/min (by C-G formula based on SCr of 1.27 mg/dL).  No results found for: BNP    Microbiology Results Abnormal     Procedure Component Value - Date/Time    Resp Virus Profile (RVP) PCR - Swab, Nasopharynx [[id number]] Collected:  10/16/18 1341    Lab Status:  Final result Specimen:  Swab from Nasopharynx Updated:  10/19/18 0618     Influenza A PCR Negative     Influenza B PCR Negative     RSV A Negative     RSV B Negative     Parainfluenza Virus 1 Negative     Parainfluenza Virus 2 Negative     Parainfluenza Virus 3 Negative     Human Rhinovirus/Enterovirus Negative     Human Metapneumovirus Negative     Adenovirus Detection by PCR Negative    Narrative:       Performed at:  [address] [place] [address]  [id number]  : Bro Robertson MD, Phone:  [phone]    Influenza A & B, RT PCR - Swab, Nasopharynx [[id number]]  (Normal) Collected:  10/16/18 1341    Lab Status:  Final result Specimen:  Swab from Nasopharynx Updated:  10/16/18 1924     Influenza A PCR Not Detected     Influenza B PCR Not Detected          Imaging Results (last 24 hours)     ** No results found for the last 24 hours. **        Results for orders placed during the hospital encounter of 10/11/18   Adult Transthoracic Echo Complete W/ Cont if Necessary Per Protocol    Narrative · Estimated EF appears to be in the range of 26 - 30%.  · The following left ventricular wall segments are hypokinetic: mid   anterolateral. The following left ventricular wall segments are   dyskinetic: apical anterior.  · Right ventricular cavity is borderline dilated.  · Left atrial cavity size is mildly dilated.  · Mild mitral valve regurgitation is present          I have  reviewed the medications.      amoxicillin-clavulanate 1 tablet Oral Q12H   atorvastatin 40 mg Oral Nightly   carvedilol 25 mg Oral BID   clopidogrel 75 mg Oral Daily   ferrous sulfate 325 mg Oral BID With Meals   finasteride 5 mg Oral Daily   guaiFENesin 1,200 mg Oral Q12H   insulin detemir 30 Units Subcutaneous BID   insulin lispro 0-9 Units Subcutaneous 4x Daily With Meals & Nightly   insulin lispro 20 Units Subcutaneous TID With Meals   pantoprazole 40 mg Oral Q AM   pharmacy consult - MTM  Does not apply Daily   polyethylene glycol 17 g Oral Daily   sacubitril-valsartan 1 tablet Oral Q12H   sennosides-docusate sodium 2 tablet Oral BID   sodium chloride 3 mL Intravenous Q12H   sodium chloride 3-10 mL Intravenous Q8H   tamsulosin 0.4 mg Oral Daily   torsemide 5 mg Oral Daily         Assessment/Plan   Assessment / Plan     Active Hospital Problems    Diagnosis   • **Coronary artery disease involving native coronary artery of native heart with angina pectoris (CMS/HCC)     · Cardiac catheterization (07/20/2018): Multivessel CAD  · CABG by Dr Mark Anthony Burton (10/11/2018): LIMA to LAD, SVG to diagonal/circumflex, SVG to RPDA      • Ischemic cardiomyopathy     · Echo (10/14/2018): LVEF 30%. Left ventricular wall segments are hypokinetic: mid anterolateral. The following left ventricular wall segments are dyskinetic: apical anterior. Mild MR     • Type 2 diabetes mellitus, with long-term current use of insulin (CMS/HCC)   • Essential hypertension   • Class 1 obesity due to excess calories in adult   • Hyperlipidemia LDL goal <70     Brief Hospital Course to date:  Kar Mora is a 68 y.o. male with a remote hx of tobacco use, HTN, hypertensive heart disease, hyperlipidemia, insulin-dependent T2DM, obesity, CKD III (baseline 1.6), and gout who is admitted following elective CABG x 4 on 10/11/18:    Multivessel CAD S/p CABG x 4 on 10/11/18  Chronic LV Systolic Heart Failure due to ICM with LVEF 26-30%  - continue  aspirin, plavix, entresto, carvedilol, and atorvastatin  - Plan for life vest at discharge, d/w CM  - Gentle diuresis with oral torsemide, Dr. Qureshi following   - CT surgery managing serosanguinous drainage from inferior aspect of sternal wound   - No aldactone due to renal dysfunction  - Optimize pain control  --CTS concerned about sternal wires/incision, especially with aggressive cough issues the last several days.  Now going to monitor pt in house over the weekend.  Plan dc home early next week if stable.     Urinary Retention, failed TOV 10/16  - continue flomax, finasteride  --leong has been discontinued and he is voiding with no issues.    --will discharge with prescriptions for above meds.     Nonproductive Cough, likely due to atelectasis, post-op pain   --improving.    Bronchitis   - Improved, CXR negative for infiltrates   - Continue supportive care with tussionex, tessalon perles, xylocaine nebs, pain control   - Continue augmentin  --stable, cough significantly better today.    --continue to encourage splinting well with any cough or significant mvmt.   --pulm is following and will see him in follow up in 2-4 weeks after discharge.       CKD III  --much better today.  Cr 1.27  --monitor    Insulin-dependent T2DM, A1c 7.5% at baseline   - exacerbated in last several days by IV steroid dose (had previously been under moderate control)  - patient will be discharged home today and will resume his home insulin regimen.  He has been instructed to check his glucoses 2-3x per day and call his endocrinologist for blood glucoses that remain above 180.      Hypertension, BP reviewed and stable on current regimen    Normocytic Anemia    DVT Prophylaxis:  mechanical   CODE STATUS:   Code Status and Medical Interventions:   Ordered at: 10/11/18 1052     Level Of Support Discussed With:    Patient     Code Status:    CPR     Medical Interventions (Level of Support Prior to Arrest):    Full       Disposition: I expect  the patient to be discharged home today per CTS.      Electronically signed by EDGAR Gonzalez, 10/22/18, 3:22 PM.

## 2018-10-23 ENCOUNTER — READMISSION MANAGEMENT (OUTPATIENT)
Dept: CALL CENTER | Facility: HOSPITAL | Age: 68
End: 2018-10-23

## 2018-10-23 NOTE — OUTREACH NOTE
Prep Survey      Responses   Facility patient discharged from?  [facility]   Is patient eligible?  Yes   Discharge diagnosis  CABG with EVH   Does the patient have one of the following disease processes/diagnoses(primary or secondary)?  Cardiothoracic surgery   Does the patient have Home health ordered?  No   Is there a DME ordered?  Yes   What DME was ordered?  [facility] = [facility]   Comments regarding appointments  office to call with apmt   Prep survey completed?  Yes          Sandy Saldana RN

## 2018-10-24 ENCOUNTER — READMISSION MANAGEMENT (OUTPATIENT)
Dept: CALL CENTER | Facility: HOSPITAL | Age: 68
End: 2018-10-24

## 2018-10-24 NOTE — OUTREACH NOTE
CT Surgery Week 1 Survey      Responses   Facility patient discharged from?  [facility]   Does the patient have one of the following disease processes/diagnoses(primary or secondary)?  Cardiothoracic surgery   Is there a successful TCM telephone encounter documented?  No   Week 1 attempt successful?  Yes   Call start time  1417   Call end time  1427   Discharge diagnosis  CABG with EVH   Is patient permission given to speak with other caregiver?  Yes   List who call center can speak with  Mrs. Mora, spouse   Person spoke with today (if not patient) and relationship  Mrs. Mora, spouse   Meds reviewed with patient/caregiver?  Yes   Is the patient having any side effects they believe may be caused by any medication additions or changes?  No   Does the patient have all medications related to this admission filled (includes all antibiotics, pain medications, cardiac medications, etc.)  Yes   Is the patient taking all medications as directed (includes completed medication regime)?  Yes   Does the patient have a primary care provider?   Yes   Does the patient have an appointment scheduled with their C/T surgeon?  Yes   Comments regarding PCP  Bro Kan MD PCP. Appt 11/5/18.    Has the patient kept scheduled appointments due by today?  N/A   Comments  Erica HERNÁNDEZ appt 10/29/18. Post op appt with Dr Burton 12/6/18.    Has home health visited the patient within 72 hours of discharge?  N/A   What DME was ordered?  [facility] = [place],  [facility] - [place]   Has all DME been delivered?  Yes   Psychosocial issues?  No   Did the patient receive a copy of their discharge instructions?  Yes   Nursing interventions  Reviewed instructions with patient   What is the patient's perception of their health status since discharge?  Improving   Nursing interventions  Nurse provided patient education   Is the patient/caregiver able to teach back normal signs of recovery?  Nausea and lack of appetite   Nursing interventions   Reassured on normal signs of recovery   Is the patient /caregiver able to teach back basic post-op care?  Continue use of incentive spirometry at least 1 week post discharge, Practice 'cough and deep breath', No tub bath, swimming, or hot tub until instructed by MD, Keep incision areas clean, dry and protected   Is the patient/caregiver able to teach back signs and symptoms of incisional infection?  Increased redness, swelling or pain at the incisonal site, Increased drainage or bleeding, Incisional warmth, Pus or odor from incision, Fever   Is the patient/caregiver able to teach back steps to recovery at home?  Eat a well-balance diet, Make a list of questions for surgeon's appointment   Is the patient /caregiver able to teach back the importance of cardiac rehab?  Yes   Nursing interventions  Provided education on importance of cardiac rehab   Is the patient/caregiver able to teach back the hierarchy of who to call/visit for symptoms/problems? PCP, Specialist, Home health nurse, Urgent Care, ED, 911  Yes   Week 1 call completed?  Yes            Danette Perry RN

## 2018-10-25 RX ORDER — BENZONATATE 100 MG/1
100 CAPSULE ORAL 3 TIMES DAILY PRN
Qty: 90 CAPSULE | Refills: 1 | Status: SHIPPED | OUTPATIENT
Start: 2018-10-25 | End: 2018-11-29

## 2018-10-25 RX ORDER — TORSEMIDE 5 MG/1
5 TABLET ORAL DAILY PRN
Qty: 30 TABLET | Refills: 11 | Status: SHIPPED | OUTPATIENT
Start: 2018-10-25 | End: 2019-02-08

## 2018-10-25 RX ORDER — GUAIFENESIN 600 MG/1
1200 TABLET, EXTENDED RELEASE ORAL EVERY 12 HOURS SCHEDULED
Qty: 60 TABLET | Refills: 1 | Status: SHIPPED | OUTPATIENT
Start: 2018-10-25 | End: 2018-11-29

## 2018-10-25 RX ORDER — ATORVASTATIN CALCIUM 40 MG/1
40 TABLET, FILM COATED ORAL NIGHTLY
Qty: 30 TABLET | Refills: 11 | Status: SHIPPED | OUTPATIENT
Start: 2018-10-25 | End: 2019-03-19 | Stop reason: ALTCHOICE

## 2018-10-25 RX ORDER — CLOPIDOGREL BISULFATE 75 MG/1
75 TABLET ORAL DAILY
Qty: 30 TABLET | Refills: 11 | Status: SHIPPED | OUTPATIENT
Start: 2018-10-25 | End: 2019-11-14 | Stop reason: SDUPTHER

## 2018-10-26 ENCOUNTER — APPOINTMENT (OUTPATIENT)
Dept: PULMONOLOGY | Facility: HOSPITAL | Age: 68
End: 2018-10-26

## 2018-10-29 ENCOUNTER — OFFICE VISIT (OUTPATIENT)
Dept: CARDIOLOGY | Facility: HOSPITAL | Age: 68
End: 2018-10-29

## 2018-10-29 ENCOUNTER — APPOINTMENT (OUTPATIENT)
Dept: LAB | Facility: HOSPITAL | Age: 68
End: 2018-10-29

## 2018-10-29 ENCOUNTER — HOSPITAL ENCOUNTER (OUTPATIENT)
Dept: CARDIOLOGY | Facility: HOSPITAL | Age: 68
Discharge: HOME | End: 2018-10-29
Admitting: NURSE PRACTITIONER

## 2018-10-29 VITALS
TEMPERATURE: 97.4 F | HEIGHT: 66 IN | BODY MASS INDEX: 33.11 KG/M2 | SYSTOLIC BLOOD PRESSURE: 99 MMHG | OXYGEN SATURATION: 96 % | RESPIRATION RATE: 16 BRPM | DIASTOLIC BLOOD PRESSURE: 55 MMHG | WEIGHT: 206 LBS | HEART RATE: 92 BPM

## 2018-10-29 DIAGNOSIS — R94.31 PROLONGED Q-T INTERVAL ON ECG: ICD-10-CM

## 2018-10-29 DIAGNOSIS — Z79.4 TYPE 2 DIABETES MELLITUS WITH OTHER CIRCULATORY COMPLICATION, WITH LONG-TERM CURRENT USE OF INSULIN (HCC): ICD-10-CM

## 2018-10-29 DIAGNOSIS — I25.5 ISCHEMIC CARDIOMYOPATHY: ICD-10-CM

## 2018-10-29 DIAGNOSIS — I10 ESSENTIAL HYPERTENSION: ICD-10-CM

## 2018-10-29 DIAGNOSIS — I25.10 CORONARY ARTERY DISEASE INVOLVING NATIVE CORONARY ARTERY OF NATIVE HEART WITHOUT ANGINA PECTORIS: Primary | ICD-10-CM

## 2018-10-29 DIAGNOSIS — E11.59 TYPE 2 DIABETES MELLITUS WITH OTHER CIRCULATORY COMPLICATION, WITH LONG-TERM CURRENT USE OF INSULIN (HCC): ICD-10-CM

## 2018-10-29 LAB
ANION GAP SERPL CALCULATED.3IONS-SCNC: 9 MMOL/L (ref 3–11)
BUN BLD-MCNC: 35 MG/DL (ref 9–23)
BUN/CREAT SERPL: 23.5 (ref 7–25)
CALCIUM SPEC-SCNC: 8.9 MG/DL (ref 8.7–10.4)
CHLORIDE SERPL-SCNC: 101 MMOL/L (ref 99–109)
CO2 SERPL-SCNC: 28 MMOL/L (ref 20–31)
CREAT BLD-MCNC: 1.49 MG/DL (ref 0.6–1.3)
GFR SERPL CREATININE-BSD FRML MDRD: 47 ML/MIN/1.73
GLUCOSE BLD-MCNC: 101 MG/DL (ref 70–100)
POTASSIUM BLD-SCNC: 4.5 MMOL/L (ref 3.5–5.5)
SODIUM BLD-SCNC: 138 MMOL/L (ref 132–146)

## 2018-10-29 PROCEDURE — 36415 COLL VENOUS BLD VENIPUNCTURE: CPT | Performed by: NURSE PRACTITIONER

## 2018-10-29 PROCEDURE — 93005 ELECTROCARDIOGRAM TRACING: CPT | Performed by: NURSE PRACTITIONER

## 2018-10-29 PROCEDURE — 80048 BASIC METABOLIC PNL TOTAL CA: CPT | Performed by: NURSE PRACTITIONER

## 2018-10-29 PROCEDURE — 99213 OFFICE O/P EST LOW 20 MIN: CPT | Performed by: NURSE PRACTITIONER

## 2018-10-29 PROCEDURE — 93010 ELECTROCARDIOGRAM REPORT: CPT | Performed by: INTERNAL MEDICINE

## 2018-10-29 NOTE — PATIENT INSTRUCTIONS
"Only hold coreg/ entresto if blood pressure top number is <= 95 or if <100 and feeling dizzy/ short of breath.  Continue to weigh daily.      Stop any of the \"as needed\" cough or pain medications you can live without    Continue to take the torsemide (Demedex) as long as you are feeling short of air @ night  "

## 2018-10-29 NOTE — PROGRESS NOTES
Subjective:     Encounter Date:10/29/2018      Patient ID: Kar Mora is a 68 y.o. male.    Chief Complaint: SHF follow up post CABG 10/11/18    History of Present Illness:  [facility]. Mor[facility] interval hospital follow up.  His LVEF is 26-30%.  He is s/p CABG x 4 per Dr. Burton on 10/11/18.  He was discharged with LifeVest.  He was diuresed 4.8 liters during hospital stay.  He is scheduled to follow up with Pulmonology as an outpatient due to persistent cough.      Patient states cough is doing better.  He is feeling stronger every day.  He relates mild early morning dizziness which will pass with time.  He activity limiting dyspnea or weakness.   Appetite is good.  Sleep remains poor (but not worse than while inpatient).  He continues to report night time orthopnea.    Past Medical History:   Diagnosis Date   • Allergic rhinitis    • Anesthesia     pt says he has woken up during surgery   • Chest pain    • Coronary artery disease    • Dyslipidemia    • Erectile dysfunction    • GERD (gastroesophageal reflux disease)    • Gout    • Hyperlipidemia    • Insulin dependent type 2 diabetes mellitus (CMS/HCC) 2002    checks fsbg every am, a1c checked every 6 months, 7.2 in may 2018    • Kidney stones    • Labile hypertension 10/24/2016   • Myocardial infarct (CMS/[facility])    • Wears glasses    • Wears glasses        Past Surgical History:   Procedure Laterality Date   • CARDIAC CATHETERIZATION N/A 7/20/2018    Procedure: Left Heart Cath;  Surgeon: Madi Moctezuma MD;  Location:  [facility];  Service: Cardiovascular   • COLONOSCOPY  2016   • CORONARY ARTERY BYPASS GRAFT N/A 10/11/2018    Procedure: MEDIAN STERNOTMY<CORONARY ARTERY BYPASS WITH INTERNAL MAMMARY ARTERY GRAFT x  4 with EVH of the right greater saphenous vein;  Surgeon: Migue Burton MD;  Location:  [facility] OR;  Service: Cardiothoracic   • KIDNEY STONE SURGERY     • NASAL POLYP SURGERY  1986        Social History     Social History   • Marital status:      Spouse name: N/A   • Number of children: 2   • Years of education: N/A     Occupational History   •       Social History Main Topics   • Smoking status: Former Smoker     Packs/day: 2.00     Years: 10.00     Types: Cigarettes     Quit date: 1981   • Smokeless tobacco: Never Used      Comment: quit 35 years ago   • Alcohol use No   • Drug use: No   • Sexual activity: Defer     Other Topics Concern   • Not on file     Social History Narrative    Lives in [facility] with his wife.    Caffeine Intake: 0- 1 servings per day       Family History   Problem Relation Age of Onset   • Diabetes Mother    • Heart disease Mother    • Arthritis Mother    • Heart disease Father    • Arthritis Father    • Heart attack Father    • Diabetes Sister        Review of Systems   Constitution: Positive for malaise/fatigue and weight loss (7 lbs). Negative for chills, decreased appetite, diaphoresis, fever, weakness, night sweats and weight gain.   HENT: Negative for congestion, hearing loss, hoarse voice and nosebleeds.    Eyes: Negative for blurred vision, visual disturbance and visual halos.   Cardiovascular: Positive for dyspnea on exertion, leg swelling and orthopnea. Negative for chest pain, claudication, cyanosis, irregular heartbeat, near-syncope, palpitations, paroxysmal nocturnal dyspnea and syncope.   Respiratory: Negative for cough, hemoptysis, shortness of breath, sleep disturbances due to breathing, snoring, sputum production and wheezing.    Hematologic/Lymphatic: Negative for bleeding problem. Does not bruise/bleed easily.   Skin: Negative for dry skin, itching and rash.   Musculoskeletal: Positive for gout. Negative for arthritis, joint pain, joint swelling and myalgias.   Gastrointestinal: Positive for constipation. Negative for bloating, abdominal pain, diarrhea, flatus, heartburn, hematemesis, hematochezia, melena, nausea and vomiting.  "  Genitourinary: Negative for dysuria, frequency, hematuria, nocturia and urgency.   Neurological: Positive for excessive daytime sleepiness. Negative for dizziness, headaches, light-headedness and loss of balance.   Psychiatric/Behavioral: Negative for depression. The patient does not have insomnia and is not nervous/anxious.    Allergic/Immunologic:        Seasonal allergies      Vitals:    10/29/18 1123 10/29/18 1125 10/29/18 1127   BP: 99/56 100/58 99/55   BP Location: Right arm Left arm Left arm   Patient Position: Sitting Sitting Standing   Cuff Size: Adult     Pulse: 91 92 92   Resp: 16     Temp: 97.4 °F (36.3 °C)     TempSrc: Temporal Artery      SpO2: 96%     Weight: 93.4 kg (206 lb)     Height: 167.6 cm (66\")         Objective:     Physical Exam   Constitutional: He is oriented to person, place, and time. He appears well-developed and well-nourished. No distress.   HENT:   Head: Normocephalic and atraumatic.   Eyes: Pupils are equal, round, and reactive to light. Conjunctivae are normal. No scleral icterus.   Neck: Normal range of motion. Neck supple. No JVD present.   Cardiovascular: Normal rate, regular rhythm, normal heart sounds and intact distal pulses.    Pulmonary/Chest: Effort normal and breath sounds normal.   Musculoskeletal: Normal range of motion. He exhibits no edema.   Neurological: He is alert and oriented to person, place, and time. No cranial nerve deficit.   Skin: Skin is warm and dry.   Mid-sternal incision, MT sites, and EVH site well approximated without tenderness, exudate, or erythema.   Psychiatric: He has a normal mood and affect. His behavior is normal. Thought content normal.   Vitals reviewed.      Lab Review: Basic Metabolic Panel    Ref Range & Units 12:41   Glucose 70 - 100 mg/dL 101     BUN 9 - 23 mg/dL 35     Creatinine 0.60 - 1.30 mg/dL 1.49     Sodium 132 - 146 mmol/L 138    Potassium 3.5 - 5.5 mmol/L 4.5    Chloride 99 - 109 mmol/L 101    CO2 20.0 - 31.0 mmol/L 28.0  "   Calcium 8.7 - 10.4 mg/dL 8.9    eGFR Non African Amer >60 mL/min/1.73 47                  Assessment/ Plan:        Diagnosis Plan   1. Coronary artery disease s/p CABG  ASA allergic.  Therefore he is on plavix, statin, and coreg.  Progressing well at home.  Plans to enroll in Cardiac Rehab. CTS follow up 12/6 @ [facility].     2. Ischemic cardiomyopathy  Basic Metabolic Panel today to assess renal function and potassium (gfr continues to improve).    Reviewed BP parameters.  Advised to take both Coreg and Entresto at present doses unless SBP is <95 or <100 and symptomatic.  Continue Torsemide daily since he still reports orthopnea.  To have echo and Cardiology follow up 11/29/18. NHYA class II symptoms.  Discuss s/sx for which to call HF clinic.  Continue LifeVest.     3. Essential hypertension  Reviewed BP log.  Only elevated BP was this AM prior to OV.  BP here is acceptable.     4. Type 2 diabetes mellitus   Patient has been adjusting insulin and glucotrol post op.  He follows with Dr. Gutiérrez/ Endocrinology.     5. Prolonged Q-T interval on ECG  ECG 12 Lead today shows LBBB and QT interval 428 ms.  Re-check with Dr. Qureshi @ next visit.

## 2018-11-01 ENCOUNTER — READMISSION MANAGEMENT (OUTPATIENT)
Dept: CALL CENTER | Facility: HOSPITAL | Age: 68
End: 2018-11-01

## 2018-11-01 NOTE — OUTREACH NOTE
CT Surgery Week 2 Survey      Responses   Facility patient discharged from?  [facility]   Does the patient have one of the following disease processes/diagnoses(primary or secondary)?  Cardiothoracic surgery   Week 2 attempt successful?  Yes   Call start time  1345   Call end time  1353   Discharge diagnosis  CABG with EVH   Is patient permission given to speak with other caregiver?  Yes   List who call center can speak with  Mrs. Mora, spouse   Person spoke with today (if not patient) and relationship  -   Meds reviewed with patient/caregiver?  Yes   Is the patient having any side effects they believe may be caused by any medication additions or changes?  No   Does the patient have all medications related to this admission filled (includes all antibiotics, pain medications, cardiac medications, etc.)  Yes   Is the patient taking all medications as directed (includes completed medication regime)?  Yes   Does the patient have a primary care provider?   Yes   Does the patient have an appointment scheduled with their C/T surgeon?  Yes   Comments regarding PCP  Bro Kan MD PCP. Appt 11/5/18.    Has the patient kept scheduled appointments due by today?  Yes   Comments  Erica HERNÁNDEZ appt 10/29/18. Post op appt with Dr Burton 12/6/18.    Has home health visited the patient within 72 hours of discharge?  N/A   Psychosocial issues?  No   Comments  Patient biggest complaint is difficulty sleeping. States that he used to sleep on his abdomen and awakening frequently,  sleeping in a recliner. Advised to discuss at next Dr clements with provider.    Did the patient receive a copy of their discharge instructions?  Yes   Nursing interventions  Reviewed instructions with patient   What is the patient's perception of their health status since discharge?  Improving   Is the patient/caregiver able to teach back normal signs of recovery?  Pain or discomfort at incisional site, Depression or irritability,  Constipation, Nausea and lack of appetite   Nursing interventions  Reassured on normal signs of recovery   Is the patient/caregiver able to teach back signs and symptoms of incisional infection?  Increased redness, swelling or pain at the incisonal site, Increased drainage or bleeding, Incisional warmth, Pus or odor from incision, Fever [Patient states incisions healing well,  scabbed over. ]   Is the patient/caregiver able to teach back steps to recovery at home?  Set small, achievable goals for return to baseline health, Make a list of questions for surgeon's appointment, Eat a well-balance diet, Rest and rebuild strength, gradually increase activity   Is the patient/caregiver able to teach back the hierarchy of who to call/visit for symptoms/problems? PCP, Specialist, Home health nurse, Urgent Care, ED, 911  Yes   Week 2 call completed?  Yes          Danette Perry RN

## 2018-11-01 NOTE — OUTREACH NOTE
CT Surgery Week 1 Survey      Responses   Facility patient discharged from?  [facility]   Does the patient have one of the following disease processes/diagnoses(primary or secondary)?  Cardiothoracic surgery   Call start time  1345   Call end time  1353   Discharge diagnosis  CABG with [facility]   Is patient permission given to speak with other caregiver?  Yes   List who call center can speak with  Mrs. Mora, spouse   Person spoke with today (if not patient) and relationship  -   Meds reviewed with patient/caregiver?  Yes   Is the patient having any side effects they believe may be caused by any medication additions or changes?  No   Does the patient have all medications related to this admission filled (includes all antibiotics, pain medications, cardiac medications, etc.)  Yes   Is the patient taking all medications as directed (includes completed medication regime)?  Yes   Does the patient have a primary care provider?   Yes   Does the patient have an appointment scheduled with their C/T surgeon?  Yes   Comments regarding PCP  Bro Kan MD PCP. Appt 11/5/18.    Has the patient kept scheduled appointments due by today?  Yes   Comments  Erica HERNÁNDEZ appt 10/29/18. Post op appt with Dr Burton 12/6/18.    Has home health visited the patient within 72 hours of discharge?  N/A   Psychosocial issues?  No   Comments  Patient biggest complaint is difficulty sleeping. States that he used to sleep on his abdomen and awakening frequently,  sleeping in a recliner. Advised to discuss at next Dr clements with provider.    Did the patient receive a copy of their discharge instructions?  Yes   Nursing interventions  Reviewed instructions with patient   What is the patient's perception of their health status since discharge?  Improving   Is the patient/caregiver able to teach back normal signs of recovery?  Pain or discomfort at incisional site, Depression or irritability, Constipation, Nausea and lack of appetite    Nursing interventions  Reassured on normal signs of recovery   Is the patient/caregiver able to teach back signs and symptoms of incisional infection?  Increased redness, swelling or pain at the incisonal site, Increased drainage or bleeding, Incisional warmth, Pus or odor from incision, Fever [Patient states incisions healing well,  scabbed over. ]   Is the patient/caregiver able to teach back steps to recovery at home?  Set small, achievable goals for return to baseline health, Make a list of questions for surgeon's appointment, Eat a well-balance diet, Rest and rebuild strength, gradually increase activity   Is the patient/caregiver able to teach back the hierarchy of who to call/visit for symptoms/problems? PCP, Specialist, Home health nurse, Urgent Care, ED, 911  Yes            Danette Perry RN

## 2018-11-08 ENCOUNTER — OFFICE VISIT (OUTPATIENT)
Dept: PULMONOLOGY | Facility: CLINIC | Age: 68
End: 2018-11-08

## 2018-11-08 VITALS
OXYGEN SATURATION: 98 % | BODY MASS INDEX: 32.14 KG/M2 | WEIGHT: 200 LBS | TEMPERATURE: 98.6 F | DIASTOLIC BLOOD PRESSURE: 70 MMHG | SYSTOLIC BLOOD PRESSURE: 110 MMHG | HEIGHT: 66 IN | HEART RATE: 68 BPM

## 2018-11-08 DIAGNOSIS — R05.9 COUGH: Primary | ICD-10-CM

## 2018-11-08 PROCEDURE — 99213 OFFICE O/P EST LOW 20 MIN: CPT | Performed by: INTERNAL MEDICINE

## 2018-11-08 RX ORDER — LORAZEPAM 0.5 MG/1
0.5 TABLET ORAL EVERY 8 HOURS PRN
COMMUNITY
End: 2018-11-29

## 2018-11-08 NOTE — PROGRESS NOTES
Pulmonary Office Follow Up      Subjective   Chief Complaint: Cough    Kar Mora is a 68 y.o. male is being seen in follow up for Cough    History of Present Illness    Mr. Mora is a 68-year-old white male remote smoker (none for 37 years) with a history of hypertension and hypertensive cardiovascular disease, hyperlipidemia, diabetes mellitus, obesity, chronic kidney disease (baseline creatinine preop was 1.63), allergic rhinitis (previously on desensitization injections), and gout.  He underwent elective CABG ×4 on 10/11/18 CAD with LVEF 40%. He did well but then developed an intractable cough.      On 10/14/18 the patient developed a persistent intense nonproductive cough.There has never been significant sputum reduction or hemoptysis, and he has had no fever or chills. In addition white count remains normal. Chest x-ray however did reveal some opacity in the left base (atelectasis versus infiltrate) and he was started on Augmentin. He was given guaifenesin, dextromethorphan, Tessalon, and codeine derivatives to suppress his cough. In addition he received prn neb treatments with Xylocaine which were effective. By the time of discharge on 10/22/18, his cough had resolved and he was no longer using the cough suppressants or nebulizer treatments. He was discharged with Augmentin to complete a course for possible pneumonia.     He returns to clinic today for follow up. He is not having any cough. He has not had to use any PRN cough suppressants. His main complaint today is difficulty sleeping. He reports that he did not have any trouble with sleep prior to surgery. He is now sleeping upright in a recliner as he finds the Life Vest uncomfortable and he is usually a belly sleeper. He wakes up with vivid dreams. He also notes that he gets some spasms in his chest that are worse after eating. This is new since surgery and is slowly improving.       The following portions of the patient's history were reviewed  "and updated as appropriate: allergies, current medications, past family history, past medical history, past social history, past surgical history and problem list.    Review of Systems   Constitutional: Positive for activity change, appetite change, chills and fatigue.   HENT: Negative.    Eyes: Negative.    Respiratory: Positive for shortness of breath.    Cardiovascular: Positive for chest pain and leg swelling.   Gastrointestinal: Negative.    Endocrine: Negative.    Genitourinary: Negative.    Musculoskeletal: Negative.    Skin: Negative.    Allergic/Immunologic: Positive for environmental allergies and food allergies.   Neurological: Negative.    Hematological: Negative.    Psychiatric/Behavioral: Positive for sleep disturbance.         Objective   Blood pressure 110/70, pulse 68, temperature 98.6 °F (37 °C), height 167.6 cm (66\"), weight 90.7 kg (200 lb), SpO2 98 %.  Physical Exam   Constitutional: He is oriented to person, place, and time. He appears well-developed and well-nourished. No distress.   HENT:   Head: Normocephalic and atraumatic.   Mouth/Throat: Oropharynx is clear and moist.   Eyes: Conjunctivae are normal. Pupils are equal, round, and reactive to light. No scleral icterus.   Neck: Normal range of motion. Neck supple. No tracheal deviation present. No thyromegaly present.   Cardiovascular: Normal rate, regular rhythm and normal heart sounds.   Pulmonary/Chest: Effort normal and breath sounds normal. No respiratory distress.   Abdominal: Soft. Bowel sounds are normal. There is no tenderness.   Musculoskeletal: Normal range of motion. He exhibits no edema.   Lymphadenopathy:     He has no cervical adenopathy.   Neurological: He is alert and oriented to person, place, and time. He exhibits normal muscle tone. Coordination normal.   Skin: Skin is warm and dry. No rash noted. No erythema.   Psychiatric: He has a normal mood and affect. His speech is normal and behavior is normal. Judgment normal. "   Nursing note and vitals reviewed.      PFTs:  No new PFTs.     Imaging:  No new imaging.     Assessment/Plan   Kar was seen today for hospital follow up.    Diagnoses and all orders for this visit:    Cough        Discussion:  Mr. Mora is a 69yo M who returns to clinic for hospital follow up.     1. Cough  - Resolved.   - No longer needing any cough suppressants.   - May flare with allergies. Instructed to resume treatment for allergic rhinitis if this occurs.   - Instructed to call if cough recurs. Otherwise he may follow up PRN.     I personally spent over half of a total 15 minutes face to face with the patient in counseling and discussion and/or coordination of care as described above.     Agnes FONTIANE Case, DO  Pulmonary and Critical Care Medicine  Note Electronically Signed

## 2018-11-12 ENCOUNTER — READMISSION MANAGEMENT (OUTPATIENT)
Dept: CALL CENTER | Facility: HOSPITAL | Age: 68
End: 2018-11-12

## 2018-11-12 NOTE — OUTREACH NOTE
CT Surgery Week 3 Survey      Responses   Facility patient discharged from?  [facility]   Does the patient have one of the following disease processes/diagnoses(primary or secondary)?  Cardiothoracic surgery   Week 3 attempt successful?  Yes   Call start time  1136   Call end time  1141   Meds reviewed with patient/caregiver?  Yes   Is the patient having any side effects they believe may be caused by any medication additions or changes?  No   Does the patient have all medications related to this admission filled (includes all antibiotics, pain medications, cardiac medications, etc.)  Yes   Is the patient taking all medications as directed (includes completed medication regime)?  Yes   Does the patient have a primary care provider?   Yes   Does the patient have an appointment scheduled with their C/T surgeon?  Yes   Has the patient kept scheduled appointments due by today?  Yes   Has home health visited the patient within 72 hours of discharge?  N/A   Has all DME been delivered?  Yes   Psychosocial issues?  No   Did the patient receive a copy of their discharge instructions?  Yes   Nursing interventions  Reviewed instructions with patient, Educated on MyChart   What is the patient's perception of their health status since discharge?  Improving   Nursing interventions  Nurse provided patient education   Is the patient/caregiver able to teach back normal signs of recovery?  Nausea and lack of appetite, Constipation, Depression or irritability, Pain or discomfort at incisional site   Nursing interventions  Reassured on normal signs of recovery   Is the patient /caregiver able to teach back basic post-op care?  Continue use of incentive spirometry at least 1 week post discharge, Practice 'cough and deep breath', Drive as instructed by MD in discharge instructions, Take showers only when approved by MD-sponge bathe until then, No tub bath, swimming, or hot tub until instructed by MD, Keep incision areas clean, dry and  protected, Do not remove steri-strips, Lifting as instructed by MD in discharge instructions   Is the patient/caregiver able to teach back signs and symptoms of incisional infection?  Increased redness, swelling or pain at the incisonal site, Increased drainage or bleeding, Incisional warmth, Pus or odor from incision, Fever   Is the patient/caregiver able to teach back steps to recovery at home?  Set small, achievable goals for return to baseline health, Rest and rebuild strength, gradually increase activity, Weigh daily, Practice good oral hygiene, Eat a well-balance diet, Make a list of questions for surgeon's appointment   Is the patient /caregiver able to teach back the importance of cardiac rehab?  Yes   Nursing interventions  Provided education on importance of cardiac rehab   Is the patient/caregiver able to teach back the hierarchy of who to call/visit for symptoms/problems? PCP, Specialist, Home health nurse, Urgent Care, ED, 911  Yes   Additional teach back comments  Cardiac s/s , s/s of infection   Week 3 call completed?  Yes   Wrap up additional comments  States is feeling much better-states still has occasional trouble sleeping. States incisions are healed. Denies any problems today.          Margo Toth, RN

## 2018-11-19 ENCOUNTER — READMISSION MANAGEMENT (OUTPATIENT)
Dept: CALL CENTER | Facility: HOSPITAL | Age: 68
End: 2018-11-19

## 2018-11-19 NOTE — OUTREACH NOTE
CT Surgery Week 4 Survey      Responses   Facility patient discharged from?  [facility]   Does the patient have one of the following disease processes/diagnoses(primary or secondary)?  Cardiothoracic surgery   Week 4 attempt successful?  Yes   Call start time  1727   Call end time  1730   Discharge diagnosis  CABG with [facility]   Meds reviewed with patient/caregiver?  Yes   Is the patient having any side effects they believe may be caused by any medication additions or changes?  No   Is the patient taking all medications as directed (includes completed medication regime)?  Yes   Has the patient kept scheduled appointments due by today?  Yes   Is the patient still receiving Home Health Services?  N/A   Psychosocial issues?  No   Comments  No complaints today.   What is the patient's perception of their health status since discharge?  Improving   Nursing interventions  Nurse provided patient education   Is the patient/caregiver able to teach back normal signs of recovery?  Nausea and lack of appetite, Constipation, Depression or irritability, Pain or discomfort at incisional site   Nursing interventions  Reassured on normal signs of recovery   Is the patient/caregiver able to teach back steps to recovery at home?  Set small, achievable goals for return to baseline health, Rest and rebuild strength, gradually increase activity, Weigh daily, Practice good oral hygiene, Eat a well-balance diet, Make a list of questions for surgeon's appointment   Is the patient/caregiver able to teach back the hierarchy of who to call/visit for symptoms/problems? PCP, Specialist, Home health nurse, Urgent Care, ED, 911  Yes   Week 4 Call Completed?  Yes          Servando Licona RN

## 2018-11-26 NOTE — PROGRESS NOTES
Subjective:     Encounter Date:11/29/2018      Patient ID: Kar Mora is a 68 y.o.   white male, , from [place], [place].      REFERRING PHYSICIAN/ENDOCRINOLOGIST: Hiren Montoya MD  ALLERGIST: Abhijeet Madrid MD  PHYSICIAN: Bro Kan MD  INTERVENTIONAL CARDIOLOGIST:  Madi Moctezuma MD, MSC, FACC .  CARDIOVASCULAR SURGEON: Migue Burton MD    Chief Complaint:   Chief Complaint   Patient presents with   • Coronary Artery Disease     Problem List:  1. Insulin-dependent type 2 diabetes mellitus without apparent end organ damage; hemoglobin A1c 6.4%, November 2017; 8.0%, July 2018  2. Labile hypertension.  3. Dyslipidemia.  4. Erectile dysfunction.  5. Remote nasal polypectomy, 1986.  6. Allergic rhinitis.  7. Probable hypertensive cardiovascular disease:  a. Abnormal EKG with abnormal acceptable echocardiographic GXT, September 2004.   b. Acceptable echocardiographic GXT with preserved exercise capacity and mild LVH, March 2008.   c. Acceptable quantitative SPECT Gated Cardiolite GXT with nominal myocardial perfusion to 88% of predicted exercise capacity and 90% predicted maximum heart rate with preserved LVEF (0.65) with continued medical therapy felt warranted, December 2012.  d. Residual class I symptoms with recent documented abnormal EKG (LBBB/first-degree AV block) with abnormal echocardiogram demonstrating mild left ventricular chamber enlargement with mild reduction in the LVEF (0.42) without PE or pulmonary hypertension.  e. Resident class I symptoms with persistent abnormal echocardiogram (LVEF 0.40), with mild concentric LVH and mild left atrial enlargement without pulmonary arterial hypertension or pericardial effusion, July 2015.  f. Residual class I symptoms with acceptable  echocardiogram (LVEF 0.50) with mild-moderate RVE, mild-moderate LAE, and aortic valve sclerosis with no evidence of pericardial effusion (November 2016).  g. Abnormal echocardiogram 5/25/18: LVEF  0.40, mild concentric hypertrophy, LA dilated, aortic valve exhibits sclerosis, mild MR, trace TR, no pericardial effusion  h. Echocardiogram 11/29/18; preliminary EF demonstrates 31-35 percent  i. Residual class I symptoms  8. Moderate obesity (BMI 33.8).  9. Ischemic heart disease:              A. Recent NYHA class II exertional dyspnea/CCS class II-III angina pectoris with diagnostic coronary angiography demonstrating severe 3-vessel obstructive coronary artery disease              B. Abnormal electrocardiogram (LBBB), July 2018                   C. [facility] 11 day hospitalization for CABG ×4 vessels 10/11/18: SVG to diagonal and circumflex, SVG to PDA, LIMA to LAD, discharged with LifeVest                D. Residual class I symptoms     Allergies   Allergen Reactions   • Asa [Aspirin] Other (See Comments)      upper airway congestion         Current Outpatient Medications:   •  atorvastatin (LIPITOR) 40 MG tablet, Take 1 tablet by mouth Every Night., Disp: 30 tablet, Rfl: 11  •  carvedilol (COREG) 25 MG tablet, take 1 tablet by mouth twice a day, Disp: 180 tablet, Rfl: 3  •  clopidogrel (PLAVIX) 75 MG tablet, Take 1 tablet by mouth Daily., Disp: 30 tablet, Rfl: 11  •  Empagliflozin (JARDIANCE) 25 MG tablet, Take 25 mg by mouth Daily., Disp: , Rfl:   •  fenofibrate 160 MG tablet, Take 160 mg by mouth Daily., Disp: , Rfl:   •  JANUVIA 100 MG tablet, Take 100 mg by mouth Daily., Disp: , Rfl: 1  •  metFORMIN (GLUCOPHAGE) 500 MG tablet, Take 1,000 mg by mouth 2 (Two) Times a Day., Disp: , Rfl: 1  •  Multiple Vitamins-Minerals (MULTIVITAMIN ADULT PO), Take 1 tablet by mouth Daily., Disp: , Rfl:   •  nitroglycerin (NITROSTAT) 0.4 MG SL tablet, Place 1 tablet under the tongue Every 5 (Five) Minutes As Needed (up to three doses)., Disp: 30 tablet, Rfl: 1  •  sacubitril-valsartan (ENTRESTO) 24-26 MG tablet, Take 1 tablet by mouth Every 12 (Twelve) Hours., Disp: 60 tablet, Rfl: 11  •  torsemide (DEMADEX) 5 MG tablet, Take 1  tablet by mouth Daily As Needed (Increased shortness of breath or edema/swelling)., Disp: 30 tablet, Rfl: 11  •  TOUJEO SOLOSTAR 300 UNIT/ML solution pen-injector, 70 Units Every Night., Disp: , Rfl: 1  •  Triamcinolone Acetonide (NASACORT ALLERGY 24HR) 55 MCG/ACT nasal inhaler, 2 sprays into each nostril Daily As Needed for Rhinitis., Disp: , Rfl:   No current facility-administered medications for this visit.     Facility-Administered Medications Ordered in Other Visits:   •  Chlorhexidine Gluconate Cloth 2 % pads 1 application, 1 application, Topical, Q12H PRN, Adonis Capps PA    HISTORY OF PRESENT ILLNESS:  Patient is here for a 4 week follow-up after having an 11 day [facility] hospitalization for a CABG x 4 vessels October 2018.  He has some brief hypertension postoperatively and complicated by a prolonged intractable cough with some sternal incisional drainage.  Patient had a low EF and a LifeVest was arranged prior to his discharge.  He had an echocardiogram before his appointment today and preliminary results demonstrated 31-35%.  He has been compliant using his LifeVest and was encouraged to continue using this until he is seen next appointment late January 2019.  He has not had any defibrillator shocks.  His cough has subsided and he has no complaints of chest pain, shortness of breath, dizziness, presyncope, or syncope.  His blood pressures at home have been around 110 systolic.  There was one day where he had to hold his Entresto because his blood pressure was too low.  His incision has healed well.  He is scheduled to see Dr. Burton 12/6/18.  He brought his blood pressure log with him today for me to review and his blood pressure has been between 106-124 systolic on average with heart rates in the 80s-90s.      Review of Systems   Constitution: Positive for weight loss.   Cardiovascular: Positive for chest pain and orthopnea.   Respiratory: Positive for cough and sleep disturbances due to breathing.   "  Endocrine: Positive for cold intolerance.   Skin: Positive for dry skin.   Musculoskeletal: Positive for gout.   Allergic/Immunologic: Positive for environmental allergies.      Obtained and otherwise negative except as outlined in problem list and HPI.    Procedures       Objective:       Vitals:    11/29/18 0958 11/29/18 0959   BP: (!) 85/56 (!) 78/54   BP Location: Left arm Left arm   Patient Position: Sitting Standing   Pulse: 85 89   Weight: 91.4 kg (201 lb 6.4 oz)    Height: 167.6 cm (66\")    Recheck blood pressure left arm sitting was 92/54  Body mass index is 32.51 kg/m².  Last weight August 2018 was 209 pounds  Physical Exam   Constitutional: He is oriented to person, place, and time. He appears well-developed and well-nourished.   Patient wearing a LifeVest   Neck: No JVD present. Carotid bruit is not present. No thyromegaly present.   Cardiovascular: Regular rhythm, S1 normal, S2 normal and normal heart sounds. Exam reveals no gallop, no S3 and no friction rub.   No murmur heard.  Pulses:       Dorsalis pedis pulses are 2+ on the right side, and 2+ on the left side.        Posterior tibial pulses are 2+ on the right side, and 2+ on the left side.   Pulmonary/Chest: Effort normal and breath sounds normal. He has no wheezes. He has no rhonchi. He has no rales.   Abdominal: Soft. He exhibits no mass. There is no hepatosplenomegaly. There is no tenderness. There is no guarding.   Bowel sounds audible x4   Musculoskeletal: Normal range of motion. He exhibits no edema.   Lymphadenopathy:     He has no cervical adenopathy.   Neurological: He is alert and oriented to person, place, and time.   Skin: Skin is warm, dry and intact. No rash noted.        Sternal incision CDI   Vitals reviewed.        Lab Review:   Lab Results   Component Value Date    GLUCOSE 101 (H) 10/29/2018    BUN 35 (H) 10/29/2018    CREATININE 1.49 (H) 10/29/2018    EGFRIFNONA 47 (L) 10/29/2018    BCR 23.5 10/29/2018    CO2 28.0 10/29/2018    " CALCIUM 8.9 10/29/2018    ALBUMIN 4.07 10/12/2018    AST 33 10/10/2018    ALT 29 10/10/2018       Lab Results   Component Value Date    WBC 9.30 10/19/2018    HGB 10.6 (L) 10/19/2018    HCT 32.8 (L) 10/19/2018    MCV 88.4 10/19/2018     10/19/2018       Lab Results   Component Value Date    HGBA1C 7.50 (H) 10/10/2018       Lab Results   Component Value Date    CHOL 169 07/20/2018     Lab Results   Component Value Date    TRIG 385 (H) 07/20/2018     Lab Results   Component Value Date    HDL 27 (L) 07/20/2018     Lab Results   Component Value Date    LDL 91 07/20/2018           Assessment:     Patient is here for a 4 week follow-up after having an 11 day [facility] hospitalization for a CABG x 4 vessels October 2018.  He has some brief hypertension postoperatively and complicated by a prolonged intractable cough with some sternal incisional drainage.  Patient had a low EF and a LifeVest was arranged prior to his discharge.  He had an echocardiogram before his appointment today and preliminary results demonstrated EF 31-35 percent.  The patient was advised to continue wearing his LifeVest until his next scheduled appointment in late January 2019.  I will refill his nitroglycerin sublingual.  When his echocardiogram results are available, we will review and send a letter with the results.     Diagnosis Plan   1. Ischemic cardiomyopathy  Review echocardiogram results when available; preliminary EF 31-35 percent.  Patient to continue wearing LifeVest    2. Coronary artery disease involving native coronary artery of native heart without angina pectoris  Stable    3. Essential hypertension  Controlled           Plan:         1. Patient to continue current medications and close follow up with the above providers.  2. Tentative cardiology follow up in January 2019 or patient may return sooner PRN.  3. Review final echocardiogram results when available  4. Continue wearing LifeVest  5. Refill NTG SL  6. Continue monitoring bp,  HR       Madhavi Stout, APRN

## 2018-11-29 ENCOUNTER — HOSPITAL ENCOUNTER (OUTPATIENT)
Dept: CARDIOLOGY | Facility: HOSPITAL | Age: 68
Discharge: HOME | End: 2018-11-29
Admitting: NURSE PRACTITIONER

## 2018-11-29 ENCOUNTER — OFFICE VISIT (OUTPATIENT)
Dept: CARDIOLOGY | Facility: CLINIC | Age: 68
End: 2018-11-29

## 2018-11-29 VITALS
HEIGHT: 66 IN | SYSTOLIC BLOOD PRESSURE: 78 MMHG | BODY MASS INDEX: 32.37 KG/M2 | HEART RATE: 89 BPM | DIASTOLIC BLOOD PRESSURE: 54 MMHG | WEIGHT: 201.4 LBS

## 2018-11-29 DIAGNOSIS — I10 ESSENTIAL HYPERTENSION: ICD-10-CM

## 2018-11-29 DIAGNOSIS — I25.10 CORONARY ARTERY DISEASE INVOLVING NATIVE CORONARY ARTERY OF NATIVE HEART WITHOUT ANGINA PECTORIS: ICD-10-CM

## 2018-11-29 DIAGNOSIS — I25.5 ISCHEMIC CARDIOMYOPATHY: Primary | ICD-10-CM

## 2018-11-29 DIAGNOSIS — I25.5 ISCHEMIC CARDIOMYOPATHY: ICD-10-CM

## 2018-11-29 LAB
BH CV ECHO MEAS - AO ROOT AREA (BSA CORRECTED): 1.3
BH CV ECHO MEAS - AO ROOT AREA: 5.2 CM^2
BH CV ECHO MEAS - AO ROOT DIAM: 2.6 CM
BH CV ECHO MEAS - BSA(HAYCOCK): 2.1 M^2
BH CV ECHO MEAS - BSA: 2 M^2
BH CV ECHO MEAS - BZI_BMI: 32.3 KILOGRAMS/M^2
BH CV ECHO MEAS - BZI_METRIC_HEIGHT: 167.6 CM
BH CV ECHO MEAS - BZI_METRIC_WEIGHT: 90.7 KG
BH CV ECHO MEAS - EDV(CUBED): 82.2 ML
BH CV ECHO MEAS - EDV(MOD-SP2): 190 ML
BH CV ECHO MEAS - EDV(MOD-SP4): 220 ML
BH CV ECHO MEAS - EDV(TEICH): 85.3 ML
BH CV ECHO MEAS - EF(CUBED): 45.9 %
BH CV ECHO MEAS - EF(MOD-BP): 38 %
BH CV ECHO MEAS - EF(MOD-SP4): 38.2 %
BH CV ECHO MEAS - EF(TEICH): 38.6 %
BH CV ECHO MEAS - ESV(CUBED): 44.5 ML
BH CV ECHO MEAS - ESV(MOD-SP2): 91 ML
BH CV ECHO MEAS - ESV(MOD-SP4): 136 ML
BH CV ECHO MEAS - ESV(TEICH): 52.4 ML
BH CV ECHO MEAS - FS: 18.5 %
BH CV ECHO MEAS - IVS/LVPW: 0.96
BH CV ECHO MEAS - IVSD: 1.3 CM
BH CV ECHO MEAS - LA DIMENSION: 4 CM
BH CV ECHO MEAS - LA/AO: 1.5
BH CV ECHO MEAS - LV DIASTOLIC VOL/BSA (35-75): 110 ML/M^2
BH CV ECHO MEAS - LV MASS(C)D: 222.3 GRAMS
BH CV ECHO MEAS - LV MASS(C)DI: 111.1 GRAMS/M^2
BH CV ECHO MEAS - LV SYSTOLIC VOL/BSA (12-30): 68 ML/M^2
BH CV ECHO MEAS - LVIDD: 4.3 CM
BH CV ECHO MEAS - LVIDS: 3.5 CM
BH CV ECHO MEAS - LVLD AP2: 8.7 CM
BH CV ECHO MEAS - LVLD AP4: 8.7 CM
BH CV ECHO MEAS - LVLS AP2: 8.1 CM
BH CV ECHO MEAS - LVLS AP4: 8.1 CM
BH CV ECHO MEAS - LVPWD: 1.4 CM
BH CV ECHO MEAS - SI(CUBED): 18.9 ML/M^2
BH CV ECHO MEAS - SI(MOD-SP2): 49.5 ML/M^2
BH CV ECHO MEAS - SI(MOD-SP4): 42 ML/M^2
BH CV ECHO MEAS - SI(TEICH): 16.5 ML/M^2
BH CV ECHO MEAS - SV(CUBED): 37.8 ML
BH CV ECHO MEAS - SV(MOD-SP2): 99 ML
BH CV ECHO MEAS - SV(MOD-SP4): 84 ML
BH CV ECHO MEAS - SV(TEICH): 32.9 ML
LV EF 2D ECHO EST: 36 %
MAXIMAL PREDICTED HEART RATE: 153 BPM
STRESS TARGET HR: 130 BPM

## 2018-11-29 PROCEDURE — 93308 TTE F-UP OR LMTD: CPT

## 2018-11-29 PROCEDURE — 25010000002 SULFUR HEXAFLUORIDE MICROSPH 60.7-25 MG RECONSTITUTED SUSPENSION: Performed by: INTERNAL MEDICINE

## 2018-11-29 PROCEDURE — 93308 TTE F-UP OR LMTD: CPT | Performed by: INTERNAL MEDICINE

## 2018-11-29 PROCEDURE — 99214 OFFICE O/P EST MOD 30 MIN: CPT | Performed by: NURSE PRACTITIONER

## 2018-11-29 RX ORDER — NITROGLYCERIN 0.4 MG/1
0.4 TABLET SUBLINGUAL
Qty: 30 TABLET | Refills: 1 | Status: SHIPPED | OUTPATIENT
Start: 2018-11-29 | End: 2019-02-08

## 2018-11-29 RX ADMIN — SULFUR HEXAFLUORIDE 4 ML: KIT at 09:40

## 2018-11-30 DIAGNOSIS — I25.5 ISCHEMIC CARDIOMYOPATHY: Primary | ICD-10-CM

## 2018-12-06 ENCOUNTER — OFFICE VISIT (OUTPATIENT)
Dept: CARDIAC SURGERY | Facility: CLINIC | Age: 68
End: 2018-12-06

## 2018-12-06 DIAGNOSIS — I25.10 CORONARY ARTERY DISEASE INVOLVING NATIVE CORONARY ARTERY OF NATIVE HEART WITHOUT ANGINA PECTORIS: Primary | ICD-10-CM

## 2018-12-06 PROCEDURE — 99024 POSTOP FOLLOW-UP VISIT: CPT | Performed by: THORACIC SURGERY (CARDIOTHORACIC VASCULAR SURGERY)

## 2018-12-07 VITALS
DIASTOLIC BLOOD PRESSURE: 64 MMHG | HEIGHT: 66 IN | SYSTOLIC BLOOD PRESSURE: 114 MMHG | HEART RATE: 89 BPM | BODY MASS INDEX: 32.51 KG/M2 | OXYGEN SATURATION: 99 % | WEIGHT: 202.3 LBS

## 2018-12-07 NOTE — PROGRESS NOTES
12/06/2018  Patient Information  Kar Mora                                                                                          [address] [place] [address]   1950  'PCP/Referring Physician'  Bro Kan MD  [phone]  No ref. provider found    Chief Complaint   Patient presents with   • Follow-up     Hospital follow up s/p CABG for CAD        History of Present Illness:  Mr. Mora returns today for follow up status post CABG.  He appears to be doing well in all regards.  He has had no major health issues.  He is followed closely by Dr. Qureshi.      Patient Active Problem List   Diagnosis   • Type 2 diabetes mellitus, with long-term current use of insulin (CMS/[facility])   • Hyperlipidemia LDL goal <70   • Erectile dysfunction   • Allergic rhinitis. Desensitization injections discontinued July 2018   • Essential hypertension   • Class 1 obesity due to excess calories in adult   • Left bundle branch block   • Coronary artery disease involving native coronary artery of native heart without angina pectoris   • Cough   • Ischemic cardiomyopathy     Past Medical History:   Diagnosis Date   • Allergic rhinitis    • Anesthesia     pt says he has woken up during surgery   • Chest pain    • Coronary artery disease    • Dyslipidemia    • Erectile dysfunction    • GERD (gastroesophageal reflux disease)    • Gout    • Hyperlipidemia    • Insulin dependent type 2 diabetes mellitus (CMS/HCC) 2002    checks fsbg every am, a1c checked every 6 months, 7.2 in may 2018    • Kidney stones    • Labile hypertension 10/24/2016   • Myocardial infarct (CMS/[facility])    • Wears glasses    • Wears glasses      Past Surgical History:   Procedure Laterality Date   • COLONOSCOPY  2016   • KIDNEY STONE SURGERY     • NASAL POLYP SURGERY  1986       Current Outpatient Medications:   •  atorvastatin (LIPITOR) 40 MG tablet, Take 1 tablet by mouth Every Night., Disp: 30 tablet, Rfl: 11  •  carvedilol (COREG) 25 MG  tablet, take 1 tablet by mouth twice a day, Disp: 180 tablet, Rfl: 3  •  clopidogrel (PLAVIX) 75 MG tablet, Take 1 tablet by mouth Daily., Disp: 30 tablet, Rfl: 11  •  Empagliflozin (JARDIANCE) 25 MG tablet, Take 25 mg by mouth Daily., Disp: , Rfl:   •  fenofibrate 160 MG tablet, Take 160 mg by mouth Daily., Disp: , Rfl:   •  JANUVIA 100 MG tablet, Take 100 mg by mouth Daily., Disp: , Rfl: 1  •  metFORMIN (GLUCOPHAGE) 500 MG tablet, Take 1,000 mg by mouth 2 (Two) Times a Day., Disp: , Rfl: 1  •  Multiple Vitamins-Minerals (MULTIVITAMIN ADULT PO), Take 1 tablet by mouth Daily., Disp: , Rfl:   •  nitroglycerin (NITROSTAT) 0.4 MG SL tablet, Place 1 tablet under the tongue Every 5 (Five) Minutes As Needed (up to three doses)., Disp: 30 tablet, Rfl: 1  •  sacubitril-valsartan (ENTRESTO) 24-26 MG tablet, Take 1 tablet by mouth Every 12 (Twelve) Hours., Disp: 60 tablet, Rfl: 11  •  torsemide (DEMADEX) 5 MG tablet, Take 1 tablet by mouth Daily As Needed (Increased shortness of breath or edema/swelling)., Disp: 30 tablet, Rfl: 11  •  TOUJEO SOLOSTAR 300 UNIT/ML solution pen-injector, 70 Units Every Night., Disp: , Rfl: 1  •  Triamcinolone Acetonide (NASACORT ALLERGY 24HR) 55 MCG/ACT nasal inhaler, 2 sprays into each nostril Daily As Needed for Rhinitis., Disp: , Rfl:   No current facility-administered medications for this visit.     Facility-Administered Medications Ordered in Other Visits:   •  Chlorhexidine Gluconate Cloth 2 % pads 1 application, 1 application, Topical, Q12H PRN, Adonis Capps, PA  Allergies   Allergen Reactions   • Asa [Aspirin] Other (See Comments)      upper airway congestion     Social History     Socioeconomic History   • Marital status:      Spouse name: Not on file   • Number of children: 2   • Years of education: Not on file   • Highest education level: Not on file   Social Needs   • Financial resource strain: Not on file   • Food insecurity - worry: Not on file   • Food insecurity -  "inability: Not on file   • Transportation needs - medical: Not on file   • Transportation needs - non-medical: Not on file   Occupational History   • Occupation:    Tobacco Use   • Smoking status: Former Smoker     Packs/day: 2.00     Years: 10.00     Pack years: 20.00     Types: Cigarettes     Last attempt to quit: 1981     Years since quittin.9   • Smokeless tobacco: Never Used   • Tobacco comment: quit 35 years ago   Substance and Sexual Activity   • Alcohol use: No   • Drug use: No   • Sexual activity: Defer   Other Topics Concern   • Not on file   Social History Narrative    Lives in [facility] with his wife.    Caffeine Intake: 0- 1 servings per day     Family History   Problem Relation Age of Onset   • Diabetes Mother    • Heart disease Mother    • Arthritis Mother    • Heart disease Father    • Arthritis Father    • Heart attack Father    • Diabetes Sister      ROS  Vitals:    18 0909   BP: 114/64   BP Location: Right arm   Patient Position: Sitting   Pulse: 89   SpO2: 99%   Weight: 91.8 kg (202 lb 4.8 oz)   Height: 167.6 cm (66\")      Physical Exam  LUNGS:   Clear.  CARDIOVASCULAR:  Regular rhythm and rate.  CHEST:   Sternum appears to be stable.  Incision is well healed.    Assessment/Plan:  Mr. Mora appears to be stable.  We will get a chest x-ray and we will see him back in four months.    Patient Active Problem List   Diagnosis   • Type 2 diabetes mellitus, with long-term current use of insulin (CMS/[facility])   • Hyperlipidemia LDL goal <70   • Erectile dysfunction   • Allergic rhinitis. Desensitization injections discontinued 2018   • Essential hypertension   • Class 1 obesity due to excess calories in adult   • Left bundle branch block   • Coronary artery disease involving native coronary artery of native heart without angina pectoris   • Cough   • Ischemic cardiomyopathy     CC: MD Janny Nur transcribing on behalf of Migue Burton MD dictating. "

## 2018-12-20 DIAGNOSIS — Z00.6 EXAMINATION FOR NORMAL COMPARISON FOR CLINICAL RESEARCH: Primary | ICD-10-CM

## 2018-12-21 ENCOUNTER — HOSPITAL ENCOUNTER (OUTPATIENT)
Dept: CARDIOLOGY | Facility: HOSPITAL | Age: 68
Discharge: HOME | End: 2018-12-21
Attending: INTERNAL MEDICINE | Admitting: INTERNAL MEDICINE

## 2018-12-21 DIAGNOSIS — I25.5 ISCHEMIC CARDIOMYOPATHY: ICD-10-CM

## 2018-12-21 LAB
MAXIMAL PREDICTED HEART RATE: 153 BPM
STRESS TARGET HR: 130 BPM

## 2018-12-21 PROCEDURE — 78472 GATED HEART PLANAR SINGLE: CPT | Performed by: INTERNAL MEDICINE

## 2018-12-21 PROCEDURE — 0 TECHNETIUM LABELED RED BLOOD CELLS: Performed by: INTERNAL MEDICINE

## 2018-12-21 PROCEDURE — A9560 TC99M LABELED RBC: HCPCS | Performed by: INTERNAL MEDICINE

## 2018-12-21 PROCEDURE — 78472 GATED HEART PLANAR SINGLE: CPT

## 2018-12-21 RX ADMIN — TECHNETIUM TC 99M-LABELED RED BLOOD CELLS 1 DOSE: KIT at 09:11

## 2019-01-09 ENCOUNTER — TELEPHONE (OUTPATIENT)
Dept: CARDIOLOGY | Facility: CLINIC | Age: 69
End: 2019-01-09

## 2019-01-09 NOTE — TELEPHONE ENCOUNTER
Per Dr. Qureshi - MUGA scan showed EF improved to 35%, patient may discontinue life vest.    I spoke with patient's spouse and she verbalized understanding.

## 2019-02-08 ENCOUNTER — OFFICE VISIT (OUTPATIENT)
Dept: CARDIOLOGY | Facility: CLINIC | Age: 69
End: 2019-02-08

## 2019-02-08 VITALS
BODY MASS INDEX: 33.85 KG/M2 | DIASTOLIC BLOOD PRESSURE: 55 MMHG | HEART RATE: 87 BPM | WEIGHT: 210.6 LBS | OXYGEN SATURATION: 97 % | SYSTOLIC BLOOD PRESSURE: 92 MMHG | HEIGHT: 66 IN

## 2019-02-08 DIAGNOSIS — I10 ESSENTIAL HYPERTENSION: ICD-10-CM

## 2019-02-08 DIAGNOSIS — I25.5 ISCHEMIC CARDIOMYOPATHY: Primary | ICD-10-CM

## 2019-02-08 DIAGNOSIS — E78.5 HYPERLIPIDEMIA LDL GOAL <70: ICD-10-CM

## 2019-02-08 PROCEDURE — 99214 OFFICE O/P EST MOD 30 MIN: CPT | Performed by: INTERNAL MEDICINE

## 2019-02-08 RX ORDER — TORSEMIDE 5 MG/1
5 TABLET ORAL DAILY
Qty: 30 TABLET | Refills: 11 | Status: SHIPPED | OUTPATIENT
Start: 2019-02-08 | End: 2019-07-17

## 2019-02-08 NOTE — PROGRESS NOTES
Subjective:     Encounter Date:02/08/2019    Patient ID: Kar Mora is a 68 y.o.  white male, , from [place], [place].      REFERRING PHYSICIAN/ENDOCRINOLOGIST: Hiren Montoya MD  ALLERGIST: Abhijeet Madrid MD  PHYSICIAN: Bro Kan MD  INTERVENTIONAL CARDIOLOGIST:  Madi Moctezuma MD, MSC, FACC .  CARDIOVASCULAR SURGEON: Migue Burton MD.    Chief Complaint:   Chief Complaint   Patient presents with   • Coronary Artery Disease   • Left bundle branch block   • Chest Pain     Problem List:  1. Insulin-dependent type 2 diabetes mellitus without apparent end organ damage; hemoglobin A1c 6.4%, November 2017; 8.0%, July 2018  2. Labile hypertension.  3. Dyslipidemia.  4. Erectile dysfunction.  5. Remote nasal polypectomy, 1986.  6. Allergic rhinitis.  7. Probable hypertensive cardiovascular disease:  a. Abnormal EKG with abnormal acceptable echocardiographic GXT, September 2004.   b. Acceptable echocardiographic GXT with preserved exercise capacity and mild LVH, March 2008.   c. Acceptable quantitative SPECT Gated Cardiolite GXT with nominal myocardial perfusion to 88% of predicted exercise capacity and 90% predicted maximum heart rate with preserved LVEF (0.65) with continued medical therapy felt warranted, December 2012.  d. Residual class I symptoms with recent documented abnormal EKG (LBBB/first-degree AV block) with abnormal echocardiogram demonstrating mild left ventricular chamber enlargement with mild reduction in the LVEF (0.42) without PE or pulmonary hypertension.  e. Resident class I symptoms with persistent abnormal echocardiogram (LVEF 0.40), with mild concentric LVH and mild left atrial enlargement without pulmonary arterial hypertension or pericardial effusion, July 2015.  f. Residual class I symptoms with acceptable  echocardiogram (LVEF 0.50) with mild-moderate RVE, mild-moderate LAE, and aortic valve sclerosis with no evidence of pericardial effusion (November  2016).  g. Abnormal echocardiogram 5/25/18: LVEF 0.40, mild concentric hypertrophy, LA dilated, aortic valve exhibits sclerosis, mild MR, trace TR, no pericardial effusion  h. Echocardiogram 11/29/18: The following LV wall segments are hypokinetic: Mid anterior, basal anterolateral, mid anterolateral, apical lateral, basal inferolateral, mid inferolateral, apical inferior, mid inferior, basal inferoseptal, mid anteroseptal, basal anterior, basal inferior and basal inferoseptal.  The following LV wall segments are akinetic: Apical anterior, apical septal, mid inferoseptal and apex.  LVEF 0.36, LV wall thickness consistent with mild-to-moderate concentric hypertrophy, no evidence of LV mass thrombus present.  Normal RV cavity size, wall thickness, systolic function and septal motion noted, no pericardial effusion, aortic valve exhibits sclerosis, mild MAC present  i. MUGA 12/21/18: Mild LV chamber enlargement with moderate global hypokinesis and prominent interface ventricular septal and apical dyskinesis in part related to left bundle branch block.  Mild reduction regional LVEF with severe reduction in interventricular septal and apical regional ejection fractions.  Normal RV chamber size and systolic wall motion.  Moderate reduction in calculated global LVEF 0.35   j. Residual class I symptoms  8. Moderate obesity (BMI 34.0).  9. Ischemic heart disease:  a. Recent NYHA class II exertional dyspnea/CCS class II-III angina pectoris with diagnostic coronary angiography demonstrating severe 3-vessel obstructive coronary artery disease  b. Abnormal electrocardiogram (LBBB), July 2018  c. [facility] 11-day hospitalization for CABG ×4 vessels 10/11/18: SVG to diagonal and circumflex, SVG to PDA, LIMA to LAD, discharged with LifeVest  d. Residual class I symptoms with abnormal postoperative echocardiogram and improved MUGA LVEF (0.35), December 2018    Allergies   Allergen Reactions   • Asa [Aspirin] Other (See Comments)      upper  airway congestion         Current Outpatient Medications:   •  atorvastatin (LIPITOR) 40 MG tablet, Take 1 tablet by mouth Every Night., Disp: 30 tablet, Rfl: 11  •  carvedilol (COREG) 25 MG tablet, take 1 tablet by mouth twice a day, Disp: 180 tablet, Rfl: 3  •  clopidogrel (PLAVIX) 75 MG tablet, Take 1 tablet by mouth Daily., Disp: 30 tablet, Rfl: 11  •  Empagliflozin (JARDIANCE) 25 MG tablet, Take 25 mg by mouth Daily., Disp: , Rfl:   •  fenofibrate 160 MG tablet, Take 160 mg by mouth Daily., Disp: , Rfl:   •  JANUVIA 100 MG tablet, Take 100 mg by mouth Daily., Disp: , Rfl: 1  •  metFORMIN (GLUCOPHAGE) 500 MG tablet, Take 1,000 mg by mouth 2 (Two) Times a Day., Disp: , Rfl: 1  •  Multiple Vitamins-Minerals (MULTIVITAMIN ADULT PO), Take 1 tablet by mouth Daily., Disp: , Rfl:   •  sacubitril-valsartan (ENTRESTO) 24-26 MG tablet, Take 1 tablet by mouth Every 12 (Twelve) Hours., Disp: 60 tablet, Rfl: 11  •  TOUJEO SOLOSTAR 300 UNIT/ML solution pen-injector, 70 Units Every Night., Disp: , Rfl: 1  •  Triamcinolone Acetonide (NASACORT ALLERGY 24HR) 55 MCG/ACT nasal inhaler, 2 sprays into each nostril Daily As Needed for Rhinitis., Disp: , Rfl:   No current facility-administered medications for this visit.     Facility-Administered Medications Ordered in Other Visits:   •  Chlorhexidine Gluconate Cloth 2 % pads 1 application, 1 application, Topical, Q12H PRN, Adonis Capps PA    HISTORY OF PRESENT ILLNESS:  Patient is here for a 2 month follow-up.  At his last appointment, his preliminary echocardiogram results demonstrated EF 31-35 percent.  He was advised to continue wearing his LifeVest until his next scheduled appointment.  He had a MUGA scan on 12/21/18 which demonstrated EF of 0.35.  He occasionally has sharp chest pain that only lasts 1-2 seconds and then resolves.  He otherwise denies shortness of breath, edema, sputum production, dizziness, presyncope, or syncope.  He brought his blood pressure log with  "him today for us to review.  He has been taking his blood pressure twice daily and on average is around 120 systolic. He feels good and is back working full time with no overall cardiopulmonary complaints.  Patient otherwise denies chest pain, shortness of breath, PND, edema, palpitations, syncope or presyncope at this time.  He does have occasional sternal clicks when he turns over in bed, but this is less notable and not problematic currently.      Review of Systems   Cardiovascular: Positive for chest pain.   Musculoskeletal: Positive for gout.   Allergic/Immunologic: Positive for environmental allergies.      Obtained and otherwise negative except as outlined in problem list and HPI.    Procedures       Objective:       Vitals:    02/08/19 1524 02/08/19 1527   BP: 97/62 92/55   BP Location: Left arm Left arm   Patient Position: Sitting Standing   Pulse: 82 87   SpO2: 97%    Weight: 95.5 kg (210 lb 9.6 oz)    Height: 167.6 cm (66\")      Body mass index is 33.99 kg/m².  Last weight November 2018 was 201 pounds    Physical Exam   Constitutional: He is oriented to person, place, and time. He appears well-developed and well-nourished.   Neck: No JVD present. Carotid bruit is not present. No thyromegaly present.   Cardiovascular: Regular rhythm, S1 normal, S2 normal and normal heart sounds. Exam reveals no gallop, no S3 and no friction rub.   No murmur heard.  Pulses:       Dorsalis pedis pulses are 2+ on the right side, and 2+ on the left side.        Posterior tibial pulses are 2+ on the right side, and 2+ on the left side.   Pulmonary/Chest: Effort normal and breath sounds normal. He has no wheezes. He has no rhonchi. He has no rales.   Abdominal: Soft. He exhibits no mass. There is no hepatosplenomegaly. There is no tenderness. There is no guarding.   Bowel sounds audible x4   Musculoskeletal: Normal range of motion. He exhibits no edema.   Lymphadenopathy:     He has no cervical adenopathy.   Neurological: He is " alert and oriented to person, place, and time.   Skin: Skin is warm, dry and intact. No rash noted.   Vitals reviewed.        Lab Review:   Lab Results   Component Value Date    GLUCOSE 101 (H) 10/29/2018    BUN 35 (H) 10/29/2018    CREATININE 1.49 (H) 10/29/2018    EGFRIFNONA 47 (L) 10/29/2018    BCR 23.5 10/29/2018    CO2 28.0 10/29/2018    CALCIUM 8.9 10/29/2018    ALBUMIN 4.07 10/12/2018    AST 33 10/10/2018    ALT 29 10/10/2018       Lab Results   Component Value Date    WBC 9.30 10/19/2018    HGB 10.6 (L) 10/19/2018    HCT 32.8 (L) 10/19/2018    MCV 88.4 10/19/2018     10/19/2018       Lab Results   Component Value Date    HGBA1C 7.50 (H) 10/10/2018       Lab Results   Component Value Date    CHOL 169 07/20/2018     Lab Results   Component Value Date    TRIG 385 (H) 07/20/2018     Lab Results   Component Value Date    HDL 27 (L) 07/20/2018     Lab Results   Component Value Date    LDL 91 07/20/2018   · MUGA 12/21/18:  · Mild left ventricular chamber enlargement with moderate global hypokinesis and prominent interventricular septal and apical dyskinesis in part related to left bundle branch block.  · Mild reduction in regional left ventricular ejection fractions with severe reduction in interventricular septal and apical regional ejection fractions.  · Normal right ventricular chamber size and systolic wall motion.  · Moderate reduction in calculated global left ventricular ejection fraction (0.35; WNL 0.55 - 0.75).      Assessment:   Asymptomatic patient with EF of 0.35 and LifeVest discontinued 1/9/19.  He is compliant with his Entresto and Coreg at home.  We will provide him with torsemide 5 mg daily when necessary for increased edema/shortness of breath/weight gain.  He has not had any recent laboratory testing, so we will provide him with slips for an FLP, CMP, and hemoglobin A1c to be obtained in May 2019.     Diagnosis Plan   1. Ischemic cardiomyopathy  EF 35% on MUGA December 2018   2. Essential  hypertension  Controlled   3. Hyperlipidemia LDL goal <70  No new labs to review          Plan:         1. Patient to continue current medications and close follow up with the above providers.  2. Tentative cardiology follow up in July 2019 or patient may return sooner PRN.  3. Torsemide 5 mg daily PRN for increased edema/SOB/weight gain  4. FLP, CMP, HgbA1C in May 2019    Scribed for Todd Qureshi MD by Madhavi Stout, APRN. 2/8/2019  3:39 PM    I, Todd Qureshi MD, [facility], personally performed the services described in this documentation as scribed by the above named individual in my presence, and it is both accurate and complete. At 4:07 PM on 02/08/2019

## 2019-03-19 RX ORDER — ROSUVASTATIN CALCIUM 40 MG/1
40 TABLET, COATED ORAL NIGHTLY
Qty: 30 TABLET | Refills: 11 | Status: SHIPPED | OUTPATIENT
Start: 2019-03-19 | End: 2020-04-23

## 2019-03-19 RX ORDER — ICOSAPENT ETHYL 1000 MG/1
2 CAPSULE ORAL 2 TIMES DAILY WITH MEALS
Qty: 120 CAPSULE | Refills: 11 | Status: SHIPPED | OUTPATIENT
Start: 2019-03-19 | End: 2020-04-23

## 2019-04-09 ENCOUNTER — TELEPHONE (OUTPATIENT)
Dept: CARDIAC SURGERY | Facility: CLINIC | Age: 69
End: 2019-04-09

## 2019-04-09 NOTE — TELEPHONE ENCOUNTER
PT'S WIFE CALLED WANTING TO MAKE F/U APPT WITH . W/CXR. VERIFIED DEMO AND INS. WIFE REQUESTED TO CALL HER WITH APPT INFO.

## 2019-06-12 RX ORDER — CARVEDILOL 25 MG/1
TABLET ORAL
Qty: 180 TABLET | Refills: 0 | Status: SHIPPED | OUTPATIENT
Start: 2019-06-12 | End: 2019-08-19 | Stop reason: SDUPTHER

## 2019-07-17 ENCOUNTER — OFFICE VISIT (OUTPATIENT)
Dept: CARDIOLOGY | Facility: CLINIC | Age: 69
End: 2019-07-17

## 2019-07-17 VITALS
HEART RATE: 88 BPM | WEIGHT: 212.6 LBS | BODY MASS INDEX: 34.17 KG/M2 | SYSTOLIC BLOOD PRESSURE: 99 MMHG | HEIGHT: 66 IN | DIASTOLIC BLOOD PRESSURE: 60 MMHG

## 2019-07-17 DIAGNOSIS — I10 ESSENTIAL HYPERTENSION: ICD-10-CM

## 2019-07-17 DIAGNOSIS — Z79.4 TYPE 2 DIABETES MELLITUS WITH OTHER CIRCULATORY COMPLICATION, WITH LONG-TERM CURRENT USE OF INSULIN (HCC): ICD-10-CM

## 2019-07-17 DIAGNOSIS — E78.5 HYPERLIPIDEMIA LDL GOAL <70: ICD-10-CM

## 2019-07-17 DIAGNOSIS — I25.10 CORONARY ARTERY DISEASE INVOLVING NATIVE CORONARY ARTERY OF NATIVE HEART WITHOUT ANGINA PECTORIS: Primary | ICD-10-CM

## 2019-07-17 DIAGNOSIS — I25.5 ISCHEMIC CARDIOMYOPATHY: ICD-10-CM

## 2019-07-17 DIAGNOSIS — E11.59 TYPE 2 DIABETES MELLITUS WITH OTHER CIRCULATORY COMPLICATION, WITH LONG-TERM CURRENT USE OF INSULIN (HCC): ICD-10-CM

## 2019-07-17 PROCEDURE — 99214 OFFICE O/P EST MOD 30 MIN: CPT | Performed by: INTERNAL MEDICINE

## 2019-07-17 RX ORDER — LANCETS
EACH MISCELLANEOUS 2 TIMES DAILY
Refills: 3 | COMMUNITY
Start: 2019-04-11 | End: 2020-10-23 | Stop reason: SDUPTHER

## 2019-07-17 RX ORDER — GLIPIZIDE 5 MG/1
TABLET, FILM COATED, EXTENDED RELEASE ORAL DAILY
Refills: 5 | COMMUNITY
Start: 2019-06-24 | End: 2021-01-18 | Stop reason: SDUPTHER

## 2019-07-17 RX ORDER — PEN NEEDLE, DIABETIC 31 GX5/16"
NEEDLE, DISPOSABLE MISCELLANEOUS SEE ADMIN INSTRUCTIONS
Refills: 4 | COMMUNITY
Start: 2019-04-11 | End: 2021-04-12 | Stop reason: SDUPTHER

## 2019-07-17 NOTE — PROGRESS NOTES
Subjective:     Encounter Date:07/17/2019    Patient ID: Kar Mora is a 68 y.o.  white male, , from [place], [place].      REFERRING PHYSICIAN/ENDOCRINOLOGIST: Hiren Montoya MD  ALLERGIST: Abhijeet Madrid MD  PHYSICIAN: Bro Kan MD  INTERVENTIONAL CARDIOLOGIST:  Madi Moctezuma MD, MSC, FACC .  CARDIOVASCULAR SURGEON: Migue Burton MD    Chief Complaint:   Chief Complaint   Patient presents with   • Cardiomyopathy   • Coronary Artery Disease   • Hyperlipidemia     Problem List:  1. Insulin-dependent type 2 diabetes mellitus without apparent end organ damage; hemoglobin A1c 6.4%, November 2017; 8.0%, July 2018; 8.2%, March 2019  2. Labile hypertension.  3. Dyslipidemia.  4. Erectile dysfunction.  5. Remote nasal polypectomy, 1986.  6. Allergic rhinitis.  7. Combined hypertensive and ischemic cardiovascular disease:  a. Abnormal EKG with abnormal acceptable echocardiographic GXT, September 2004.   b. Acceptable echocardiographic GXT with preserved exercise capacity and mild LVH, March 2008.   c. Acceptable quantitative SPECT Gated Cardiolite GXT with nominal myocardial perfusion to 88% of predicted exercise capacity and 90% predicted maximum heart rate with preserved LVEF (0.65) with continued medical therapy felt warranted, December 2012.  d. Residual class I symptoms with recent documented abnormal EKG (LBBB/first-degree AV block) with abnormal echocardiogram demonstrating mild left ventricular chamber enlargement with mild reduction in the LVEF (0.42) without PE or pulmonary hypertension.  e. Resident class I symptoms with persistent abnormal echocardiogram (LVEF 0.40), with mild concentric LVH and mild left atrial enlargement without pulmonary arterial hypertension or pericardial effusion, July 2015.  f. Residual class I symptoms with acceptable  echocardiogram (LVEF 0.50) with mild-moderate RVE, mild-moderate LAE, and aortic valve sclerosis with no evidence of pericardial  effusion (November 2016).  g. Abnormal echocardiogram, 5/25/2018: LVEF 0.40, mild concentric hypertrophy, LA dilated, aortic valve exhibits sclerosis, mild MR, trace TR, no pericardial effusion  h. Echocardiogram, 11/29/2018: The following LV wall segments are hypokinetic: Mid anterior, basal anterolateral, mid anterolateral, apical lateral, basal inferolateral, mid inferolateral, apical inferior, mid inferior, basal inferoseptal, mid anteroseptal, basal anterior, basal inferior and basal inferoseptal.  The following LV wall segments are akinetic: Apical anterior, apical septal, mid inferoseptal and apex.  LVEF 0.36, LV wall thickness consistent with mild-to-moderate concentric hypertrophy, no evidence of LV mass thrombus present.  Normal RV cavity size, wall thickness, systolic function and septal motion noted, no pericardial effusion, aortic valve exhibits sclerosis, mild MAC present  i. MUGA, 12/21/2018: Mild LV chamber enlargement with moderate global hypokinesis and prominent interface ventricular septal and apical dyskinesis in part related to left bundle branch block.  Mild reduction regional LVEF with severe reduction in interventricular septal and apical regional ejection fractions.  Normal RV chamber size and systolic wall motion.  Moderate reduction in calculated global LVEF 0.35   j. Residual class I symptoms  8. Moderate obesity (BMI 34.3).  9. Ischemic heart disease:  a. Remote NYHA class II exertional dyspnea/CCS class II-III angina pectoris with diagnostic coronary angiography demonstrating severe 3-vessel obstructive coronary artery disease  b. Abnormal electrocardiogram (LBBB), July 2018  c. [facility] 11-day hospitalization for CABG ×4 vessels, 10/11/2018: SVG to diagonal and circumflex, SVG to PDA, LIMA to LAD, discharged with LifeVest  d. Residual class I symptoms with abnormal postoperative echocardiogram and improved MUGA LVEF (0.35), December 2018        Allergies   Allergen Reactions   • Asa  [Aspirin] Other (See Comments)      upper airway congestion         Current Outpatient Medications:   •  B-D ULTRAFINE III SHORT PEN 31G X 8 MM misc, See Admin Instructions., Disp: , Rfl: 4  •  carvedilol (COREG) 25 MG tablet, TAKE 1 TABLET BY MOUTH TWICE A DAY, Disp: 180 tablet, Rfl: 0  •  clopidogrel (PLAVIX) 75 MG tablet, Take 1 tablet by mouth Daily., Disp: 30 tablet, Rfl: 11  •  Empagliflozin (JARDIANCE) 25 MG tablet, Take 25 mg by mouth Daily., Disp: , Rfl:   •  fenofibrate 160 MG tablet, Take 160 mg by mouth Daily., Disp: , Rfl:   •  glipiZIDE (GLUCOTROL XL) 5 MG ER tablet, Take  by mouth Daily., Disp: , Rfl: 5  •  icosapent ethyl (VASCEPA) 1 g capsule capsule, Take 2 g by mouth 2 (Two) Times a Day With Meals., Disp: 120 capsule, Rfl: 11  •  JANUVIA 100 MG tablet, Take 100 mg by mouth Daily., Disp: , Rfl: 1  •  Lancets (ONETOUCH ULTRASOFT) lancets, 2 (Two) Times a Day. for testing, Disp: , Rfl: 3  •  metFORMIN (GLUCOPHAGE) 500 MG tablet, Take 1,000 mg by mouth 2 (Two) Times a Day., Disp: , Rfl: 1  •  Multiple Vitamins-Minerals (MULTIVITAMIN ADULT PO), Take 1 tablet by mouth Daily., Disp: , Rfl:   •  ONE TOUCH ULTRA TEST test strip, 2 (Two) Times a Day. for testing, Disp: , Rfl: 3  •  rosuvastatin (CRESTOR) 40 MG tablet, Take 1 tablet by mouth Every Night., Disp: 30 tablet, Rfl: 11  •  sacubitril-valsartan (ENTRESTO) 24-26 MG tablet, Take 1 tablet by mouth Every 12 (Twelve) Hours., Disp: 60 tablet, Rfl: 11  •  TOUJEO SOLOSTAR 300 UNIT/ML solution pen-injector, 70 Units Every Night., Disp: , Rfl: 1  No current facility-administered medications for this visit.     Facility-Administered Medications Ordered in Other Visits:   •  Chlorhexidine Gluconate Cloth 2 % pads 1 application, 1 application, Topical, Q12H PRN, Adonis Capps PA    HISTORY OF PRESENT ILLNESS: Patient returns for scheduled 5-month followup.  He notes today that he snores a lot at night, but his wife has never mentioned him stopping  "breathing.  He awakens feeling refreshed, but he does feel sleepy during the day.  He has had infrequent hematochezia, and he has a history of hemorrhoids and had a colonoscopy in the past, approximately 3 years ago, with colorectal surgery.  He has noticed having movement in his chest since his surgery and also a click when he moves, and he says the noise \"is pretty pronounced sometimes.\"  He had a chest x-ray in May 2019 which did not indicate anything, and he is scheduled to see Dr. Burton next month.  He is active and asymptomatic on a daily basis, although he does not pursue any regular exercise program.  Patient otherwise denies chest pain, shortness of breath, PND, edema, palpitations, syncope or presyncope at this time.  His treatment for dyslipidemia was altered in March 2019, and he has tolerated the changes well.        Review of Systems   Respiratory: Positive for snoring.    Musculoskeletal: Positive for gout.   Gastrointestinal: Positive for hematochezia.   Allergic/Immunologic: Positive for environmental allergies (food allergies).      All other systems reviewed and otherwise negative.    Procedures       Objective:       Vitals:    07/17/19 1525 07/17/19 1526   BP: 106/66 99/60   BP Location: Left arm Left arm   Patient Position: Sitting Standing   Pulse: 86 88   Weight: 96.4 kg (212 lb 9.6 oz)    Height: 167.6 cm (66\")      Body mass index is 34.31 kg/m².   Last weight:  210 lbs.    Physical Exam   Constitutional: He is oriented to person, place, and time. He appears well-developed and well-nourished.   Neck: No JVD present. Carotid bruit is not present. No thyromegaly present.   Cardiovascular: Regular rhythm, S1 normal and S2 normal. Exam reveals no gallop, no S3 and no friction rub.   Murmur heard.   Medium-pitched early systolic murmur is present with a grade of 2/6 at the lower left sternal border.  Pulses:       Carotid pulses are 1+ on the right side, and 1+ on the left side.       Radial " "pulses are 1+ on the right side, and 1+ on the left side.        Femoral pulses are 1+ on the right side, and 1+ on the left side.       Popliteal pulses are 1+ on the right side, and 1+ on the left side.        Dorsalis pedis pulses are 1+ on the right side, and 1+ on the left side.        Posterior tibial pulses are 1+ on the right side, and 1+ on the left side.   Pulmonary/Chest: Effort normal. He has decreased breath sounds. He has no wheezes. He has no rhonchi. He has no rales.   Sternum appears intact   Abdominal: Soft. He exhibits no mass. There is no hepatosplenomegaly. There is no tenderness. There is no guarding.   Bowel sounds audible x4   Musculoskeletal: Normal range of motion. He exhibits no edema.   Lymphadenopathy:     He has no cervical adenopathy.   Neurological: He is alert and oriented to person, place, and time.   Skin: Skin is warm, dry and intact. No rash noted.   Vitals reviewed.        Lab Review:   March 2019 (reviewed with patient by letter):  · CMP - normal electrolytes, serum creatinine 1.7, BUN 36, glucose 114, normal serum calcium and liver function tests  · FLP - LDL cholesterol 99, total cholesterol 155, HDL-C 21, triglycerides 266  · Hemoglobin A1c - 8.2%, average mean glucose 189 mg/dL  · Suggesting altering atorvastatin to rosuvastatin 40 mg daily and adding Vascepa 2 gram bid    05/11/2019, chest x-ray:  Normal, clear lungs, heart size \"top normal\"    Lab Results   Component Value Date    GLUCOSE 101 (H) 10/29/2018    BUN 35 (H) 10/29/2018    CREATININE 1.49 (H) 10/29/2018    EGFRIFNONA 47 (L) 10/29/2018    BCR 23.5 10/29/2018    CO2 28.0 10/29/2018    CALCIUM 8.9 10/29/2018    ALBUMIN 4.07 10/12/2018    AST 33 10/10/2018    ALT 29 10/10/2018       Lab Results   Component Value Date    WBC 9.30 10/19/2018    HGB 10.6 (L) 10/19/2018    HCT 32.8 (L) 10/19/2018    MCV 88.4 10/19/2018     10/19/2018       Lab Results   Component Value Date    HGBA1C 7.50 (H) 10/10/2018 "       Lab Results   Component Value Date    CHOL 169 07/20/2018     Lab Results   Component Value Date    TRIG 385 (H) 07/20/2018     Lab Results   Component Value Date    HDL 27 (L) 07/20/2018     Lab Results   Component Value Date    LDL 91 07/20/2018           Assessment:   Overall continued acceptable course with no interim cardiopulmonary complaints with good functional status. We will defer additional diagnostic or therapeutic intervention from a cardiac perspective at this time.       Diagnosis Plan   1. Coronary artery disease involving native coronary artery of native heart without angina pectoris  Adult Transthoracic Echo Complete W/ Cont if Necessary Per Protocol; No recurrent angina pectoris or CHF on current activity schedule; continue current treatment     2. Hyperlipidemia LDL goal <70  Lipid Panel; continue rosuvastatin and Vascepa   3. Ischemic cardiomyopathy  Adult Transthoracic Echo Complete W/ Cont if Necessary Per Protocol   4. Essential hypertension  Acceptable control; Continue current treatment.    5. Type 2 diabetes mellitus with other circulatory complication, with long-term current use of insulin (CMS/[facility])  No data to review; continue current treatment          Plan:         1. Patient to continue current medications and close follow up with the above providers.  2. Fasting lipid panel in October 2019 with flu shot.  3. Tentative cardiology follow up in January 2020 with same-day limited echocardiogram to assess residual LV function, or patient may return sooner PRN.    Transcribed by Renetta Amor for Dr. Todd Qureshi at 3:35 PM on 07/17/2019    ITodd MD, [facility], personally performed the services described in this documentation as scribed by the above named individual in my presence, and it is both accurate and complete. At 3:50 PM on 07/17/2019

## 2019-08-15 ENCOUNTER — OFFICE VISIT (OUTPATIENT)
Dept: CARDIAC SURGERY | Facility: CLINIC | Age: 69
End: 2019-08-15

## 2019-08-15 DIAGNOSIS — I65.21 STENOSIS OF RIGHT CAROTID ARTERY: ICD-10-CM

## 2019-08-15 DIAGNOSIS — I25.10 CORONARY ARTERY DISEASE INVOLVING NATIVE CORONARY ARTERY OF NATIVE HEART WITHOUT ANGINA PECTORIS: Primary | ICD-10-CM

## 2019-08-15 PROCEDURE — 99212 OFFICE O/P EST SF 10 MIN: CPT | Performed by: THORACIC SURGERY (CARDIOTHORACIC VASCULAR SURGERY)

## 2019-08-16 VITALS
OXYGEN SATURATION: 97 % | DIASTOLIC BLOOD PRESSURE: 64 MMHG | HEIGHT: 66 IN | HEART RATE: 82 BPM | SYSTOLIC BLOOD PRESSURE: 128 MMHG | BODY MASS INDEX: 32.47 KG/M2 | WEIGHT: 202 LBS

## 2019-08-16 PROBLEM — I65.21 STENOSIS OF RIGHT CAROTID ARTERY: Status: ACTIVE | Noted: 2019-08-16

## 2019-08-16 NOTE — PROGRESS NOTES
08/15/2019  Patient Information  Kar Mora                                                                                          [address] [place] [address]   1950  'PCP/Referring Physician'  Bro Kan MD  [phone]  No ref. provider found    Chief Complaint   Patient presents with   • Follow-up     4 month follow up with CXR , S/p CABG 10/11/18       History of Present Illness:  Mr. Mora returns today for follow up of his carotid stenosis and coronary disease.  Since last visit, he has been doing reasonably well.  He is following up with Dr. Qureshi and Dr. Montoya.  He does have some slight sternal movement apparently, but it does not bother him and it is not sore.  He has had no other new problems.    Patient Active Problem List   Diagnosis   • Type 2 diabetes mellitus, with long-term current use of insulin (CMS/[facility])   • Hyperlipidemia LDL goal <70   • Erectile dysfunction   • Allergic rhinitis. Desensitization injections discontinued July 2018   • Essential hypertension   • Class 1 obesity due to excess calories in adult   • Left bundle branch block   • Coronary artery disease involving native coronary artery of native heart without angina pectoris   • Cough   • Ischemic cardiomyopathy   • Stenosis of right carotid artery     Past Medical History:   Diagnosis Date   • Allergic rhinitis    • Anesthesia     pt says he has woken up during surgery   • Chest pain    • Coronary artery disease    • Dyslipidemia    • Erectile dysfunction    • GERD (gastroesophageal reflux disease)    • Gout    • Hyperlipidemia    • Insulin dependent type 2 diabetes mellitus (CMS/[facility]) 2002    checks fsbg every am, a1c checked every 6 months, 7.2 in may 2018    • Kidney stones    • Labile hypertension 10/24/2016   • Myocardial infarct (CMS/[facility])    • Wears glasses    • Wears glasses      Past Surgical History:   Procedure Laterality Date   • CARDIAC CATHETERIZATION N/A 7/20/2018    Procedure:  Left Heart Cath;  Surgeon: Madi Moctezuma MD;  Location:  [facility];  Service: Cardiovascular   • COLONOSCOPY  2016   • CORONARY ARTERY BYPASS GRAFT N/A 10/11/2018    Procedure: MEDIAN STERNOTMY<CORONARY ARTERY BYPASS WITH INTERNAL MAMMARY ARTERY GRAFT x  4 with EVH of the right greater saphenous vein;  Surgeon: Migue Burton MD;  Location:  [facility] OR;  Service: Cardiothoracic   • KIDNEY STONE SURGERY     • NASAL POLYP SURGERY  1986       Current Outpatient Medications:   •  B-D ULTRAFINE III SHORT PEN 31G X 8 MM misc, See Admin Instructions., Disp: , Rfl: 4  •  carvedilol (COREG) 25 MG tablet, TAKE 1 TABLET BY MOUTH TWICE A DAY, Disp: 180 tablet, Rfl: 0  •  clopidogrel (PLAVIX) 75 MG tablet, Take 1 tablet by mouth Daily., Disp: 30 tablet, Rfl: 11  •  Empagliflozin (JARDIANCE) 25 MG tablet, Take 25 mg by mouth Daily., Disp: , Rfl:   •  fenofibrate 160 MG tablet, Take 160 mg by mouth Daily., Disp: , Rfl:   •  glipiZIDE (GLUCOTROL XL) 5 MG ER tablet, Take  by mouth Daily., Disp: , Rfl: 5  •  icosapent ethyl (VASCEPA) 1 g capsule capsule, Take 2 g by mouth 2 (Two) Times a Day With Meals., Disp: 120 capsule, Rfl: 11  •  JANUVIA 100 MG tablet, Take 100 mg by mouth Daily., Disp: , Rfl: 1  •  Lancets (ONETOUCH ULTRASOFT) lancets, 2 (Two) Times a Day. for testing, Disp: , Rfl: 3  •  metFORMIN (GLUCOPHAGE) 500 MG tablet, Take 1,000 mg by mouth 2 (Two) Times a Day., Disp: , Rfl: 1  •  ONE TOUCH ULTRA TEST test strip, 2 (Two) Times a Day. for testing, Disp: , Rfl: 3  •  rosuvastatin (CRESTOR) 40 MG tablet, Take 1 tablet by mouth Every Night., Disp: 30 tablet, Rfl: 11  •  sacubitril-valsartan (ENTRESTO) 24-26 MG tablet, Take 1 tablet by mouth Every 12 (Twelve) Hours., Disp: 60 tablet, Rfl: 11  •  TOUJEO SOLOSTAR 300 UNIT/ML solution pen-injector, 70 Units Every Night., Disp: , Rfl: 1  •  Multiple Vitamins-Minerals (MULTIVITAMIN ADULT PO), Take 1 tablet by mouth Daily., Disp: , Rfl:   No current  facility-administered medications for this visit.     Facility-Administered Medications Ordered in Other Visits:   •  Chlorhexidine Gluconate Cloth 2 % pads 1 application, 1 application, Topical, Q12H PRN, Adonis Capps PA  Allergies   Allergen Reactions   • Asa [Aspirin] Other (See Comments)      upper airway congestion     Social History     Socioeconomic History   • Marital status:      Spouse name: Not on file   • Number of children: 2   • Years of education: Not on file   • Highest education level: Not on file   Occupational History   • Occupation:    Tobacco Use   • Smoking status: Former Smoker     Packs/day: 2.00     Years: 10.00     Pack years: 20.00     Types: Cigarettes     Last attempt to quit:      Years since quittin.6   • Smokeless tobacco: Never Used   • Tobacco comment: quit 35 years ago   Substance and Sexual Activity   • Alcohol use: No   • Drug use: No   • Sexual activity: Defer   Social History Narrative    Lives in [facility] with his wife.    Caffeine Intake: 0- 1 servings per day     Family History   Problem Relation Age of Onset   • Diabetes Mother    • Heart disease Mother    • Arthritis Mother    • Heart disease Father    • Arthritis Father    • Heart attack Father    • Diabetes Sister      Review of Systems   Constitution: Negative for chills, diaphoresis, fever, malaise/fatigue and weight loss.   HENT: Negative for congestion, hearing loss, hoarse voice and sore throat.    Eyes: Negative for blurred vision and double vision.   Cardiovascular: Negative for chest pain, claudication, dyspnea on exertion, leg swelling, palpitations and syncope.   Respiratory: Positive for cough. Negative for hemoptysis, shortness of breath and wheezing.    Hematologic/Lymphatic: Does not bruise/bleed easily.   Skin: Negative for itching, poor wound healing and rash.   Musculoskeletal: Negative for arthritis, back pain, falls, joint pain, joint swelling, muscle cramps, muscle  "weakness and neck pain.   Gastrointestinal: Negative for abdominal pain, constipation, diarrhea, hematemesis, nausea and vomiting.   Genitourinary: Negative for dysuria, frequency, hematuria, hesitancy, incomplete emptying and urgency.   Neurological: Positive for light-headedness (with sudden movement) and numbness. Negative for dizziness, headaches, loss of balance and vertigo.   Psychiatric/Behavioral: Negative for depression, memory loss and substance abuse. The patient is not nervous/anxious.    Allergic/Immunologic: Positive for environmental allergies. Negative for HIV exposure.     Vitals:    08/16/19 0855   BP: 128/64   Pulse: 82   SpO2: 97%   Weight: 91.6 kg (202 lb)   Height: 167.6 cm (66\")      Physical Exam  VASCULAR:   Questionable right carotid bruit.  LUNGS:   Decreased on the bases.  CARDIAC:   Regular rhythm and rate.  There is some instability of his sternum.    Labs/Imaging:  Chest x-ray is satisfactory.    Assessment/Plan:  Mr. Mora appears to be stable as far as his coronary artery disease and carotid disease are concerned.  He does have some instability of his sternum, but at this point he has no symptoms appreciably, and I would continue to observe this.  There is a questionable right carotid bruit.  We will get a carotid duplex the next time we see him back in follow up in one year.    Patient Active Problem List   Diagnosis   • Type 2 diabetes mellitus, with long-term current use of insulin (CMS/[facility])   • Hyperlipidemia LDL goal <70   • Erectile dysfunction   • Allergic rhinitis. Desensitization injections discontinued July 2018   • Essential hypertension   • Class 1 obesity due to excess calories in adult   • Left bundle branch block   • Coronary artery disease involving native coronary artery of native heart without angina pectoris   • Cough   • Ischemic cardiomyopathy   • Stenosis of right carotid artery     CC: MD Janny Vitale transcribing on behalf of Migue MILLER" MD Micheal dictating.

## 2019-08-19 RX ORDER — CARVEDILOL 25 MG/1
TABLET ORAL
Qty: 180 TABLET | Refills: 3 | Status: SHIPPED | OUTPATIENT
Start: 2019-08-19 | End: 2020-08-21

## 2019-09-19 DIAGNOSIS — R00.2 PALPITATIONS: Primary | ICD-10-CM

## 2019-11-14 RX ORDER — CLOPIDOGREL BISULFATE 75 MG/1
TABLET ORAL
Qty: 30 TABLET | Refills: 11 | Status: SHIPPED | OUTPATIENT
Start: 2019-11-14 | End: 2020-11-24 | Stop reason: SDUPTHER

## 2019-11-14 RX ORDER — SACUBITRIL AND VALSARTAN 24; 26 MG/1; MG/1
TABLET, FILM COATED ORAL
Qty: 60 TABLET | Refills: 5 | Status: SHIPPED | OUTPATIENT
Start: 2019-11-14 | End: 2020-05-21

## 2020-02-05 ENCOUNTER — OFFICE VISIT (OUTPATIENT)
Dept: CARDIOLOGY | Facility: CLINIC | Age: 70
End: 2020-02-05

## 2020-02-05 ENCOUNTER — HOSPITAL ENCOUNTER (OUTPATIENT)
Dept: CARDIOLOGY | Facility: HOSPITAL | Age: 70
Discharge: HOME | End: 2020-02-05
Admitting: INTERNAL MEDICINE

## 2020-02-05 ENCOUNTER — HOSPITAL ENCOUNTER (OUTPATIENT)
Dept: CARDIOLOGY | Facility: HOSPITAL | Age: 70
Discharge: HOME | End: 2020-02-05

## 2020-02-05 VITALS
WEIGHT: 212 LBS | BODY MASS INDEX: 34.07 KG/M2 | BODY MASS INDEX: 34.07 KG/M2 | HEIGHT: 66 IN | HEIGHT: 66 IN | WEIGHT: 212 LBS

## 2020-02-05 VITALS
BODY MASS INDEX: 34.07 KG/M2 | DIASTOLIC BLOOD PRESSURE: 65 MMHG | SYSTOLIC BLOOD PRESSURE: 103 MMHG | WEIGHT: 212 LBS | HEIGHT: 66 IN | HEART RATE: 92 BPM

## 2020-02-05 DIAGNOSIS — R09.89 RIGHT CAROTID BRUIT: ICD-10-CM

## 2020-02-05 DIAGNOSIS — R00.2 PALPITATIONS: ICD-10-CM

## 2020-02-05 DIAGNOSIS — I10 ESSENTIAL HYPERTENSION: ICD-10-CM

## 2020-02-05 DIAGNOSIS — E78.5 DYSLIPIDEMIA: Primary | ICD-10-CM

## 2020-02-05 DIAGNOSIS — I44.7 LEFT BUNDLE BRANCH BLOCK: ICD-10-CM

## 2020-02-05 DIAGNOSIS — E11.59 TYPE 2 DIABETES MELLITUS WITH OTHER CIRCULATORY COMPLICATION, WITH LONG-TERM CURRENT USE OF INSULIN (HCC): ICD-10-CM

## 2020-02-05 DIAGNOSIS — Z79.4 TYPE 2 DIABETES MELLITUS WITH OTHER CIRCULATORY COMPLICATION, WITH LONG-TERM CURRENT USE OF INSULIN (HCC): ICD-10-CM

## 2020-02-05 DIAGNOSIS — I25.5 ISCHEMIC CARDIOMYOPATHY: ICD-10-CM

## 2020-02-05 LAB
BH CV ECHO MEAS - AO ROOT AREA (BSA CORRECTED): 1.6
BH CV ECHO MEAS - AO ROOT AREA: 8.7 CM^2
BH CV ECHO MEAS - AO ROOT DIAM: 3.3 CM
BH CV ECHO MEAS - BSA(HAYCOCK): 2.2 M^2
BH CV ECHO MEAS - BSA(HAYCOCK): 2.2 M^2
BH CV ECHO MEAS - BSA: 2.1 M^2
BH CV ECHO MEAS - BSA: 2.1 M^2
BH CV ECHO MEAS - BZI_BMI: 34.2 KILOGRAMS/M^2
BH CV ECHO MEAS - BZI_BMI: 34.2 KILOGRAMS/M^2
BH CV ECHO MEAS - BZI_METRIC_HEIGHT: 167.6 CM
BH CV ECHO MEAS - BZI_METRIC_HEIGHT: 167.6 CM
BH CV ECHO MEAS - BZI_METRIC_WEIGHT: 96.2 KG
BH CV ECHO MEAS - BZI_METRIC_WEIGHT: 96.2 KG
BH CV ECHO MEAS - EDV(CUBED): 98.3 ML
BH CV ECHO MEAS - EDV(MOD-SP2): 117 ML
BH CV ECHO MEAS - EDV(MOD-SP4): 153 ML
BH CV ECHO MEAS - EDV(TEICH): 98.1 ML
BH CV ECHO MEAS - EF(CUBED): 20.5 %
BH CV ECHO MEAS - EF(MOD-BP): 39 %
BH CV ECHO MEAS - EF(MOD-SP2): 37.6 %
BH CV ECHO MEAS - EF(MOD-SP4): 34 %
BH CV ECHO MEAS - EF(TEICH): 16.5 %
BH CV ECHO MEAS - ESV(CUBED): 78.1 ML
BH CV ECHO MEAS - ESV(MOD-SP2): 73 ML
BH CV ECHO MEAS - ESV(MOD-SP4): 101 ML
BH CV ECHO MEAS - ESV(TEICH): 81.9 ML
BH CV ECHO MEAS - FS: 7.4 %
BH CV ECHO MEAS - IVS/LVPW: 1
BH CV ECHO MEAS - IVSD: 1 CM
BH CV ECHO MEAS - LA DIMENSION: 3.8 CM
BH CV ECHO MEAS - LA/AO: 1.2
BH CV ECHO MEAS - LAD MAJOR: 5.7 CM
BH CV ECHO MEAS - LV DIASTOLIC VOL/BSA (35-75): 74.6 ML/M^2
BH CV ECHO MEAS - LV MASS(C)D: 152.5 GRAMS
BH CV ECHO MEAS - LV MASS(C)DI: 74.4 GRAMS/M^2
BH CV ECHO MEAS - LV SYSTOLIC VOL/BSA (12-30): 49.3 ML/M^2
BH CV ECHO MEAS - LVIDD: 4.6 CM
BH CV ECHO MEAS - LVIDS: 3.8 CM
BH CV ECHO MEAS - LVLD AP2: 7.9 CM
BH CV ECHO MEAS - LVLD AP4: 9.1 CM
BH CV ECHO MEAS - LVLS AP2: 7.5 CM
BH CV ECHO MEAS - LVLS AP4: 8 CM
BH CV ECHO MEAS - LVPWD: 1 CM
BH CV ECHO MEAS - SI(CUBED): 9.9 ML/M^2
BH CV ECHO MEAS - SI(MOD-SP2): 21.5 ML/M^2
BH CV ECHO MEAS - SI(MOD-SP4): 25.4 ML/M^2
BH CV ECHO MEAS - SI(TEICH): 7.9 ML/M^2
BH CV ECHO MEAS - SV(CUBED): 20.2 ML
BH CV ECHO MEAS - SV(MOD-SP2): 44 ML
BH CV ECHO MEAS - SV(MOD-SP4): 52 ML
BH CV ECHO MEAS - SV(TEICH): 16.2 ML
BH CV VAS BP LEFT ARM: NORMAL MMHG
BH CV XLRA - RV BASE: 4.1 CM
BH CV XLRA - RV LENGTH: 7.1 CM
BH CV XLRA - RV MID: 3.4 CM
BH CV XLRA MEAS LEFT CCA RATIO VEL: 122 CM/SEC
BH CV XLRA MEAS LEFT DIST CCA EDV: 28.3 CM/SEC
BH CV XLRA MEAS LEFT DIST CCA PSV: 120.2 CM/SEC
BH CV XLRA MEAS LEFT DIST ICA EDV: 32.2 CM/SEC
BH CV XLRA MEAS LEFT DIST ICA PSV: 81.7 CM/SEC
BH CV XLRA MEAS LEFT ICA RATIO VEL: 86.4 CM/SEC
BH CV XLRA MEAS LEFT ICA/CCA RATIO: 0.7
BH CV XLRA MEAS LEFT MID CCA EDV: 29.1 CM/SEC
BH CV XLRA MEAS LEFT MID CCA PSV: 122.6 CM/SEC
BH CV XLRA MEAS LEFT MID ICA EDV: 30.6 CM/SEC
BH CV XLRA MEAS LEFT MID ICA PSV: 87.2 CM/SEC
BH CV XLRA MEAS LEFT PROX CCA EDV: 27.5 CM/SEC
BH CV XLRA MEAS LEFT PROX CCA PSV: 121.8 CM/SEC
BH CV XLRA MEAS LEFT PROX ECA PSV: 97.1 CM/SEC
BH CV XLRA MEAS LEFT PROX ICA EDV: 30.6 CM/SEC
BH CV XLRA MEAS LEFT PROX ICA PSV: 79.4 CM/SEC
BH CV XLRA MEAS LEFT PROX SCLA PSV: 172.9 CM/SEC
BH CV XLRA MEAS LEFT VERTEBRAL A EDV: 11.4 CM/SEC
BH CV XLRA MEAS LEFT VERTEBRAL A PSV: 38.9 CM/SEC
BH CV XLRA MEAS RIGHT CCA RATIO VEL: 117 CM/SEC
BH CV XLRA MEAS RIGHT DIST CCA EDV: 31.4 CM/SEC
BH CV XLRA MEAS RIGHT DIST CCA PSV: 122.9 CM/SEC
BH CV XLRA MEAS RIGHT DIST ICA EDV: 25.1 CM/SEC
BH CV XLRA MEAS RIGHT DIST ICA PSV: 61 CM/SEC
BH CV XLRA MEAS RIGHT ICA RATIO VEL: 146 CM/SEC
BH CV XLRA MEAS RIGHT ICA/CCA RATIO: 1.2
BH CV XLRA MEAS RIGHT MID CCA EDV: 30 CM/SEC
BH CV XLRA MEAS RIGHT MID CCA PSV: 118 CM/SEC
BH CV XLRA MEAS RIGHT MID ICA EDV: 27.7 CM/SEC
BH CV XLRA MEAS RIGHT MID ICA PSV: 81.7 CM/SEC
BH CV XLRA MEAS RIGHT PROX CCA EDV: 17.6 CM/SEC
BH CV XLRA MEAS RIGHT PROX CCA PSV: 96.2 CM/SEC
BH CV XLRA MEAS RIGHT PROX ECA PSV: 184.6 CM/SEC
BH CV XLRA MEAS RIGHT PROX ICA EDV: 33.2 CM/SEC
BH CV XLRA MEAS RIGHT PROX ICA PSV: 146.7 CM/SEC
BH CV XLRA MEAS RIGHT PROX SCLA PSV: 140.4 CM/SEC
BH CV XLRA MEAS RIGHT VERTEBRAL A EDV: 18.8 CM/SEC
BH CV XLRA MEAS RIGHT VERTEBRAL A PSV: 50.2 CM/SEC
LEFT ARM BP: NORMAL MMHG
LEFT ATRIUM VOLUME INDEX: 26.3 ML/M^2
LEFT ATRIUM VOLUME: 54 ML
LV EF 2D ECHO EST: 38 %
RIGHT ARM BP: NORMAL MMHG

## 2020-02-05 PROCEDURE — 93880 EXTRACRANIAL BILAT STUDY: CPT | Performed by: INTERNAL MEDICINE

## 2020-02-05 PROCEDURE — 99214 OFFICE O/P EST MOD 30 MIN: CPT | Performed by: INTERNAL MEDICINE

## 2020-02-05 PROCEDURE — 93880 EXTRACRANIAL BILAT STUDY: CPT

## 2020-02-05 PROCEDURE — 93308 TTE F-UP OR LMTD: CPT

## 2020-02-05 PROCEDURE — 93308 TTE F-UP OR LMTD: CPT | Performed by: INTERNAL MEDICINE

## 2020-02-05 NOTE — PROGRESS NOTES
Subjective:     Encounter Date:02/05/2020    Patient ID: Kar Mora is a 69 y.o.  white male, , from [place], [place].      REFERRING PHYSICIAN/ENDOCRINOLOGIST: Hiren Montoya MD  ALLERGIST: Abhijeet Madrid MD  PHYSICIAN: Bro Kan MD  INTERVENTIONAL CARDIOLOGIST:  Madi Moctezuma MD, [facility], [facility]  CARDIOVASCULAR SURGEON: Migue Burton MD    Chief Complaint:   Chief Complaint   Patient presents with   • Coronary Artery Disease   • Cardiomyopathy   • Hypertension     Problem List:  1. Insulin-dependent type 2 diabetes mellitus without apparent end organ damage; hemoglobin A1c 6.4%, November 2017; 8.0%, July 2018; 8.2%, March 2019  2. Labile hypertension.  3. Dyslipidemia.  4. Erectile dysfunction.  5. Remote nasal polypectomy, 1986.  6. Allergic rhinitis.  7. Combined hypertensive and ischemic cardiovascular disease:  a. Abnormal EKG with abnormal acceptable echocardiographic GXT, September 2004.   b. Acceptable echocardiographic GXT with preserved exercise capacity and mild LVH, March 2008.   c. Acceptable quantitative SPECT Gated Cardiolite GXT with nominal myocardial perfusion to 88% of predicted exercise capacity and 90% predicted maximum heart rate with preserved LVEF (0.65) with continued medical therapy felt warranted, December 2012.  d. Residual class I symptoms with recent documented abnormal EKG (LBBB/first-degree AV block) with abnormal echocardiogram demonstrating mild left ventricular chamber enlargement with mild reduction in the LVEF (0.42) without PE or pulmonary hypertension.  e. Resident class I symptoms with persistent abnormal echocardiogram (LVEF 0.40), with mild concentric LVH and mild left atrial enlargement without pulmonary arterial hypertension or pericardial effusion, July 2015.  f. Residual class I symptoms with acceptable  echocardiogram (LVEF 0.50) with mild-moderate RVE, mild-moderate LAE, and aortic valve sclerosis with no evidence of pericardial  effusion (November 2016).  g. Abnormal echocardiogram, 5/25/2018: LVEF 0.40, mild concentric hypertrophy, LA dilated, aortic valve exhibits sclerosis, mild MR, trace TR, no pericardial effusion  h. Echocardiogram, 11/29/2018: The following LV wall segments are hypokinetic: Mid anterior, basal anterolateral, mid anterolateral, apical lateral, basal inferolateral, mid inferolateral, apical inferior, mid inferior, basal inferoseptal, mid anteroseptal, basal anterior, basal inferior and basal inferoseptal.  The following LV wall segments are akinetic: Apical anterior, apical septal, mid inferoseptal and apex.  LVEF 0.36, LV wall thickness consistent with mild-to-moderate concentric hypertrophy, no evidence of LV mass thrombus present.  Normal RV cavity size, wall thickness, systolic function and septal motion noted, no pericardial effusion, aortic valve exhibits sclerosis, mild MAC present  i. MUGA, 12/21/2018: Mild LV chamber enlargement with moderate global hypokinesis and prominent interface ventricular septal and apical dyskinesis in part related to left bundle branch block.  Mild reduction regional LVEF with severe reduction in interventricular septal and apical regional ejection fractions.  Normal RV chamber size and systolic wall motion.  Moderate reduction in calculated global LVEF 0.35   j. Residual class I symptoms  8. Moderate obesity (BMI 34.2).  9. Questionable asymptomatic right carotid bruit  10. Ischemic heart disease:  a. Remote NYHA class II exertional dyspnea/CCS class II-III angina pectoris with diagnostic coronary angiography demonstrating severe 3-vessel obstructive coronary artery disease  b. Abnormal electrocardiogram (LBBB), July 2018  c. [facility] 11-day hospitalization for CABG ×4 vessels, 10/11/2018: SVG to diagonal and circumflex, SVG to PDA, LIMA to LAD, discharged with LifeVest  d. Residual class I symptoms with abnormal postoperative echocardiogram and improved MUGA LVEF (0.35), December  2018     Allergies   Allergen Reactions   • Asa [Aspirin] Other (See Comments)      upper airway congestion         Current Outpatient Medications:   •  B-D ULTRAFINE III SHORT PEN 31G X 8 MM misc, See Admin Instructions., Disp: , Rfl: 4  •  carvedilol (COREG) 25 MG tablet, TAKE 1 TABLET BY MOUTH TWICE A DAY, Disp: 180 tablet, Rfl: 3  •  clopidogrel (PLAVIX) 75 MG tablet, TAKE 1 TABLET BY MOUTH ONCE DAILY, Disp: 30 tablet, Rfl: 11  •  Empagliflozin (JARDIANCE) 25 MG tablet, Take 25 mg by mouth Daily., Disp: , Rfl:   •  ENTRESTO 24-26 MG tablet, TAKE 1 TABLET BY MOUTH EVERY 12 HOURS, Disp: 60 tablet, Rfl: 5  •  fenofibrate 160 MG tablet, Take 160 mg by mouth Daily., Disp: , Rfl:   •  glipiZIDE (GLUCOTROL XL) 5 MG ER tablet, Take  by mouth Daily., Disp: , Rfl: 5  •  icosapent ethyl (VASCEPA) 1 g capsule capsule, Take 2 g by mouth 2 (Two) Times a Day With Meals., Disp: 120 capsule, Rfl: 11  •  JANUVIA 100 MG tablet, Take 100 mg by mouth Daily., Disp: , Rfl: 1  •  Lancets (ONETOUCH ULTRASOFT) lancets, 2 (Two) Times a Day. for testing, Disp: , Rfl: 3  •  metFORMIN (GLUCOPHAGE) 500 MG tablet, Take 1,000 mg by mouth 2 (Two) Times a Day., Disp: , Rfl: 1  •  Multiple Vitamins-Minerals (MULTIVITAMIN ADULT PO), Take 1 tablet by mouth Daily., Disp: , Rfl:   •  ONE TOUCH ULTRA TEST test strip, 2 (Two) Times a Day. for testing, Disp: , Rfl: 3  •  rosuvastatin (CRESTOR) 40 MG tablet, Take 1 tablet by mouth Every Night., Disp: 30 tablet, Rfl: 11  •  TOUJEO SOLOSTAR 300 UNIT/ML solution pen-injector, 70 Units Every Night., Disp: , Rfl: 1  No current facility-administered medications for this visit.     Facility-Administered Medications Ordered in Other Visits:   •  Chlorhexidine Gluconate Cloth 2 % pads 1 application, 1 application, Topical, Q12H PRN, Adonis Capps, PA    HISTORY OF PRESENT ILLNESS: Patient returns for scheduled 6-month followup.  The patient is to have an echocardiogram after today's appointment; this will be  "read, and a letter will be sent to the patient with those results.  He had labs drawn on Monday, 02/03/2020, and he asked that we be provided those results; however, we do not have them yet.  He has not heard anything in regard to those results.  He has done well since last being seen.  He has been walking some for exercise.  He has no chest pain, tightness, or pressure with his activities.  He notes that his chest \"didn't meld well\" following his surgery, and he gets sharp pains on the far left side of his chest.  He states that Dr. Burton confirmed that the pain is a result of his surgery.  The patient states that there are some activities he \"just can't do,\" such as sweeping, but he can use a chainsaw without any problems.  He \"pretty much\" sleeps well, other than having to get up through the night to urinate due to his diabetes medicine.  He does not travel much, but he is told to call for a travel packet if he decides to take any trips out of state.  Patient otherwise denies anginal-type chest pain, shortness of breath, PND, edema, palpitations, syncope or presyncope at this time.      Review of Systems   Respiratory: Positive for sleep disturbances due to breathing and snoring.    Musculoskeletal: Positive for gout.   Allergic/Immunologic: Positive for environmental allergies.        Drug and food allergies      All other systems reviewed and otherwise negative.    Procedures       Objective:       Vitals:    02/05/20 1430 02/05/20 1434   BP: 104/71 103/65   BP Location: Right arm Right arm   Patient Position: Sitting Standing   Pulse: 93 92   Weight: 96.2 kg (212 lb)    Height: 167.6 cm (66\")      Body mass index is 34.22 kg/m².   Last weight:  212 lbs.    Physical Exam   Constitutional: He is oriented to person, place, and time. He appears well-developed and well-nourished.   Neck: No JVD present. Carotid bruit is present (questionable, faint, right). No thyromegaly present.   Cardiovascular: Regular rhythm, " S1 normal and S2 normal. Exam reveals no gallop, no S3 and no friction rub.   Murmur heard.   Medium-pitched early systolic murmur is present with a grade of 1/6 at the upper right sternal border.  Pulses:       Carotid pulses are 1+ on the right side, and 1+ on the left side.       Radial pulses are 1+ on the right side, and 1+ on the left side.        Femoral pulses are 1+ on the right side, and 1+ on the left side.       Popliteal pulses are 1+ on the right side, and 1+ on the left side.        Dorsalis pedis pulses are 1+ on the right side, and 1+ on the left side.        Posterior tibial pulses are 1+ on the right side, and 1+ on the left side.   Sternal click with deep inspiration   Pulmonary/Chest: Effort normal. He has decreased breath sounds. He has no wheezes. He has no rhonchi. He has no rales.   Abdominal: Soft. He exhibits no mass. There is no hepatosplenomegaly. There is no tenderness. There is no guarding.   Bowel sounds audible x4   Musculoskeletal: Normal range of motion. He exhibits no edema.   Lymphadenopathy:     He has no cervical adenopathy.   Neurological: He is alert and oriented to person, place, and time.   Skin: Skin is warm, dry and intact. No rash noted.   Vitals reviewed.        Lab Review: No recent laboratory results available for review today    Lab Results   Component Value Date    GLUCOSE 101 (H) 10/29/2018    BUN 35 (H) 10/29/2018    CREATININE 1.49 (H) 10/29/2018    EGFRIFNONA 47 (L) 10/29/2018    BCR 23.5 10/29/2018    CO2 28.0 10/29/2018    CALCIUM 8.9 10/29/2018    ALBUMIN 4.07 10/12/2018    AST 33 10/10/2018    ALT 29 10/10/2018       Lab Results   Component Value Date    WBC 9.30 10/19/2018    HGB 10.6 (L) 10/19/2018    HCT 32.8 (L) 10/19/2018    MCV 88.4 10/19/2018     10/19/2018       Lab Results   Component Value Date    HGBA1C 7.50 (H) 10/10/2018       Lab Results   Component Value Date    CHOL 169 07/20/2018     Lab Results   Component Value Date    TRIG 385 (H)  07/20/2018     Lab Results   Component Value Date    HDL 27 (L) 07/20/2018     Lab Results   Component Value Date    LDL 91 07/20/2018         Assessment:   Overall continued acceptable course with no new interim cardiopulmonary complaints with good functional status. We will defer additional diagnostic or therapeutic intervention from a cardiac perspective at this time.  Hopefully, his recent laboratory results will be received in our office soon, and we will forward a letter to him in that regard.       Diagnosis Plan   1. Dyslipidemia  No data to review; await study results obtained earlier this month   2. Right carotid bruit  Duplex Carotid Ultrasound CAR   3. Left bundle branch block  May be a candidate for CRT if further reduction in LVEF   4. Essential hypertension  Controlled; Continue current treatment.    5. Ischemic cardiomyopathy  Residual NYHA class I-II CHF symptoms; continue current treatment   6. Type 2 diabetes mellitus with other circulatory complication, with long-term current use of insulin (CMS/[facility])  No data to review; continue close followup and monitoring with Dr. Montoya          Plan:         1. Patient to continue current medications and close follow up with the above providers.  2. The patient is having an echocardiogram after his appointment today; this will be read, and a letter will be sent to the patient with those results.  3. Patient will have a carotid duplex study today as well, if schedule permits.  4. Tentative cardiology follow up in August 2020, or patient may return sooner PRN.    Transcribed by Renetta Amor for Dr. Todd Qureshi at 2:43 PM on 02/05/2020    ITodd MD, [facility], personally performed the services described in this documentation as scribed by the above named individual in my presence, and it is both accurate and complete. At 2:54 PM on 02/05/2020

## 2020-02-11 DIAGNOSIS — E78.5 HYPERLIPIDEMIA LDL GOAL <70: Primary | ICD-10-CM

## 2020-02-11 RX ORDER — FENOFIBRATE 160 MG/1
160 TABLET ORAL DAILY
Qty: 90 TABLET | Refills: 1 | Status: SHIPPED | OUTPATIENT
Start: 2020-02-11 | End: 2021-03-17 | Stop reason: SDUPTHER

## 2020-04-23 RX ORDER — ICOSAPENT ETHYL 1000 MG/1
CAPSULE ORAL
Qty: 120 CAPSULE | Refills: 5 | Status: SHIPPED | OUTPATIENT
Start: 2020-04-23 | End: 2020-12-22 | Stop reason: SDUPTHER

## 2020-04-23 RX ORDER — ROSUVASTATIN CALCIUM 40 MG/1
40 TABLET, COATED ORAL NIGHTLY
Qty: 30 TABLET | Refills: 5 | Status: SHIPPED | OUTPATIENT
Start: 2020-04-23 | End: 2020-12-22 | Stop reason: SDUPTHER

## 2020-04-27 RX ORDER — NITROGLYCERIN 0.4 MG/1
TABLET SUBLINGUAL
Qty: 25 TABLET | Refills: 11 | Status: SHIPPED | OUTPATIENT
Start: 2020-04-27 | End: 2021-05-14 | Stop reason: SDUPTHER

## 2020-05-21 RX ORDER — SACUBITRIL AND VALSARTAN 24; 26 MG/1; MG/1
TABLET, FILM COATED ORAL
Qty: 180 TABLET | Refills: 3 | Status: SHIPPED | OUTPATIENT
Start: 2020-05-21 | End: 2021-05-14 | Stop reason: SDUPTHER

## 2020-08-21 RX ORDER — CARVEDILOL 25 MG/1
TABLET ORAL
Qty: 180 TABLET | Refills: 3 | Status: SHIPPED | OUTPATIENT
Start: 2020-08-21 | End: 2021-05-14 | Stop reason: SDUPTHER

## 2020-10-13 ENCOUNTER — TELEPHONE (OUTPATIENT)
Dept: CARDIAC SURGERY | Facility: CLINIC | Age: 70
End: 2020-10-13

## 2020-10-23 ENCOUNTER — OFFICE VISIT (OUTPATIENT)
Dept: CARDIOLOGY | Facility: CLINIC | Age: 70
End: 2020-10-23

## 2020-10-23 VITALS
BODY MASS INDEX: 34.07 KG/M2 | SYSTOLIC BLOOD PRESSURE: 93 MMHG | DIASTOLIC BLOOD PRESSURE: 44 MMHG | WEIGHT: 212 LBS | HEART RATE: 83 BPM | HEIGHT: 66 IN

## 2020-10-23 DIAGNOSIS — I25.9 IHD (ISCHEMIC HEART DISEASE): ICD-10-CM

## 2020-10-23 DIAGNOSIS — I10 ESSENTIAL HYPERTENSION: ICD-10-CM

## 2020-10-23 DIAGNOSIS — I25.10 CORONARY ARTERY DISEASE INVOLVING NATIVE CORONARY ARTERY OF NATIVE HEART WITHOUT ANGINA PECTORIS: ICD-10-CM

## 2020-10-23 DIAGNOSIS — E78.5 HYPERLIPIDEMIA LDL GOAL <70: ICD-10-CM

## 2020-10-23 DIAGNOSIS — E11.59 TYPE 2 DIABETES MELLITUS WITH OTHER CIRCULATORY COMPLICATION, WITH LONG-TERM CURRENT USE OF INSULIN (HCC): ICD-10-CM

## 2020-10-23 DIAGNOSIS — I25.5 ISCHEMIC CARDIOMYOPATHY: Primary | ICD-10-CM

## 2020-10-23 DIAGNOSIS — Z79.4 TYPE 2 DIABETES MELLITUS WITH OTHER CIRCULATORY COMPLICATION, WITH LONG-TERM CURRENT USE OF INSULIN (HCC): ICD-10-CM

## 2020-10-23 PROCEDURE — 99214 OFFICE O/P EST MOD 30 MIN: CPT | Performed by: INTERNAL MEDICINE

## 2020-10-23 PROCEDURE — 90694 VACC AIIV4 NO PRSRV 0.5ML IM: CPT | Performed by: INTERNAL MEDICINE

## 2020-10-23 PROCEDURE — 90471 IMMUNIZATION ADMIN: CPT | Performed by: INTERNAL MEDICINE

## 2020-10-23 RX ORDER — INSULIN GLARGINE 300 U/ML
INJECTION, SOLUTION SUBCUTANEOUS
Qty: 10 PEN | Refills: 3 | Status: SHIPPED | OUTPATIENT
Start: 2020-10-23 | End: 2021-04-12 | Stop reason: SDUPTHER

## 2020-10-23 RX ORDER — LANCETS
EACH MISCELLANEOUS
Qty: 200 EACH | Refills: 1 | Status: SHIPPED | OUTPATIENT
Start: 2020-10-23 | End: 2022-03-07

## 2020-10-23 NOTE — PROGRESS NOTES
Subjective:     Encounter Date:10/23/2020    Patient ID: Kar Mora is a 70 y.o.  white male, , from [place], [place].      REFERRING PHYSICIAN/ENDOCRINOLOGIST: Hiren Montoya MD  ALLERGIST: Abhijeet Madrid MD  PHYSICIAN: Bro Kan MD  INTERVENTIONAL CARDIOLOGIST:  Madi Moctezuma MD, MSC, [facility]  CARDIOVASCULAR SURGEON: Migue Burton MD    Chief Complaint:   Chief Complaint   Patient presents with   • Coronary Artery Disease   • Cardiomyopathy       Problem List:  1. Insulin-dependent type 2 diabetes mellitus without apparent end organ damage; hemoglobin A1c 6.4%, November 2017; 8.0%, July 2018; 8.2%, March 2019, 7% Fall 2020  2. Labile hypertension.  3. Dyslipidemia.  4. Erectile dysfunction.  5. Remote nasal polypectomy, 1986.  6. Allergic rhinitis.  7. Combined hypertensive and ischemic cardiovascular disease:  a. Abnormal EKG with abnormal acceptable echocardiographic GXT, September 2004.   b. Acceptable echocardiographic GXT with preserved exercise capacity and mild LVH, March 2008.   c. Acceptable quantitative SPECT Gated Cardiolite GXT with nominal myocardial perfusion to 88% of predicted exercise capacity and 90% predicted maximum heart rate with preserved LVEF (0.65) with continued medical therapy felt warranted, December 2012.  d. Residual class I symptoms with recent documented abnormal EKG (LBBB/first-degree AV block) with abnormal echocardiogram demonstrating mild left ventricular chamber enlargement with mild reduction in the LVEF (0.42) without PE or pulmonary hypertension.  e. Resident class I symptoms with persistent abnormal echocardiogram (LVEF 0.40), with mild concentric LVH and mild left atrial enlargement without pulmonary arterial hypertension or pericardial effusion, July 2015.  f. Residual class I symptoms with acceptable  echocardiogram (LVEF 0.50) with mild-moderate RVE, mild-moderate LAE, and aortic valve sclerosis with no evidence of pericardial  effusion (November 2016).  g. Abnormal echocardiogram, 5/25/2018: LVEF 0.40, mild concentric hypertrophy, LA dilated, aortic valve exhibits sclerosis, mild MR, trace TR, no pericardial effusion  h. Echocardiogram, 11/29/2018: The following LV wall segments are hypokinetic: Mid anterior, basal anterolateral, mid anterolateral, apical lateral, basal inferolateral, mid inferolateral, apical inferior, mid inferior, basal inferoseptal, mid anteroseptal, basal anterior, basal inferior and basal inferoseptal.  The following LV wall segments are akinetic: Apical anterior, apical septal, mid inferoseptal and apex.  LVEF 0.36, LV wall thickness consistent with mild-to-moderate concentric hypertrophy, no evidence of LV mass thrombus present.  Normal RV cavity size, wall thickness, systolic function and septal motion noted, no pericardial effusion, aortic valve exhibits sclerosis, mild MAC present  i. MUGA, 12/21/2018: Mild LV chamber enlargement with moderate global hypokinesis and prominent interface ventricular septal and apical dyskinesis in part related to left bundle branch block.  Mild reduction regional LVEF with severe reduction in interventricular septal and apical regional ejection fractions.  Normal RV chamber size and systolic wall motion.  Moderate reduction in calculated global LVEF 0.35   j. Echocardiogram 02/05/2020: The following LV wall segments are hypokinetic: Mid anterior, basal anterolateral, mid anterolateral, apical lateral, basal inferolateral, mid inferolateral, apical inferior, mid inferior, basal inferoseptal, mid anteroseptal, basal anterior, basal inferior, and basal inferoseptal.  The following LV wall segments are akinetic: Apical anterior, apical septal, mid inferoseptal and apex.  LVEF 38%, normal RV cavity size, wall thickness, systolic function and septal motion noted.  Septal wall motion is abnormal consistent with BBB.  No significant change from 11/29/2018 study (LVEF 0.36).  k. Residual  class I symptoms, October 2020  8. Moderate obesity (BMI 34.22).  9. Questionable asymptomatic right carotid bruit  10. Ischemic heart disease:  a. Remote NYHA class II exertional dyspnea/CCS class II-III angina pectoris with diagnostic coronary angiography demonstrating severe 3-vessel obstructive coronary artery disease  b. Abnormal electrocardiogram (LBBB), July 2018  c. [facility] 11-day hospitalization for CABG ×4 vessels, 10/11/2018: SVG to diagonal and circumflex, SVG to PDA, LIMA to LAD, discharged with Life[facility]est  d. Residual class I symptoms with abnormal postoperative echocardiogram and improved MUGA LVEF (0.35), December 2018  e. Residual class I symptoms, October 2020    Allergies   Allergen Reactions   • Asa [Aspirin] Other (See Comments)      upper airway congestion       Current Outpatient Medications:   •  B-D ULTRAFINE III SHORT PEN 31G X 8 MM misc, See Admin Instructions., Disp: , Rfl: 4  •  carvedilol (COREG) 25 MG tablet, TAKE 1 TABLET BY MOUTH TWICE A DAY, Disp: 180 tablet, Rfl: 3  •  clopidogrel (PLAVIX) 75 MG tablet, TAKE 1 TABLET BY MOUTH ONCE DAILY, Disp: 30 tablet, Rfl: 11  •  Empagliflozin (JARDIANCE) 25 MG tablet, Take 25 mg by mouth Daily., Disp: , Rfl:   •  ENTRESTO 24-26 MG tablet, TAKE 1 TABLET BY MOUTH EVERY 12 HOURS, Disp: 180 tablet, Rfl: 3  •  fenofibrate 160 MG tablet, Take 1 tablet by mouth Daily., Disp: 90 tablet, Rfl: 1  •  glipiZIDE (GLUCOTROL XL) 5 MG ER tablet, Take  by mouth Daily., Disp: , Rfl: 5  •  JANUVIA 100 MG tablet, Take 100 mg by mouth Daily., Disp: , Rfl: 1  •  Lancets (ONETOUCH ULTRASOFT) lancets, 2 (Two) Times a Day. for testing, Disp: , Rfl: 3  •  metFORMIN (GLUCOPHAGE) 500 MG tablet, Take 1,000 mg by mouth 2 (Two) Times a Day., Disp: , Rfl: 1  •  Multiple Vitamins-Minerals (MULTIVITAMIN ADULT PO), Take 1 tablet by mouth Daily., Disp: , Rfl:   •  nitroglycerin (NITROSTAT) 0.4 MG SL tablet, DISSOLVE 1 TABLET UNDER THE TONGUE EVERY 5 MINUTES AS NEEDED UP TO 3 DOSES. IF  "CHEST PAIN DOESN'T RESOLVE CALL 911., Disp: 25 tablet, Rfl: 11  •  ONE TOUCH ULTRA TEST test strip, 2 (Two) Times a Day. for testing, Disp: , Rfl: 3  •  rosuvastatin (CRESTOR) 40 MG tablet, TAKE 1 TABLET BY MOUTH EVERY NIGHT, Disp: 30 tablet, Rfl: 5  •  TOUJEO SOLOSTAR 300 UNIT/ML solution pen-injector, 70 Units Every Night., Disp: , Rfl: 1  •  VASCEPA 1 g capsule capsule, TAKE 2 CAPSULES BY MOUTH TWICE DAILY WITH MEALS, Disp: 120 capsule, Rfl: 5  No current facility-administered medications for this visit.     Facility-Administered Medications Ordered in Other Visits:   •  Chlorhexidine Gluconate Cloth 2 % pads 1 application, 1 application, Topical, Q12H PRN, Adonis Capps PA    History of Present Illness: Patient returns for follow up after a 8-month hiatus.  He denies any chest pain, shortness of breath, palpitations, dizziness, presyncope, syncope, or edema.  He does not do any walking for exercise.  His blood pressure most of the time is fine and once every 8 days or so he has an elevated blood pressure but not consistently.  He has not had any recent laboratory testing.  He has not had his influenza vaccination this year.  When he saw Dr. Montoya for his diabetes, and his last A1c was 7%.  The patient recently had a birthday. Patient otherwise denies chest pain, shortness of breath, PND, edema, palpitations, syncope or presyncope at this time. He has had no interim ER visits, hospitalizations, serious illnesses, or surgeries.         Review of Systems   All other systems reviewed and are negative.     Obtained and negative except as outlined in problem list and HPI.    Procedures       Objective:       Vitals:    10/23/20 1400 10/23/20 1401   BP: 105/59 93/44   BP Location: Left arm Left arm   Patient Position: Sitting Standing   Pulse: 80 83   Weight: 96.2 kg (212 lb) 96.2 kg (212 lb)   Height: 167.6 cm (66\") 167.6 cm (66\")     Body mass index is 34.22 kg/m².  Last weight: 212 lbs    Vitals signs " reviewed.   Constitutional:       Appearance: Well-developed.   Neck:      Thyroid: No thyromegaly.      Vascular: No carotid bruit or JVD.      Lymphadenopathy: No cervical adenopathy.   Pulmonary:      Effort: Pulmonary effort is normal.      Breath sounds: Normal breath sounds. No wheezing. No rhonchi. No rales.      Comments: Sternum overall intact  Cardiovascular:      Regular rhythm.      Murmurs: There is a grade 1/6 mid frequency harsh early systolic murmur at the URSB.      No gallop. No S3 gallop.   Pulses:     Dorsalis pedis: 1+ bilaterally.     Posterior tibial: 1+ bilaterally.  Edema:     Peripheral edema absent.   Abdominal:      Palpations: Abdomen is soft. There is no abdominal mass.      Tenderness: There is no abdominal tenderness. There is no guarding.   Musculoskeletal: Normal range of motion.   Skin:     General: Skin is warm and dry.      Findings: No rash.   Neurological:      Mental Status: Alert and oriented to person, place, and time.           Lab Review:     02/07/2020 (reviewed with patient by letter- recommended to restart fenofibrate at 160 mg):  • FLP: total cholesterol 158, triglycerides 315, HDL-C 20, LDL-C 82       Lab Results   Component Value Date    GLUCOSE 101 (H) 10/29/2018    BUN 35 (H) 10/29/2018    CREATININE 1.49 (H) 10/29/2018    EGFRIFNONA 47 (L) 10/29/2018    BCR 23.5 10/29/2018     10/29/2018    K 4.5 10/29/2018     10/29/2018    MG 2.6 10/12/2018    CO2 28.0 10/29/2018    CALCIUM 8.9 10/29/2018    ALBUMIN 4.07 10/12/2018    AST 33 10/10/2018    ALT 29 10/10/2018       Lab Results   Component Value Date    WBC 9.30 10/19/2018    HGB 10.6 (L) 10/19/2018    HCT 32.8 (L) 10/19/2018    MCV 88.4 10/19/2018     10/19/2018       Lab Results   Component Value Date    HGBA1C 7.50 (H) 10/10/2018       Lab Results   Component Value Date    CHOL 169 07/20/2018     Lab Results   Component Value Date    TRIG 385 (H) 07/20/2018     Lab Results   Component Value  Date    HDL 27 (L) 07/20/2018     Lab Results   Component Value Date    LDL 91 07/20/2018     Echocardiogram, 02/05/2020 (reviewed with patient by letter):  · The following left ventricular wall segments are hypokinetic: mid anterior, basal anterolateral, mid anterolateral, apical lateral, basal inferolateral, mid inferolateral, apical inferior, mid inferior, basal inferoseptal, mid anteroseptal, basal anterior, basal inferior and basal inferoseptal. The following left ventricular wall segments are akinetic: apical anterior, apical septal, mid inferoseptal and apex.  · Estimated EF = 38%.  · Left ventricular systolic function is moderately decreased.  · Normal right ventricular cavity size, wall thickness, systolic function and septal motion noted.  · Septal wall motion is abnormal, consistent with a bundle branch block.  · No significant change from 29 November 2018 study (LVEF 0.36).     Carotid duplex, 02/05/2020 (reviewed with patient by letter):  · Proximal right internal carotid artery plaque without significant stenosis.  · Right internal carotid artery stenosis of 0-49%.  · Left internal carotid artery stenosis of 0-49%.  · Proximal left internal carotid artery plaque without significant stenosis.  · Nominal antegrade bilateral vertebral artery flow demonstrated.      Assessment:       Overall continued acceptable course with no new interim cardiopulmonary complaints with acceptable functional status. We will defer additional diagnostic or therapeutic intervention from a cardiac perspective at this time.  The patient has not had any recent laboratory testing; will provide him with CBC, CMP, and FLP lab slips.  The patient had an influenza vaccination in office today. The patient had an echocardiogram in February 2020 with no significant changes since his last study in 2018.     Diagnosis Plan   1. Ischemic cardiomyopathy   Stable echocardiogram February 2020, No recurrent angina pectoris or CHF on current  activity schedule; continue current treatment   2. Hyperlipidemia LDL goal <70  Lipid Panel    CBC & Differential    Comprehensive Metabolic Panel   3. IHD (ischemic heart disease)  Lipid Panel    CBC & Differential    Comprehensive Metabolic Panel   4. Essential hypertension   Controlled, continue current cardiac medications   5. Type 2 diabetes mellitus with other circulatory complication, with long-term current use of insulin (CMS/[facility])   A1c 7%, increase physical activity as tolerated, heart healthy diet, follow-up with Dr. Montoya   6. Coronary artery disease involving native coronary artery of native heart without angina pectoris  No recurrent angina pectoris or CHF on current activity schedule; continue current treatment          Plan:         1. Patient to continue current medications and close follow up with the above providers.  2. Tentative cardiology follow up in April 2021 or patient may return sooner PRN.  3. CBC, CMP, FLP lab slips given to patient in office today.  4. Influenza vaccination administered in office to right deltoid without complications.     Scribed for Todd Qureshi MD by Madhavi Stout, APRN. 10/23/2020  14:25 EDT    I, Todd Qureshi MD, FACC, personally performed the services described in this documentation as scribed by the above named individual in my presence, and it is both accurate and complete. At 14:26 EDT on 10/23/2020

## 2020-10-23 NOTE — TELEPHONE ENCOUNTER
Pt needs refills for Toujeo and One Touch Ultra Soft lancets called into [facility] in [place], [place].    [facility] Ph: [phone]

## 2020-11-24 RX ORDER — CLOPIDOGREL BISULFATE 75 MG/1
75 TABLET ORAL DAILY
Qty: 90 TABLET | Refills: 3 | Status: SHIPPED | OUTPATIENT
Start: 2020-11-24 | End: 2021-05-14 | Stop reason: SDUPTHER

## 2020-12-01 ENCOUNTER — TELEPHONE (OUTPATIENT)
Dept: CARDIAC SURGERY | Facility: CLINIC | Age: 70
End: 2020-12-01

## 2020-12-01 NOTE — TELEPHONE ENCOUNTER
PATIENT READY FOR RECALL APPOINTMENT. PATIENTS INSURANCE, ADDRESS, AND PHONE NUMBER VERIFIED.    *PATIENT WANTS TO HAVE APPOINTMENT DONE IN [place] IF POSSIBLE

## 2020-12-21 DIAGNOSIS — I65.23 CAROTID STENOSIS, ASYMPTOMATIC, BILATERAL: Primary | ICD-10-CM

## 2020-12-22 RX ORDER — ROSUVASTATIN CALCIUM 40 MG/1
40 TABLET, COATED ORAL NIGHTLY
Qty: 30 TABLET | Refills: 5 | Status: SHIPPED | OUTPATIENT
Start: 2020-12-22 | End: 2021-05-14 | Stop reason: SDUPTHER

## 2020-12-22 RX ORDER — ICOSAPENT ETHYL 1000 MG/1
2 CAPSULE ORAL 2 TIMES DAILY WITH MEALS
Qty: 120 CAPSULE | Refills: 5 | Status: SHIPPED | OUTPATIENT
Start: 2020-12-22 | End: 2021-05-14 | Stop reason: SDUPTHER

## 2021-01-18 RX ORDER — GLIPIZIDE 5 MG/1
5 TABLET, FILM COATED, EXTENDED RELEASE ORAL DAILY
Qty: 30 TABLET | Refills: 5 | Status: SHIPPED | OUTPATIENT
Start: 2021-01-18 | End: 2021-04-12 | Stop reason: SDUPTHER

## 2021-01-21 RX ORDER — SITAGLIPTIN 100 MG/1
100 TABLET, FILM COATED ORAL DAILY
Qty: 30 TABLET | Refills: 2 | Status: SHIPPED | OUTPATIENT
Start: 2021-01-21 | End: 2021-04-12 | Stop reason: SDUPTHER

## 2021-01-21 RX ORDER — EMPAGLIFLOZIN 25 MG/1
25 TABLET, FILM COATED ORAL DAILY
Qty: 30 TABLET | Refills: 2 | Status: SHIPPED | OUTPATIENT
Start: 2021-01-21 | End: 2021-04-12 | Stop reason: SDUPTHER

## 2021-01-21 NOTE — TELEPHONE ENCOUNTER
Pt is checking on an update for his Rx request. He stated he only has one pill of each Rx left. Please advise

## 2021-02-17 NOTE — PAYOR COMM NOTE
"/77 (BP Location: Left arm)   Pulse 104   Temp 97.7  F (36.5  C) (Oral)   Resp 20   Ht 1.651 m (5' 5\")   Wt 41.2 kg (90 lb 13.3 oz)   LMP 12/26/2020 (Exact Date)   SpO2 98%   BMI 15.11 kg/m       Hours of Care: 4591-9495.     Reason for admission: Admitted on 1/27 for acute hypoxic respiratory failure, ARDs, intubated on 1/29 c/b septic shock and renal failure.  Hx of CF s/p bilateral lung transplant in 2016, HTN, pancreatic insufficiency, focal nodular hyperplasia of liver, CKD 4, line associated DVT  Vitals: Mildly hypertensive, otherwise VSS.   Activity: Bedbound, weight shifting.  Pain: Denies.  Neuro: AO x 4.  Cardiac: ST.    Respiratory: On 5L NC.  GI/: small amount of urine output.  Rectal pouch in place with adequate output.  Diet: TF and normal diet.   Lines: R tunneled HD cath, R SL PICC, R PIV x 2, NJ tube  Skin/Wounds: Preventative Mepilex on coccyx      Continue to monitor and follow POC     Dick Wing RN on 2/16/2021 at 5:58 AM    " "Utilization Review [phone]  Brain Mora (68 y.o. Male)     Date of Birth Social Security Number Address Home Phone MRN    1950  [id number] [facility] [phone] [id number]    Taoism Marital Status          None        Admission Date Admission Type Admitting Provider Attending Provider Department, Room/Bed    10/11/18 Elective Migue Burton MD Rogers, Anthony G, MD [facility] 4H, S470/1    Discharge Date Discharge Disposition Discharge Destination                       Attending Provider:  Migue Burton MD    Allergies:  Asa [Aspirin]    Isolation:  None   Infection:  None   Code Status:  CPR    Ht:  167.6 cm (66\")   Wt:  94.6 kg (208 lb 9.6 oz)    Admission Cmt:  None   Principal Problem:  Multivessel CAD [I25.119]                 Active Insurance as of 10/11/2018     Primary Coverage     Payor Plan Insurance Group Employer/Plan Group    MISC COMMERCIAL MISC COMMERCIAL INS      Coverage Address Coverage Phone Number Effective From Effective To    [id number] [phone] 7/1/2007     [facility] [address]       Subscriber Name Subscriber Birth Date Member ID       BRAIN MORA 1950 [id number]                 Emergency Contacts      (Rel.) Home Phone Work Phone Mobile Phone    Laura Mora (Spouse) [phone] -- [phone]    Lucretia Galvan (Sister) [phone] -- --            Prior to Admission Medications     Prescriptions Last Dose Informant Patient Reported? Taking?    atorvastatin (LIPITOR) 20 MG tablet 10/10/2018 Medication Bottle Yes Yes    Take 20 mg by mouth Every Night.    carvedilol (COREG) 25 MG tablet 10/10/2018 Medication Bottle No Yes    take 1 tablet by mouth twice a day    Empagliflozin (JARDIANCE) 25 MG tablet 10/10/2018 Medication Bottle Yes Yes    Take 25 mg by mouth Daily.    fenofibrate 160 MG tablet 10/10/2018 Medication Bottle Yes Yes    Take 160 mg by mouth Daily.    " glipiZIDE (GLUCOTROL) 10 MG 24 hr tablet 10/10/2018 Medication Bottle Yes Yes    Take 10 mg by mouth Every Night.    JANUVIA 100 MG tablet 10/10/2018 Medication Bottle Yes Yes    Take 100 mg by mouth Daily.    losartan (COZAAR) 100 MG tablet 10/10/2018 Medication Bottle Yes Yes    Take 100 mg by mouth Every Night.    metFORMIN (GLUCOPHAGE) 500 MG tablet 10/10/2018 Medication Bottle Yes Yes    Take 1,000 mg by mouth 2 (Two) Times a Day.    Multiple Vitamins-Minerals (MULTIVITAMIN ADULT PO) 10/10/2018 Medication Bottle Yes Yes    Take 1 tablet by mouth Daily.    nitroglycerin (NITROSTAT) 0.4 MG SL tablet Past Month Medication Bottle No Yes    1 under the tongue as needed for angina, may repeat q5mins for up three doses    Patient taking differently:  Place 0.4 mg under the tongue Every 5 (Five) Minutes As Needed. 1 under the tongue as needed for angina, may repeat q5mins for up three doses    raNITIdine (ZANTAC) 150 MG tablet 10/10/2018 Medication Bottle Yes Yes    Take 150 mg by mouth 2 (Two) Times a Day.    TOUJEO SOLOSTAR 300 UNIT/ML solution pen-injector 10/10/2018 Medication Bottle Yes Yes    70 Units Every Night.    Triamcinolone Acetonide (NASACORT ALLERGY 24HR) 55 MCG/ACT nasal inhaler Past Week Medication Bottle Yes Yes    2 sprays into each nostril Daily As Needed for Rhinitis.    CIALIS 5 MG tablet More than a month Self Yes No    Take 5 mg by mouth As Needed for erectile dysfunction.             Physician Progress Notes (last 24 hours) (Notes from 10/15/2018  1:28 PM through 10/16/2018  1:28 PM)      Branden Florence MD at 10/16/2018 11:59 AM          Intensivist Note     10/16/2018  Hospital Day: 5  5 Days Post-Op      Mr. Kar Mora, 68 y.o. male is followed for:    Multivessel CAD    S/P CABG x 4    Ischemic cardiomyopathy with LVEF 26-30% on echocardiogram 10/13/18    Insulin dependent type 2 diabetes mellitus (CMS/HCC)    Hypertensive cardiovascular disease    LOPEZ (acute kidney injury)  (CMS/formerly [facility])    Hyperlipidemia    Moderate obesity    Allergic rhinitis. Desensitization injections discontinued July 2018    Cough       SUBJECTIVE     68-year-old white male remote smoker (none for 37 years) with a history of hypertension and hypertensive cardiovascular disease, hyperlipidemia, diabetes mellitus, obesity, chronic kidney disease, allergic rhinitis (previously on desensitization injections), and gout. Has been followed for quite some time for what has been felt to be hypertensive cardiovascular disease but when seen 5/2018 mentioned a history of chest discomfort felt possibly due to angina. Echocardiogram at that time revealed LVEF of 40%. Patient subsequently underwent elective cardiac catheterization 7/5/18 revealing multivessel CAD.     Because of the above patient underwent elective CABG ×4 without complications other than a transient slight increase in creatinine from 1.63 preoperatively to 1.89. That however has since recovered to today's value of 1.4. Chest tubes and pacer wires were removed 10/15/18. Over the last 24-36 hours however the patient has developed a persistent intense nonproductive cough. This has caused him a great deal of discomfort due to chest pain associated with the cough. There has been no hemoptysis. He denies dyspnea and is well saturated on room air. He has not noted wheezing. He denies postnasal drip or significant reflux although he carries both of these diagnoses (allergies and GERD). He has not noted any pleuritic chest pain or lower extremity edema. There has been no fever or chills and white count has remained normal.    Past Medical History:   Diagnosis Date   • Allergic rhinitis and previous desensitization injections     • Anesthesia     pt says he has woken up during surgery   • Chest pain/angina and previous MI     • Coronary artery disease and ICM (preoperative EF 40%)     • Hypertension     • Hyperlipidemia    • Diabetes mellitus. Hemoglobin A1c 7.5 (CMS/formerly [facility])  2002   • Kidney stones     GERD     • Gout       Past Surgical History:   Procedure Laterality Date   • CARDIAC CATHETERIZATION N/A 7/20/2018    Procedure: Left Heart Cath;  Surgeon: Madi Moctezuma MD;  Location:  [facility];  Service: Cardiovascular   • COLONOSCOPY  2016   • CORONARY ARTERY BYPASS GRAFT N/A 10/11/2018    Procedure: MEDIAN STERNOTMY<CORONARY ARTERY BYPASS WITH INTERNAL MAMMARY ARTERY GRAFT x  4 with EVH of the right greater saphenous vein;  Surgeon: Migue Burton MD;  Location:  [facility] OR;  Service: Cardiothoracic   • KIDNEY STONE SURGERY     • NASAL POLYP SURGERY  1986       Current Outpatient Prescriptions on File Prior to Encounter   Medication Sig Dispense Refill   • atorvastatin (LIPITOR) 20 MG tablet Take 20 mg by mouth Every Night.  1   • carvedilol (COREG) 25 MG tablet take 1 tablet by mouth twice a day 180 tablet 3   • Empagliflozin (JARDIANCE) 25 MG tablet Take 25 mg by mouth Daily.     • fenofibrate 160 MG tablet Take 160 mg by mouth Daily.     • glipiZIDE (GLUCOTROL) 10 MG 24 hr tablet Take 10 mg by mouth Every Night.  0   • JANUVIA 100 MG tablet Take 100 mg by mouth Daily.  1   • losartan (COZAAR) 100 MG tablet Take 100 mg by mouth Every Night.  1   • metFORMIN (GLUCOPHAGE) 500 MG tablet Take 1,000 mg by mouth 2 (Two) Times a Day.  1   • nitroglycerin (NITROSTAT) 0.4 MG SL tablet 1 under the tongue as needed for angina, may repeat q5mins for up three doses (Patient taking differently: Place 0.4 mg under the tongue Every 5 (Five) Minutes As Needed. 1 under the tongue as needed for angina, may repeat q5mins for up three doses) 25 tablet 6   • raNITIdine (ZANTAC) 150 MG tablet Take 150 mg by mouth 2 (Two) Times a Day.  0   • TOUJEO SOLOSTAR 300 UNIT/ML solution pen-injector 70 Units Every Night.  1   • Triamcinolone Acetonide (NASACORT ALLERGY 24HR) 55 MCG/ACT nasal inhaler 2 sprays into each nostril Daily As Needed for Rhinitis.     • CIALIS 5 MG tablet Take 5 mg by  "mouth As Needed for erectile dysfunction.  0     Allergies   Allergen Reactions   • Asa [Aspirin] Other (See Comments)      upper airway congestion       The patient's relevant past medical, surgical and social history were reviewed and updated in Epic as appropriate.    OBJECTIVE     /56   Pulse 79   Temp 98.8 °F (37.1 °C) (Oral)   Resp 18   Ht 167.6 cm (66\")   Wt 94.6 kg (208 lb 9.6 oz)   SpO2 93%   BMI 33.67 kg/m²      Patient is presently on room air    Flowsheet Rows      First Filed Value   Admission Height  167.6 cm (66\") Documented at 10/11/2018 0604   Admission Weight  96.2 kg (212 lb) Documented at 10/11/2018 0604        Intake & Output (last day)       10/15 0701 - 10/16 0700 10/16 0701 - 10/17 0700    P.O.  480    Total Intake(mL/kg)  480 (5.1)    Urine (mL/kg/hr) 1900 (0.8)     Total Output 1900      Net -1900 +480                Exam:  General Exam:  Well-developed overweight white male sitting up in chair in NAD.  HEENT: Pupils equal and reactive. Nose and throat clear.  Neck:                          Supple, no JVD, thyromegaly, or adenopathy  Lungs: Clear to auscultation and percussion anteriorly and posteriorly.  Cardiovascular: RRR without murmurs or gallops. HR 80 bpm  Abdomen: Soft nontender without organomegaly or masses. Obese   and rectal: Deferred.  Extremities: No cyanosis or clubbing. No lower extremity edema but his right calf feels swollen. I note no Homans sign.  Neurologic:                 Symmetric strength but diffusely weak. No focal deficits. Slightly confused due to recent codeine and morphine doses given for cough. Is cooperative and will answer most of my questions    Chest X-Ray 10/15/18: Cardiomegaly but no congestive changes or evidence of decompensation. Sternal wires in good position. Minimal increased markings in the right infrahilar region and a slight increase in markings in the left retrocardiac region (more prominent than 10/13/18)      Results from last " 7 days  Lab Units 10/16/18  0517 10/15/18  0500 10/14/18  0321 10/13/18  0321   WBC 10*3/mm3 6.71  --  7.34 9.84   HEMOGLOBIN g/dL 10.1*  10.0* 9.9* 9.1* 10.0*   HEMATOCRIT % 31.2*  31.0* 30.7* 28.1* 31.5*   PLATELETS 10*3/mm3 288  --  158 160       Results from last 7 days  Lab Units 10/16/18  0517 10/15/18  0500   SODIUM mmol/L 135 133   POTASSIUM mmol/L 4.5 4.4   CHLORIDE mmol/L 104 101   CO2 mmol/L 21.0 23.0   BUN mg/dL 50* 50*   CREATININE mg/dL 1.40* 1.62*   GLUCOSE mg/dL 199* 212*   CALCIUM mg/dL 8.4* 9.1       Results from last 7 days  Lab Units 10/12/18  0323 10/11/18  1448 10/11/18  1305   MAGNESIUM mg/dL 2.6 2.6 2.6   PHOSPHORUS mg/dL 4.6 2.8 3.4       Results from last 7 days  Lab Units 10/10/18  1324   ALK PHOS U/L 35   BILIRUBIN mg/dL 0.4   ALT (SGPT) U/L 29   AST (SGOT) U/L 33       No results found for: SEDRATE  No results found for: BNP  No results found for: CKTOTAL, CKMB, CKMBINDEX, TROPONINI, TROPONINT  No results found for: TSH  No results found for: LACTATE  No results found for: CORTISOL      Results from last 7 days  Lab Units 10/11/18  1448 10/11/18  1409 10/11/18  1152 10/11/18  1008 10/11/18  0927   PH, ARTERIAL pH units 7.341* 7.324* 7.229* 7.29* 7.32*   PCO2, ARTERIAL mm Hg 39.7 41.2 52.0*  --   --    PO2 ART mm Hg 100.0 92.1 191.0*  --   --    HCO3 ART mmol/L 21.5 21.4 21.7  --   --    FIO2 % 40 40 100  --   --        I reviewed the patient's results, images and medication.    Assessment/Plan   ASSESSMENT        Multivessel CAD    S/P CABG x 4    Ischemic cardiomyopathy with LVEF 26-30% on echocardiogram 10/13/18    Insulin dependent type 2 diabetes mellitus (CMS/HCC)    Hypertensive cardiovascular disease    LOEPZ (acute kidney injury) (CMS/[facility])    Hyperlipidemia    Moderate obesity    Allergic rhinitis. Desensitization injections discontinued July 2018    Cough      DISCUSSION: I can find nothing obvious to explain his cough. Although he does have some increased markings in the bases  this is most likely some mild atelectasis from his postoperative state. (No evidence of an active infection in view of normal WBC and temperature curve). The only new medication he has received his Entresto and neither of the agents and it are known for causing cough (no ACE inhibitor). He is not volume overloaded and there is no evidence for occult PE (is oxygenating on room air). I think this is just going to resolve spontaneously. If persists, we will have to consider the possibility that an Entresto may be playing a role. I never seen this occur with Entresto and it does not have an ace inhibitor in it, but according to the drug insert, cough can be a complaint in 9% of patients.    PLAN     1. Continue Tessalon Perles and PRN narcotics for cough  2. Will use inhaled lidocaine to suppress the cough when more severe  3. Watch temperature curve, x-ray, and white count closely. If any evidence of infection will begin empiric antibiotics   4. Try to avoid further steroid use as will throw his sugars out of control  5. Rule out DVT and occult PE    I discussed the patient's findings and my recommendations with patient, family and nursing staff    Time spent Critical care 35 min (It does not include procedure time).    Branden Florence MD  Intensive Care Medicine  10/16/18 11:59 AM       Electronically signed by Branedn Florence MD at 10/16/2018 12:26 PM     Charlene Gutiérrez PA-C at 10/16/2018  9:10 AM              [facility] Medicine Services  PROGRESS NOTE    Patient Name: Kar Mora  : 1950  MRN: [id number]    Date of Admission: 10/11/2018  Length of Stay: 5  Primary Care Physician: Bro Kan MD    Subjective     CC: medical management    HPI:  Coughing persists. Severe, uncontrolled pain related to cough with drainage from mediastinal tubes. Wife is very upset/tearful and patient is quite uncomfortable. Cough is non-productive, no fevers/chills, labs WNL.  CT surgery team giving solumedrol this morning.     Review of Systems  Gen- No fevers, chills  CV- (+) post-surgical chest pain, no palpitations  Resp- (+) cough, no dyspnea  GI- No N/V/D, abd pain    Otherwise ROS is negative except as mentioned in the HPI.    Objective     Vital Signs:   Temp:  [98.8 °F (37.1 °C)] 98.8 °F (37.1 °C)  Heart Rate:  [80-92] 88  Resp:  [18] 18  BP: ()/(48-77) 114/56        Physical Exam:  Constitutional: Drowsy, appears uncomfortable. Frequent coughing. Wife at bedside.   HENT: NCAT, mucous membranes moist  Respiratory: Mild wheezing bilaterally without rales or rhonchi. Normal respiratory effort. On room air.    Cardiovascular: RRR, no murmurs, rubs, or gallops, palpable pedal pulses bilaterally  Gastrointestinal: Positive bowel sounds, soft, nontender, nondistended  Musculoskeletal: No bilateral ankle edema  Psychiatric: Appropriate affect, cooperative  Neurologic: Oriented x 3, strength symmetric in all extremities, Cranial Nerves grossly intact to confrontation, speech clear  Skin: No rashes    Results Reviewed:  I have personally reviewed current lab, radiology, and data and agree.      Results from last 7 days  Lab Units 10/16/18  0517 10/15/18  0500 10/14/18  0321 10/13/18  0321 10/12/18  0323  10/11/18  1305  10/10/18  1324   WBC 10*3/mm3 6.71  --  7.34 9.84 8.44  < > 5.18  --  5.02   HEMOGLOBIN g/dL 10.1*  10.0* 9.9* 9.1* 10.0* 10.0*  < > 10.5*  --  12.2*   HEMOGLOBIN, POC   --   --   --   --   --   --   --   < >  --    HEMATOCRIT % 31.2*  31.0* 30.7* 28.1* 31.5* 31.4*  < > 32.0*  --  37.3*   HEMATOCRIT POC   --   --   --   --   --   --   --   < >  --    PLATELETS 10*3/mm3 288  --  158 160 169  < > 114*  --  199   INR   --   --   --   --  1.02  --  1.07  --  0.99   < > = values in this interval not displayed.    Results from last 7 days  Lab Units 10/16/18  0517 10/15/18  0500 10/14/18  0321  10/10/18  1324   SODIUM mmol/L 135 133 132  < > 137   POTASSIUM mmol/L 4.5  4.4 4.0  < > 4.4   CHLORIDE mmol/L 104 101 104  < > 104   CO2 mmol/L 21.0 23.0 21.0  < > 26.0   BUN mg/dL 50* 50* 44*  < > 42*   CREATININE mg/dL 1.40* 1.62* 1.56*  < > 1.84*   GLUCOSE mg/dL 199* 212* 141*  < > 217*   CALCIUM mg/dL 8.4* 9.1 7.8*  < > 8.9   ALT (SGPT) U/L  --   --   --   --  29   AST (SGOT) U/L  --   --   --   --  33   < > = values in this interval not displayed.  Estimated Creatinine Clearance: 55.1 mL/min (A) (by C-G formula based on SCr of 1.4 mg/dL (H)).  No results found for: BNP    Microbiology Results Abnormal     None        Imaging Results (last 24 hours)     Procedure Component Value Units Date/Time    XR Chest 1 View [[id number]] Collected:  10/15/18 1600     Updated:  10/15/18 1619    Narrative:       EXAMINATION: XR CHEST 1 VW- 10/15/2018     INDICATION: Chest pain.; Z74.09-Other reduced mobility;  I25.118-Atherosclerotic heart disease of native coronary artery with  other forms of angina pectoris; I25.119-Atherosclerotic heart disease of  native coronary artery with unspecified angina pectoris; N17.9-Acute  kidney failure, unspecified; I25.9-Chronic ischemic heart disease,  unspecified      COMPARISON: 10/13/2018     FINDINGS: A left chest tube has been removed. There is no pneumothorax.  There is left basilar airspace disease, mild.           Impression:       There is no pneumothorax following removal of a left chest  tube. There is minimal left basilar airspace disease.     D:  10/15/2018  E:  10/15/2018     This report was finalized on 10/15/2018 4:16 PM by Dr. Adonis Hall MD.           Results for orders placed during the hospital encounter of 10/11/18   Adult Transthoracic Echo Complete W/ Cont if Necessary Per Protocol    Narrative · Estimated EF appears to be in the range of 26 - 30%.  · The following left ventricular wall segments are hypokinetic: mid   anterolateral. The following left ventricular wall segments are   dyskinetic: apical anterior.  · Right ventricular cavity is  borderline dilated.  · Left atrial cavity size is mildly dilated.  · Mild mitral valve regurgitation is present          I have reviewed the medications.      atorvastatin 40 mg Oral Nightly   carvedilol 25 mg Oral BID   clopidogrel 75 mg Oral Daily   dextromethorphan polistirex ER 60 mg Oral Q12H   ferrous sulfate 325 mg Oral BID With Meals   finasteride 5 mg Oral Daily   guaifenesin-dextromethorphan 1 tablet Oral BID   insulin detemir 20 Units Subcutaneous BID   insulin lispro 0-9 Units Subcutaneous 4x Daily With Meals & Nightly   insulin lispro 9 Units Subcutaneous TID With Meals   Morphine 4 mg Intravenous Once   oxyCODONE-acetaminophen 1 tablet Oral Q8H   pharmacy consult - MTM  Does not apply Daily   sacubitril-valsartan 1 tablet Oral Q12H   sennosides-docusate sodium 2 tablet Oral BID   sodium chloride 3 mL Intravenous Q12H   sodium chloride 3-10 mL Intravenous Q8H   tamsulosin 0.4 mg Oral Daily     Assessment / Plan     Active Hospital Problems    Diagnosis   • **Coronary artery disease (S/P CABG x 4)   • Cough   • LOPEZ (acute kidney injury) (CMS/HCC)   • Insulin dependent type 2 diabetes mellitus (CMS/HCC)   • Labile hypertension   • Hypertensive cardiovascular disease     1. Probable hypertensive cardiovascular disease:  a. Abnormal EKG with abnormal acceptable echocardiographic GXT, September 2004.   b. Acceptable echocardiographic GXT with preserved exercise capacity and mild LVH, March 2008.   c. Acceptable Quantitative SPECT Gated Cardiolite GXT with nominal myocardial perfusion to 88% of predicted exercise capacity and 90% predicted maximum heart rate with preserved LVEF (0.65) with continued medical therapy felt warranted, December 2012.  d. Residual class I symptoms with recent documented abnormal EKG (LBBB/first-degree AV block) with abnormal echocardiogram demonstrating mild left ventricular chamber enlargement with mild reduction in the LVEF (0.42) without PE or pulmonary  hypertension.  e. Resident class I symptoms with persistent abnormal echocardiogram (LVEF 0.40), with mild concentric LVH and mild left atrial enlargement without pulmonary arterial hypertension or pericardial effusion, July 2015.       • Moderate obesity   • Dyslipidemia     Brief Hospital Course to date:  Kar Mora is a 68 y.o. male with PMH significant for insulin-dependent DMII, HTN, hyperlipidemia, CKD III and CAD. Admitted to [facility] 10/11/2018 for CABG x 4. Transferred to the [facility]     Coronary artery disease s/p CABG x 4 (10/11/18)  - post-operative care per CT surgery team  - Continue DAPT, statin and BB     Cough  - Source unclear. CXR negative. Labs not consistent with infection. Will discuss possible CT chest with surgery team  - Check flu and respiratory virus PCRs  - Continue scheduled tussionex, stop mucinex and start Robitussin DM for improved cough suppression  - Morphine 4mg IV x 1, Oxycodone 10mg Q8H scheduled and Q4H PRN for breakthrough pain     Systolic congestive heart falure  - EF 26-30% - cardiology is following.   - Will need LifeVest placed prior to discharge  - Entresto started 10/15 - monitor BP and renal function     LOPEZ on CKD   - Baseline creatinine is unclear   - 7/2018 creatnine 1.68, GFR 41  - Creat currently 1.4, monitor     Non-insulin dependent DMII  - Hgb A1c 7.5% - At baseline, he is on Metformin, Toujeo, Glucotrol and Jardiance   - He follows with Dr. Hiren Montoya every 3-4 months. Needs follow up in 1-2 weeks following d/c  - Will increase Levemir to 20 units BID and Lispro to 9 units TIC AC + SSI to account for the 1g Solumedrol he was given this morning      Urinary retention  - Started on Flomax and Proscar in the ICU  - Voiding trial this AM     DVT Prophylaxis: Mechanical    CODE STATUS:   Code Status and Medical Interventions:   Ordered at: 10/11/18 1052     Level Of Support Discussed With:    Patient     Code Status:    CPR     Medical  "Interventions (Level of Support Prior to Arrest):    Full     Disposition: I expect the patient to be discharged in a couple of days - pending improvement of his cough    Electronically signed by Charlene Gutiérrez PA-C, 10/16/18, 9:17 AM.        Electronically signed by Charlene Gutiérrez PA-C at 10/16/2018  9:35 AM     Migue Burton MD at 10/16/2018  7:06 AM          Kar Mora  [id number]  1950     LOS: 5 days   Patient Care Team:  Bro Kan MD as PCP - General  MontoyaHiren MD as Consulting Physician (Endocrinology)    Chief Complaint: Coronary artery disease      Subjective: Patient has severe cough which is developed over the last 24-36 hours    Objective:     Vital Sign Min/Max for last 24 hours  Temp  Min: 98.2 °F (36.8 °C)  Max: 98.8 °F (37.1 °C)   BP  Min: 93/48  Max: 140/77   Pulse  Min: 80  Max: 89   Resp  Min: 18  Max: 18   SpO2  Min: 92 %  Max: 93 %   No Data Recorded   Weight  Min: 94.6 kg (208 lb 9.6 oz)  Max: 94.6 kg (208 lb 9.6 oz)     Flowsheet Rows      First Filed Value   Admission Height  167.6 cm (66\") Documented at 10/11/2018 0604   Admission Weight  96.2 kg (212 lb) Documented at 10/11/2018 0604          Physical Exam:    Wound: Slight drainage from the lower portion the incision    Pulses:     Mediastinal and Chest Tube Drainage:       Results Review:     Results from last 7 days  Lab Units 10/16/18  0517  10/14/18  0321   WBC 10*3/mm3  --   --  7.34   HEMOGLOBIN g/dL 10.0*  < > 9.1*   HEMATOCRIT % 31.0*  < > 28.1*   PLATELETS 10*3/mm3  --   --  158   < > = values in this interval not displayed.    Results from last 7 days  Lab Units 10/16/18  0517   SODIUM mmol/L 135   POTASSIUM mmol/L 4.5   CHLORIDE mmol/L 104   CO2 mmol/L 21.0   BUN mg/dL 50*   CREATININE mg/dL 1.40*   GLUCOSE mg/dL 199*   CALCIUM mg/dL 8.4*       Results from last 7 days  Lab Units 10/11/18  1448   PH, ARTERIAL pH units 7.341*   PO2 ART mm Hg 100.0   PCO2, ARTERIAL mm Hg " 39.7   HCO3 ART mmol/L 21.5         Assessment      Coronary artery disease (S/P CABG x 4)    Insulin dependent type 2 diabetes mellitus (CMS/HCC)    Labile hypertension    Dyslipidemia    Hypertensive cardiovascular disease    Moderate obesity    LOPEZ (acute kidney injury) (CMS/HCC)      Place patient on Tussionex 1 teaspoon every 12 hours around the clock, the patient also will need to be placed on Tussionex pearls around the clock, also need give 1 g of Solu-Medrol if this is an allergic type reaction.  Discussed with wife.        Migue Burton MD  10/16/18  7:06 AM      Please note that portions of this note were completed with a voice recognition program. Efforts were made to edit the dictations, but words may be mistranscribed    Electronically signed by Migue Burton MD at 10/16/2018  7:07 AM     Todd Qureshi MD at 10/16/2018  5:46 AM          Kar Mora  [id number]  1950   LOS: 5 days   Patient Care Team:  Bro Kan MD as PCP - General  [facility] Phys[facility] (Endocrinology)    Chief Complaint:  Cough, chest discomfort, CAD    Subjective      Patient has a nonproductive cough and states that he is miserable.  He is much worse than yesterday.  His wife states that he screams whenever he has to cough because it is so painful.  His incision is now draining as well. They did a chest x-ray which apparently was clear.  He denies any sputum production, increased shortness of breath, or edema.    Objective     Vital Sign Min/Max for last 24 hours  Temp  Min: 98.2 °F (36.8 °C)  Max: 98.8 °F (37.1 °C)   BP  Min: 93/48  Max: 140/77   Pulse  Min: 80  Max: 89   Resp  Min: 18  Max: 18   SpO2  Min: 92 %  Max: 93 %      Weight  Min: 94.6 kg (208 lb 9.6 oz)  Max: 94.6 kg (208 lb 9.6 oz)     1    10/15/18  0500 10/16/18  0422   Weight: 95.7 kg (211 lb) 94.6 kg (208 lb 9.6 oz)         Intake/Output Summary (Last 24 hours) at 10/16/18 0546  Last data filed at  10/15/18 1005   Gross per 24 hour   Intake                0 ml   Output              750 ml   Net             -750 ml       Physical Exam:     General Appearance:    Alert, cooperative, in no acute distress   Lungs:     Clear to auscultation,respirations regular, even and                   unlabored    Heart:    Regular and normal rate, normal S1 and S2, grade 1/6            murmur, no gallop, no rub, no click   Abdomen:  Extremities:   Soft, non-tender, bowel sounds audible x4    No edema, normal range of motion   Pulses:   Pulses palpable and equal bilaterally    Sternal incision with serosanguineous drainage  Results Review:     Results from last 7 days  Lab Units 10/15/18  0500 10/14/18  0321 10/13/18  0321   SODIUM mmol/L 133 132 134   POTASSIUM mmol/L 4.4 4.0 4.6   CHLORIDE mmol/L 101 104 106   CO2 mmol/L 23.0 21.0 19.0*   BUN mg/dL 50* 44* 36*   CREATININE mg/dL 1.62* 1.56* 1.70*   GLUCOSE mg/dL 212* 141* 173*   CALCIUM mg/dL 9.1 7.8* 8.4*       Results from last 7 days  Lab Units 10/16/18  0517 10/15/18  0500 10/14/18  0321 10/13/18  0321 10/12/18  0323   WBC 10*3/mm3  --   --  7.34 9.84 8.44   HEMOGLOBIN g/dL 10.0* 9.9* 9.1* 10.0* 10.0*   HEMATOCRIT % 31.0* 30.7* 28.1* 31.5* 31.4*   PLATELETS 10*3/mm3  --   --  158 160 169       Results from last 7 days  Lab Units 10/10/18  1324   HEMOGLOBIN A1C % 7.50*     · ECG 10/15/18:  Normal sinus rhythm  Left bundle branch block  Abnormal ECG  When compared with ECG of 15-OCT-2018 15:38, (Unconfirmed)  No significant change was found  Confirmed by KELLY SHAVER MD ([id number]) on 10/15/2018 7:14:18 PM    · Chest x-ray 10/15/18: There is no pneumothorax following removal of a left chest  tube. There is minimal left basilar airspace disease    Medication Review: REVIEWED    Assessment/Plan      S/p CABG x 4 vessels (LIMA to LAD, SVG to the diagonal , circumflex, and PDA), with plans for discharge in next 24-48 hours per CTS. Persistent anemia and marginal renal function. EF  40% when measured in May 2018, EF now 26-30%.  Lifevest has been ordered for placement at discharge.   He will need close follow-up with a BMP for renal dysfunction with his primary care physician after discharge.  Patient is having a persistent nonproductive cough.  I will add Tessalon Perles.  He may not be able to tolerate the Entresto, and need to be switched back to Cozaar if his cough does not improve with the Tessalon Perles.  He has had a persistent cough for the past 2 days but Entresto was only initiated yesterday.  Chest X-ray acceptable.  No new BMP to review yet this morning. Would recommend the following discharge cardiac medications:     · Atorvastatin 40 mg daily  · Carvedilol 25 mg BID                          · Entresto 24/26 BID  · Clopidogrel 75 mg daily  · Ferrous sulfate 325 mg BID times month  · [facility] cardiology follow-up 6 weeks with limited echocardiogram to assess LVEF  · Lifevest      Coronary artery disease (S/P CABG x 4)    Insulin dependent type 2 diabetes mellitus (CMS/HCC)    Labile hypertension    Dyslipidemia    Hypertensive cardiovascular disease    Moderate obesity    LOPEZ (acute kidney injury) (CMS/HCC)    Discussed with patient and wife in room.    Scribed for Todd Qureshi MD by Madhavi Stout, EDGAR. 10/16/2018  5:46 AM    I, Todd Qureshi MD, [facility], personally performed the services described in this documentation as scribed by the above named individual in my presence, and it is both accurate and complete.     10/16/18  5:46 AM    Electronically signed by Todd Qureshi MD at 10/16/2018  8:07 AM

## 2021-03-17 RX ORDER — FENOFIBRATE 160 MG/1
160 TABLET ORAL DAILY
Qty: 90 TABLET | Refills: 3 | Status: SHIPPED | OUTPATIENT
Start: 2021-03-17 | End: 2021-05-14 | Stop reason: SDUPTHER

## 2021-04-12 ENCOUNTER — OFFICE VISIT (OUTPATIENT)
Dept: ENDOCRINOLOGY | Facility: CLINIC | Age: 71
End: 2021-04-12

## 2021-04-12 VITALS
WEIGHT: 212 LBS | BODY MASS INDEX: 34.07 KG/M2 | HEART RATE: 86 BPM | SYSTOLIC BLOOD PRESSURE: 94 MMHG | TEMPERATURE: 97.3 F | DIASTOLIC BLOOD PRESSURE: 56 MMHG | OXYGEN SATURATION: 96 % | HEIGHT: 66 IN

## 2021-04-12 DIAGNOSIS — E11.59 TYPE 2 DIABETES MELLITUS WITH OTHER CIRCULATORY COMPLICATION, WITH LONG-TERM CURRENT USE OF INSULIN (HCC): Primary | ICD-10-CM

## 2021-04-12 DIAGNOSIS — Z79.4 TYPE 2 DIABETES MELLITUS WITH OTHER CIRCULATORY COMPLICATION, WITH LONG-TERM CURRENT USE OF INSULIN (HCC): Primary | ICD-10-CM

## 2021-04-12 LAB
EXPIRATION DATE: NORMAL
HBA1C MFR BLD: 7.9 %
Lab: NORMAL

## 2021-04-12 PROCEDURE — 83036 HEMOGLOBIN GLYCOSYLATED A1C: CPT | Performed by: INTERNAL MEDICINE

## 2021-04-12 PROCEDURE — 99213 OFFICE O/P EST LOW 20 MIN: CPT | Performed by: INTERNAL MEDICINE

## 2021-04-12 RX ORDER — EMPAGLIFLOZIN 25 MG/1
25 TABLET, FILM COATED ORAL DAILY
Qty: 30 TABLET | Refills: 5 | Status: SHIPPED | OUTPATIENT
Start: 2021-04-12 | End: 2021-10-04

## 2021-04-12 RX ORDER — INSULIN GLARGINE 300 U/ML
INJECTION, SOLUTION SUBCUTANEOUS
Qty: 10 PEN | Refills: 3 | Status: SHIPPED | OUTPATIENT
Start: 2021-04-12 | End: 2021-10-19 | Stop reason: SDUPTHER

## 2021-04-12 RX ORDER — GLIPIZIDE 5 MG/1
5 TABLET, FILM COATED, EXTENDED RELEASE ORAL DAILY
Qty: 30 TABLET | Refills: 5 | Status: SHIPPED | OUTPATIENT
Start: 2021-04-12 | End: 2021-10-19 | Stop reason: SDUPTHER

## 2021-04-12 RX ORDER — SITAGLIPTIN 100 MG/1
100 TABLET, FILM COATED ORAL DAILY
Qty: 30 TABLET | Refills: 5 | Status: SHIPPED | OUTPATIENT
Start: 2021-04-12 | End: 2021-10-04

## 2021-04-12 RX ORDER — PEN NEEDLE, DIABETIC 31 GX5/16"
NEEDLE, DISPOSABLE MISCELLANEOUS
Qty: 100 EACH | Refills: 3 | Status: SHIPPED | OUTPATIENT
Start: 2021-04-12 | End: 2021-10-19 | Stop reason: SDUPTHER

## 2021-04-12 NOTE — PROGRESS NOTES
"     Office Note      Date: 2021  Patient Name: Kar Mora  MRN: [id number]  : 1950    Chief Complaint   Patient presents with   • Diabetes       History of Present Illness:   Kar Mora is a 70 y.o. male who presents for Diabetes - type 2  He is on basal insulin. Metformin, jardiance, januvia and sfu  bg checks are done daily- 150-200  He has not had hypoglycemia     Last A1c:  Hemoglobin A1C   Date Value Ref Range Status   2021 7.9 % Final   10/10/2018 7.50 (H) 4.80 - 5.60 % Final       Changes in health since last visit: none. Last eye exam aboutdue  Feet- no issues   .    Subjective          Review of Systems:   Review of Systems   Constitutional: Negative.    HENT: Negative.    Eyes: Negative.    Respiratory: Negative.    All other systems reviewed and are negative.      The following portions of the patient's history were reviewed and updated as appropriate: allergies, current medications, past family history, past medical history, past social history, past surgical history and problem list.    Objective     Visit Vitals  BP 94/56 (BP Location: Left arm, Patient Position: Sitting, Cuff Size: Adult)   Pulse 86   Temp 97.3 °F (36.3 °C) (Infrared)   Ht 167.6 cm (66\")   Wt 96.2 kg (212 lb)   SpO2 96%   BMI 34.22 kg/m²       Labs:    CMP  Lab Results   Component Value Date    GLUCOSE 101 (H) 10/29/2018    BUN 35 (H) 10/29/2018    CREATININE 1.49 (H) 10/29/2018    EGFRIFNONA 47 (L) 10/29/2018    BCR 23.5 10/29/2018    K 4.5 10/29/2018    CO2 28.0 10/29/2018    CALCIUM 8.9 10/29/2018    AST 33 10/10/2018    ALT 29 10/10/2018        CBC w/DIFF  Lab Results   Component Value Date    WBC 9.30 10/19/2018    RBC 3.71 (L) 10/19/2018    HGB 10.6 (L) 10/19/2018    HCT 32.8 (L) 10/19/2018    MCV 88.4 10/19/2018    MCH 28.6 10/19/2018    MCHC 32.3 10/19/2018    RDW 14.6 (H) 10/19/2018    RDWSD 47.0 10/19/2018    MPV 9.4 10/19/2018     10/19/2018    NEUTRORELPCT 67.6 10/18/2018    " LYMPHORELPCT 20.9 (L) 10/18/2018    MONORELPCT 10.4 10/18/2018    EOSRELPCT 0.9 10/18/2018    BASORELPCT 0.2 10/18/2018    AUTOIGPER 1.9 (H) 10/18/2018    NEUTROABS 7.16 10/18/2018    LYMPHSABS 2.21 10/18/2018    MONOSABS 1.10 (H) 10/18/2018    EOSABS 0.09 10/18/2018    BASOSABS 0.02 10/18/2018    AUTOIGNUM 0.20 (H) 10/18/2018       Physical Exam:  Physical Exam  Vitals reviewed.   Constitutional:       Appearance: Normal appearance.   Neurological:      Mental Status: He is alert and oriented to person, place, and time.   Psychiatric:         Mood and Affect: Mood normal.         Thought Content: Thought content normal.         Judgment: Judgment normal.          Assessment / Plan      Assessment & Plan:  Problem List Items Addressed This Visit        Other    Type 2 diabetes mellitus, with long-term current use of insulin (CMS/[facility]) - Primary    Current Assessment & Plan      Diabetes control  Is  Acceptable.  Goal is under 8  He is 7.9         Relevant Medications    metFORMIN (GLUCOPHAGE) 500 MG tablet    Toujeo SoloStar 300 UNIT/ML solution pen-injector injection    glipizide (GLUCOTROL XL) 5 MG ER tablet    Januvia 100 MG tablet    Empagliflozin (Jardiance) 25 MG tablet    Other Relevant Orders    POC Glycosylated Hemoglobin (Hb A1C) (Completed)           Hiren Montoya MD   04/12/2021

## 2021-05-14 ENCOUNTER — OFFICE VISIT (OUTPATIENT)
Dept: CARDIOLOGY | Facility: CLINIC | Age: 71
End: 2021-05-14

## 2021-05-14 VITALS
BODY MASS INDEX: 34.23 KG/M2 | HEIGHT: 66 IN | HEART RATE: 90 BPM | SYSTOLIC BLOOD PRESSURE: 117 MMHG | DIASTOLIC BLOOD PRESSURE: 61 MMHG | WEIGHT: 213 LBS | TEMPERATURE: 98 F

## 2021-05-14 DIAGNOSIS — I25.9 IHD (ISCHEMIC HEART DISEASE): ICD-10-CM

## 2021-05-14 DIAGNOSIS — I25.5 ISCHEMIC CARDIOMYOPATHY: Primary | ICD-10-CM

## 2021-05-14 DIAGNOSIS — Z79.4 TYPE 2 DIABETES MELLITUS WITH OTHER CIRCULATORY COMPLICATION, WITH LONG-TERM CURRENT USE OF INSULIN (HCC): ICD-10-CM

## 2021-05-14 DIAGNOSIS — E11.59 TYPE 2 DIABETES MELLITUS WITH OTHER CIRCULATORY COMPLICATION, WITH LONG-TERM CURRENT USE OF INSULIN (HCC): ICD-10-CM

## 2021-05-14 DIAGNOSIS — I10 ESSENTIAL HYPERTENSION: ICD-10-CM

## 2021-05-14 DIAGNOSIS — E78.5 DYSLIPIDEMIA: ICD-10-CM

## 2021-05-14 PROCEDURE — 99214 OFFICE O/P EST MOD 30 MIN: CPT | Performed by: INTERNAL MEDICINE

## 2021-05-14 RX ORDER — FENOFIBRATE 160 MG/1
160 TABLET ORAL DAILY
Qty: 90 TABLET | Refills: 3 | Status: SHIPPED | OUTPATIENT
Start: 2021-05-14 | End: 2022-06-01 | Stop reason: SDUPTHER

## 2021-05-14 RX ORDER — SACUBITRIL AND VALSARTAN 24; 26 MG/1; MG/1
1 TABLET, FILM COATED ORAL EVERY 12 HOURS
Qty: 180 TABLET | Refills: 3 | Status: SHIPPED | OUTPATIENT
Start: 2021-05-14 | End: 2022-06-01 | Stop reason: SDUPTHER

## 2021-05-14 RX ORDER — NITROGLYCERIN 0.4 MG/1
0.4 TABLET SUBLINGUAL
Qty: 25 TABLET | Refills: 11 | Status: SHIPPED | OUTPATIENT
Start: 2021-05-14 | End: 2022-06-01 | Stop reason: SDUPTHER

## 2021-05-14 RX ORDER — CLOPIDOGREL BISULFATE 75 MG/1
75 TABLET ORAL DAILY
Qty: 90 TABLET | Refills: 3 | Status: SHIPPED | OUTPATIENT
Start: 2021-05-14 | End: 2022-06-01 | Stop reason: SDUPTHER

## 2021-05-14 RX ORDER — ROSUVASTATIN CALCIUM 40 MG/1
40 TABLET, COATED ORAL NIGHTLY
Qty: 90 TABLET | Refills: 3 | Status: SHIPPED | OUTPATIENT
Start: 2021-05-14 | End: 2022-06-01 | Stop reason: SDUPTHER

## 2021-05-14 RX ORDER — ICOSAPENT ETHYL 1000 MG/1
2 CAPSULE ORAL 2 TIMES DAILY WITH MEALS
Qty: 360 CAPSULE | Refills: 3 | Status: SHIPPED | OUTPATIENT
Start: 2021-05-14 | End: 2021-07-07 | Stop reason: SDUPTHER

## 2021-05-14 RX ORDER — CARVEDILOL 25 MG/1
25 TABLET ORAL 2 TIMES DAILY
Qty: 180 TABLET | Refills: 3 | Status: SHIPPED | OUTPATIENT
Start: 2021-05-14 | End: 2022-06-01 | Stop reason: SDUPTHER

## 2021-05-14 NOTE — PROGRESS NOTES
Subjective:     Encounter Date:05/14/2021    Patient ID: Kar Mora is a 70 y.o.  white male, , from [place], [place].      REFERRING PHYSICIAN/ENDOCRINOLOGIST: Hiren Montoya MD  FORMER ALLERGIST: Abhijeet Madrid MD  PHYSICIAN: Bro Kan MD  INTERVENTIONAL CARDIOLOGIST:  Madi Moctezuma MD, MSC, [facility]  CARDIOVASCULAR SURGEON: Migue Burton MD    Chief Complaint:   Chief Complaint   Patient presents with   • Coronary Artery Disease   • Cardiomyopathy       Problem List:  1. Insulin-dependent type 2 diabetes mellitus without apparent end organ damage; hemoglobin A1c 6.4%, November 2017; 8.0%, July 2018; 8.2%, March 2019, 7% Fall 2020  2. Labile hypertension.  3. Dyslipidemia.  4. Erectile dysfunction.  5. Remote nasal polypectomy, 1986.  6. Allergic rhinitis.  7. Combined hypertensive and ischemic cardiovascular disease:  a. Abnormal EKG with abnormal acceptable echocardiographic GXT, September 2004.   b. Acceptable echocardiographic GXT with preserved exercise capacity and mild LVH, March 2008.   c. Acceptable quantitative SPECT Gated Cardiolite GXT with nominal myocardial perfusion to 88% of predicted exercise capacity and 90% predicted maximum heart rate with preserved LVEF (0.65) with continued medical therapy felt warranted, December 2012.  d. Residual class I symptoms with recent documented abnormal EKG (LBBB/first-degree AV block) with abnormal echocardiogram demonstrating mild left ventricular chamber enlargement with mild reduction in the LVEF (0.42) without PE or pulmonary hypertension.  e. Resident class I symptoms with persistent abnormal echocardiogram (LVEF 0.40), with mild concentric LVH and mild left atrial enlargement without pulmonary arterial hypertension or pericardial effusion, July 2015.  f. Residual class I symptoms with acceptable  echocardiogram (LVEF 0.50) with mild-moderate RVE, mild-moderate LAE, and aortic valve sclerosis with no evidence of pericardial  effusion (November 2016).  g. Abnormal echocardiogram, 5/25/2018: LVEF 0.40, mild concentric hypertrophy, LA dilated, aortic valve exhibits sclerosis, mild MR, trace TR, no pericardial effusion  h. Echocardiogram, 11/29/2018: The following LV wall segments are hypokinetic: Mid anterior, basal anterolateral, mid anterolateral, apical lateral, basal inferolateral, mid inferolateral, apical inferior, mid inferior, basal inferoseptal, mid anteroseptal, basal anterior, basal inferior and basal inferoseptal.  The following LV wall segments are akinetic: Apical anterior, apical septal, mid inferoseptal and apex.  LVEF 0.36, LV wall thickness consistent with mild-to-moderate concentric hypertrophy, no evidence of LV mass thrombus present.  Normal RV cavity size, wall thickness, systolic function and septal motion noted, no pericardial effusion, aortic valve exhibits sclerosis, mild MAC present  i. MUGA, 12/21/2018: Mild LV chamber enlargement with moderate global hypokinesis and prominent interface ventricular septal and apical dyskinesis in part related to left bundle branch block.  Mild reduction regional LVEF with severe reduction in interventricular septal and apical regional ejection fractions.  Normal RV chamber size and systolic wall motion.  Moderate reduction in calculated global LVEF 0.35   j. Echocardiogram 02/05/2020: The following LV wall segments are hypokinetic: Mid anterior, basal anterolateral, mid anterolateral, apical lateral, basal inferolateral, mid inferolateral, apical inferior, mid inferior, basal inferoseptal, mid anteroseptal, basal anterior, basal inferior, and basal inferoseptal.  The following LV wall segments are akinetic: Apical anterior, apical septal, mid inferoseptal and apex.  LVEF 38%, normal RV cavity size, wall thickness, systolic function and septal motion noted.  Septal wall motion is abnormal consistent with BBB.  No significant change from 11/29/2018 study (LVEF 0.36).  k. Residual  class I symptoms, October 2020, May 2021  8. Moderate obesity (BMI 34.38).  9. Questionable asymptomatic right carotid bruit  10. Ischemic heart disease:  a. Remote NYHA class II exertional dyspnea/CCS class II-III angina pectoris with diagnostic coronary angiography demonstrating severe 3-vessel obstructive coronary artery disease  b. Abnormal electrocardiogram (LBBB), July 2018  c. [facility] 11-day hospitalization for CABG ×4 vessels, 10/11/2018: SVG to diagonal and circumflex, SVG to PDA, LIMA to LAD, discharged with LifeVest  d. Residual class I symptoms with abnormal postoperative echocardiogram and improved MUGA LVEF (0.35), December 2018  e. Residual class I symptoms, October 2020, May 2021    Allergies   Allergen Reactions   • Asa [Aspirin] Other (See Comments)      upper airway congestion       Current Outpatient Medications:   •  B-D ULTRAFINE III SHORT PEN 31G X 8 MM misc, For daily use, Disp: 100 each, Rfl: 3  •  carvedilol (COREG) 25 MG tablet, TAKE 1 TABLET BY MOUTH TWICE A DAY, Disp: 180 tablet, Rfl: 3  •  clopidogrel (PLAVIX) 75 MG tablet, Take 1 tablet by mouth Daily., Disp: 90 tablet, Rfl: 3  •  Empagliflozin (Jardiance) 25 MG tablet, Take 25 mg by mouth Daily., Disp: 30 tablet, Rfl: 5  •  ENTRESTO 24-26 MG tablet, TAKE 1 TABLET BY MOUTH EVERY 12 HOURS, Disp: 180 tablet, Rfl: 3  •  fenofibrate 160 MG tablet, Take 1 tablet by mouth Daily., Disp: 90 tablet, Rfl: 3  •  glipizide (GLUCOTROL XL) 5 MG ER tablet, Take 1 tablet by mouth Daily., Disp: 30 tablet, Rfl: 5  •  icosapent ethyl (Vascepa) 1 g capsule capsule, Take 2 g by mouth 2 (Two) Times a Day With Meals. PLEASE GET LABS FOR FURTHER REFILLS, Disp: 120 capsule, Rfl: 5  •  Januvia 100 MG tablet, Take 1 tablet by mouth Daily., Disp: 30 tablet, Rfl: 5  •  Lancets (onetouch ultrasoft) lancets, Test 2 times daily, Disp: 200 each, Rfl: 1  •  metFORMIN (GLUCOPHAGE) 500 MG tablet, Take 2 tablets by mouth 2 (Two) Times a Day With Meals., Disp: 180 tablet, Rfl:  "3  •  Multiple Vitamins-Minerals (MULTIVITAMIN ADULT PO), Take 1 tablet by mouth Daily., Disp: , Rfl:   •  nitroglycerin (NITROSTAT) 0.4 MG SL tablet, DISSOLVE 1 TABLET UNDER THE TONGUE EVERY 5 MINUTES AS NEEDED UP TO 3 DOSES. IF CHEST PAIN DOESN'T RESOLVE CALL 911., Disp: 25 tablet, Rfl: 11  •  ONE TOUCH ULTRA TEST test strip, 2 (Two) Times a Day. for testing, Disp: , Rfl: 3  •  rosuvastatin (CRESTOR) 40 MG tablet, Take 1 tablet by mouth Every Night. PLEASE GET LABS FOR FURTHER REFILLS, Disp: 30 tablet, Rfl: 5  •  Toujeo SoloStar 300 UNIT/ML solution pen-injector injection, Inject 75 units nightly, Disp: 10 pen, Rfl: 3  No current facility-administered medications for this visit.    Facility-Administered Medications Ordered in Other Visits:   •  Chlorhexidine Gluconate Cloth 2 % pads 1 application, 1 application, Topical, Q12H PRN, Adonis Capps PA    History of Present Illness: Patient returns for scheduled 7-month follow up.  He denies any chest pain, increased shortness of breath, edema, palpitations, dizziness, presyncope, or syncope.  He has noticed sometimes at night when he is lying down that he feels like he may have a muscle pulled in his abdomen.  He feels like the band wrapping around the lower quadrants.  He does not have associated nausea, vomiting, or diarrhea with this sensation.  He does not describe it as pain.  He describes it as a sensation \"like if you were a kid and you jumped off of something and when you hit the ground what your abdomen would feel like when once you hit the ground.\"  He has been compliant with his cardiac medications.  He had laboratory testing in January 2021.  He has had both of his Covid vaccinations.  He does not engage in much physical activity other than what he does at work. He does extensive yard work every Saturday without marked cardiopulmonary complaints. 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        Review of Systems   All other systems reviewed and are negative.     Obtained and negative except as outlined in problem list and HPI.    Procedures       Objective:       Vitals:    05/14/21 1518 05/14/21 1520   BP: 114/55 117/61   BP Location: Left arm Left arm   Patient Position: Sitting Standing   Pulse: 86 90   Temp: 98 °F (36.7 °C)    Weight: 96.6 kg (213 lb)    Height: 167.6 cm (66\")      Body mass index is 34.38 kg/m².  Last weight: 212 lbs    Vitals reviewed.   Constitutional:       Appearance: Well-developed.   Neck:      Thyroid: No thyromegaly.      Vascular: No carotid bruit or JVD.      Lymphadenopathy: No cervical adenopathy.   Pulmonary:      Effort: Pulmonary effort is normal.      Breath sounds: Decreased breath sounds present. No wheezing. No rhonchi. No rales.   Cardiovascular:      Regular rhythm.      Murmurs: There is a grade 1/6 mid frequency harsh early systolic murmur at the URSB.      No gallop. No S3 gallop.   Pulses:     Dorsalis pedis: 2+ bilaterally.     Posterior tibial: 2+ bilaterally.  Edema:     Peripheral edema absent.   Abdominal:      Palpations: Abdomen is soft. There is no abdominal mass.      Tenderness: There is no abdominal tenderness. There is no guarding.   Musculoskeletal: Normal range of motion. Skin:     General: Skin is warm and dry.      Findings: No rash.   Neurological:      Mental Status: Alert and oriented to person, place, and time.           Lab Review:     1/23/2021 (reviewed with patient by letter - continue current treatment and remain physically active):  • CBC: hematocrit 39%, hemoglobin 12.8, WBC 5570 and normal indices, platelet count and differential  • CMP: normal electrolytes with mild kidney dysfunction, serum creatinine 1.6, BUN 32, glucose 237 and normal serum calcium and liver function tests.   • Serum albumin: 3.9  • FLP: total cholesterol 147, triglycerides 334, HDL-C 20, LDL-C 79    Carotid Duplex Bilateral, 2/4/2021:  No " hemodynamic stenosis seen in the carotid arteries.      Advance Care Planning   ACP discussion was held with the patient during this visit. Patient has an advance directive (not in EMR), copy requested.  Assessment:       Overall continued acceptable course with no new interim cardiopulmonary complaints with acceptable functional status on limited activity.  The patient's last echocardiogram was February 2020 and EF was 38% at that time.  The patient has been compliant with his cardiac medications and we will order a new echocardiogram same day as his next appointment to assess heart structure/function.     Diagnosis Plan   1. Ischemic cardiomyopathy   Echocardiogram same day as his next appointment, EF 38% on last echo February 2020, continue Coreg and Entresto and consider trial of Farxiga.   2. IHD (ischemic heart disease)  No recurrent angina pectoris or CHF on current activity schedule; continue current treatment   3. Essential hypertension   Controlled, continue current cardiac medications   4. Dyslipidemia   Abnormal lipid panel January 2021, continue Vascepa, fenofibrate, rosuvastatin   5. Type 2 diabetes mellitus with other circulatory complication, with long-term current use of insulin (CMS/[facility])   No new labs to review, continue heart healthy diet and increase physical activity          Plan:         1. Patient to continue current medications and close follow up with the above providers.  2. Strongly encouraged to implement aerobic exercise routine, at least 30 minutes daily 5 times per week.   3. Tentative cardiology follow up in November 2021 with same day echocardiogram or patient may return sooner PRN.  4. Travel packet issued.    Scribed for Todd Qureshi MD by Madhavi Stout, APRN. 5/14/2021  15:48 EDT    I, Todd Qureshi MD, [facility], personally performed the services described in this documentation as scribed by the above named individual in my presence, and it is both accurate and complete. At  16:08 EDT on 05/14/2021

## 2021-07-07 RX ORDER — ICOSAPENT ETHYL 1000 MG/1
2 CAPSULE ORAL 2 TIMES DAILY WITH MEALS
Qty: 360 CAPSULE | Refills: 3 | Status: SHIPPED | OUTPATIENT
Start: 2021-07-07 | End: 2022-06-01 | Stop reason: SDUPTHER

## 2021-10-04 RX ORDER — EMPAGLIFLOZIN 25 MG/1
TABLET, FILM COATED ORAL
Qty: 30 TABLET | Refills: 2 | Status: SHIPPED | OUTPATIENT
Start: 2021-10-04 | End: 2021-10-19 | Stop reason: SDUPTHER

## 2021-10-04 RX ORDER — SITAGLIPTIN 100 MG/1
100 TABLET, FILM COATED ORAL DAILY
Qty: 30 TABLET | Refills: 2 | Status: SHIPPED | OUTPATIENT
Start: 2021-10-04 | End: 2021-10-19 | Stop reason: SDUPTHER

## 2021-10-19 ENCOUNTER — OFFICE VISIT (OUTPATIENT)
Dept: ENDOCRINOLOGY | Facility: CLINIC | Age: 71
End: 2021-10-19

## 2021-10-19 VITALS
BODY MASS INDEX: 34.07 KG/M2 | WEIGHT: 212 LBS | SYSTOLIC BLOOD PRESSURE: 98 MMHG | HEART RATE: 84 BPM | HEIGHT: 66 IN | DIASTOLIC BLOOD PRESSURE: 56 MMHG | OXYGEN SATURATION: 97 %

## 2021-10-19 DIAGNOSIS — E11.59 TYPE 2 DIABETES MELLITUS WITH OTHER CIRCULATORY COMPLICATION, WITH LONG-TERM CURRENT USE OF INSULIN (HCC): Primary | ICD-10-CM

## 2021-10-19 DIAGNOSIS — Z79.4 TYPE 2 DIABETES MELLITUS WITH OTHER CIRCULATORY COMPLICATION, WITH LONG-TERM CURRENT USE OF INSULIN (HCC): Primary | ICD-10-CM

## 2021-10-19 LAB
EXPIRATION DATE: ABNORMAL
EXPIRATION DATE: NORMAL
GLUCOSE BLDC GLUCOMTR-MCNC: 203 MG/DL (ref 70–130)
HBA1C MFR BLD: 7.7 %
Lab: ABNORMAL
Lab: NORMAL

## 2021-10-19 PROCEDURE — 83036 HEMOGLOBIN GLYCOSYLATED A1C: CPT | Performed by: INTERNAL MEDICINE

## 2021-10-19 PROCEDURE — 99213 OFFICE O/P EST LOW 20 MIN: CPT | Performed by: INTERNAL MEDICINE

## 2021-10-19 PROCEDURE — 82947 ASSAY GLUCOSE BLOOD QUANT: CPT | Performed by: INTERNAL MEDICINE

## 2021-10-19 PROCEDURE — 3051F HG A1C>EQUAL 7.0%<8.0%: CPT | Performed by: INTERNAL MEDICINE

## 2021-10-19 RX ORDER — PEN NEEDLE, DIABETIC 31 GX5/16"
NEEDLE, DISPOSABLE MISCELLANEOUS
Qty: 100 EACH | Refills: 3 | Status: SHIPPED | OUTPATIENT
Start: 2021-10-19 | End: 2022-07-06 | Stop reason: SDUPTHER

## 2021-10-19 RX ORDER — INSULIN GLARGINE 300 U/ML
INJECTION, SOLUTION SUBCUTANEOUS
Qty: 10 PEN | Refills: 3 | Status: SHIPPED | OUTPATIENT
Start: 2021-10-19 | End: 2022-07-06 | Stop reason: SDUPTHER

## 2021-10-19 RX ORDER — GLIPIZIDE 5 MG/1
5 TABLET, FILM COATED, EXTENDED RELEASE ORAL DAILY
Qty: 30 TABLET | Refills: 5 | Status: SHIPPED | OUTPATIENT
Start: 2021-10-19 | End: 2022-06-20

## 2021-10-19 RX ORDER — SITAGLIPTIN 100 MG/1
100 TABLET, FILM COATED ORAL DAILY
Qty: 30 TABLET | Refills: 2 | Status: SHIPPED | OUTPATIENT
Start: 2021-10-19 | End: 2022-05-23

## 2021-10-19 NOTE — PROGRESS NOTES
"     Office Note      Date: 10/19/2021  Patient Name: Kar Mora  MRN: [id number]  : 1950    Chief Complaint   Patient presents with   • Diabetes       History of Present Illness:   Kar Mora is a 71 y.o. male who presents for Diabetes - type 2  On basal insulin, sfu, metformin , sglt2 and dpp4  bg checks are done daily  Hypoglycemia- rare      Last A1c:  Hemoglobin A1C   Date Value Ref Range Status   10/19/2021 7.7 % Final   10/10/2018 7.50 (H) 4.80 - 5.60 % Final       Changes in health since last visit: none . Last eye exam upcoming .    Subjective          Review of Systems:   Review of Systems   Constitutional: Negative.    HENT: Negative.    Eyes: Negative.    Respiratory: Negative.        The following portions of the patient's history were reviewed and updated as appropriate: allergies, current medications, past family history, past medical history, past social history, past surgical history and problem list.    Objective     Visit Vitals  BP 98/56   Pulse 84   Ht 167.6 cm (66\")   Wt 96.2 kg (212 lb)   SpO2 97%   BMI 34.22 kg/m²       Labs:    CMP  Lab Results   Component Value Date    GLUCOSE 101 (H) 10/29/2018    BUN 35 (H) 10/29/2018    CREATININE 1.49 (H) 10/29/2018    EGFRIFNONA 47 (L) 10/29/2018    BCR 23.5 10/29/2018    K 4.5 10/29/2018    CO2 28.0 10/29/2018    CALCIUM 8.9 10/29/2018    AST 33 10/10/2018    ALT 29 10/10/2018        CBC w/DIFF  Lab Results   Component Value Date    WBC 9.30 10/19/2018    RBC 3.71 (L) 10/19/2018    HGB 10.6 (L) 10/19/2018    HCT 32.8 (L) 10/19/2018    MCV 88.4 10/19/2018    MCH 28.6 10/19/2018    MCHC 32.3 10/19/2018    RDW 14.6 (H) 10/19/2018    RDWSD 47.0 10/19/2018    MPV 9.4 10/19/2018     10/19/2018    NEUTRORELPCT 67.6 10/18/2018    LYMPHORELPCT 20.9 (L) 10/18/2018    MONORELPCT 10.4 10/18/2018    EOSRELPCT 0.9 10/18/2018    BASORELPCT 0.2 10/18/2018    AUTOIGPER 1.9 (H) 10/18/2018    NEUTROABS 7.16 10/18/2018    LYMPHSABS 2.21 " 10/18/2018    MONOSABS 1.10 (H) 10/18/2018    EOSABS 0.09 10/18/2018    BASOSABS 0.02 10/18/2018    AUTOIGNUM 0.20 (H) 10/18/2018       Physical Exam:  Physical Exam  Vitals reviewed.   Constitutional:       Appearance: Normal appearance. He is normal weight.   Cardiovascular:      Pulses:           Dorsalis pedis pulses are 2+ on the right side and 2+ on the left side.        Posterior tibial pulses are 2+ on the right side and 2+ on the left side.   Musculoskeletal:      Right foot: Normal range of motion. No deformity, bunion, Charcot foot, foot drop or prominent metatarsal heads.      Left foot: Normal range of motion. No deformity, bunion, Charcot foot, foot drop or prominent metatarsal heads.   Feet:      Right foot:      Protective Sensation: 10 sites tested. 5 sites sensed.      Skin integrity: Skin integrity normal.      Toenail Condition: Right toenails are normal.      Left foot:      Protective Sensation: 10 sites tested. 5 sites sensed.      Skin integrity: Skin integrity normal.      Toenail Condition: Left toenails are normal.      Comments: Diabetic Foot Exam Performed and Monofilament Test Performed    Neurological:      Mental Status: He is alert.          Assessment / Plan      Assessment & Plan:  Problem List Items Addressed This Visit        Other    Type 2 diabetes mellitus, with long-term current use of insulin (HCC) - Primary    Current Assessment & Plan     Diabetes is improving with treatment.   Continue current treatment regimen.  Diabetes will be reassessed in 6 months.         Relevant Medications    glipizide (GLUCOTROL XL) 5 MG ER tablet    Januvia 100 MG tablet    empagliflozin (Jardiance) 25 MG tablet tablet    metFORMIN (GLUCOPHAGE) 500 MG tablet    Toujeo SoloStar 300 UNIT/ML solution pen-injector injection    Other Relevant Orders    POC Glycosylated Hemoglobin (Hb A1C) (Completed)    POC Glucose, Blood (Completed)           Hiren Montoya MD   10/19/2021

## 2021-12-01 ENCOUNTER — OFFICE VISIT (OUTPATIENT)
Dept: CARDIOLOGY | Facility: CLINIC | Age: 71
End: 2021-12-01

## 2021-12-01 ENCOUNTER — HOSPITAL ENCOUNTER (OUTPATIENT)
Dept: CARDIOLOGY | Facility: HOSPITAL | Age: 71
Discharge: HOME | End: 2021-12-01
Admitting: INTERNAL MEDICINE

## 2021-12-01 VITALS
OXYGEN SATURATION: 96 % | HEART RATE: 92 BPM | DIASTOLIC BLOOD PRESSURE: 64 MMHG | BODY MASS INDEX: 34.33 KG/M2 | SYSTOLIC BLOOD PRESSURE: 111 MMHG | WEIGHT: 213.6 LBS | HEIGHT: 66 IN

## 2021-12-01 VITALS
BODY MASS INDEX: 34.07 KG/M2 | DIASTOLIC BLOOD PRESSURE: 65 MMHG | HEIGHT: 66 IN | SYSTOLIC BLOOD PRESSURE: 114 MMHG | WEIGHT: 212 LBS

## 2021-12-01 DIAGNOSIS — Z79.4 TYPE 2 DIABETES MELLITUS WITH OTHER CIRCULATORY COMPLICATION, WITH LONG-TERM CURRENT USE OF INSULIN (HCC): ICD-10-CM

## 2021-12-01 DIAGNOSIS — I25.9 IHD (ISCHEMIC HEART DISEASE): Primary | ICD-10-CM

## 2021-12-01 DIAGNOSIS — I25.5 ISCHEMIC CARDIOMYOPATHY: ICD-10-CM

## 2021-12-01 DIAGNOSIS — E11.59 TYPE 2 DIABETES MELLITUS WITH OTHER CIRCULATORY COMPLICATION, WITH LONG-TERM CURRENT USE OF INSULIN (HCC): ICD-10-CM

## 2021-12-01 DIAGNOSIS — I25.9 IHD (ISCHEMIC HEART DISEASE): ICD-10-CM

## 2021-12-01 DIAGNOSIS — I10 ESSENTIAL HYPERTENSION: ICD-10-CM

## 2021-12-01 DIAGNOSIS — E78.5 DYSLIPIDEMIA: ICD-10-CM

## 2021-12-01 LAB
ASCENDING AORTA: 3 CM
BH CV ECHO MEAS - AO MAX PG (FULL): 4.2 MMHG
BH CV ECHO MEAS - AO MAX PG: 7 MMHG
BH CV ECHO MEAS - AO MEAN PG (FULL): 2.7 MMHG
BH CV ECHO MEAS - AO MEAN PG: 4.2 MMHG
BH CV ECHO MEAS - AO ROOT AREA (BSA CORRECTED): 1.4
BH CV ECHO MEAS - AO ROOT AREA: 6.8 CM^2
BH CV ECHO MEAS - AO ROOT DIAM: 3 CM
BH CV ECHO MEAS - AO V2 MAX: 129.2 CM/SEC
BH CV ECHO MEAS - AO V2 MEAN: 99.4 CM/SEC
BH CV ECHO MEAS - AO V2 VTI: 27.3 CM
BH CV ECHO MEAS - ASC AORTA: 3 CM
BH CV ECHO MEAS - AVA(I,A): 2 CM^2
BH CV ECHO MEAS - AVA(I,D): 2 CM^2
BH CV ECHO MEAS - AVA(V,A): 2.1 CM^2
BH CV ECHO MEAS - AVA(V,D): 2.1 CM^2
BH CV ECHO MEAS - BSA(HAYCOCK): 2.2 M^2
BH CV ECHO MEAS - BSA: 2.1 M^2
BH CV ECHO MEAS - BZI_BMI: 34.2 KILOGRAMS/M^2
BH CV ECHO MEAS - BZI_METRIC_HEIGHT: 167.6 CM
BH CV ECHO MEAS - BZI_METRIC_WEIGHT: 96.2 KG
BH CV ECHO MEAS - EDV(CUBED): 167.7 ML
BH CV ECHO MEAS - EDV(MOD-SP2): 205 ML
BH CV ECHO MEAS - EDV(MOD-SP4): 162 ML
BH CV ECHO MEAS - EDV(TEICH): 148.3 ML
BH CV ECHO MEAS - EF(CUBED): 79 %
BH CV ECHO MEAS - EF(MOD-BP): 42.9 %
BH CV ECHO MEAS - EF(MOD-SP2): 54.7 %
BH CV ECHO MEAS - EF(MOD-SP4): 31.5 %
BH CV ECHO MEAS - EF(TEICH): 70.7 %
BH CV ECHO MEAS - ESV(CUBED): 35.2 ML
BH CV ECHO MEAS - ESV(MOD-SP2): 92.9 ML
BH CV ECHO MEAS - ESV(MOD-SP4): 111 ML
BH CV ECHO MEAS - ESV(TEICH): 43.4 ML
BH CV ECHO MEAS - FS: 40.5 %
BH CV ECHO MEAS - IVS/LVPW: 2.4
BH CV ECHO MEAS - IVSD: 1.3 CM
BH CV ECHO MEAS - LA DIMENSION: 4 CM
BH CV ECHO MEAS - LA/AO: 1.4
BH CV ECHO MEAS - LAD MAJOR: 5.9 CM
BH CV ECHO MEAS - LAT PEAK E' VEL: 5.5 CM/SEC
BH CV ECHO MEAS - LATERAL E/E' RATIO: 20.2
BH CV ECHO MEAS - LV DIASTOLIC VOL/BSA (35-75): 79 ML/M^2
BH CV ECHO MEAS - LV IVRT: 0.12 SEC
BH CV ECHO MEAS - LV MASS(C)D: 318.3 GRAMS
BH CV ECHO MEAS - LV MASS(C)DI: 155.2 GRAMS/M^2
BH CV ECHO MEAS - LV MAX PG: 2.8 MMHG
BH CV ECHO MEAS - LV MEAN PG: 1.5 MMHG
BH CV ECHO MEAS - LV SYSTOLIC VOL/BSA (12-30): 54.1 ML/M^2
BH CV ECHO MEAS - LV V1 MAX: 83.9 CM/SEC
BH CV ECHO MEAS - LV V1 MEAN: 58.3 CM/SEC
BH CV ECHO MEAS - LV V1 VTI: 17.4 CM
BH CV ECHO MEAS - LVIDD: 3.7 CM
BH CV ECHO MEAS - LVIDS: 3.5 CM
BH CV ECHO MEAS - LVLD AP2: 9.2 CM
BH CV ECHO MEAS - LVLD AP4: 8.9 CM
BH CV ECHO MEAS - LVLS AP2: 7.8 CM
BH CV ECHO MEAS - LVLS AP4: 8.4 CM
BH CV ECHO MEAS - LVOT AREA (M): 3.1 CM^2
BH CV ECHO MEAS - LVOT AREA: 3.2 CM^2
BH CV ECHO MEAS - LVOT DIAM: 2 CM
BH CV ECHO MEAS - LVPWD: 1.3 CM
BH CV ECHO MEAS - MED PEAK E' VEL: 3.9 CM/SEC
BH CV ECHO MEAS - MEDIAL E/E' RATIO: 28.6
BH CV ECHO MEAS - MV A MAX VEL: 28.7 CM/SEC
BH CV ECHO MEAS - MV DEC SLOPE: 705.9 CM/SEC^2
BH CV ECHO MEAS - MV DEC TIME: 0.14 SEC
BH CV ECHO MEAS - MV E MAX VEL: 112 CM/SEC
BH CV ECHO MEAS - MV E/A: 3.9
BH CV ECHO MEAS - MV MAX PG: 9.6 MMHG
BH CV ECHO MEAS - MV MEAN PG: 2.8 MMHG
BH CV ECHO MEAS - MV P1/2T MAX VEL: 151.3 CM/SEC
BH CV ECHO MEAS - MV P1/2T: 62.8 MSEC
BH CV ECHO MEAS - MV V2 MAX: 154.7 CM/SEC
BH CV ECHO MEAS - MV V2 MEAN: 71.6 CM/SEC
BH CV ECHO MEAS - MV V2 VTI: 32.3 CM
BH CV ECHO MEAS - MVA P1/2T LCG: 1.5 CM^2
BH CV ECHO MEAS - MVA(P1/2T): 3.5 CM^2
BH CV ECHO MEAS - MVA(VTI): 1.7 CM^2
BH CV ECHO MEAS - PA ACC TIME: 0.11 SEC
BH CV ECHO MEAS - PA MAX PG: 11.8 MMHG
BH CV ECHO MEAS - PA PR(ACCEL): 30.7 MMHG
BH CV ECHO MEAS - PA V2 MAX: 171.8 CM/SEC
BH CV ECHO MEAS - RAP SYSTOLE: 3 MMHG
BH CV ECHO MEAS - RVSP: 20 MMHG
BH CV ECHO MEAS - SI(AO): 91.1 ML/M^2
BH CV ECHO MEAS - SI(CUBED): 64.6 ML/M^2
BH CV ECHO MEAS - SI(LVOT): 26.9 ML/M^2
BH CV ECHO MEAS - SI(MOD-SP2): 54.7 ML/M^2
BH CV ECHO MEAS - SI(MOD-SP4): 24.9 ML/M^2
BH CV ECHO MEAS - SI(TEICH): 51.1 ML/M^2
BH CV ECHO MEAS - SV(AO): 186.8 ML
BH CV ECHO MEAS - SV(CUBED): 132.4 ML
BH CV ECHO MEAS - SV(LVOT): 55.1 ML
BH CV ECHO MEAS - SV(MOD-SP2): 112.1 ML
BH CV ECHO MEAS - SV(MOD-SP4): 51 ML
BH CV ECHO MEAS - SV(TEICH): 104.9 ML
BH CV ECHO MEAS - TAPSE (>1.6): 1.6 CM
BH CV ECHO MEAS - TR MAX PG: 17 MMHG
BH CV ECHO MEAS - TR MAX VEL: 205.1 CM/SEC
BH CV ECHO MEASUREMENTS AVERAGE E/E' RATIO: 23.83
BH CV XLRA - RV BASE: 3.6 CM
BH CV XLRA - RV LENGTH: 7.3 CM
BH CV XLRA - RV MID: 3.2 CM
BH CV XLRA - TDI S': 11 CM/SEC
LEFT ATRIUM VOLUME INDEX: 30.9 ML/M^2
LEFT ATRIUM VOLUME: 63.3 ML
LV EF 2D ECHO EST: 44 %
MAXIMAL PREDICTED HEART RATE: 150 BPM
STRESS TARGET HR: 128 BPM

## 2021-12-01 PROCEDURE — 93306 TTE W/DOPPLER COMPLETE: CPT

## 2021-12-01 PROCEDURE — 93306 TTE W/DOPPLER COMPLETE: CPT | Performed by: INTERNAL MEDICINE

## 2021-12-01 PROCEDURE — 25010000002 SULFUR HEXAFLUORIDE MICROSPH 60.7-25 MG RECONSTITUTED SUSPENSION: Performed by: INTERNAL MEDICINE

## 2021-12-01 PROCEDURE — 99214 OFFICE O/P EST MOD 30 MIN: CPT | Performed by: INTERNAL MEDICINE

## 2021-12-01 RX ADMIN — SULFUR HEXAFLUORIDE 3 ML: KIT at 14:47

## 2021-12-01 NOTE — PROGRESS NOTES
Subjective:     Encounter Date:12/01/2021    Patient ID: Kar Mora is a 71 y.o.  white male, , from [place], [place].      REFERRING PHYSICIAN/ENDOCRINOLOGIST: Hiren Montoya MD  FORMER ALLERGIST: Abhijeet Madrid MD  PHYSICIAN: Bro Kan MD  INTERVENTIONAL CARDIOLOGIST:  Madi Moctezuma MD, MSC, [facility]  CARDIOVASCULAR SURGEON: Migue Burton MD    Chief Complaint:   Chief Complaint   Patient presents with   • Cardiomyopathy   • Coronary Artery Disease       Problem List:  1. Insulin-dependent type 2 diabetes mellitus without apparent end organ damage; hemoglobin A1c 6.4%, November 2017; 8.0%, July 2018; 8.2%, March 2019, 7% Fall 2020  2. Labile hypertension.  3. Dyslipidemia.  4. Erectile dysfunction.  5. Remote nasal polypectomy, 1986.  6. Allergic rhinitis.  7. Combined hypertensive and ischemic cardiovascular disease:  a. Abnormal EKG with abnormal acceptable echocardiographic GXT, September 2004.   b. Acceptable echocardiographic GXT with preserved exercise capacity and mild LVH, March 2008.   c. Acceptable quantitative SPECT Gated Cardiolite GXT with nominal myocardial perfusion to 88% of predicted exercise capacity and 90% predicted maximum heart rate with preserved LVEF (0.65) with continued medical therapy felt warranted, December 2012.  d. Residual class I symptoms with recent documented abnormal EKG (LBBB/first-degree AV block) with abnormal echocardiogram demonstrating mild left ventricular chamber enlargement with mild reduction in the LVEF (0.42) without PE or pulmonary hypertension.  e. Resident class I symptoms with persistent abnormal echocardiogram (LVEF 0.40), with mild concentric LVH and mild left atrial enlargement without pulmonary arterial hypertension or pericardial effusion, July 2015.  f. Residual class I symptoms with acceptable  echocardiogram (LVEF 0.50) with mild-moderate RVE, mild-moderate LAE, and aortic valve sclerosis with no evidence of pericardial  effusion (November 2016).  g. Abnormal echocardiogram, 5/25/2018: LVEF 0.40, mild concentric hypertrophy, LA dilated, aortic valve exhibits sclerosis, mild MR, trace TR, no pericardial effusion  h. Echocardiogram, 11/29/2018: The following LV wall segments are hypokinetic: Mid anterior, basal anterolateral, mid anterolateral, apical lateral, basal inferolateral, mid inferolateral, apical inferior, mid inferior, basal inferoseptal, mid anteroseptal, basal anterior, basal inferior and basal inferoseptal.  The following LV wall segments are akinetic: Apical anterior, apical septal, mid inferoseptal and apex.  LVEF 0.36, LV wall thickness consistent with mild-to-moderate concentric hypertrophy, no evidence of LV mass thrombus present.  Normal RV cavity size, wall thickness, systolic function and septal motion noted, no pericardial effusion, aortic valve exhibits sclerosis, mild MAC present  i. MUGA, 12/21/2018: Mild LV chamber enlargement with moderate global hypokinesis and prominent interface ventricular septal and apical dyskinesis in part related to left bundle branch block.  Mild reduction regional LVEF with severe reduction in interventricular septal and apical regional ejection fractions.  Normal RV chamber size and systolic wall motion.  Moderate reduction in calculated global LVEF 0.35   j. Echocardiogram 02/05/2020: The following LV wall segments are hypokinetic: Mid anterior, basal anterolateral, mid anterolateral, apical lateral, basal inferolateral, mid inferolateral, apical inferior, mid inferior, basal inferoseptal, mid anteroseptal, basal anterior, basal inferior, and basal inferoseptal.  The following LV wall segments are akinetic: Apical anterior, apical septal, mid inferoseptal and apex.  LVEF 38%, normal RV cavity size, wall thickness, systolic function and septal motion noted.  Septal wall motion is abnormal consistent with BBB.  No significant change from 11/29/2018 study (LVEF 0.36).  k. Residual  class I symptoms, October 2020, May 2021, December 2021 with echocardiogram pending.  8. Moderate obesity (BMI 34.38).  9. Questionable asymptomatic right carotid bruit  10. Ischemic heart disease:  a. Remote NYHA class II exertional dyspnea/CCS class II-III angina pectoris with diagnostic coronary angiography demonstrating severe 3-vessel obstructive coronary artery disease  b. Abnormal electrocardiogram (LBBB), July 2018  c. [facility] 11-day hospitalization for CABG ×4 vessels, 10/11/2018: SVG to diagonal and circumflex, SVG to PDA, LIMA to LAD, discharged with LifeVest  d. Residual class I symptoms with abnormal postoperative echocardiogram and improved MUGA LVEF (0.35), December 2018  e. Residual class I symptoms, October 2020, May 2021    Allergies   Allergen Reactions   • Asa [Aspirin] Other (See Comments)      upper airway congestion       Current Outpatient Medications:   •  B-D ULTRAFINE III SHORT PEN 31G X 8 MM misc, For daily use, Disp: 100 each, Rfl: 3  •  carvedilol (COREG) 25 MG tablet, Take 1 tablet by mouth 2 (Two) Times a Day., Disp: 180 tablet, Rfl: 3  •  clopidogrel (PLAVIX) 75 MG tablet, Take 1 tablet by mouth Daily., Disp: 90 tablet, Rfl: 3  •  empagliflozin (Jardiance) 25 MG tablet tablet, Take 1 tablet by mouth Daily., Disp: 30 tablet, Rfl: 2  •  fenofibrate 160 MG tablet, Take 1 tablet by mouth Daily., Disp: 90 tablet, Rfl: 3  •  glipizide (GLUCOTROL XL) 5 MG ER tablet, Take 1 tablet by mouth Daily., Disp: 30 tablet, Rfl: 5  •  icosapent ethyl (Vascepa) 1 g capsule capsule, Take 2 g by mouth 2 (Two) Times a Day With Meals., Disp: 360 capsule, Rfl: 3  •  Januvia 100 MG tablet, Take 1 tablet by mouth Daily., Disp: 30 tablet, Rfl: 2  •  Lancets (onetouch ultrasoft) lancets, Test 2 times daily, Disp: 200 each, Rfl: 1  •  metFORMIN (GLUCOPHAGE) 500 MG tablet, Take 2 tablets by mouth 2 (Two) Times a Day With Meals., Disp: 180 tablet, Rfl: 3  •  Multiple Vitamins-Minerals (MULTIVITAMIN ADULT PO), Take 1  tablet by mouth Daily., Disp: , Rfl:   •  nitroglycerin (NITROSTAT) 0.4 MG SL tablet, Place 1 tablet under the tongue Every 5 (Five) Minutes As Needed for Chest Pain. Take no more than 3 doses in 15 minutes., Disp: 25 tablet, Rfl: 11  •  ONE TOUCH ULTRA TEST test strip, 2 (Two) Times a Day. for testing, Disp: , Rfl: 3  •  rosuvastatin (CRESTOR) 40 MG tablet, Take 1 tablet by mouth Every Night., Disp: 90 tablet, Rfl: 3  •  sacubitril-valsartan (Entresto) 24-26 MG tablet, Take 1 tablet by mouth Every 12 (Twelve) Hours., Disp: 180 tablet, Rfl: 3  •  Toujeo SoloStar 300 UNIT/ML solution pen-injector injection, Inject 75 units nightly, Disp: 10 pen, Rfl: 3  No current facility-administered medications for this visit.    Facility-Administered Medications Ordered in Other Visits:   •  Chlorhexidine Gluconate Cloth 2 % pads 1 application, 1 application, Topical, Q12H PRN, Adonis Capps PA    History of Present Illness: Patient returns for scheduled 7-month follow up. He has been feeling well overall from a cardiovascular standpoint. He does not have chest pain but notes he occasionally will have clicking or rattling in his sternal chest related to his previous CABG. Patient denies chest pain, shortness of breath, palpitations, edema, dizziness, and syncope. He stays busy and active at work but does not have a regular exercise routine. He will exercise more frequently in the spring/summer when it is warm outside. He sometimes has difficulty falling back to sleep if he wakes up in the night. He has had no interim ER visits, hospitalizations, serious illnesses, or surgeries. He has received COVID immunization.      ROS   Obtained and negative except as outlined in problem list and HPI.    Procedures       Objective:       Vitals:    12/01/21 1508 12/01/21 1510   BP: 104/58 111/64   BP Location: Right arm Right arm   Patient Position: Sitting Standing   Pulse: 88 92   SpO2: 96%    Weight: 96.9 kg (213 lb 9.6 oz)   "  Height: 167.6 cm (66\")      Body mass index is 34.48 kg/m².  Last weight: 213 lbs    Vitals reviewed.   Constitutional:       Appearance: Well-developed.   Neck:      Thyroid: No thyromegaly.      Vascular: No carotid bruit or JVD.      Lymphadenopathy: No cervical adenopathy.   Pulmonary:      Effort: Pulmonary effort is normal.      Breath sounds: Decreased breath sounds present. No wheezing. No rhonchi. No rales.   Chest:      Comments: Occasional sternal clicks without marked instability  Cardiovascular:      Regular rhythm.      Murmurs: There is a grade 1/6 mid frequency early systolic murmur at the LLSB.      No gallop. No S3 gallop.   Pulses:     Dorsalis pedis: 1+ bilaterally.     Posterior tibial: 1+ bilaterally.  Edema:     Peripheral edema absent.   Abdominal:      Palpations: Abdomen is soft. There is no abdominal mass.      Tenderness: There is no abdominal tenderness.   Musculoskeletal: Normal range of motion. Skin:     General: Skin is warm and dry.      Findings: No rash.   Neurological:      Mental Status: Alert and oriented to person, place, and time.           Lab Review:     No recent laboratory studies available for review today.        Assessment:       Overall continued acceptable course with no new interim cardiopulmonary complaints with good functional status. We will defer additional diagnostic or therapeutic intervention from a cardiac perspective at this time. The patient had an echocardiogram before coming to our office today; this will be read, and a letter will be sent to the patient with those results.     Diagnosis Plan   1. IHD (ischemic heart disease)  No recurrent angina pectoris or CHF on current activity schedule; continue current treatment   2. Ischemic cardiomyopathy  The patient had an echocardiogram before coming to our office today; this will be read, and a letter will be sent to the patient with those results.   3. Essential hypertension  Well controlled. Continue " current treatment.   4. Dyslipidemia  No data to review. Continue rosuvastatin and fenofibrate.   5. Type 2 diabetes mellitus with other circulatory complication, with long-term current use of insulin (HCC)  No data to review. Continue current treatment.          Plan:         1. Patient to continue current medications and close follow up with the above providers.  2. The following studies are ordered with slips provided:  A. FLP  B. CMP  C. CBC  3. Tentative cardiology follow up in June 2022 or patient may return sooner PRN.    I, Gilberto Knox, attest that this documentation has been prepared under the direction and in the presence of Todd Qureshi MD 12/01/2021    I, Todd Qureshi MD, [facility], personally performed the services described in this documentation as scribed by the above named individual in my presence, and it is both accurate and complete. At 15:34 EST on 12/01/2021

## 2022-02-01 DIAGNOSIS — I65.23 CAROTID STENOSIS, ASYMPTOMATIC, BILATERAL: Primary | ICD-10-CM

## 2022-02-01 DIAGNOSIS — R42 DIZZINESS: ICD-10-CM

## 2022-03-07 RX ORDER — LANCETS
EACH MISCELLANEOUS
Qty: 200 EACH | Refills: 1 | Status: SHIPPED | OUTPATIENT
Start: 2022-03-07 | End: 2022-07-06 | Stop reason: SDUPTHER

## 2022-04-07 ENCOUNTER — OFFICE VISIT (OUTPATIENT)
Dept: CARDIAC SURGERY | Facility: CLINIC | Age: 72
End: 2022-04-07

## 2022-04-07 VITALS
DIASTOLIC BLOOD PRESSURE: 65 MMHG | SYSTOLIC BLOOD PRESSURE: 112 MMHG | BODY MASS INDEX: 33.4 KG/M2 | HEIGHT: 66 IN | TEMPERATURE: 97.3 F | HEART RATE: 83 BPM | WEIGHT: 207.8 LBS | OXYGEN SATURATION: 99 %

## 2022-04-07 DIAGNOSIS — I65.23 CAROTID STENOSIS, ASYMPTOMATIC, BILATERAL: Primary | ICD-10-CM

## 2022-04-07 PROCEDURE — 99213 OFFICE O/P EST LOW 20 MIN: CPT | Performed by: NURSE PRACTITIONER

## 2022-04-07 NOTE — PROGRESS NOTES
[facility] Cardiothoracic Surgery Office Follow Up Note     Date of Encounter: 2022     Name: Kar Mora  : 1950     Referred By: No ref. provider found  PCP: Bro Kan MD    Chief Complaint:    Chief Complaint   Patient presents with   • Carotid Artery Disease     Yearly follow-up with results from carotid duplex.        Subjective      History of Present Illness:    Kar Mora is a 71 y.o. male former smoker, with a history of HTN, HLD on statin therapy, T2DM, obesity, LBBB, ischemic cardiomyopathy, CAD s/p CABG x4 10/11/28 with Dr. Burton, and right carotid artery stenosis. Patient was last seen in clinic by Dr. Burton 8/15/2019, doing well with some complaints of sternal instability that Dr. Burton recommended observation. Patient presents today for yearly surveillance of carotid artery disease. Pt denies hx of TIA or CVA since last visit, has had no new focal neurologic dysfunction, specifically vision changes or amaurosis fugax.  Patient reports compliance with Plavix/statin therapy.  He follows closely with Dr. Qureshi.    Review of Systems:  Review of Systems   Constitutional: Negative for chills, fever, malaise/fatigue, night sweats and weight loss.   HENT: Negative for hearing loss, odynophagia and sore throat.    Cardiovascular: Negative for chest pain, dyspnea on exertion, leg swelling, orthopnea and palpitations.   Respiratory: Negative for cough and hemoptysis.    Endocrine: Negative for cold intolerance, heat intolerance, polydipsia, polyphagia and polyuria.   Hematologic/Lymphatic: Does not bruise/bleed easily.   Skin: Negative for itching and rash.   Musculoskeletal: Negative for joint pain, joint swelling and myalgias.   Gastrointestinal: Negative for abdominal pain, constipation, diarrhea, hematemesis, hematochezia, melena, nausea and vomiting.   Genitourinary: Negative for dysuria, frequency and hematuria.   Neurological: Negative for focal  weakness, headaches, numbness and seizures.   Psychiatric/Behavioral: Negative for suicidal ideas.   All other systems reviewed and are negative.      I have reviewed the following portions of the patient's history: allergies, current medications, past family history, past medical history, past social history, past surgical history, problem list and ROS and confirm it's accurate.    Allergies:  Allergies   Allergen Reactions   • Asa [Aspirin] Other (See Comments)      upper airway congestion       Medications:      Current Outpatient Medications:   •  B-D ULTRAFINE III SHORT PEN 31G X 8 MM misc, For daily use, Disp: 100 each, Rfl: 3  •  carvedilol (COREG) 25 MG tablet, Take 1 tablet by mouth 2 (Two) Times a Day., Disp: 180 tablet, Rfl: 3  •  clopidogrel (PLAVIX) 75 MG tablet, Take 1 tablet by mouth Daily., Disp: 90 tablet, Rfl: 3  •  empagliflozin (Jardiance) 25 MG tablet tablet, Take 1 tablet by mouth Daily., Disp: 30 tablet, Rfl: 2  •  fenofibrate 160 MG tablet, Take 1 tablet by mouth Daily., Disp: 90 tablet, Rfl: 3  •  glipizide (GLUCOTROL XL) 5 MG ER tablet, Take 1 tablet by mouth Daily., Disp: 30 tablet, Rfl: 5  •  icosapent ethyl (Vascepa) 1 g capsule capsule, Take 2 g by mouth 2 (Two) Times a Day With Meals., Disp: 360 capsule, Rfl: 3  •  Januvia 100 MG tablet, Take 1 tablet by mouth Daily., Disp: 30 tablet, Rfl: 2  •  Lancets (onetouch ultrasoft) lancets, TEST TWICE DAILY, Disp: 200 each, Rfl: 1  •  metFORMIN (GLUCOPHAGE) 500 MG tablet, Take 2 tablets by mouth 2 (Two) Times a Day With Meals., Disp: 180 tablet, Rfl: 3  •  Multiple Vitamins-Minerals (MULTIVITAMIN ADULT PO), Take 1 tablet by mouth Daily., Disp: , Rfl:   •  nitroglycerin (NITROSTAT) 0.4 MG SL tablet, Place 1 tablet under the tongue Every 5 (Five) Minutes As Needed for Chest Pain. Take no more than 3 doses in 15 minutes., Disp: 25 tablet, Rfl: 11  •  ONE TOUCH ULTRA TEST test strip, 2 (Two) Times a Day. for testing, Disp: , Rfl: 3  •   rosuvastatin (CRESTOR) 40 MG tablet, Take 1 tablet by mouth Every Night., Disp: 90 tablet, Rfl: 3  •  sacubitril-valsartan (Entresto) 24-26 MG tablet, Take 1 tablet by mouth Every 12 (Twelve) Hours., Disp: 180 tablet, Rfl: 3  •  Toujeo SoloStar 300 UNIT/ML solution pen-injector injection, Inject 75 units nightly, Disp: 10 pen, Rfl: 3  No current facility-administered medications for this visit.    Facility-Administered Medications Ordered in Other Visits:   •  Chlorhexidine Gluconate Cloth 2 % pads 1 application, 1 application, Topical, Q12H PRN, Adonis Capps PA    History:   Past Medical History:   Diagnosis Date   • Allergic rhinitis    • Anesthesia     pt says he has woken up during surgery   • Chest pain    • Coronary artery disease    • Dyslipidemia    • Erectile dysfunction    • GERD (gastroesophageal reflux disease)    • Gout    • Hypercholesterolemia    • Hyperlipidemia    • Hypertensive disorder    • Impotence of organic origin    • Insulin dependent type 2 diabetes mellitus (HCC) 2002    checks fsbg every am, a1c checked every 6 months, 7.2 in may 2018    • Kidney stones    • Labile hypertension 10/24/2016   • Myocardial infarct (HCC)    • Type 2 diabetes mellitus (HCC)    • Wears glasses        Past Surgical History:   Procedure Laterality Date   • CARDIAC CATHETERIZATION N/A 07/20/2018    Procedure: Left Heart Cath;  Surgeon: Madi Moctezuma MD;  Location:  [facility];  Service: Cardiovascular   • COLONOSCOPY  2016   • CORONARY ARTERY BYPASS GRAFT N/A 10/11/2018    Procedure: MEDIAN STERNOTMY<CORONARY ARTERY BYPASS WITH INTERNAL MAMMARY ARTERY GRAFT x  4 with EVH of the right greater saphenous vein;  Surgeon: Migue Burton MD;  Location: [facility] OR;  Service: Cardiothoracic   • KIDNEY STONE SURGERY     • MOUTH SURGERY      tooth implant   • NASAL POLYP SURGERY  1986       Social History     Socioeconomic History   • Marital status:    • Number of children: 2   Tobacco  "Use   • Smoking status: Former Smoker     Packs/day: 2.00     Years: 10.00     Pack years: 20.00     Types: Cigarettes     Quit date:      Years since quittin.2   • Smokeless tobacco: Never Used   • Tobacco comment: quit 35 years ago   Vaping Use   • Vaping Use: Never used   Substance and Sexual Activity   • Alcohol use: No   • Drug use: No   • Sexual activity: Defer        Family History   Problem Relation Age of Onset   • Diabetes Mother    • Heart disease Mother    • Arthritis Mother    • Depression Mother    • Heart disease Father    • Arthritis Father    • Heart attack Father    • Diabetes Sister        Objective     Physical Exam:  Vitals:    22 1331 22 1332   BP: 103/62 112/65   BP Location: Right arm Left arm   Patient Position: Sitting Sitting   Pulse: 83    Temp: 97.3 °F (36.3 °C)    SpO2: 99%    Weight: 94.3 kg (207 lb 12.8 oz)    Height: 167.6 cm (66\")       Body mass index is 33.54 kg/m².    Physical Exam  Vitals and nursing note reviewed.   Constitutional:       Appearance: Normal appearance. He is well-developed.   HENT:      Head: Normocephalic and atraumatic.   Eyes:      Pupils: Pupils are equal, round, and reactive to light.   Neck:      Vascular: No carotid bruit.   Cardiovascular:      Rate and Rhythm: Normal rate and regular rhythm.      Pulses: Normal pulses.      Heart sounds: Normal heart sounds, S1 normal and S2 normal. No murmur heard.  Pulmonary:      Effort: Pulmonary effort is normal.      Breath sounds: Normal breath sounds.   Abdominal:      Palpations: Abdomen is soft.   Musculoskeletal:         General: No swelling.      Cervical back: Neck supple.      Right lower leg: No edema.      Left lower leg: No edema.   Skin:     General: Skin is warm and dry.      Capillary Refill: Capillary refill takes less than 2 seconds.      Findings: No bruising.   Neurological:      General: No focal deficit present.      Mental Status: He is alert and oriented to person, place, " and time. Mental status is at baseline.      GCS: GCS eye subscore is 4. GCS verbal subscore is 5. GCS motor subscore is 6.      Motor: Motor function is intact.      Coordination: Coordination is intact.      Gait: Gait is intact.   Psychiatric:         Mood and Affect: Mood normal.         Speech: Speech normal.         Behavior: Behavior normal. Behavior is cooperative.         Cognition and Memory: Cognition normal.         Imaging/Labs:    Carotid duplex bilateral  Result date 3/3/2022:  Findings: On the submitted images there are no significant calcified or nonsense calcified plaques demonstrated.  The peak systolic nisi velocities are within normal range with no significant focal elevation or significant decreased flow.  Antegrade flow is demonstrated in the vertebral arteries.  The ratio flow in the right internal the right common carotid artery is 92% and the ratio on the left is 76% which is normal.  Impression: There is no evidence of any hemodynamic significant stenosis.        Assessment / Plan      Assessment / Plan:  Diagnoses and all orders for this visit:    1. Carotid stenosis, asymptomatic, bilateral (Primary)       1. CABG with Dr. Burton 2018, Carotid stenosis: Patient denies any interval chest pain, SOA.  Patient denies any interval TIA/CVA symptoms or vision changes.  Patient been compliant with Plavix/statin therapy.  He follows closely with cardiology Dr. Qureshi.  Reviewed carotid duplex with patient, revealing no evidence of hemodynamic significant stenosis in bilateral carotid arteries.  Reviewed stroke symptoms with patient.  Will plan to see patient back in 1 year for surveillance of his carotid artery stenosis.    Patient Education:  The BE FAST acronym helps you remember the signs and symptoms of a stroke: Balance loss, Eyesight loss, Facial dropping, Arm weakness, Speech difficulty, and Time to call 911    Follow Up:   Return in about 1 year (around 4/7/2023) for F/u with imaging.    Or sooner for any further concerns or worsening sign and symptoms. If unable to reach us in the office please dial 911 or go to the nearest emergency department.      Shama HERNÁNDEZ  [facility] Cardiothoracic Surgery    Time Spent: I spent 20 minutes caring for Kar on this date of service. This time includes time spent by me in the following activities: preparing for the visit, reviewing tests, obtaining and/or reviewing a separately obtained history, performing a medically appropriate examination and/or evaluation, counseling and educating the patient/family/caregiver, ordering medications, tests, or procedures, documenting information in the medical record, independently interpreting results and communicating that information with the patient/family/caregiver and care coordination.

## 2022-04-22 RX ORDER — EMPAGLIFLOZIN 25 MG/1
TABLET, FILM COATED ORAL
Qty: 30 TABLET | Refills: 2 | Status: SHIPPED | OUTPATIENT
Start: 2022-04-22 | End: 2022-07-06 | Stop reason: SDUPTHER

## 2022-05-23 RX ORDER — SITAGLIPTIN 100 MG/1
100 TABLET, FILM COATED ORAL DAILY
Qty: 90 TABLET | Refills: 1 | Status: SHIPPED | OUTPATIENT
Start: 2022-05-23 | End: 2022-07-06

## 2022-06-01 ENCOUNTER — OFFICE VISIT (OUTPATIENT)
Dept: CARDIOLOGY | Facility: CLINIC | Age: 72
End: 2022-06-01

## 2022-06-01 VITALS
WEIGHT: 213 LBS | HEART RATE: 71 BPM | DIASTOLIC BLOOD PRESSURE: 52 MMHG | HEIGHT: 66 IN | SYSTOLIC BLOOD PRESSURE: 105 MMHG | BODY MASS INDEX: 34.23 KG/M2 | OXYGEN SATURATION: 97 %

## 2022-06-01 DIAGNOSIS — I10 ESSENTIAL HYPERTENSION: ICD-10-CM

## 2022-06-01 DIAGNOSIS — I25.9 IHD (ISCHEMIC HEART DISEASE): Primary | ICD-10-CM

## 2022-06-01 DIAGNOSIS — Z79.4 TYPE 2 DIABETES MELLITUS WITH OTHER CIRCULATORY COMPLICATION, WITH LONG-TERM CURRENT USE OF INSULIN: ICD-10-CM

## 2022-06-01 DIAGNOSIS — E78.5 DYSLIPIDEMIA: ICD-10-CM

## 2022-06-01 DIAGNOSIS — E11.59 TYPE 2 DIABETES MELLITUS WITH OTHER CIRCULATORY COMPLICATION, WITH LONG-TERM CURRENT USE OF INSULIN: ICD-10-CM

## 2022-06-01 DIAGNOSIS — I25.5 ISCHEMIC CARDIOMYOPATHY: ICD-10-CM

## 2022-06-01 PROCEDURE — 99214 OFFICE O/P EST MOD 30 MIN: CPT | Performed by: INTERNAL MEDICINE

## 2022-06-01 RX ORDER — ICOSAPENT ETHYL 1000 MG/1
2 CAPSULE ORAL 2 TIMES DAILY WITH MEALS
Qty: 360 CAPSULE | Refills: 3 | Status: SHIPPED | OUTPATIENT
Start: 2022-06-01

## 2022-06-01 RX ORDER — ROSUVASTATIN CALCIUM 40 MG/1
40 TABLET, COATED ORAL NIGHTLY
Qty: 90 TABLET | Refills: 3 | Status: SHIPPED | OUTPATIENT
Start: 2022-06-01 | End: 2022-07-06 | Stop reason: SDUPTHER

## 2022-06-01 RX ORDER — FENOFIBRATE 160 MG/1
160 TABLET ORAL DAILY
Qty: 90 TABLET | Refills: 3 | Status: SHIPPED | OUTPATIENT
Start: 2022-06-01

## 2022-06-01 RX ORDER — CARVEDILOL 25 MG/1
25 TABLET ORAL 2 TIMES DAILY
Qty: 180 TABLET | Refills: 3 | Status: SHIPPED | OUTPATIENT
Start: 2022-06-01

## 2022-06-01 RX ORDER — CLOPIDOGREL BISULFATE 75 MG/1
75 TABLET ORAL DAILY
Qty: 90 TABLET | Refills: 3 | Status: SHIPPED | OUTPATIENT
Start: 2022-06-01

## 2022-06-01 RX ORDER — NITROGLYCERIN 0.4 MG/1
0.4 TABLET SUBLINGUAL
Qty: 25 TABLET | Refills: 11 | Status: SHIPPED | OUTPATIENT
Start: 2022-06-01

## 2022-06-01 RX ORDER — SACUBITRIL AND VALSARTAN 24; 26 MG/1; MG/1
1 TABLET, FILM COATED ORAL EVERY 12 HOURS
Qty: 180 TABLET | Refills: 3 | Status: SHIPPED | OUTPATIENT
Start: 2022-06-01

## 2022-06-01 NOTE — PROGRESS NOTES
Subjective:     Encounter Date:06/01/2022    Patient ID: Kar Mora is a 71 y.o.  white male, , from [place], [place].      REFERRING PHYSICIAN/ENDOCRINOLOGIST: Hiren Montoya MD  FORMER ALLERGIST: Abhijeet Madrid MD  PHYSICIAN: Bro Kan MD  INTERVENTIONAL CARDIOLOGIST:  Madi Moctezuma MD, MSC, [facility]  CARDIOVASCULAR SURGEON: Migue Burton MD    Chief Complaint:   Chief Complaint   Patient presents with   • Coronary Artery Disease       Problem List:  1. Insulin-dependent type 2 diabetes mellitus without apparent end organ damage; hemoglobin A1c 6.4%, November 2017; 8.0%, July 2018; 8.2%, March 2019, 7% Fall 2020  2. Labile hypertension.  3. Dyslipidemia.  4. Erectile dysfunction.  5. Remote nasal polypectomy, 1986.  6. Allergic rhinitis.  7. Combined hypertensive and ischemic cardiovascular disease:  a. Abnormal EKG with abnormal acceptable echocardiographic GXT, September 2004.   b. Acceptable echocardiographic GXT with preserved exercise capacity and mild LVH, March 2008.   c. Acceptable quantitative SPECT Gated Cardiolite GXT with nominal myocardial perfusion to 88% of predicted exercise capacity and 90% predicted maximum heart rate with preserved LVEF (0.65) with continued medical therapy felt warranted, December 2012.  d. Residual class I symptoms with recent documented abnormal EKG (LBBB/first-degree AV block) with abnormal echocardiogram demonstrating mild left ventricular chamber enlargement with mild reduction in the LVEF (0.42) without PE or pulmonary hypertension.  e. Resident class I symptoms with persistent abnormal echocardiogram (LVEF 0.40), with mild concentric LVH and mild left atrial enlargement without pulmonary arterial hypertension or pericardial effusion, July 2015.  f. Residual class I symptoms with acceptable  echocardiogram (LVEF 0.50) with mild-moderate RVE, mild-moderate LAE, and aortic valve sclerosis with no evidence of pericardial effusion (November  2016).  g. Abnormal echocardiogram, 5/25/2018: LVEF 0.40, mild concentric hypertrophy, LA dilated, aortic valve exhibits sclerosis, mild MR, trace TR, no pericardial effusion  h. Echocardiogram, 11/29/2018: The following LV wall segments are hypokinetic: Mid anterior, basal anterolateral, mid anterolateral, apical lateral, basal inferolateral, mid inferolateral, apical inferior, mid inferior, basal inferoseptal, mid anteroseptal, basal anterior, basal inferior and basal inferoseptal.  The following LV wall segments are akinetic: Apical anterior, apical septal, mid inferoseptal and apex.  LVEF 0.36, LV wall thickness consistent with mild-to-moderate concentric hypertrophy, no evidence of LV mass thrombus present.  Normal RV cavity size, wall thickness, systolic function and septal motion noted, no pericardial effusion, aortic valve exhibits sclerosis, mild MAC present  i. MUGA, 12/21/2018: Mild LV chamber enlargement with moderate global hypokinesis and prominent interface ventricular septal and apical dyskinesis in part related to left bundle branch block.  Mild reduction regional LVEF with severe reduction in interventricular septal and apical regional ejection fractions.  Normal RV chamber size and systolic wall motion.  Moderate reduction in calculated global LVEF 0.35   j. Echocardiogram 02/05/2020: The following LV wall segments are hypokinetic: Mid anterior, basal anterolateral, mid anterolateral, apical lateral, basal inferolateral, mid inferolateral, apical inferior, mid inferior, basal inferoseptal, mid anteroseptal, basal anterior, basal inferior, and basal inferoseptal.  The following LV wall segments are akinetic: Apical anterior, apical septal, mid inferoseptal and apex.  LVEF 38%, normal RV cavity size, wall thickness, systolic function and septal motion noted.  Septal wall motion is abnormal consistent with BBB.  No significant change from 11/29/2018 study (LVEF 0.36).  k. Residual class I symptoms,  October 2020, May 2021, December 2021 with acceptable echocardiogram December 2021  l. Residual class I symptoms, June 2022  8. Moderate obesity (BMI 34.38).  9. Questionable asymptomatic right carotid bruit  10. Ischemic heart disease:  a. Remote NYHA class II exertional dyspnea/CCS class II-III angina pectoris with diagnostic coronary angiography demonstrating severe 3-vessel obstructive coronary artery disease  b. Abnormal electrocardiogram (LBBB), July 2018  c. [facility] 11-day hospitalization for CABG ×4 vessels, 10/11/2018: SVG to diagonal and circumflex, SVG to PDA, LIMA to LAD, discharged with LifeVest  d. Residual class I symptoms with abnormal postoperative echocardiogram and improved MUGA LVEF (0.35), December 2018, LVEF 44% December 2021  e. Residual class I symptoms, October 2020, May 2021, June 2022    Allergies   Allergen Reactions   • Asa [Aspirin] Other (See Comments)      upper airway congestion       Current Outpatient Medications:   •  B-D ULTRAFINE III SHORT PEN 31G X 8 MM misc, For daily use, Disp: 100 each, Rfl: 3  •  carvedilol (COREG) 25 MG tablet, Take 1 tablet by mouth 2 (Two) Times a Day., Disp: 180 tablet, Rfl: 3  •  clopidogrel (PLAVIX) 75 MG tablet, Take 1 tablet by mouth Daily., Disp: 90 tablet, Rfl: 3  •  fenofibrate 160 MG tablet, Take 1 tablet by mouth Daily., Disp: 90 tablet, Rfl: 3  •  glipizide (GLUCOTROL XL) 5 MG ER tablet, Take 1 tablet by mouth Daily., Disp: 30 tablet, Rfl: 5  •  icosapent ethyl (Vascepa) 1 g capsule capsule, Take 2 g by mouth 2 (Two) Times a Day With Meals., Disp: 360 capsule, Rfl: 3  •  Januvia 100 MG tablet, TAKE 1 TABLET BY MOUTH DAILY, Disp: 90 tablet, Rfl: 1  •  Jardiance 25 MG tablet tablet, TAKE 1 TABLET BY MOUTH DAILY, Disp: 30 tablet, Rfl: 2  •  Lancets (onetouch ultrasoft) lancets, TEST TWICE DAILY, Disp: 200 each, Rfl: 1  •  metFORMIN (GLUCOPHAGE) 500 MG tablet, TAKE 2 TABLETS BY MOUTH TWICE DAILY WITH MEALS, Disp: 360 tablet, Rfl: 1  •  Multiple  Vitamins-Minerals (MULTIVITAMIN ADULT PO), Take 1 tablet by mouth Daily., Disp: , Rfl:   •  nitroglycerin (NITROSTAT) 0.4 MG SL tablet, Place 1 tablet under the tongue Every 5 (Five) Minutes As Needed for Chest Pain. Take no more than 3 doses in 15 minutes., Disp: 25 tablet, Rfl: 11  •  ONE TOUCH ULTRA TEST test strip, 2 (Two) Times a Day. for testing, Disp: , Rfl: 3  •  rosuvastatin (CRESTOR) 40 MG tablet, Take 1 tablet by mouth Every Night., Disp: 90 tablet, Rfl: 3  •  sacubitril-valsartan (Entresto) 24-26 MG tablet, Take 1 tablet by mouth Every 12 (Twelve) Hours., Disp: 180 tablet, Rfl: 3  •  Toujeo SoloStar 300 UNIT/ML solution pen-injector injection, Inject 75 units nightly (Patient taking differently: Inject 70 units nightly), Disp: 10 pen, Rfl: 3  No current facility-administered medications for this visit.    Facility-Administered Medications Ordered in Other Visits:   •  Chlorhexidine Gluconate Cloth 2 % pads 1 application, 1 application, Topical, Q12H PRN, Adonis Capps PA    History of Present Illness: Patient returns for scheduled 6-month follow up.  The patient denies any chest pain, increased shortness of breath or edema, palpitations, dizziness, presyncope, or syncope.  He saw Dr. Burton February 2022 and had a carotid ultrasound which apparently was acceptable-data deficit.  He has not had any recent laboratory testing.  He is still working and has reduced his hours to 50 hours a week.  He tries to do some walking outside of work.  His blood pressures at home have been WNL and he checks it twice a day.  About 3 times a month he will have an elevated blood pressure but this is rare.  Occasionally he will feel a sharp chest pain and does not think that his sternum grew back together correctly.  He does not have to take nitroglycerin sublingual for this. Patient has had no additional interim ER visits, hospitalizations, serious illnesses, or surgeries.         ROS   Obtained and negative except as  "outlined in problem list and HPI.    Procedures       Objective:       Vitals:    06/01/22 1528 06/01/22 1531   BP: 90/50 105/52   BP Location: Right arm Right arm   Patient Position: Sitting Standing   Pulse: 69 71   SpO2: 97%    Weight: 96.6 kg (213 lb)    Height: 167.6 cm (66\")      Body mass index is 34.38 kg/m².  Last weight: 213 lbs    Vitals reviewed.   Constitutional:       Appearance: Well-developed.   Neck:      Thyroid: No thyromegaly.      Vascular: No carotid bruit or JVD.      Lymphadenopathy: No cervical adenopathy.   Pulmonary:      Effort: Pulmonary effort is normal.      Breath sounds: Normal breath sounds. No wheezing. No rhonchi. No rales.   Cardiovascular:      Regular rhythm.      Murmurs: There is a grade 1/6 systolic murmur at the LLSB.      No gallop. No S3 gallop.   Pulses:     Dorsalis pedis: 2+ bilaterally.     Posterior tibial: 2+ bilaterally.  Abdominal:      Palpations: Abdomen is soft. There is no abdominal mass.      Tenderness: There is no abdominal tenderness.   Musculoskeletal: Normal range of motion. Skin:     General: Skin is warm and dry.      Findings: No rash.   Neurological:      Mental Status: Alert and oriented to person, place, and time.           Lab Review:     No recent laboratory studies available for review today.    · Echocardiogram, 12/01/2021  · Left ventricular wall thickness is consistent with hypertrophy; sigmoid-shaped ventricular septum is present.  · There is calcification of the aortic valve mainly affecting the non-coronary cusp(s).  · Estimated right ventricular systolic pressure from tricuspid regurgitation is normal (<35 mmHg). Calculated right ventricular systolic pressure from tricuspid regurgitation is 20 mmHg.  · The following left ventricular wall segments are hypokinetic: basal anterolateral, mid anterolateral, apical lateral, basal inferolateral, mid inferolateral, apical inferior, mid inferior, apical septal, basal inferoseptal, mid " inferoseptal, mid anteroseptal, basal anterior, basal inferior and basal inferoseptal. The following left ventricular wall segments are dyskinetic: mid anterior and apical anterior. The following left ventricular wall segments are akinetic: apex.  · Estimated left ventricular EF = 44% Estimated left ventricular EF was in agreement with the calculated left ventricular EF. Left ventricular ejection fraction appears to be 41 - 45%. Left ventricular systolic function is mildly decreased.  · Left ventricular diastolic function is consistent with (grade II w/high LAP) pseudonormalization.  · Normal right ventricular cavity size, wall thickness and systolic function noted with abnormal septal motion consistent with bundle branch block.  · No evidence of left ventricular thrombus or mass present.  · No evidence of pulmonary hypertension is present.  · There is no evidence of pericardial effusion.  · Improved systolic left ventricular function noted since 5 February 2020 echocardiographic study report (LVEF 0.36).        Assessment:       Overall continued acceptable course with no new interim cardiopulmonary complaints with acceptable functional status. We will defer additional diagnostic or therapeutic intervention from a cardiac perspective at this time.  The patient's EF had improved on last echocardiogram December 2021 to 44%.  Would recommend to continue current cardiac medications.  We will provide the patient with laboratory slips for CMP and FLP.     Diagnosis Plan   1. IHD (ischemic heart disease)  No recurrent angina pectoris or CHF on current activity schedule; continue current treatment   2. Ischemic cardiomyopathy  The patient's EF had improved on last echocardiogram December 2021 to 44%.  Continue current cardiac medications   3. Essential hypertension   Controlled, continue current cardiac medications   4. Dyslipidemia   No new labs to review, will provide patient with slips for FLP, CMP   5. Type 2 diabetes  mellitus with other circulatory complication, with long-term current use of insulin (HCC)   No new labs to review, continue heart healthy diet and physical activity as tolerated          Plan:         1. Patient to continue current medications and close follow up with the above providers.  2. Tentative cardiology follow up in December 2022 or patient may return sooner PRN.  3. FLP, CMP slips provided to patient in office today    Scribed for Todd Qureshi MD by Madhavi Stout, APRN. 6/1/2022  15:47 EDT    I, Todd Qureshi MD, [facility], personally performed the services described in this documentation as scribed by the above named individual in my presence, and it is both accurate and complete. At 15:54 EDT on 06/01/2022

## 2022-06-20 RX ORDER — GLIPIZIDE 5 MG/1
5 TABLET, FILM COATED, EXTENDED RELEASE ORAL DAILY
Qty: 30 TABLET | Refills: 0 | Status: SHIPPED | OUTPATIENT
Start: 2022-06-20 | End: 2022-07-06 | Stop reason: SDUPTHER

## 2022-07-06 ENCOUNTER — OFFICE VISIT (OUTPATIENT)
Dept: ENDOCRINOLOGY | Facility: CLINIC | Age: 72
End: 2022-07-06

## 2022-07-06 VITALS
OXYGEN SATURATION: 99 % | BODY MASS INDEX: 33.43 KG/M2 | WEIGHT: 208 LBS | DIASTOLIC BLOOD PRESSURE: 58 MMHG | SYSTOLIC BLOOD PRESSURE: 104 MMHG | HEART RATE: 85 BPM | HEIGHT: 66 IN

## 2022-07-06 DIAGNOSIS — Z79.4 TYPE 2 DIABETES MELLITUS WITH OTHER CIRCULATORY COMPLICATION, WITH LONG-TERM CURRENT USE OF INSULIN: Primary | ICD-10-CM

## 2022-07-06 DIAGNOSIS — E11.59 TYPE 2 DIABETES MELLITUS WITH OTHER CIRCULATORY COMPLICATION, WITH LONG-TERM CURRENT USE OF INSULIN: Primary | ICD-10-CM

## 2022-07-06 LAB
EXPIRATION DATE: ABNORMAL
EXPIRATION DATE: NORMAL
GLUCOSE BLDC GLUCOMTR-MCNC: 261 MG/DL (ref 70–130)
HBA1C MFR BLD: 8.1 %
Lab: ABNORMAL
Lab: NORMAL

## 2022-07-06 PROCEDURE — 82947 ASSAY GLUCOSE BLOOD QUANT: CPT | Performed by: INTERNAL MEDICINE

## 2022-07-06 PROCEDURE — 99214 OFFICE O/P EST MOD 30 MIN: CPT | Performed by: INTERNAL MEDICINE

## 2022-07-06 PROCEDURE — 3052F HG A1C>EQUAL 8.0%<EQUAL 9.0%: CPT | Performed by: INTERNAL MEDICINE

## 2022-07-06 PROCEDURE — 83036 HEMOGLOBIN GLYCOSYLATED A1C: CPT | Performed by: INTERNAL MEDICINE

## 2022-07-06 RX ORDER — ORAL SEMAGLUTIDE 3 MG/1
3 TABLET ORAL DAILY
Qty: 30 TABLET | Refills: 5 | Status: SHIPPED | OUTPATIENT
Start: 2022-07-06 | End: 2022-12-16

## 2022-07-06 RX ORDER — LANCETS
EACH MISCELLANEOUS
Qty: 200 EACH | Refills: 1 | Status: SHIPPED | OUTPATIENT
Start: 2022-07-06

## 2022-07-06 RX ORDER — INSULIN GLARGINE 300 U/ML
INJECTION, SOLUTION SUBCUTANEOUS
Qty: 10 PEN | Refills: 3 | Status: SHIPPED | OUTPATIENT
Start: 2022-07-06 | End: 2022-12-16 | Stop reason: SDUPTHER

## 2022-07-06 RX ORDER — GLIPIZIDE 5 MG/1
5 TABLET, FILM COATED, EXTENDED RELEASE ORAL DAILY
Qty: 30 TABLET | Refills: 0 | Status: SHIPPED | OUTPATIENT
Start: 2022-07-06 | End: 2022-08-23 | Stop reason: SDUPTHER

## 2022-07-06 RX ORDER — ROSUVASTATIN CALCIUM 40 MG/1
40 TABLET, COATED ORAL NIGHTLY
Qty: 90 TABLET | Refills: 3 | Status: SHIPPED | OUTPATIENT
Start: 2022-07-06 | End: 2022-12-16 | Stop reason: SDUPTHER

## 2022-07-06 RX ORDER — PEN NEEDLE, DIABETIC 31 GX5/16"
NEEDLE, DISPOSABLE MISCELLANEOUS
Qty: 100 EACH | Refills: 3 | Status: SHIPPED | OUTPATIENT
Start: 2022-07-06

## 2022-07-06 NOTE — PROGRESS NOTES
"     Office Note      Date: 2022  Patient Name: Kar Mora  MRN: [id number]  : 1950    Chief Complaint   Patient presents with   • Diabetes       History of Present Illness:   Kar Mora is a 71 y.o. male who presents for Diabetes - type 2  On sfu, jardiance, dpp4, metformin and daily insulin - 70 units   bg checks are done fasting daily- 150-200  He has not had hypoglycemia.  -----------------------  He did take abx for a dental implant in the spring  He had a colonoscopy     Last A1c:  Hemoglobin A1C   Date Value Ref Range Status   2022 8.1 % Final   10/10/2018 7.50 (H) 4.80 - 5.60 % Final       Changes in health since last visit: none . Last eye exam 2021  Fasting labs have been ordered but he has not done them yet..    Subjective          Review of Systems:   Review of Systems   Constitutional: Negative.    HENT: Negative.    Eyes: Negative.    Respiratory: Negative.        The following portions of the patient's history were reviewed and updated as appropriate: allergies, current medications, past family history, past medical history, past social history, past surgical history and problem list.    Objective     Visit Vitals  /58   Pulse 85   Ht 167.6 cm (66\")   Wt 94.3 kg (208 lb)   SpO2 99%   BMI 33.57 kg/m²       Labs:    CMP  Lab Results   Component Value Date    GLUCOSE 101 (H) 10/29/2018    BUN 35 (H) 10/29/2018    CREATININE 1.49 (H) 10/29/2018    EGFRIFNONA 47 (L) 10/29/2018    BCR 23.5 10/29/2018    K 4.5 10/29/2018    CO2 28.0 10/29/2018    CALCIUM 8.9 10/29/2018    AST 33 10/10/2018    ALT 29 10/10/2018        CBC w/DIFF  Lab Results   Component Value Date    WBC 9.30 10/19/2018    RBC 3.71 (L) 10/19/2018    HGB 10.6 (L) 10/19/2018    HCT 32.8 (L) 10/19/2018    MCV 88.4 10/19/2018    MCH 28.6 10/19/2018    MCHC 32.3 10/19/2018    RDW 14.6 (H) 10/19/2018    RDWSD 47.0 10/19/2018    MPV 9.4 10/19/2018     10/19/2018    NEUTRORELPCT 67.6 10/18/2018    " LYMPHORELPCT 20.9 (L) 10/18/2018    MONORELPCT 10.4 10/18/2018    EOSRELPCT 0.9 10/18/2018    BASORELPCT 0.2 10/18/2018    AUTOIGPER 1.9 (H) 10/18/2018    NEUTROABS 7.16 10/18/2018    LYMPHSABS 2.21 10/18/2018    MONOSABS 1.10 (H) 10/18/2018    EOSABS 0.09 10/18/2018    BASOSABS 0.02 10/18/2018    AUTOIGNUM 0.20 (H) 10/18/2018       Physical Exam:  Physical Exam  Vitals reviewed.   Constitutional:       Appearance: Normal appearance.   Neurological:      Mental Status: He is alert.   Psychiatric:         Mood and Affect: Mood normal.         Behavior: Behavior normal.         Thought Content: Thought content normal.         Judgment: Judgment normal.          Assessment / Plan      Assessment & Plan:  Problem List Items Addressed This Visit        Other    Type 2 diabetes mellitus, with long-term current use of insulin (HCC) - Primary    Overview     Has not tolerated glp1           Current Assessment & Plan      Diabetes is worse. He is willing to try oral semaglutide. Will go with low dose to see how he does. He knows to stop januvia .           Relevant Medications    Semaglutide (Rybelsus) 3 MG tablet    glipizide (GLUCOTROL XL) 5 MG ER tablet    empagliflozin (Jardiance) 25 MG tablet tablet    metFORMIN (GLUCOPHAGE) 500 MG tablet    Toujeo SoloStar 300 UNIT/ML solution pen-injector injection    Other Relevant Orders    POC Glycosylated Hemoglobin (Hb A1C) (Completed)    POC Glucose, Blood (Completed)           Hiren Montoya MD   07/06/2022

## 2022-07-06 NOTE — ASSESSMENT & PLAN NOTE
Diabetes is worse. He is willing to try oral semaglutide. Will go with low dose to see how he does. He knows to stop januvia .

## 2022-07-13 ENCOUNTER — TELEPHONE (OUTPATIENT)
Dept: ENDOCRINOLOGY | Facility: CLINIC | Age: 72
End: 2022-07-13

## 2022-07-13 NOTE — TELEPHONE ENCOUNTER
PATIENT STATES DR. ENNIS HAS HIM TAKING RYBELSUS, BUT HE IS UNSURE WHAT DR. ENNIS WANTED PATIENT TO STOP TAKING. HE NEEDS CLARIFICATION ON MEDICATION. PATIENTS NUMBER [phone]

## 2022-08-23 RX ORDER — GLIPIZIDE 5 MG/1
5 TABLET, FILM COATED, EXTENDED RELEASE ORAL DAILY
Qty: 30 TABLET | Refills: 5 | Status: SHIPPED | OUTPATIENT
Start: 2022-08-23 | End: 2022-12-16 | Stop reason: SDUPTHER

## 2022-09-23 ENCOUNTER — TELEPHONE (OUTPATIENT)
Dept: CARDIOLOGY | Facility: CLINIC | Age: 72
End: 2022-09-23

## 2022-09-23 NOTE — TELEPHONE ENCOUNTER
Patient called regarding lab results letter 8/16/22. Per KTS, patient needs close follow-up and monitoring with nephrology.     Patient does not have nephrologist and needs referral. Referral sent to [facility]. Pt aware.

## 2022-11-21 RX ORDER — SITAGLIPTIN 100 MG/1
100 TABLET, FILM COATED ORAL DAILY
Qty: 90 TABLET | Refills: 1 | OUTPATIENT
Start: 2022-11-21

## 2022-11-21 RX ORDER — EMPAGLIFLOZIN 25 MG/1
TABLET, FILM COATED ORAL
Qty: 30 TABLET | Refills: 2 | OUTPATIENT
Start: 2022-11-21

## 2022-11-23 RX ORDER — SITAGLIPTIN 100 MG/1
100 TABLET, FILM COATED ORAL DAILY
Qty: 90 TABLET | Refills: 1 | OUTPATIENT
Start: 2022-11-23

## 2022-12-14 RX ORDER — SITAGLIPTIN 100 MG/1
100 TABLET, FILM COATED ORAL DAILY
Qty: 90 TABLET | Refills: 1 | OUTPATIENT
Start: 2022-12-14

## 2022-12-16 ENCOUNTER — TELEPHONE (OUTPATIENT)
Dept: ENDOCRINOLOGY | Facility: CLINIC | Age: 72
End: 2022-12-16

## 2022-12-16 RX ORDER — GLIPIZIDE 5 MG/1
5 TABLET, FILM COATED, EXTENDED RELEASE ORAL DAILY
Qty: 30 TABLET | Refills: 5 | Status: SHIPPED | OUTPATIENT
Start: 2022-12-16 | End: 2023-07-03 | Stop reason: SDUPTHER

## 2022-12-16 RX ORDER — ROSUVASTATIN CALCIUM 40 MG/1
40 TABLET, COATED ORAL NIGHTLY
Qty: 90 TABLET | Refills: 3 | Status: SHIPPED | OUTPATIENT
Start: 2022-12-16

## 2022-12-16 RX ORDER — INSULIN GLARGINE 300 U/ML
INJECTION, SOLUTION SUBCUTANEOUS
Qty: 30 ML | Refills: 3 | Status: SHIPPED | OUTPATIENT
Start: 2022-12-16 | End: 2023-04-20 | Stop reason: ALTCHOICE

## 2022-12-16 NOTE — TELEPHONE ENCOUNTER
Pt called. He needs all prescriptions refilled.     Dr rouse stopped januvia and put him on rybelsus and it is making pt nauseous. He would like to go back on januvia.        janine  Phone: [phone] Fax: [phone]    Call pt:  [phone]

## 2023-01-03 ENCOUNTER — OFFICE VISIT (OUTPATIENT)
Dept: ENDOCRINOLOGY | Facility: CLINIC | Age: 73
End: 2023-01-03
Payer: COMMERCIAL

## 2023-01-03 VITALS
DIASTOLIC BLOOD PRESSURE: 56 MMHG | OXYGEN SATURATION: 96 % | SYSTOLIC BLOOD PRESSURE: 106 MMHG | HEIGHT: 66 IN | HEART RATE: 81 BPM | WEIGHT: 209 LBS | BODY MASS INDEX: 33.59 KG/M2

## 2023-01-03 DIAGNOSIS — E11.59 TYPE 2 DIABETES MELLITUS WITH OTHER CIRCULATORY COMPLICATION, WITH LONG-TERM CURRENT USE OF INSULIN: Primary | ICD-10-CM

## 2023-01-03 DIAGNOSIS — Z79.4 TYPE 2 DIABETES MELLITUS WITH OTHER CIRCULATORY COMPLICATION, WITH LONG-TERM CURRENT USE OF INSULIN: Primary | ICD-10-CM

## 2023-01-03 LAB
EXPIRATION DATE: ABNORMAL
EXPIRATION DATE: NORMAL
GLUCOSE BLDC GLUCOMTR-MCNC: 292 MG/DL (ref 70–130)
HBA1C MFR BLD: 8.8 %
Lab: ABNORMAL
Lab: NORMAL

## 2023-01-03 PROCEDURE — 82947 ASSAY GLUCOSE BLOOD QUANT: CPT | Performed by: INTERNAL MEDICINE

## 2023-01-03 PROCEDURE — 99214 OFFICE O/P EST MOD 30 MIN: CPT | Performed by: INTERNAL MEDICINE

## 2023-01-03 PROCEDURE — 83036 HEMOGLOBIN GLYCOSYLATED A1C: CPT | Performed by: INTERNAL MEDICINE

## 2023-01-03 RX ORDER — DULAGLUTIDE 0.75 MG/.5ML
0.5 INJECTION, SOLUTION SUBCUTANEOUS WEEKLY
Qty: 6 ML | Refills: 3 | Status: SHIPPED | OUTPATIENT
Start: 2023-01-03 | End: 2023-03-08 | Stop reason: CLARIF

## 2023-01-03 RX ORDER — DULOXETIN HYDROCHLORIDE 30 MG/1
30 CAPSULE, DELAYED RELEASE ORAL DAILY
Qty: 90 CAPSULE | Refills: 3 | Status: SHIPPED | OUTPATIENT
Start: 2023-01-03 | End: 2024-01-03
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
## 2023-01-03 NOTE — ASSESSMENT & PLAN NOTE
Deteriorated  Will stop januvia and try low dose trulicity   Will use low dose cymbalta for early neuropathy

## 2023-02-21 NOTE — TELEPHONE ENCOUNTER
PATIENTS WIFE STATES THEY HAVE NEW INSURANCE AND THE INSURANCE IS REQUIRING PRIOR AUTH FOR BOTH GIBSONLOKIIA AND SHERRI SHE STATES PHARMACY FAXED THE DENIAL YESTERDAY AND IS GOING TO REFAX AGAIN TODAY. THEY ALSO NEED REFILL ON TEST STRIPS.

## 2023-03-01 DIAGNOSIS — I65.21 STENOSIS OF RIGHT CAROTID ARTERY: Primary | ICD-10-CM

## 2023-03-06 ENCOUNTER — TELEPHONE (OUTPATIENT)
Dept: ENDOCRINOLOGY | Facility: CLINIC | Age: 73
End: 2023-03-06
Payer: COMMERCIAL

## 2023-03-06 NOTE — TELEPHONE ENCOUNTER
Patients spouse called regarding prior auth's for patients toujeo and januvia.  She stated that the pharmacy has sent us information regarding the prior auths twice. She was calling for an update.  She would like a call back. Thank you

## 2023-03-06 NOTE — TELEPHONE ENCOUNTER
Spoke with patients wife.  Advised we had not received the denial.  She is going to contact pharmacy and have them send over.

## 2023-03-08 ENCOUNTER — TELEPHONE (OUTPATIENT)
Dept: ENDOCRINOLOGY | Facility: CLINIC | Age: 73
End: 2023-03-08
Payer: COMMERCIAL

## 2023-03-08 NOTE — TELEPHONE ENCOUNTER
rx for januvia sent. Might not be on formulary- might need to switch to tradjenta 5 mg per day which is ok by me

## 2023-03-09 ENCOUNTER — TELEPHONE (OUTPATIENT)
Dept: ENDOCRINOLOGY | Facility: CLINIC | Age: 73
End: 2023-03-09
Payer: COMMERCIAL

## 2023-03-10 ENCOUNTER — TELEPHONE (OUTPATIENT)
Dept: ENDOCRINOLOGY | Facility: CLINIC | Age: 73
End: 2023-03-10
Payer: COMMERCIAL

## 2023-03-10 RX ORDER — INSULIN DEGLUDEC INJECTION 100 U/ML
70 INJECTION, SOLUTION SUBCUTANEOUS NIGHTLY
Qty: 30 ML | Refills: 5 | Status: SHIPPED | OUTPATIENT
Start: 2023-03-10

## 2023-03-10 NOTE — TELEPHONE ENCOUNTER
Please call them back [phone]  She was asking about the tresiba -it was not called in yet  And  The pharmacy told her that it would not cover the januvia we sent in

## 2023-03-10 NOTE — TELEPHONE ENCOUNTER
Januvia and Toujeo denied by insurance.  Per Dr. Montoya, change Januvia to Tradjenta and Toujeo to Tresiba.  Rx's sent per protocol.  Patients wife notified.

## 2023-04-18 DIAGNOSIS — I65.21 STENOSIS OF RIGHT CAROTID ARTERY: ICD-10-CM

## 2023-04-19 NOTE — PROGRESS NOTES
[facility] Cardiothoracic Surgery Office Follow Up Note     Date of Encounter: 2023     Name: Kar Mora  : 1950     Referred By: No ref. provider found  PCP: Bro Kan MD    Chief Complaint:    Chief Complaint   Patient presents with   • Carotid Artery Disease     1 year follow-up with carotid duplex       Subjective      History of Present Illness:    Kar Mora is a 72 y.o. male  former smoker, with a history of HTN, HLD on statin therapy, T2DM, obesity, LBBB, ischemic cardiomyopathy, CAD s/p CABG x4 per Dr. Burton 10/11/18, and right carotid artery stenosis. Patient was last seen in clinic 22  doing well with some complaints of sternal instability that Dr. Burton recommended observation. This has resolved.  Patient presents today for yearly surveillance of carotid artery disease. Pt denies hx of TIA or CVA since last visit, has had no new focal neurologic dysfunction, specifically vision changes or amaurosis fugax.  Patient reports compliance with Plavix/statin therapy.  He follows regularly with Cardiology, Nephrology, and Endocrinology.       Review of Systems:  Review of Systems   Constitutional: Negative for chills, decreased appetite, diaphoresis, fever, malaise/fatigue, night sweats, weight gain and weight loss.   HENT: Negative for hoarse voice.    Eyes: Negative for blurred vision, double vision and visual disturbance.   Cardiovascular: Negative for chest pain, claudication, dyspnea on exertion, irregular heartbeat, leg swelling, near-syncope, orthopnea, palpitations, paroxysmal nocturnal dyspnea and syncope.        Right leg pain     Respiratory: Negative for cough, hemoptysis, shortness of breath, sputum production and wheezing.    Hematologic/Lymphatic: Negative for adenopathy and bleeding problem. Does not bruise/bleed easily.   Skin: Negative for color change, nail changes, poor wound healing and rash.   Musculoskeletal: Negative for back pain,  falls and muscle cramps.   Gastrointestinal: Positive for heartburn. Negative for abdominal pain and dysphagia.   Genitourinary: Negative for flank pain.   Neurological: Negative for brief paralysis, disturbances in coordination, dizziness, focal weakness, headaches, light-headedness, loss of balance, numbness, paresthesias, sensory change, vertigo and weakness.   Psychiatric/Behavioral: Negative for depression and suicidal ideas. The patient is not nervous/anxious.    Allergic/Immunologic: Negative for persistent infections.       I have reviewed the following portions of the patient's history: allergies, current medications, past family history, past medical history, past social history, past surgical history, problem list and ROS and confirm it's accurate.    Allergies:  Allergies   Allergen Reactions   • Asa [Aspirin] Other (See Comments)      upper airway congestion       Medications:      Current Outpatient Medications:   •  B-D ULTRAFINE III SHORT PEN 31G X 8 MM misc, For daily use, Disp: 100 each, Rfl: 3  •  carvedilol (COREG) 25 MG tablet, Take 1 tablet by mouth 2 (Two) Times a Day., Disp: 180 tablet, Rfl: 3  •  clopidogrel (PLAVIX) 75 MG tablet, Take 1 tablet by mouth Daily., Disp: 90 tablet, Rfl: 3  •  Dulaglutide (TRULICITY SC), Inject  under the skin into the appropriate area as directed., Disp: , Rfl:   •  DULoxetine (Cymbalta) 30 MG capsule, Take 1 capsule by mouth Daily., Disp: 90 capsule, Rfl: 3  •  empagliflozin (Jardiance) 25 MG tablet tablet, Take 1 tablet by mouth Daily., Disp: 30 tablet, Rfl: 5  •  fenofibrate 160 MG tablet, Take 1 tablet by mouth Daily., Disp: 90 tablet, Rfl: 3  •  glipizide (GLUCOTROL XL) 5 MG ER tablet, Take 1 tablet by mouth Daily., Disp: 30 tablet, Rfl: 5  •  glucose blood (ONE TOUCH ULTRA TEST) test strip, Test Three Times Daily DX Code: E11.65, Disp: 300 each, Rfl: 1  •  icosapent ethyl (Vascepa) 1 g capsule capsule, Take 2 g by mouth 2 (Two) Times a Day With Meals.,  Disp: 360 capsule, Rfl: 3  •  insulin degludec (Tresiba FlexTouch) 100 UNIT/ML solution pen-injector injection, Inject 70 Units under the skin into the appropriate area as directed Every Night., Disp: 30 mL, Rfl: 5  •  Lancets (onetouch ultrasoft) lancets, TEST TWICE DAILY, Disp: 200 each, Rfl: 1  •  linagliptin (Tradjenta) 5 MG tablet tablet, Take 1 tablet by mouth Daily., Disp: 90 tablet, Rfl: 1  •  metFORMIN (GLUCOPHAGE) 500 MG tablet, Take 2 tablets by mouth 2 (Two) Times a Day With Meals., Disp: 360 tablet, Rfl: 1  •  Multiple Vitamins-Minerals (MULTIVITAMIN ADULT PO), Take 1 tablet by mouth Daily., Disp: , Rfl:   •  nitroglycerin (NITROSTAT) 0.4 MG SL tablet, Place 1 tablet under the tongue Every 5 (Five) Minutes As Needed for Chest Pain. Take no more than 3 doses in 15 minutes., Disp: 25 tablet, Rfl: 11  •  rosuvastatin (CRESTOR) 40 MG tablet, Take 1 tablet by mouth Every Night., Disp: 90 tablet, Rfl: 3  •  sacubitril-valsartan (Entresto) 24-26 MG tablet, Take 1 tablet by mouth Every 12 (Twelve) Hours., Disp: 180 tablet, Rfl: 3  No current facility-administered medications for this visit.    History:   Past Medical History:   Diagnosis Date   • Allergic rhinitis    • Anesthesia     pt says he has woken up during surgery   • Chest pain    • Coronary artery disease    • Dyslipidemia    • Erectile dysfunction    • GERD (gastroesophageal reflux disease)    • Gout    • Hypercholesterolemia    • Hyperlipidemia    • Hypertensive disorder    • Impotence of organic origin    • Insulin dependent type 2 diabetes mellitus 2002    checks fsbg every am, a1c checked every 6 months, 7.2 in may 2018    • Kidney stones    • Labile hypertension 10/24/2016   • Myocardial infarct    • Type 2 diabetes mellitus    • Wears glasses        Past Surgical History:   Procedure Laterality Date   • CARDIAC CATHETERIZATION N/A 07/20/2018    Procedure: Left Heart Cath;  Surgeon: Madi Moctezuma MD;  Location: [facility] CATH INVASIVE LOCATION;   Service: Cardiovascular   • COLONOSCOPY  2016   • CORONARY ARTERY BYPASS GRAFT N/A 10/11/2018    Procedure: MEDIAN STERNOTMY<CORONARY ARTERY BYPASS WITH INTERNAL MAMMARY ARTERY GRAFT x  4 with EVH of the right greater saphenous vein;  Surgeon: Migue Burton MD;  Location: Replaced by [facility];  Service: Cardiothoracic   • KIDNEY STONE SURGERY     • MOUTH SURGERY      tooth implant   • NASAL POLYP SURGERY         Social History     Socioeconomic History   • Marital status:    • Number of children: 2   Tobacco Use   • Smoking status: Former     Packs/day: 2.00     Years: 10.00     Pack years: 20.00     Types: Cigarettes     Start date: 1972     Quit date: 1981     Years since quittin.3   • Smokeless tobacco: Never   • Tobacco comments:     quit 35 years ago   Vaping Use   • Vaping Use: Never used   Substance and Sexual Activity   • Alcohol use: No   • Drug use: No   • Sexual activity: Yes     Partners: Female     Birth control/protection: None        Family History   Problem Relation Age of Onset   • Diabetes Mother    • Heart disease Mother    • Arthritis Mother    • Depression Mother    • Heart disease Father    • Arthritis Father    • Heart attack Father    • Diabetes Sister        Objective   Physical Exam:  Vitals:    23 1404 23 1405   BP: 118/59 108/60   BP Location: Right arm Left arm   Patient Position: Sitting Sitting   Pulse: 76    Temp: 97.1 °F (36.2 °C)    SpO2: 99%    Weight: 94.2 kg (207 lb 9.6 oz)       Body mass index is 33.51 kg/m².    Physical Exam  Vitals reviewed.   Constitutional:       General: He is not in acute distress.     Appearance: He is well-developed and well-groomed.   HENT:      Head: Normocephalic and atraumatic.   Eyes:      General: Lids are normal.      Conjunctiva/sclera: Conjunctivae normal.      Pupils: Pupils are equal, round, and reactive to light.   Neck:      Vascular: No carotid bruit.   Cardiovascular:      Rate and Rhythm: Normal rate and  regular rhythm.      Pulses:           Dorsalis pedis pulses are 2+ on the right side and 2+ on the left side.        Posterior tibial pulses are 2+ on the right side and 2+ on the left side.      Heart sounds: S1 normal and S2 normal. No murmur heard.  Pulmonary:      Effort: Pulmonary effort is normal. No respiratory distress.      Breath sounds: Normal breath sounds.   Musculoskeletal:         General: Normal range of motion.      Cervical back: Normal range of motion and neck supple.      Right lower leg: No edema.      Left lower leg: No edema.   Skin:     General: Skin is warm and dry.      Capillary Refill: Capillary refill takes less than 2 seconds.   Neurological:      General: No focal deficit present.      Mental Status: He is alert and oriented to person, place, and time.   Psychiatric:         Attention and Perception: Attention normal.         Mood and Affect: Mood normal.         Speech: Speech normal.         Behavior: Behavior normal. Behavior is cooperative.         Imaging/Labs:   Carotid duplex 4/13/2023 @ [facility]  Impression:  Mild hemodynamic stenosis noted in the right carotid bulb approximately 54% and proximal ICA 57%  46% diameter reduction noted in the bulb on the left carotid artery.  Borderline hemodynamic stenosis.    Carotid duplex bilateral 3/3/2022:  Findings: On the submitted images there are no significant calcified or noncalcified plaques demonstrated.  The peak systolic and diastolic velocities are within normal range with no significant focal elevation or significant decreased flow.  Antegrade flow is demonstrated in the vertebral arteries.  The ratio flow in the right internal the right common carotid artery is 92% and the ratio on the left is 76% which is normal.  Impression: There is no evidence of any hemodynamic significant stenosis.    Assessment / Plan      Assessment / Plan:  1. Stenosis of right carotid artery (Primary)  2. Coronary artery disease s/p CABG  2018  - 72 y.o. male  former smoker, with a history of HTN, HLD on statin therapy, T2DM, obesity, LBBB, ischemic cardiomyopathy, CAD s/p CABG x4 per Dr. Burton 10/11/18, and right carotid artery stenosis.   - Patient was last seen in clinic 4/7/22 doing well.  - Denies TIA, vision changes or amaurosis fugax, or CVA sx since last visit  - Compliant w/ Plavix/statin therapy  - Current carotid duplex demonstrates mild hemodynamically stenosis on the right   - Follows regularly with Cardiology, Nephrology, and Endocrinology.   - Will plan to see Mr. Mora back in one year with repeat carotid duplex    Patient Education: Report any neurological symptoms promptly or call 911.  Be FAST: Balance issues, Eyesight loss, Face drooping, Arm weakness, Speech changes, Time to get help      Follow Up:   Return in about 1 year (around 4/20/2024) for carotid duplex.   Or sooner for any further concerns or worsening sign and symptoms. If unable to reach us in the office please dial 911 or go to the nearest emergency department.      EDGAR Li  [facility]    Time Spent: I spent 30 minutes caring for Kar on this date of service. This time includes time spent by me in the following activities: preparing for the visit, reviewing tests, obtaining and/or reviewing a separately obtained history, performing a medically appropriate examination and/or evaluation, counseling and educating the patient/family/caregiver, documenting information in the medical record and care coordination.

## 2023-04-20 ENCOUNTER — OFFICE VISIT (OUTPATIENT)
Dept: CARDIAC SURGERY | Facility: CLINIC | Age: 73
End: 2023-04-20
Payer: MEDICARE

## 2023-04-20 VITALS
DIASTOLIC BLOOD PRESSURE: 60 MMHG | BODY MASS INDEX: 33.51 KG/M2 | HEART RATE: 76 BPM | OXYGEN SATURATION: 99 % | WEIGHT: 207.6 LBS | SYSTOLIC BLOOD PRESSURE: 108 MMHG | TEMPERATURE: 97.1 F

## 2023-04-20 DIAGNOSIS — I65.21 STENOSIS OF RIGHT CAROTID ARTERY: Primary | ICD-10-CM

## 2023-04-20 DIAGNOSIS — I25.10 CORONARY ARTERY DISEASE INVOLVING NATIVE CORONARY ARTERY OF NATIVE HEART WITHOUT ANGINA PECTORIS: ICD-10-CM

## 2023-04-20 PROCEDURE — 3078F DIAST BP <80 MM HG: CPT | Performed by: NURSE PRACTITIONER

## 2023-04-20 PROCEDURE — 3074F SYST BP LT 130 MM HG: CPT | Performed by: NURSE PRACTITIONER

## 2023-04-20 PROCEDURE — 1159F MED LIST DOCD IN RCRD: CPT | Performed by: NURSE PRACTITIONER

## 2023-04-20 PROCEDURE — 1160F RVW MEDS BY RX/DR IN RCRD: CPT | Performed by: NURSE PRACTITIONER

## 2023-04-20 PROCEDURE — 99214 OFFICE O/P EST MOD 30 MIN: CPT | Performed by: NURSE PRACTITIONER

## 2023-05-23 RX ORDER — FENOFIBRATE 160 MG/1
160 TABLET ORAL DAILY
Qty: 90 TABLET | Refills: 3 | Status: SHIPPED | OUTPATIENT
Start: 2023-05-23

## 2023-05-24 ENCOUNTER — TELEPHONE (OUTPATIENT)
Dept: CARDIOLOGY | Facility: CLINIC | Age: 73
End: 2023-05-24
Payer: COMMERCIAL

## 2023-05-24 RX ORDER — SACUBITRIL AND VALSARTAN 24; 26 MG/1; MG/1
1 TABLET, FILM COATED ORAL EVERY 12 HOURS
Qty: 180 TABLET | Refills: 3 | Status: SHIPPED | OUTPATIENT
Start: 2023-05-24

## 2023-05-24 RX ORDER — NITROGLYCERIN 0.4 MG/1
0.4 TABLET SUBLINGUAL
Qty: 25 TABLET | Refills: 11 | Status: SHIPPED | OUTPATIENT
Start: 2023-05-24

## 2023-05-24 RX ORDER — ICOSAPENT ETHYL 1000 MG/1
2 CAPSULE ORAL 2 TIMES DAILY WITH MEALS
Qty: 360 CAPSULE | Refills: 3 | Status: SHIPPED | OUTPATIENT
Start: 2023-05-24

## 2023-05-24 RX ORDER — CARVEDILOL 25 MG/1
25 TABLET ORAL 2 TIMES DAILY
Qty: 180 TABLET | Refills: 3 | Status: SHIPPED | OUTPATIENT
Start: 2023-05-24

## 2023-05-24 RX ORDER — CLOPIDOGREL BISULFATE 75 MG/1
75 TABLET ORAL DAILY
Qty: 90 TABLET | Refills: 3 | Status: SHIPPED | OUTPATIENT
Start: 2023-05-24

## 2023-05-24 NOTE — TELEPHONE ENCOUNTER
Pt called for refills and wanted to know if he can follow his cardiac care with Dr. Moctezuma. Sent message to his .   
.

## 2023-06-14 ENCOUNTER — TELEPHONE (OUTPATIENT)
Dept: CARDIOLOGY | Facility: CLINIC | Age: 73
End: 2023-06-14
Payer: MEDICARE

## 2023-06-14 NOTE — TELEPHONE ENCOUNTER
Ashlee from [facility] called needing clearance for patient to have a tooth extraction and how long to hold his Plavix. Please advise.  
Patient has cardiac clearance and is mild risk.  Hold Plavix 5 days preprocedure and reinitiate day after tooth extraction.  
Satisfactory

## 2023-08-03 ENCOUNTER — HOSPITAL ENCOUNTER (OUTPATIENT)
Dept: CARDIOLOGY | Facility: HOSPITAL | Age: 73
Discharge: HOME | End: 2023-08-03
Payer: COMMERCIAL

## 2023-08-03 VITALS — WEIGHT: 209 LBS | HEIGHT: 67 IN | BODY MASS INDEX: 32.8 KG/M2

## 2023-08-03 VITALS — BODY MASS INDEX: 32.8 KG/M2 | HEIGHT: 67 IN | WEIGHT: 209 LBS

## 2023-08-03 DIAGNOSIS — I73.9 ASYMPTOMATIC PVD (PERIPHERAL VASCULAR DISEASE): ICD-10-CM

## 2023-08-03 DIAGNOSIS — I25.9 CHRONIC ISCHEMIC HEART DISEASE, UNSPECIFIED: ICD-10-CM

## 2023-08-03 PROCEDURE — 93970 EXTREMITY STUDY: CPT

## 2023-08-03 PROCEDURE — 93970 EXTREMITY STUDY: CPT | Performed by: INTERNAL MEDICINE

## 2023-08-03 PROCEDURE — 93923 UPR/LXTR ART STDY 3+ LVLS: CPT | Performed by: INTERNAL MEDICINE

## 2023-08-03 PROCEDURE — 93923 UPR/LXTR ART STDY 3+ LVLS: CPT

## 2023-08-04 ENCOUNTER — DOCUMENTATION (OUTPATIENT)
Dept: CARDIOLOGY | Facility: CLINIC | Age: 73
End: 2023-08-04
Payer: MEDICARE

## 2023-08-04 LAB
BH CV LOWER ARTERIAL LEFT ABI RATIO: 1.1
BH CV LOWER ARTERIAL LEFT DORSALIS PEDIS SYS MAX: 131
BH CV LOWER ARTERIAL LEFT GREAT TOE SYS MAX: 94
BH CV LOWER ARTERIAL LEFT POST TIBIAL SYS MAX: 133
BH CV LOWER ARTERIAL LEFT TBI RATIO: 0.78
BH CV LOWER ARTERIAL RIGHT ABI RATIO: 1.1
BH CV LOWER ARTERIAL RIGHT DORSALIS PEDIS SYS MAX: 131
BH CV LOWER ARTERIAL RIGHT GREAT TOE SYS MAX: 114
BH CV LOWER ARTERIAL RIGHT POST TIBIAL SYS MAX: 135
BH CV LOWER ARTERIAL RIGHT TBI RATIO: 0.95
BH CV LOWER VASCULAR LEFT COMMON FEMORAL AUGMENT: NORMAL
BH CV LOWER VASCULAR LEFT COMMON FEMORAL COMPRESS: NORMAL
BH CV LOWER VASCULAR LEFT COMMON FEMORAL PHASIC: NORMAL
BH CV LOWER VASCULAR LEFT COMMON FEMORAL SPONT: NORMAL
BH CV LOWER VASCULAR LEFT DISTAL FEMORAL AUGMENT: NORMAL
BH CV LOWER VASCULAR LEFT DISTAL FEMORAL COMPRESS: NORMAL
BH CV LOWER VASCULAR LEFT GASTRONEMIUS COMPRESS: NORMAL
BH CV LOWER VASCULAR LEFT GREATER SAPH AK COMPRESS: NORMAL
BH CV LOWER VASCULAR LEFT GREATER SAPH BK COMPRESS: NORMAL
BH CV LOWER VASCULAR LEFT LESSER SAPH COMPRESS: NORMAL
BH CV LOWER VASCULAR LEFT MID FEMORAL AUGMENT: NORMAL
BH CV LOWER VASCULAR LEFT MID FEMORAL COMPRESS: NORMAL
BH CV LOWER VASCULAR LEFT MID FEMORAL PHASIC: NORMAL
BH CV LOWER VASCULAR LEFT MID FEMORAL SPONT: NORMAL
BH CV LOWER VASCULAR LEFT PERONEAL COMPRESS: NORMAL
BH CV LOWER VASCULAR LEFT POPLITEAL AUGMENT: NORMAL
BH CV LOWER VASCULAR LEFT POPLITEAL COMPRESS: NORMAL
BH CV LOWER VASCULAR LEFT POPLITEAL PHASIC: NORMAL
BH CV LOWER VASCULAR LEFT POPLITEAL SPONT: NORMAL
BH CV LOWER VASCULAR LEFT POSTERIOR TIBIAL COMPRESS: NORMAL
BH CV LOWER VASCULAR LEFT PROFUNDA FEMORAL AUGMENT: NORMAL
BH CV LOWER VASCULAR LEFT PROFUNDA FEMORAL COMPRESS: NORMAL
BH CV LOWER VASCULAR LEFT PROXIMAL FEMORAL AUGMENT: NORMAL
BH CV LOWER VASCULAR LEFT PROXIMAL FEMORAL COMPRESS: NORMAL
BH CV LOWER VASCULAR LEFT SAPHENOFEMORAL JUNCTION AUGMENT: NORMAL
BH CV LOWER VASCULAR LEFT SAPHENOFEMORAL JUNCTION COMPRESS: NORMAL
BH CV LOWER VASCULAR LEFT SOLEAL COMPRESS: NORMAL
BH CV LOWER VASCULAR RIGHT COMMON FEMORAL AUGMENT: NORMAL
BH CV LOWER VASCULAR RIGHT COMMON FEMORAL COMPRESS: NORMAL
BH CV LOWER VASCULAR RIGHT COMMON FEMORAL PHASIC: NORMAL
BH CV LOWER VASCULAR RIGHT COMMON FEMORAL SPONT: NORMAL
BH CV LOWER VASCULAR RIGHT DISTAL FEMORAL AUGMENT: NORMAL
BH CV LOWER VASCULAR RIGHT DISTAL FEMORAL COMPRESS: NORMAL
BH CV LOWER VASCULAR RIGHT GASTRONEMIUS COMPRESS: NORMAL
BH CV LOWER VASCULAR RIGHT GREATER SAPH AK COMPRESS: NORMAL
BH CV LOWER VASCULAR RIGHT GREATER SAPH BK COMPRESS: NORMAL
BH CV LOWER VASCULAR RIGHT LESSER SAPH COMPRESS: NORMAL
BH CV LOWER VASCULAR RIGHT MID FEMORAL AUGMENT: NORMAL
BH CV LOWER VASCULAR RIGHT MID FEMORAL COMPRESS: NORMAL
BH CV LOWER VASCULAR RIGHT MID FEMORAL PHASIC: NORMAL
BH CV LOWER VASCULAR RIGHT MID FEMORAL SPONT: NORMAL
BH CV LOWER VASCULAR RIGHT PERONEAL COMPRESS: NORMAL
BH CV LOWER VASCULAR RIGHT POPLITEAL AUGMENT: NORMAL
BH CV LOWER VASCULAR RIGHT POPLITEAL COMPRESS: NORMAL
BH CV LOWER VASCULAR RIGHT POPLITEAL PHASIC: NORMAL
BH CV LOWER VASCULAR RIGHT POPLITEAL SPONT: NORMAL
BH CV LOWER VASCULAR RIGHT POSTERIOR TIBIAL COMPRESS: NORMAL
BH CV LOWER VASCULAR RIGHT PROFUNDA FEMORAL AUGMENT: NORMAL
BH CV LOWER VASCULAR RIGHT PROFUNDA FEMORAL COMPRESS: NORMAL
BH CV LOWER VASCULAR RIGHT PROXIMAL FEMORAL AUGMENT: NORMAL
BH CV LOWER VASCULAR RIGHT PROXIMAL FEMORAL COMPRESS: NORMAL
BH CV LOWER VASCULAR RIGHT SAPHENOFEMORAL JUNCTION AUGMENT: NORMAL
BH CV LOWER VASCULAR RIGHT SAPHENOFEMORAL JUNCTION COMPRESS: NORMAL
BH CV LOWER VASCULAR RIGHT SOLEAL COMPRESS: NORMAL
UPPER ARTERIAL LEFT ARM BRACHIAL SYS MAX: 120 MMHG
UPPER ARTERIAL RIGHT ARM BRACHIAL SYS MAX: 119 MMHG

## 2023-08-16 RX ORDER — GLIPIZIDE 5 MG/1
5 TABLET, FILM COATED, EXTENDED RELEASE ORAL DAILY
Qty: 30 TABLET | Refills: 5 | Status: SHIPPED | OUTPATIENT
Start: 2023-08-16

## 2023-08-16 NOTE — TELEPHONE ENCOUNTER
Rx Refill Note  Requested Prescriptions     Pending Prescriptions Disp Refills    glipizide (GLUCOTROL XL) 5 MG ER tablet [Pharmacy Med Name: GLIPIZIDE ER 5MG TABLETS] 30 tablet 5     Sig: TAKE 1 TABLET BY MOUTH DAILY          Last office visit with prescribing clinician: 7/3/2023     Next office visit with prescribing clinician: 1/8/2024         Irma Ribeiro MA  08/16/23, 11:49 EDT

## 2024-01-04 RX ORDER — ROSUVASTATIN CALCIUM 40 MG/1
40 TABLET, COATED ORAL NIGHTLY
Qty: 90 TABLET | Refills: 3 | Status: SHIPPED | OUTPATIENT
Start: 2024-01-04

## 2024-01-04 NOTE — TELEPHONE ENCOUNTER
Rx Refill Note  Requested Prescriptions     Pending Prescriptions Disp Refills    rosuvastatin (CRESTOR) 40 MG tablet [Pharmacy Med Name: ROSUVASTATIN 40MG TABLETS] 90 tablet 3     Sig: TAKE 1 TABLET BY MOUTH EVERY NIGHT      Last office visit with prescribing clinician: 7/3/2023     Next office visit with prescribing clinician: 1/8/2024       Aida Chicas MA  01/04/24, 11:06 EST

## 2024-01-08 ENCOUNTER — OFFICE VISIT (OUTPATIENT)
Dept: CARDIOLOGY | Facility: CLINIC | Age: 74
End: 2024-01-08
Payer: COMMERCIAL

## 2024-01-08 ENCOUNTER — HOSPITAL ENCOUNTER (OUTPATIENT)
Dept: CARDIOLOGY | Facility: HOSPITAL | Age: 74
Discharge: HOME | End: 2024-01-08
Admitting: NURSE PRACTITIONER
Payer: COMMERCIAL

## 2024-01-08 ENCOUNTER — OFFICE VISIT (OUTPATIENT)
Dept: ENDOCRINOLOGY | Facility: CLINIC | Age: 74
End: 2024-01-08
Payer: COMMERCIAL

## 2024-01-08 VITALS
WEIGHT: 208 LBS | SYSTOLIC BLOOD PRESSURE: 104 MMHG | BODY MASS INDEX: 33.43 KG/M2 | HEIGHT: 66 IN | DIASTOLIC BLOOD PRESSURE: 63 MMHG | HEART RATE: 78 BPM | OXYGEN SATURATION: 96 %

## 2024-01-08 VITALS
SYSTOLIC BLOOD PRESSURE: 106 MMHG | OXYGEN SATURATION: 97 % | WEIGHT: 207.4 LBS | HEIGHT: 66 IN | HEART RATE: 81 BPM | BODY MASS INDEX: 33.33 KG/M2 | DIASTOLIC BLOOD PRESSURE: 58 MMHG

## 2024-01-08 VITALS — BODY MASS INDEX: 33.41 KG/M2 | HEIGHT: 66 IN | WEIGHT: 207.89 LBS

## 2024-01-08 DIAGNOSIS — I25.118 CORONARY ARTERY DISEASE OF NATIVE ARTERY OF NATIVE HEART WITH STABLE ANGINA PECTORIS: Primary | ICD-10-CM

## 2024-01-08 DIAGNOSIS — Z79.4 TYPE 2 DIABETES MELLITUS WITH HYPERGLYCEMIA, WITH LONG-TERM CURRENT USE OF INSULIN: Primary | ICD-10-CM

## 2024-01-08 DIAGNOSIS — E11.65 TYPE 2 DIABETES MELLITUS WITH HYPERGLYCEMIA, WITH LONG-TERM CURRENT USE OF INSULIN: Primary | ICD-10-CM

## 2024-01-08 DIAGNOSIS — E78.5 HYPERLIPIDEMIA LDL GOAL <70: ICD-10-CM

## 2024-01-08 DIAGNOSIS — I25.5 ISCHEMIC CARDIOMYOPATHY: ICD-10-CM

## 2024-01-08 DIAGNOSIS — I10 ESSENTIAL HYPERTENSION: ICD-10-CM

## 2024-01-08 DIAGNOSIS — I44.7 LEFT BUNDLE BRANCH BLOCK: ICD-10-CM

## 2024-01-08 LAB
EXPIRATION DATE: ABNORMAL
EXPIRATION DATE: ABNORMAL
GLUCOSE BLDC GLUCOMTR-MCNC: 373 MG/DL (ref 70–130)
HBA1C MFR BLD: 9.6 % (ref 4.5–5.7)
Lab: ABNORMAL
Lab: ABNORMAL

## 2024-01-08 PROCEDURE — 99214 OFFICE O/P EST MOD 30 MIN: CPT | Performed by: INTERNAL MEDICINE

## 2024-01-08 PROCEDURE — 93306 TTE W/DOPPLER COMPLETE: CPT

## 2024-01-08 PROCEDURE — 83036 HEMOGLOBIN GLYCOSYLATED A1C: CPT | Performed by: INTERNAL MEDICINE

## 2024-01-08 PROCEDURE — 82947 ASSAY GLUCOSE BLOOD QUANT: CPT | Performed by: INTERNAL MEDICINE

## 2024-01-08 RX ORDER — GLUCOSAMINE HCL/CHONDROITIN SU 500-400 MG
CAPSULE ORAL
Qty: 100 EACH | Refills: 3 | Status: SHIPPED | OUTPATIENT
Start: 2024-01-08

## 2024-01-08 RX ORDER — SEMAGLUTIDE 0.68 MG/ML
0.5 INJECTION, SOLUTION SUBCUTANEOUS WEEKLY
Qty: 3 ML | Refills: 5 | Status: SHIPPED | OUTPATIENT
Start: 2024-01-08

## 2024-01-08 RX ORDER — LANCETS 33 GAUGE
1 EACH MISCELLANEOUS DAILY
Qty: 100 EACH | Refills: 3 | Status: SHIPPED | OUTPATIENT
Start: 2024-01-08

## 2024-01-08 NOTE — PROGRESS NOTES
[facility]   [address], [address]  [place], [place], [address]    [phone]  WWW.[facility]           OUTPATIENT CLINIC FOLLOW UP NOTE    Patient Care Team:  Patient Care Team:  Bro Kan MD as PCP - Hiren Baeza MD as Consulting Physician (Endocrinology)  Madi Moctezuma MD as Consulting Physician (Cardiology)    Subjective:      Chief Complaint   Patient presents with    Cardiomyopathy         Kar Mora is a 73 y.o. male.  Cardiac focused, problem list:  CAD:  [facility] 11-day hospitalization for CABG ×4 vessels, 10/11/2018: SVG to diagonal and circumflex, SVG to PDA, LIMA to LAD, discharged with LifeVest  Combined hypertensive and ischemic cardiovascular disease:  Low risk GXT 2004,   Acceptable echocardiographic GXT with preserved exercise capacity and mild LVH, March 2008.   Low risk Lexiscan 2012  Resident class I symptoms with persistent abnormal echocardiogram (LVEF 0.40), with mild concentric LVH and mild left atrial enlargement without pulmonary arterial hypertension or pericardial effusion, July 2015. No sig change on TTE 2016 or 2018, 2020, 2024  MUGA, 12/21/2018: LVEF 0.35   Insulin-dependent type 2 diabetes mellitus without apparent end organ damage; hemoglobin A1c 6.4%, November 2017; 8.0%, July 2018; 8.2%, March 2019, 7% Fall 2020, 8.8% January 2023  Labile hypertension.  Dyslipidemia.  Erectile dysfunction.  Remote nasal polypectomy, 1986.  Allergic rhinitis.Moderate obesity (BMI 33.73).  Questionable asymptomatic right carotid bruit  Carotid duplex April 2023 showed mild hemodynamic stenosis in the right carotid bulb approximately 54% and proximal ICA 57%.  46% diameter reduction in the bulb on the left carotid artery with borderline hemodynamic stenosis.  Chronic kidney disease  Bilateral leg pain, possible peripheral vascular disease  Normal ABIs and negative venous duplex 8/2023    HPI:    Today the patient is  for follow-up    Doing well from a cardiopulmonary standpoint.  Has occasional right leg pains, such as after sitting for long periods of time or walking on a hard surface.  Walking around a grocery store or other his day-to-day activities typically do not produce a claudication-like pain.  Possibly neuropathic in nature.  No associated swelling    Review of Systems:  As noted above in the HPI     PFSH:  Patient Active Problem List   Diagnosis    Type 2 diabetes mellitus, with long-term current use of insulin    Hyperlipidemia LDL goal <70    Erectile dysfunction    Allergic rhinitis. Desensitization injections discontinued July 2018    Essential hypertension    Class 1 obesity due to excess calories in adult    Left bundle branch block    Coronary artery disease involving native coronary artery of native heart without angina pectoris    Cough    Ischemic cardiomyopathy    Stenosis of right carotid artery         Current Outpatient Medications:     B-D ULTRAFINE III SHORT PEN 31G X 8 MM misc, For daily use, Disp: 100 each, Rfl: 3    Blood Glucose Monitoring Suppl device, Use as directed to check blood sugar please dispense insurance preferred, Disp: 1 each, Rfl: 0    carvedilol (COREG) 25 MG tablet, Take 1 tablet by mouth 2 (Two) Times a Day., Disp: 180 tablet, Rfl: 3    clopidogrel (PLAVIX) 75 MG tablet, Take 1 tablet by mouth Daily., Disp: 90 tablet, Rfl: 3    empagliflozin (Jardiance) 25 MG tablet tablet, Take 1 tablet by mouth Daily., Disp: 30 tablet, Rfl: 5    fenofibrate 160 MG tablet, TAKE 1 TABLET BY MOUTH DAILY, Disp: 90 tablet, Rfl: 3    glipizide (GLUCOTROL XL) 5 MG ER tablet, TAKE 1 TABLET BY MOUTH DAILY, Disp: 30 tablet, Rfl: 5    Glucose Blood (Blood Glucose Test) strip, Use to check blood sugar daily dx e11.65, Disp: 100 each, Rfl: 3    icosapent ethyl (Vascepa) 1 g capsule capsule, Take 2 g by mouth 2 (Two) Times a Day With Meals., Disp: 360 capsule, Rfl: 3    insulin degludec (Tresiba FlexTouch) 100  "UNIT/ML solution pen-injector injection, Inject 70 Units under the skin into the appropriate area as directed Every Night., Disp: 30 mL, Rfl: 5    Lancets (onetouch ultrasoft) lancets, TEST TWICE DAILY, Disp: 200 each, Rfl: 1    Lancets 33G misc, Use 1 each Daily. Dx e11.65, Disp: 100 each, Rfl: 3    metFORMIN (GLUCOPHAGE) 500 MG tablet, Take 2 tablets by mouth 2 (Two) Times a Day With Meals., Disp: 360 tablet, Rfl: 1    Multiple Vitamins-Minerals (MULTIVITAMIN ADULT PO), Take 1 tablet by mouth Daily., Disp: , Rfl:     nitroglycerin (NITROSTAT) 0.4 MG SL tablet, Place 1 tablet under the tongue Every 5 (Five) Minutes As Needed for Chest Pain. Take no more than 3 doses in 15 minutes., Disp: 25 tablet, Rfl: 11    rosuvastatin (CRESTOR) 40 MG tablet, TAKE 1 TABLET BY MOUTH EVERY NIGHT, Disp: 90 tablet, Rfl: 3    sacubitril-valsartan (Entresto) 24-26 MG tablet, Take 1 tablet by mouth Every 12 (Twelve) Hours., Disp: 180 tablet, Rfl: 3    Semaglutide,0.25 or 0.5MG/DOS, (Ozempic, 0.25 or 0.5 MG/DOSE,) 2 MG/3ML solution pen-injector, Inject 0.5 mg under the skin into the appropriate area as directed 1 (One) Time Per Week., Disp: 3 mL, Rfl: 5    DULoxetine (Cymbalta) 30 MG capsule, Take 1 capsule by mouth Daily., Disp: 90 capsule, Rfl: 3     reports that he quit smoking about 43 years ago. His smoking use included cigarettes. He started smoking about 52 years ago. He has a 20.00 pack-year smoking history. He has been exposed to tobacco smoke. He has never used smokeless tobacco.      Objective:   Physical exam:  /58 (BP Location: Left arm, Patient Position: Sitting, Cuff Size: Adult)   Pulse 81   Ht 167.6 cm (65.98\")   Wt 94.1 kg (207 lb 6.4 oz)   SpO2 97%   BMI 33.49 kg/m²   CONSTITUTIONAL: No acute distress  RESPIRATORY: Normal effort. Clear to auscultation bilaterally without wheezing or rales  CARDIOVASCULAR: Carotids with normal upstrokes without bruits.  Regular rate and rhythm with normal S1 and S2. Without " "murmur. Normal radial pulse.     Labs:    Lab Results   Component Value Date    LDL 91 07/20/2018     No components found for: \"LDLDIRECTC\"    OSH 8/2022: LDL 80,     Diagnostic Data:    Procedures    Results for orders placed in visit on 06/22/23    Adult Transthoracic Echo Complete W/ Cont if Necessary Per Protocol    Interpretation Summary    Left ventricular systolic function is mildly decreased. Left ventricular ejection fraction appears to be 41 - 45%.    Left ventricular wall thickness is consistent with hypertrophy. Sigmoid-shaped ventricular septum is present.    Left ventricular diastolic function is consistent with (grade I) impaired relaxation.    Septal wall motion is abnormal, consistent with a bundle branch block.      Assessment and Plan:     Coronary artery disease   Ischemic cardiomyopathy  Essential hypertension  Left bundle branch block  Hyperlipidemia LDL goal <70   -Doing well from a cardiopulmonary standpoint.  Continue current medications and plan  -Repeat external CMP and FLP form provided.  Patient to have blood drawn at time of upcoming hepatology evaluation.  Patient to have results faxed to us    Right leg pain  Restless leg syndrome  Diabetes  -Acceptable noninvasive vascular studies in August 2023  -Possible neuropathy.  Consider EMG/ECV.  Discussed with patient.  He wishes to defer for now.    - Return in about 1 year (around 1/8/2025) for Next scheduled follow-up with an ECG.      Madi Moctezuma MD, MSc, FACC, [facility]  Interventional Cardiology  [facility]    "

## 2024-01-08 NOTE — PROGRESS NOTES
"     Office Note      Date: 2024  Patient Name: Kar Mora  MRN: [id number]  : 1950    Chief Complaint   Patient presents with    Diabetes       History of Present Illness:   Kar Mora is a 73 y.o. male who presents for Diabetes type 2.   Current RX ozempic/ metformin/ west/ sglt2/ daily  insulin     Bg checks are done:fasting   Hypoglycemia :none       Last A1c:  Hemoglobin A1C   Date Value Ref Range Status   2024 9.6 (A) 4.5 - 5.7 % Final   10/10/2018 7.50 (H) 4.80 - 5.60 % Final       Changes in health since last visit: none . Last eye exam December .    Subjective              Review of Systems:   Review of Systems   Respiratory:  Negative for chest tightness and shortness of breath.        The following portions of the patient's history were reviewed and updated as appropriate: allergies, current medications, past family history, past medical history, past social history, past surgical history, and problem list.    Objective     Visit Vitals  /63   Pulse 78   Ht 167.6 cm (66\")   Wt 94.3 kg (208 lb)   SpO2 96%   BMI 33.57 kg/m²           Physical Exam:  Physical Exam  Vitals reviewed.   Constitutional:       Appearance: Normal appearance. He is normal weight.   Cardiovascular:      Pulses:           Dorsalis pedis pulses are 2+ on the right side and 2+ on the left side.        Posterior tibial pulses are 2+ on the right side and 2+ on the left side.   Musculoskeletal:      Right foot: Normal range of motion. No deformity, bunion, Charcot foot, foot drop or prominent metatarsal heads.      Left foot: Normal range of motion. No deformity, bunion, Charcot foot, foot drop or prominent metatarsal heads.   Feet:      Right foot:      Protective Sensation: 10 sites tested.  10 sites sensed.      Skin integrity: Skin integrity normal.      Toenail Condition: Right toenails are normal.      Left foot:      Protective Sensation: 10 sites tested.  10 sites sensed.      Skin integrity: " Skin integrity normal.      Toenail Condition: Left toenails are normal.      Comments: Diabetic Foot Exam Performed    Neurological:      Mental Status: He is alert.   Psychiatric:         Mood and Affect: Mood normal.         Behavior: Behavior normal.         Thought Content: Thought content normal.         Judgment: Judgment normal.          Assessment / Plan      Assessment & Plan:  Problem List Items Addressed This Visit          Other    Type 2 diabetes mellitus with hyperglycemia, with long-term current use of insulin - Primary    Overview     . Did ok with byetta for years in past. Did not tolerate victoza           Current Assessment & Plan      Deteriorated because  I have been cautious about increasing the dose of of ozempic . The plan is to increase ozempic         Relevant Medications    empagliflozin (Jardiance) 25 MG tablet tablet    insulin degludec (Tresiba FlexTouch) 100 UNIT/ML solution pen-injector injection    metFORMIN (GLUCOPHAGE) 500 MG tablet    glipizide (GLUCOTROL XL) 5 MG ER tablet    Semaglutide,0.25 or 0.5MG/DOS, (Ozempic, 0.25 or 0.5 MG/DOSE,) 2 MG/3ML solution pen-injector    Other Relevant Orders    POC Glucose, Blood (Completed)    POC Glycosylated Hemoglobin (Hb A1C) (Completed)         Electronically signed by :Hiren Montoya MD   01/08/2024

## 2024-01-08 NOTE — ASSESSMENT & PLAN NOTE
Deteriorated because  I have been cautious about increasing the dose of of ozempic . The plan is to increase ozempic

## 2024-01-09 ENCOUNTER — DOCUMENTATION (OUTPATIENT)
Dept: CARDIOLOGY | Facility: CLINIC | Age: 74
End: 2024-01-09
Payer: COMMERCIAL

## 2024-01-09 NOTE — PROGRESS NOTES
I was able to review the patient's echocardiogram results.  Ejection fraction was 41-45% and consistent with hypertrophy which is unchanged from prior echocardiogram in 2021.  I would recommend to continue current cardiac medications. I will have RHONDA Hager call the patient back with results/recommendations.     Electronically signed by EDGAR Huber, 01/09/24, 12:59 PM EST.

## 2024-01-10 ENCOUNTER — TELEPHONE (OUTPATIENT)
Dept: CARDIOLOGY | Facility: CLINIC | Age: 74
End: 2024-01-10
Payer: COMMERCIAL

## 2024-01-10 NOTE — TELEPHONE ENCOUNTER
Spoke with Pts wife about results of recent ECHO per Madhavi Stout, APRN:        Ejection fraction was 41-45% and consistent with hypertrophy which is unchanged from prior echocardiogram in 2021.  I would recommend to continue current cardiac medications     PT verbalized understanding

## 2024-01-17 DIAGNOSIS — Z79.4 TYPE 2 DIABETES MELLITUS WITH OTHER CIRCULATORY COMPLICATION, WITH LONG-TERM CURRENT USE OF INSULIN: ICD-10-CM

## 2024-01-17 DIAGNOSIS — E11.59 TYPE 2 DIABETES MELLITUS WITH OTHER CIRCULATORY COMPLICATION, WITH LONG-TERM CURRENT USE OF INSULIN: ICD-10-CM

## 2024-01-18 RX ORDER — DULOXETIN HYDROCHLORIDE 30 MG/1
30 CAPSULE, DELAYED RELEASE ORAL DAILY
Qty: 90 CAPSULE | Refills: 3 | Status: SHIPPED | OUTPATIENT
Start: 2024-01-18 | End: 2025-01-17

## 2024-01-18 NOTE — TELEPHONE ENCOUNTER
Rx Refill Note  Requested Prescriptions     Pending Prescriptions Disp Refills    DULoxetine (CYMBALTA) 30 MG capsule [Pharmacy Med Name: DULOXETINE DR 30MG CAPSULES] 90 capsule 3     Sig: TAKE 1 CAPSULE BY MOUTH DAILY      Last office visit with prescribing clinician: 1/8/2024     Next office visit with prescribing clinician: 7/8/2024       Aida Chicas MA  01/18/24, 08:14 EST

## 2024-01-29 RX ORDER — EMPAGLIFLOZIN 25 MG/1
25 TABLET, FILM COATED ORAL DAILY
Qty: 30 TABLET | Refills: 5 | Status: SHIPPED | OUTPATIENT
Start: 2024-01-29

## 2024-01-29 NOTE — TELEPHONE ENCOUNTER
Rx Refill Note  Requested Prescriptions     Pending Prescriptions Disp Refills    Jardiance 25 MG tablet tablet [Pharmacy Med Name: JARDIANCE 25MG TABLETS] 30 tablet 5     Sig: TAKE 1 TABLET BY MOUTH DAILY      Last office visit with prescribing clinician: 1/8/2024   Last telemedicine visit with prescribing clinician: Visit date not found   Next office visit with prescribing clinician: 7/8/2024                         Would you like a call back once the refill request has been completed: [] Yes [] No    If the office needs to give you a call back, can they leave a voicemail: [] Yes [] No    James Luna MA  01/29/24, 13:08 EST

## 2024-02-21 RX ORDER — FENOFIBRATE 160 MG/1
160 TABLET ORAL DAILY
Qty: 90 TABLET | Refills: 3 | Status: SHIPPED | OUTPATIENT
Start: 2024-02-21

## 2024-03-14 DIAGNOSIS — I65.23 CAROTID STENOSIS, ASYMPTOMATIC, BILATERAL: Primary | ICD-10-CM

## 2024-04-23 RX ORDER — INSULIN DEGLUDEC 100 U/ML
INJECTION, SOLUTION SUBCUTANEOUS
Qty: 30 ML | Refills: 5 | Status: SHIPPED | OUTPATIENT
Start: 2024-04-23

## 2024-04-23 NOTE — TELEPHONE ENCOUNTER
Rx Refill Note  Requested Prescriptions     Pending Prescriptions Disp Refills    Tresiba FlexTouch 100 UNIT/ML solution pen-injector injection [Pharmacy Med Name: TRESIBA FLEXTOUCH PEN (U-100)INJ3ML] 30 mL 5     Sig: ADMINISTER 70 UNITS UNDER THE SKIN EVERY NIGHT AS DIRECTED          Last office visit with prescribing clinician: 1/8/2024     Next office visit with prescribing clinician: 7/8/2024         Irma Ribeiro MA  04/23/24, 15:30 EDT

## 2024-05-14 DIAGNOSIS — E11.65 TYPE 2 DIABETES MELLITUS WITH HYPERGLYCEMIA, WITH LONG-TERM CURRENT USE OF INSULIN: ICD-10-CM

## 2024-05-14 DIAGNOSIS — Z79.4 TYPE 2 DIABETES MELLITUS WITH HYPERGLYCEMIA, WITH LONG-TERM CURRENT USE OF INSULIN: ICD-10-CM

## 2024-05-15 RX ORDER — SEMAGLUTIDE 0.68 MG/ML
0.5 INJECTION, SOLUTION SUBCUTANEOUS WEEKLY
Qty: 3 ML | Refills: 5 | OUTPATIENT
Start: 2024-05-15

## 2024-05-15 NOTE — TELEPHONE ENCOUNTER
I called the patient and his wife answered and said she was going to call us to let us know that medicine required a pa.I explain that once we receive a denial letter we can working the pa.

## 2024-05-17 ENCOUNTER — PRIOR AUTHORIZATION (OUTPATIENT)
Dept: ENDOCRINOLOGY | Facility: CLINIC | Age: 74
End: 2024-05-17
Payer: COMMERCIAL

## 2024-05-20 NOTE — TELEPHONE ENCOUNTER
Rx Refill Note  Requested Prescriptions     Pending Prescriptions Disp Refills    metFORMIN (GLUCOPHAGE) 500 MG tablet [Pharmacy Med Name: METFORMIN 500MG TABLETS] 360 tablet 1     Sig: TAKE 2 TABLETS BY MOUTH TWICE DAILY WITH MEALS          Last office visit with prescribing clinician: 1/8/2024     Next office visit with prescribing clinician: 7/8/2024         Irma Ribeiro MA  05/20/24, 14:42 EDT

## 2024-05-21 RX ORDER — GLIPIZIDE 5 MG/1
5 TABLET, FILM COATED, EXTENDED RELEASE ORAL DAILY
Qty: 90 TABLET | Refills: 2 | Status: SHIPPED | OUTPATIENT
Start: 2024-05-21

## 2024-05-21 NOTE — TELEPHONE ENCOUNTER
Rx Refill Note  Requested Prescriptions     Pending Prescriptions Disp Refills    glipizide (GLUCOTROL XL) 5 MG ER tablet [Pharmacy Med Name: GLIPIZIDE ER 5MG TABLETS] 90 tablet      Sig: TAKE 1 TABLET BY MOUTH DAILY          Last office visit with prescribing clinician: 1/8/2024     Next office visit with prescribing clinician: 7/8/2024         Irma Ribeiro MA  05/21/24, 14:42 EDT

## 2024-05-22 RX ORDER — CLOPIDOGREL BISULFATE 75 MG/1
75 TABLET ORAL DAILY
Qty: 90 TABLET | Refills: 3 | Status: SHIPPED | OUTPATIENT
Start: 2024-05-22 | End: 2024-05-24 | Stop reason: SDUPTHER

## 2024-05-24 RX ORDER — CLOPIDOGREL BISULFATE 75 MG/1
75 TABLET ORAL DAILY
Qty: 90 TABLET | Refills: 3 | Status: SHIPPED | OUTPATIENT
Start: 2024-05-24

## 2024-06-03 RX ORDER — SACUBITRIL AND VALSARTAN 24; 26 MG/1; MG/1
1 TABLET, FILM COATED ORAL EVERY 12 HOURS
Qty: 180 TABLET | Refills: 3 | Status: SHIPPED | OUTPATIENT
Start: 2024-06-03 | End: 2024-06-04 | Stop reason: SDUPTHER

## 2024-06-03 RX ORDER — ICOSAPENT ETHYL 1 G/1
2 CAPSULE ORAL 2 TIMES DAILY WITH MEALS
Qty: 360 CAPSULE | Refills: 3 | Status: SHIPPED | OUTPATIENT
Start: 2024-06-03 | End: 2024-06-04 | Stop reason: SDUPTHER

## 2024-06-03 NOTE — TELEPHONE ENCOUNTER
Rx Refill Note  Requested Prescriptions     Pending Prescriptions Disp Refills    empagliflozin (Jardiance) 25 MG tablet tablet 30 tablet 5     Sig: Take 1 tablet by mouth Daily.      Last office visit with prescribing clinician: 1/8/2024     Next office visit with prescribing clinician: 7/8/2024                           Jeanette Oliveira MA  06/03/24, 13:31 EDT

## 2024-06-04 ENCOUNTER — PRIOR AUTHORIZATION (OUTPATIENT)
Dept: ENDOCRINOLOGY | Facility: CLINIC | Age: 74
End: 2024-06-04
Payer: COMMERCIAL

## 2024-06-04 RX ORDER — ICOSAPENT ETHYL 1 G/1
2 CAPSULE ORAL 2 TIMES DAILY WITH MEALS
Qty: 360 CAPSULE | Refills: 3 | Status: SHIPPED | OUTPATIENT
Start: 2024-06-04

## 2024-06-04 RX ORDER — SACUBITRIL AND VALSARTAN 24; 26 MG/1; MG/1
1 TABLET, FILM COATED ORAL EVERY 12 HOURS
Qty: 180 TABLET | Refills: 3 | Status: SHIPPED | OUTPATIENT
Start: 2024-06-04

## 2024-06-04 NOTE — TELEPHONE ENCOUNTER
BRAIN MACEDO (Key: [id number])  PA Case ID #: [id number]  Rx #: [id number]  Need Help? Call us at [phone]  Outcome  Approved today  Your PA request has been approved. Additional information will be provided in the approval communication. (Message 1149)  Authorization Expiration Date: 6/4/2027  Drug  Jardiance 25MG tablets  [facility] cloud logo  Form  [facility] Electronic PA Form (2017 NCPDP)

## 2024-06-25 DIAGNOSIS — I65.23 CAROTID STENOSIS, ASYMPTOMATIC, BILATERAL: ICD-10-CM

## 2024-07-08 ENCOUNTER — OFFICE VISIT (OUTPATIENT)
Age: 74
End: 2024-07-08
Payer: COMMERCIAL

## 2024-07-08 VITALS
BODY MASS INDEX: 33.11 KG/M2 | OXYGEN SATURATION: 99 % | HEIGHT: 66 IN | WEIGHT: 206 LBS | HEART RATE: 86 BPM | DIASTOLIC BLOOD PRESSURE: 66 MMHG | SYSTOLIC BLOOD PRESSURE: 112 MMHG

## 2024-07-08 DIAGNOSIS — Z79.4 TYPE 2 DIABETES MELLITUS WITH HYPERGLYCEMIA, WITH LONG-TERM CURRENT USE OF INSULIN: Primary | ICD-10-CM

## 2024-07-08 DIAGNOSIS — E11.65 TYPE 2 DIABETES MELLITUS WITH HYPERGLYCEMIA, WITH LONG-TERM CURRENT USE OF INSULIN: Primary | ICD-10-CM

## 2024-07-08 LAB
EXPIRATION DATE: ABNORMAL
EXPIRATION DATE: ABNORMAL
GLUCOSE BLDC GLUCOMTR-MCNC: 390 MG/DL (ref 70–130)
HBA1C MFR BLD: 9.5 % (ref 4.5–5.7)
Lab: ABNORMAL
Lab: ABNORMAL

## 2024-07-08 PROCEDURE — 83036 HEMOGLOBIN GLYCOSYLATED A1C: CPT | Performed by: INTERNAL MEDICINE

## 2024-07-08 PROCEDURE — 99214 OFFICE O/P EST MOD 30 MIN: CPT | Performed by: INTERNAL MEDICINE

## 2024-07-08 PROCEDURE — 82947 ASSAY GLUCOSE BLOOD QUANT: CPT | Performed by: INTERNAL MEDICINE

## 2024-07-08 NOTE — PROGRESS NOTES
"     Office Note      Date: 2024  Patient Name: Kar Mora  MRN: [id number]  : 1950    Chief Complaint   Patient presents with    Diabetes       History of Present Illness:   Kar Mora is a 73 y.o. male who presents for Diabetes type 2.   Current Rxdaily insulin//  ozempic/tresiba/ jardiance, glipizide  He is up to date with all his monitoring     Bg checks are done:daily   Hypoglycemia :none       Last A1c:  Hemoglobin A1C   Date Value Ref Range Status   2024 9.5 (A) 4.5 - 5.7 % Final   10/10/2018 7.50 (H) 4.80 - 5.60 % Final       Changes in health since last visit: none . Last eye exam up to date.    Subjective              Review of Systems:   Review of Systems   Endocrine: Negative for polydipsia and polyuria.       The following portions of the patient's history were reviewed and updated as appropriate: allergies, current medications, past family history, past medical history, past social history, past surgical history, and problem list.    Objective     Visit Vitals  /66 (BP Location: Right arm, Patient Position: Sitting, Cuff Size: Adult)   Pulse 86   Ht 167.6 cm (66\")   Wt 93.4 kg (206 lb)   SpO2 99%   BMI 33.25 kg/m²           Physical Exam:  Physical Exam  Vitals reviewed.   Constitutional:       Appearance: Normal appearance.   Neurological:      Mental Status: He is alert.   Psychiatric:         Mood and Affect: Mood normal.         Thought Content: Thought content normal.         Judgment: Judgment normal.          Assessment / Plan      Assessment & Plan:  Problem List Items Addressed This Visit       Type 2 diabetes mellitus with hyperglycemia, with long-term current use of insulin - Primary    Overview     . Did ok with byetta for years in past. Did not tolerate victoza           Current Assessment & Plan     Stable but still uncontrolled and not at goal  The plan is to stop ozmepic and use mounjaro         Relevant Medications    DULoxetine (CYMBALTA) 30 MG " capsule    Tresiba FlexTouch 100 UNIT/ML solution pen-injector injection    metFORMIN (GLUCOPHAGE) 500 MG tablet    glipizide (GLUCOTROL XL) 5 MG ER tablet    Tirzepatide (MOUNJARO) 2.5 MG/0.5ML solution pen-injector pen    empagliflozin (Jardiance) 25 MG tablet tablet    Other Relevant Orders    POC Glucose, Blood (Completed)    POC Glycosylated Hemoglobin (Hb A1C) (Completed)         Electronically signed by : Hiren Montoya MD  07/08/2024

## 2024-07-10 ENCOUNTER — PRIOR AUTHORIZATION (OUTPATIENT)
Dept: ENDOCRINOLOGY | Facility: CLINIC | Age: 74
End: 2024-07-10
Payer: COMMERCIAL

## 2024-07-11 NOTE — TELEPHONE ENCOUNTER
BRAIN MACEDO (Key: [id number])  PA Case ID #: [id number]  Rx #: [id number]  Need Help? Call us at [phone]  Outcome  Approved on July 10  Your PA request has been approved. Additional information will be provided in the approval communication. (Message 1144)  Authorization Expiration Date: 7/9/2025  Drug  Mounjaro 2.5MG/0.5ML pen-injectors  ePA cloud logo  Form  [facility] Electronic PA Form (2017 NCPDP)

## 2024-07-15 RX ORDER — PEN NEEDLE, DIABETIC 31 GX5/16"
NEEDLE, DISPOSABLE MISCELLANEOUS
Qty: 100 EACH | Refills: 3 | Status: SHIPPED | OUTPATIENT
Start: 2024-07-15

## 2024-07-16 RX ORDER — GLIPIZIDE 5 MG/1
5 TABLET, FILM COATED, EXTENDED RELEASE ORAL DAILY
Qty: 30 TABLET | OUTPATIENT
Start: 2024-07-16

## 2024-07-22 RX ORDER — GLIPIZIDE 5 MG/1
5 TABLET, FILM COATED, EXTENDED RELEASE ORAL DAILY
Qty: 90 TABLET | Refills: 1 | Status: SHIPPED | OUTPATIENT
Start: 2024-07-22

## 2024-07-22 NOTE — TELEPHONE ENCOUNTER
Rx Refill Note  Requested Prescriptions     Pending Prescriptions Disp Refills    glipizide (GLUCOTROL XL) 5 MG ER tablet [Pharmacy Med Name: GLIPIZIDE ER 5MG TABLETS] 30 tablet      Sig: TAKE 1 TABLET BY MOUTH DAILY      Last office visit with prescribing clinician: 7/8/2024     Next office visit with prescribing clinician: 1/13/2025

## 2024-08-05 DIAGNOSIS — E11.65 TYPE 2 DIABETES MELLITUS WITH HYPERGLYCEMIA, WITH LONG-TERM CURRENT USE OF INSULIN: ICD-10-CM

## 2024-08-05 DIAGNOSIS — Z79.4 TYPE 2 DIABETES MELLITUS WITH HYPERGLYCEMIA, WITH LONG-TERM CURRENT USE OF INSULIN: ICD-10-CM

## 2024-08-08 RX ORDER — CARVEDILOL 25 MG/1
25 TABLET ORAL 2 TIMES DAILY
Qty: 180 TABLET | Refills: 3 | Status: SHIPPED | OUTPATIENT
Start: 2024-08-08

## 2024-08-08 RX ORDER — TIRZEPATIDE 2.5 MG/.5ML
INJECTION, SOLUTION SUBCUTANEOUS
Qty: 2 ML | Refills: 0 | OUTPATIENT
Start: 2024-08-08

## 2024-08-08 NOTE — TELEPHONE ENCOUNTER
Pt was called again 2nd time pt statees he wants to go up to the next doses of moujaro, pt is tolerated the medicine

## 2024-09-09 ENCOUNTER — SPECIALTY PHARMACY (OUTPATIENT)
Dept: CARDIOLOGY | Facility: CLINIC | Age: 74
End: 2024-09-09
Payer: COMMERCIAL

## 2024-09-12 RX ORDER — ICOSAPENT ETHYL 1 G/1
2 CAPSULE ORAL 2 TIMES DAILY WITH MEALS
Qty: 120 CAPSULE | Refills: 11 | Status: SHIPPED | OUTPATIENT
Start: 2024-09-12

## 2024-09-12 NOTE — PROGRESS NOTES
Specialty Pharmacy Patient Management Program  Cardiology Initial Assessment     Kar Mora was referred by a Cardiology provider to the Cardiology Patient Management program offered by [facility] for Hyperlipidemia on 09/12/24.  An initial outreach was conducted, including assessment of therapy appropriateness and specialty medication education for Vascepa. The patient was introduced to services offered by [facility], including: regular assessments, refill coordination, mail order delivery options, prior authorization maintenance, and financial assistance programs as applicable. The patient was also provided with contact information for the pharmacy team.     Insurance Coverage & Financial Support  [facility]     Relevant Past Medical History and Comorbidities  Relevant medical history and concomitant health conditions were discussed with the patient. The patient's chart has been reviewed for relevant past medical history and comorbid conditions and updated as necessary.  Past Medical History:   Diagnosis Date    Abnormal ECG     Allergic rhinitis     Anesthesia     pt says he has woken up during surgery    Chest pain     Congenital heart disease     Coronary artery disease     Dyslipidemia     Erectile dysfunction     GERD (gastroesophageal reflux disease)     Gout     Hypercholesterolemia     Hyperlipidemia     Hypertensive disorder     Impotence of organic origin     Insulin dependent type 2 diabetes mellitus 2002    checks fsbg every am, a1c checked every 6 months, 7.2 in may 2018     Kidney stones     Labile hypertension 10/24/2016    Myocardial infarct     Type 2 diabetes mellitus     Wears glasses      Social History     Socioeconomic History    Marital status:     Number of children: 2   Tobacco Use    Smoking status: Former     Current packs/day: 0.00     Average packs/day: 2.0 packs/day for 10.0 years (20.0 ttl pk-yrs)     Types: Cigarettes      Start date: 1972     Quit date: 1981     Years since quittin.7     Passive exposure: Past    Smokeless tobacco: Never    Tobacco comments:     quit 35 years ago   Vaping Use    Vaping status: Never Used   Substance and Sexual Activity    Alcohol use: No    Drug use: No    Sexual activity: Yes     Partners: Female     Birth control/protection: None       Problem list reviewed by Maylin Raines, PharmD on 2024 at 11:48 AM    Allergies  Known allergies and reactions were discussed with the patient. The patient's chart has been reviewed for  allergy information and updated as necessary.   Allergies   Allergen Reactions    Asa [Aspirin] Other (See Comments)      upper airway congestion       Allergies reviewed by Maylin Raines, PharmD on 2024 at 11:47 AM    Relevant Laboratory Values  Relevant laboratory values were discussed with the patient. The following specialty medication dose adjustment(s) are recommended: Continue Vascepa    Lab Results   Component Value Date    GLUCOSE 101 (H) 10/29/2018    CALCIUM 8.9 10/29/2018     10/29/2018    K 4.5 10/29/2018    CO2 28.0 10/29/2018     10/29/2018    BUN 35 (H) 10/29/2018    CREATININE 1.49 (H) 10/29/2018    EGFRIFNONA 47 (L) 10/29/2018    BCR 23.5 10/29/2018    ANIONGAP 9.0 10/29/2018     Lab Results   Component Value Date    CHOL 169 2018    TRIG 385 (H) 2018    HDL 27 (L) 2018    LDL 91 2018         Current Medication List  This medication list has been reviewed with the patient and evaluated for any interactions or necessary modifications/recommendations, and updated to include all prescription medications, OTC medications, and supplements the patient is currently taking.  This list reflects what is contained in the patient's profile, which has also been marked as reviewed to communicate to other providers it is the most up to date version of the patient's current medication therapy.     Current Outpatient  Medications:     icosapent ethyl (Vascepa) 1 g capsule capsule, Take 2 g by mouth 2 (Two) Times a Day With Meals., Disp: 120 capsule, Rfl: 11    B-D ULTRAFINE III SHORT PEN 31G X 8 MM misc, FOR DAILY USE; USE AS DIRECTED, Disp: 100 each, Rfl: 3    Blood Glucose Monitoring Suppl device, Use as directed to check blood sugar please dispense insurance preferred, Disp: 1 each, Rfl: 0    carvedilol (COREG) 25 MG tablet, Take 1 tablet by mouth 2 (Two) Times a Day., Disp: 180 tablet, Rfl: 3    clopidogrel (PLAVIX) 75 MG tablet, Take 1 tablet by mouth Daily., Disp: 90 tablet, Rfl: 3    DULoxetine (CYMBALTA) 30 MG capsule, TAKE 1 CAPSULE BY MOUTH DAILY, Disp: 90 capsule, Rfl: 3    empagliflozin (Jardiance) 25 MG tablet tablet, Take 1 tablet by mouth Daily., Disp: 90 tablet, Rfl: 3    fenofibrate 160 MG tablet, TAKE 1 TABLET BY MOUTH DAILY, Disp: 90 tablet, Rfl: 3    glipizide (GLUCOTROL XL) 5 MG ER tablet, TAKE 1 TABLET BY MOUTH DAILY, Disp: 90 tablet, Rfl: 1    Glucose Blood (Blood Glucose Test) strip, Use to check blood sugar daily dx e11.65, Disp: 100 each, Rfl: 3    Lancets (onetouch ultrasoft) lancets, TEST TWICE DAILY, Disp: 200 each, Rfl: 1    Lancets 33G misc, Use 1 each Daily. Dx e11.65, Disp: 100 each, Rfl: 3    metFORMIN (GLUCOPHAGE) 500 MG tablet, TAKE 2 TABLETS BY MOUTH TWICE DAILY WITH MEALS, Disp: 360 tablet, Rfl: 1    Multiple Vitamins-Minerals (MULTIVITAMIN ADULT PO), Take 1 tablet by mouth Daily., Disp: , Rfl:     nitroglycerin (NITROSTAT) 0.4 MG SL tablet, Place 1 tablet under the tongue Every 5 (Five) Minutes As Needed for Chest Pain. Take no more than 3 doses in 15 minutes., Disp: 25 tablet, Rfl: 11    rosuvastatin (CRESTOR) 40 MG tablet, TAKE 1 TABLET BY MOUTH EVERY NIGHT, Disp: 90 tablet, Rfl: 3    sacubitril-valsartan (Entresto) 24-26 MG tablet, Take 1 tablet by mouth Every 12 (Twelve) Hours., Disp: 180 tablet, Rfl: 3    Tirzepatide (MOUNJARO) 5 MG/0.5ML solution pen-injector pen, Inject 0.5 mL under  the skin into the appropriate area as directed 1 (One) Time Per Week., Disp: 2 mL, Rfl: 2    Tresiba FlexTouch 100 UNIT/ML solution pen-injector injection, ADMINISTER 70 UNITS UNDER THE SKIN EVERY NIGHT AS DIRECTED, Disp: 30 mL, Rfl: 5    Medicines reviewed by Maylin Raines, PharmD on 9/12/2024 at 11:47 AM    Drug Interactions  None  Recommended Medications Assessment  Aspirin: Contraindicated  Statin: Currently Taking   ACEi/ARB: Currently Taking     Initial Education Provided for Specialty Medication  The patient has been provided with the following education and any applicable administration techniques (i.e. self-injection) have been demonstrated for the therapies indicated. All questions and concerns have been addressed prior to the patient receiving the medication, and the patient has verbalized comprehension of the education and any materials provided. Additional patient education shall be provided and documented upon request by the patient, provider, or payer.    VASCEPA® (icosapent ethyl)  Medication Expectations   Why am I taking this medication? You are taking this medication to help lower the level of a type of fat called triglycerides in your blood.     What should I expect while on this medication? It is reasonable to expect your triglyceride level to decrease.  This medication may be added to other medications that reduce cholesterol levels (statins).    How does the medication work? Vascepa works by lowering the amount of triglycerides made in the liver. It can also help to remove triglycerides from your blood.    How long will I be on this medication for? The amount of time you will be on this medication will be determined by your doctor. It is possible you will be on this medication or a similar medication another throughout your life. Do not abruptly stop this or any medication without talking to your doctor first.   How do I take this medication? Take as directed on your prescription label.   This medication is usually taken twice a day with food. You should take Vascepa capsules whole. Do not break, open, crush, dissolve, or chew.    What are some possible side effects? You may experience increased risk of bleeding when taking Vascepa. You should monitor for signs of bleeding such as bruising, blood in your urine or stool, nosebleeds without a known cause, or bleeding that won't stop from a cut or injury.  This risk is increased if you take medications that affect blood clotting (anti-platelets or blood thinners). Other side effects could include irregular heartbeat, joint pain, constipation, and dizziness. You should call your doctor if you notice any of these side effects.     What happens if I miss a dose? If you miss a dose, take it as soon as you remember. If it is close to your next dose, skip it (do not take 2 doses at once)     Medication Safety   What are things I should warn my doctor immediately about? Tell your doctor if you have an allergy or sensitivity to fish or shellfish. Stop taking Vascepa and tell your doctor right away or get emergency medical help if you have any signs or symptoms of an allergic reaction (rash, hives, difficulty breathing, swelling of the lips/tongue/face, etc.).  You should also tell your doctor if you have liver or heart rhythm problems (a-fib or atrial flutter), or are experiencing the signs and symptoms of bleeding mentioned above.   What are things that I should be cautious or aware of? Be cautious of any side effects from this medication. Talk to your doctor if any new ones develop or aren't getting better. Vascepa should be used with a healthy diet and regular exercise.    What are some medications that can interact with this one? Some medications that can interact with Vascepa include medications that affect blood clotting (anti-platelets and blood thinners) and Ibrutinib.  Your doctor will monitor you closely for interactions and may adjust the dose of  these medications to reduce the chance that they will interact with Vascepa. Always tell your doctor or pharmacist immediately if you start taking any new medications, including over-the-counter medications, vitamins, and herbal supplements.      Medication Storage/Handling   How should I handle this medication? Keep this medication out of reach of pets/children in tightly sealed container.    How does this medication need to be stored? Store at room temperature and keep dry (don't keep in bathroom or other room that is humid or has a lot of moisture)    How should I dispose of this medication? There should not be a need to dispose of this medication unless your provider decides to change the dose or therapy. If that is the case, take to your local police station for proper disposal. Some pharmacies also have take-back bins for medication disposal.      Resources/Support   How can I remind myself to take this medication? You can download reminder apps to help you manage your refills. You may also set an alarm on your phone to remind you. The pharmacy carries pill boxes that you can place next to an area you pass everyday (such as where you place your car keys or where you charge your phone)   Is financial support available?  Ask your doctor or pharmacist if there are programs for financial support. The drug  ([employer]) can also provide co-pay cards if you have commercial insurance or patient assistance if you have Medicare or no insurance.  Go to vascepa.com for details.    Which vaccines are recommended for me? Talk to your doctor about these vaccines that may be recommended: Flu, Coronavirus (COVID-19), Pneumococcal (pneumonia), Tdap, Hepatitis B, Zoster (shingles)           Adherence and Self-Administration  Adherence related to the patient's specialty therapy was discussed with the patient. The Adherence segment of this outreach has been reviewed and updated.     Is there a concern with patient's  ability to self administer the medication correctly and without issue?: No  Were any potential barriers to adherence identified during the initial assessment or patient education?: No  Are there any concerns regarding the patient's understanding of the importance of medication adherence?: No  Methods for Supporting Patient Adherence and/or Self-Administration: None    Open Medication Therapy Problems  No medication therapy recommendations to display    Goals of Therapy  Goals related to the patient's specialty therapy were discussed with the patient. The Patient Goals segment of this outreach has been reviewed and updated.   Goals Addressed Today        Specialty Pharmacy General Goal      TGY < 150 mg/dL    Lab Results   Component Value Date    TRIG 385 (H) 07/20/2018                  Reassessment Plan & Follow-Up  1. Medication Therapy Changes: Continue Vascepa 2g by mouth twice daily  2. Related Plans, Therapy Recommendations, or Therapy Problems to Be Addressed: None  3. Pharmacist to perform regular assessments no more than (6) months from the previous assessment.  4. Care Coordinator to set up future refill outreaches, coordinate prescription delivery, and escalate clinical questions to pharmacist.  5. Welcome information and patient satisfaction survey to be sent by specialty pharmacy team with patient's initial fill.    Attestation  Therapeutic appropriateness: Appropriate   I attest the patient was actively involved in and has agreed to the above plan of care. If the prescribed therapy is at any point deemed not appropriate based on the current or future assessments, a consultation will be initiated with the patient's specialty care provider to determine the best course of action. The revised plan of therapy will be documented along with any required assessments and/or additional patient education provided.     Maylin Raines, PharmD, BCPS  Clinical Specialty Pharmacist, Cardiology  9/12/2024  11:49 EDT

## 2024-10-07 ENCOUNTER — SPECIALTY PHARMACY (OUTPATIENT)
Dept: CARDIOLOGY | Facility: CLINIC | Age: 74
End: 2024-10-07
Payer: COMMERCIAL

## 2024-10-07 NOTE — PROGRESS NOTES
Specialty Pharmacy Refill Coordination Note     Kar is a 74 y.o. male contacted today regarding refills of Vascepa 2 g PO twice daily specialty medication(s).    Delivery scheduled for tomorrow, patient aware.    Specialty medication(s) and dose(s) confirmed: yes    Refill Questions      Flowsheet Row Most Recent Value   Changes to allergies? No   Changes to medications? No   New conditions or infections since last clinic visit No   Unplanned office visit, urgent care, ED, or hospital admission in the last 4 weeks  No   How does patient/caregiver feel medication is working? Very good   Financial problems or insurance changes  No   Since the previous refill, were any specialty medication doses or scheduled injections missed or delayed?  No   Does this patient require a clinical escalation to a pharmacist? No            Delivery Questions      Flowsheet Row Most Recent Value   Delivery method UPS   Delivery address verified with patient/caregiver? Yes   Delivery address Home   Number of medications in delivery 1   Medication(s) being filled and delivered Icosapent Ethyl   Doses left of specialty medications 7-10 days   Copay verified? Yes   Copay amount 20.00   Copay form of payment Credit/debit on file   Ship Date 10/7/2024   Delivery Date 10/8/2024   Signature Required No                   Follow-up: 30 day(s)     Sonya Solorzano, Pharmacy Technician  Specialty Pharmacy Technician

## 2024-10-16 RX ORDER — ROSUVASTATIN CALCIUM 40 MG/1
40 TABLET, COATED ORAL NIGHTLY
Qty: 90 TABLET | Refills: 3 | OUTPATIENT
Start: 2024-10-16

## 2024-10-16 NOTE — TELEPHONE ENCOUNTER
Rx Refill Note  Requested Prescriptions     Pending Prescriptions Disp Refills    rosuvastatin (CRESTOR) 40 MG tablet [Pharmacy Med Name: ROSUVASTATIN 40MG TABLETS] 90 tablet 3     Sig: TAKE 1 TABLET BY MOUTH EVERY NIGHT          Last office visit with prescribing clinician: 7/8/2024     Next office visit with prescribing clinician: 1/13/2025         Irma Ribeiro MA  10/16/24, 11:12 EDT

## 2024-10-28 RX ORDER — GLIPIZIDE 5 MG/1
5 TABLET, FILM COATED, EXTENDED RELEASE ORAL DAILY
Qty: 90 TABLET | Refills: 1 | Status: SHIPPED | OUTPATIENT
Start: 2024-10-28

## 2024-10-31 ENCOUNTER — TELEPHONE (OUTPATIENT)
Dept: ENDOCRINOLOGY | Facility: CLINIC | Age: 74
End: 2024-10-31
Payer: COMMERCIAL

## 2024-10-31 RX ORDER — TIRZEPATIDE 7.5 MG/.5ML
0.5 INJECTION, SOLUTION SUBCUTANEOUS WEEKLY
Qty: 2 ML | Refills: 2 | Status: SHIPPED | OUTPATIENT
Start: 2024-10-31

## 2024-10-31 NOTE — TELEPHONE ENCOUNTER
Caller: Laura Mora    Relationship to patient: Emergency Contact    Best call back number: [phone]        Patient is needing: PT WIFE CALLED IN ASKING IF  CAN INCREASE PT DOSAGE TO 7.5MG. PLEASE ADVISE AND CALL BACK.       Tirzepatide (MOUNJARO) 5 MG/0.5ML solution pen-injector pen     [id number] - [place], [place] - [address] [address] AT [address] [address] [address] - [phone]  - [phone] [address] [address]   Phone: [phone] Fax: [phone]   Hours: Not open 24 hours

## 2024-11-01 ENCOUNTER — SPECIALTY PHARMACY (OUTPATIENT)
Dept: CARDIOLOGY | Facility: CLINIC | Age: 74
End: 2024-11-01
Payer: COMMERCIAL

## 2024-11-01 NOTE — PROGRESS NOTES
Specialty Pharmacy Refill Coordination Note     Kar is a 74 y.o. male contacted today regarding refills of Vascepa 2 g PO twice daily specialty medication(s).    Delivery scheduled for Tuesday, 11/5/2024.    Specialty medication(s) and dose(s) confirmed: yes    Refill Questions      Flowsheet Row Most Recent Value   Changes to allergies? No   Changes to medications? No   New conditions or infections since last clinic visit No   Unplanned office visit, urgent care, ED, or hospital admission in the last 4 weeks  No   How does patient/caregiver feel medication is working? Good   Financial problems or insurance changes  No   Since the previous refill, were any specialty medication doses or scheduled injections missed or delayed?  No   Does this patient require a clinical escalation to a pharmacist? No            Delivery Questions      Flowsheet Row Most Recent Value   Delivery method UPS   Delivery address Home   Number of medications in delivery 1   Medication(s) being filled and delivered Icosapent Ethyl (VASCEPA)   Doses left of specialty medications 7-10 days   Copay verified? Yes   Copay amount 20.00   Copay form of payment Credit/debit on file   Ship Date 11/4/2024   Delivery Date 11/5/2024   Signature Required No                   Follow-up: 30 day(s)     Sonya Solorzano, Pharmacy Technician  Specialty Pharmacy Technician

## 2024-11-12 NOTE — TELEPHONE ENCOUNTER
Rx Refill Note  Requested Prescriptions     Pending Prescriptions Disp Refills    metFORMIN (GLUCOPHAGE) 500 MG tablet [Pharmacy Med Name: METFORMIN 500MG TABLETS] 360 tablet 1     Sig: TAKE 2 TABLETS BY MOUTH TWICE DAILY WITH MEALS      Last office visit with prescribing clinician: 7/8/2024     Next office visit with prescribing clinician: 1/13/2025

## 2024-11-15 RX ORDER — INSULIN DEGLUDEC 100 U/ML
INJECTION, SOLUTION SUBCUTANEOUS
Qty: 30 ML | Refills: 5 | Status: SHIPPED | OUTPATIENT
Start: 2024-11-15

## 2024-11-15 NOTE — TELEPHONE ENCOUNTER
Rx Refill Note  Requested Prescriptions     Pending Prescriptions Disp Refills    Tresiba FlexTouch 100 UNIT/ML solution pen-injector injection [Pharmacy Med Name: TRESIBA FLEXTOUCH PEN (U-100)INJ3ML] 30 mL 5     Sig: ADMINISTER 70 UNITS UNDER THE SKIN EVERY NIGHT AS DIRECTED          Last office visit with prescribing clinician:1/13/25    Next office visit with prescribing clinician: 7/8/24        Irma Ribeiro MA  11/15/24, 10:14 EST

## 2024-11-15 NOTE — TELEPHONE ENCOUNTER
Rx Refill Note  Requested Prescriptions     Pending Prescriptions Disp Refills    metFORMIN (GLUCOPHAGE) 500 MG tablet [Pharmacy Med Name: METFORMIN 500MG TABLETS] 360 tablet 0     Sig: TAKE 2 TABLETS BY MOUTH TWICE DAILY WITH MEALS          Last office visit with prescribing clinician: 7/8/2024     Next office visit with prescribing clinician: 1/13/2025         Irma Ribeiro MA  11/15/24, 08:47 EST

## 2024-11-18 ENCOUNTER — TELEPHONE (OUTPATIENT)
Dept: CARDIOLOGY | Facility: CLINIC | Age: 74
End: 2024-11-18
Payer: COMMERCIAL

## 2024-11-18 NOTE — TELEPHONE ENCOUNTER
Pt notified of Dr. Moctezuma's recommendations. Pt did see neurologist in hospital. He was instructed to follow up with neurologist. I asked patient to let us know if they needed anything else. Pt agreeable.

## 2024-11-18 NOTE — TELEPHONE ENCOUNTER
Pt called to let us know he was hospitalized at [facility] over the weekend. Pt had both legs go numb and his left arm went floppy. They admitted him, he had no residual symptoms besides some left leg weakness. They recommended he follow up with his cardiologist as soon as possible. We did have a follow up 11/25 with Chasity HERNÁNDEZ.      From Care Everywhere, I can see they did an echo, CT of head, bilateral carotid doppler, MR of lumbar spine, MR brain, and MR head angio. Pt mailing in echo disc. Pt currently wearing heart monitor which he believes is a live monitor. Please advise further recommendations.   none

## 2024-11-20 NOTE — TELEPHONE ENCOUNTER
Called [facility] about heart monitor.     Pt was discharged with life vest not heart monitor according to representative I spoke with.

## 2024-11-25 ENCOUNTER — OFFICE VISIT (OUTPATIENT)
Dept: CARDIOLOGY | Facility: CLINIC | Age: 74
End: 2024-11-25
Payer: MEDICARE

## 2024-11-25 VITALS
BODY MASS INDEX: 32.3 KG/M2 | DIASTOLIC BLOOD PRESSURE: 58 MMHG | SYSTOLIC BLOOD PRESSURE: 98 MMHG | HEIGHT: 66 IN | OXYGEN SATURATION: 95 % | WEIGHT: 201 LBS | HEART RATE: 83 BPM

## 2024-11-25 DIAGNOSIS — I25.10 CORONARY ARTERY DISEASE INVOLVING NATIVE CORONARY ARTERY OF NATIVE HEART WITHOUT ANGINA PECTORIS: ICD-10-CM

## 2024-11-25 DIAGNOSIS — I10 ESSENTIAL HYPERTENSION: ICD-10-CM

## 2024-11-25 DIAGNOSIS — I65.21 STENOSIS OF RIGHT CAROTID ARTERY: ICD-10-CM

## 2024-11-25 DIAGNOSIS — E78.5 HYPERLIPIDEMIA LDL GOAL <70: ICD-10-CM

## 2024-11-25 DIAGNOSIS — I25.5 ISCHEMIC CARDIOMYOPATHY: Primary | ICD-10-CM

## 2024-11-25 PROCEDURE — 1159F MED LIST DOCD IN RCRD: CPT | Performed by: HOSPITALIST

## 2024-11-25 PROCEDURE — 3078F DIAST BP <80 MM HG: CPT | Performed by: HOSPITALIST

## 2024-11-25 PROCEDURE — 1160F RVW MEDS BY RX/DR IN RCRD: CPT | Performed by: HOSPITALIST

## 2024-11-25 PROCEDURE — 93000 ELECTROCARDIOGRAM COMPLETE: CPT | Performed by: HOSPITALIST

## 2024-11-25 PROCEDURE — 3074F SYST BP LT 130 MM HG: CPT | Performed by: HOSPITALIST

## 2024-11-25 PROCEDURE — 99214 OFFICE O/P EST MOD 30 MIN: CPT | Performed by: HOSPITALIST

## 2024-11-25 RX ORDER — OFLOXACIN 3 MG/ML
SOLUTION AURICULAR (OTIC)
COMMUNITY
Start: 2024-10-31

## 2024-11-25 RX ORDER — LEVOCETIRIZINE DIHYDROCHLORIDE 5 MG/1
5 TABLET, FILM COATED ORAL
COMMUNITY
Start: 2024-11-05 | End: 2025-11-05

## 2024-11-25 RX ORDER — MONTELUKAST SODIUM 10 MG/1
10 TABLET ORAL DAILY
COMMUNITY
Start: 2024-11-05 | End: 2025-11-05

## 2024-11-25 NOTE — PROGRESS NOTES
[facility] Cardiology   [address], [address]  [place], [place], [address]    [phone]  WWW.[facility]           OUTPATIENT CLINIC FOLLOW UP NOTE    Patient Care Team:  Patient Care Team:  Bro Kan MD as PCP - Hiren Baeza MD as Consulting Physician (Endocrinology)  Madi Moctezuma MD as Consulting Physician (Cardiology)  Chasity Saucedo APRN as Nurse Practitioner (Cardiology)    Subjective:      Chief Complaint   Patient presents with    Coronary Artery Disease         Kar Mora is a 74 y.o. male.  Cardiac focused, problem list:  CAD:  [facility] 11-day hospitalization for CABG ×4 vessels, 10/11/2018: SVG to diagonal and circumflex, SVG to PDA, LIMA to LAD, discharged with LifeVest  Combined hypertensive and ischemic cardiovascular disease:  Low risk GXT 2004,   Acceptable echocardiographic GXT with preserved exercise capacity and mild LVH, March 2008.   Low risk Lexiscan 2012  Resident class I symptoms with persistent abnormal echocardiogram (LVEF 0.40), with mild concentric LVH and mild left atrial enlargement without pulmonary arterial hypertension or pericardial effusion, July 2015. No sig change on TTE 2016 or 2018, 2020, 2024  MUGA, 12/21/2018: LVEF 0.35   Echocardiogram 11/2024:  LVEF 30-35%. Akinesis of the apical myocardium. Increased left atrial pressure. Ventricular septal motion is severely dyssynergic. No PFO. Moderate mitral valve calcification.   TIA  MRI brain, 11/2024:  Cortical volume loss with scattered periventricular and subcortical white matter disease that most likely represents small vessel ischemic disease.   Insulin-dependent type 2 diabetes mellitus without apparent end organ damage; hemoglobin A1c 6.4%, November 2017; 8.0%, July 2018; 8.2%, March 2019, 7% Fall 2020, 8.8% January 2023  Labile hypertension.  Dyslipidemia.  Erectile dysfunction.  Remote nasal polypectomy, 1986.  Allergic rhinitis.Moderate obesity  (BMI 33.73).  Questionable asymptomatic right carotid bruit  Carotid duplex April 2023 showed mild hemodynamic stenosis in the right carotid bulb approximately 54% and proximal ICA 57%.  46% diameter reduction in the bulb on the left carotid artery with borderline hemodynamic stenosis.  Chronic kidney disease  Bilateral leg pain, possible peripheral vascular disease  Normal ABIs and negative venous duplex 8/2023    HPI:    Patient presents today for follow up.  Recent hospital admission at [place] for TIA.  Presented with bilateral upper and lower extremity weakness and numbness.  MRI brain revealing cortical volume loss with scattered periventricular and subcortical white matter disease most likely representing small vessel ischemic disease. Echo revealing decreased LVEF of 30-35% with akinesis of the apical myocardium.  Patient does have history of ischemic cardiomyopathy with EF around 40% from 2017 to January 2024. Was discharged home with LifeVest.  He denies chest pain, shortness of breath, lower extremity edema, lightheadedness or syncope.     Review of Systems:  As noted above in the HPI     PFSH:  Patient Active Problem List   Diagnosis    Type 2 diabetes mellitus with hyperglycemia, with long-term current use of insulin    Hyperlipidemia LDL goal <70    Erectile dysfunction    Allergic rhinitis. Desensitization injections discontinued July 2018    Essential hypertension    Class 1 obesity due to excess calories in adult    Left bundle branch block    Coronary artery disease involving native coronary artery of native heart without angina pectoris    Cough    Ischemic cardiomyopathy    Stenosis of right carotid artery         Current Outpatient Medications:     B-D ULTRAFINE III SHORT PEN 31G X 8 MM misc, FOR DAILY USE; USE AS DIRECTED, Disp: 100 each, Rfl: 3    Blood Glucose Monitoring Suppl device, Use as directed to check blood sugar please dispense insurance preferred, Disp: 1 each, Rfl: 0     carvedilol (COREG) 25 MG tablet, Take 1 tablet by mouth 2 (Two) Times a Day., Disp: 180 tablet, Rfl: 3    clopidogrel (PLAVIX) 75 MG tablet, Take 1 tablet by mouth Daily., Disp: 90 tablet, Rfl: 3    DULoxetine (CYMBALTA) 30 MG capsule, TAKE 1 CAPSULE BY MOUTH DAILY, Disp: 90 capsule, Rfl: 3    empagliflozin (Jardiance) 25 MG tablet tablet, Take 1 tablet by mouth Daily., Disp: 90 tablet, Rfl: 3    fenofibrate 160 MG tablet, TAKE 1 TABLET BY MOUTH DAILY, Disp: 90 tablet, Rfl: 3    glipizide (GLUCOTROL XL) 5 MG ER tablet, TAKE 1 TABLET BY MOUTH DAILY, Disp: 90 tablet, Rfl: 1    Glucose Blood (Blood Glucose Test) strip, Use to check blood sugar daily dx e11.65, Disp: 100 each, Rfl: 3    icosapent ethyl (Vascepa) 1 g capsule capsule, Take 2 capsules by mouth 2 (Two) Times a Day With Meals., Disp: 120 capsule, Rfl: 11    Lancets (onetouch ultrasoft) lancets, TEST TWICE DAILY, Disp: 200 each, Rfl: 1    Lancets 33G misc, Use 1 each Daily. Dx e11.65, Disp: 100 each, Rfl: 3    levocetirizine (XYZAL) 5 MG tablet, Take 1 tablet by mouth., Disp: , Rfl:     metFORMIN (GLUCOPHAGE) 500 MG tablet, TAKE 2 TABLETS BY MOUTH TWICE DAILY WITH MEALS, Disp: 360 tablet, Rfl: 0    montelukast (SINGULAIR) 10 MG tablet, Take 1 tablet by mouth Daily., Disp: , Rfl:     Multiple Vitamins-Minerals (MULTIVITAMIN ADULT PO), Take 1 tablet by mouth Daily., Disp: , Rfl:     nitroglycerin (NITROSTAT) 0.4 MG SL tablet, Place 1 tablet under the tongue Every 5 (Five) Minutes As Needed for Chest Pain. Take no more than 3 doses in 15 minutes., Disp: 25 tablet, Rfl: 11    ofloxacin (FLOXIN) 0.3 % otic solution, INSTILL 2 DROPS TO LEFT EAR IN THE MORNING AND AT BEDTIME FOR 7 DAYS, Disp: , Rfl:     rosuvastatin (CRESTOR) 40 MG tablet, TAKE 1 TABLET BY MOUTH EVERY NIGHT, Disp: 90 tablet, Rfl: 3    sacubitril-valsartan (Entresto) 24-26 MG tablet, Take 1 tablet by mouth Every 12 (Twelve) Hours., Disp: 180 tablet, Rfl: 3    Tirzepatide (Mounjaro) 7.5 MG/0.5ML  "solution auto-injector, Inject 0.5 mL under the skin into the appropriate area as directed 1 (One) Time Per Week., Disp: 2 mL, Rfl: 2    Tresiba FlexTouch 100 UNIT/ML solution pen-injector injection, ADMINISTER 70 UNITS UNDER THE SKIN EVERY NIGHT AS DIRECTED, Disp: 30 mL, Rfl: 5     reports that he quit smoking about 43 years ago. His smoking use included cigarettes. He started smoking about 52 years ago. He has a 20 pack-year smoking history. He has been exposed to tobacco smoke. He has never used smokeless tobacco.      Objective:   Physical exam:  BP 98/58 (BP Location: Right arm, Patient Position: Sitting)   Pulse 83   Ht 167.6 cm (66\")   Wt 91.2 kg (201 lb)   SpO2 95%   BMI 32.44 kg/m²   CONSTITUTIONAL: No acute distress  RESPIRATORY: Normal effort. Clear to auscultation bilaterally without wheezing or rales  CARDIOVASCULAR: Carotids with normal upstrokes without bruits.  Regular rate and rhythm with normal S1 and S2. Without murmur. Normal radial pulse.     Labs:    Lab Results   Component Value Date    LDL 91 07/20/2018     No components found for: \"LDLDIRECTC\"    OSH 8/2022: LDL 80,     Diagnostic Data:      ECG 12 Lead    Date/Time: 11/25/2024 4:17 PM  Performed by: Chasity Saucedo APRN    Authorized by: Chasity Saucedo APRN  Comparison: compared with previous ECG from 6/22/2023  Similar to previous ECG  Rhythm: sinus rhythm  Rate: normal  BPM: 83  Conduction: left bundle branch block and 1st degree AV block          Results for orders placed in visit on 06/22/23    Adult Transthoracic Echo Complete W/ Cont if Necessary Per Protocol    Interpretation Summary    Left ventricular systolic function is mildly decreased. Left ventricular ejection fraction appears to be 41 - 45%.    Left ventricular wall thickness is consistent with hypertrophy. Sigmoid-shaped ventricular septum is present.    Left ventricular diastolic function is consistent with (grade I) impaired relaxation.    Septal wall motion " is abnormal, consistent with a bundle branch block.      Assessment and Plan:     Coronary artery disease   Ischemic cardiomyopathy  Essential hypertension  Left bundle branch block  Hyperlipidemia LDL goal <70   -Recent echo at [place] with decreased EF 30-35%.   -Currently without active HF signs or symptoms.   -Recommend continuing LifeVest for now.    -Repeat limited echo with [facility]ason in 2-3 weeks to reassess EF.  If EF improved >35%, would discontinue LifeVest.    -Continue Entresto, Jardiance, carvedilol for HF. Borderline blood pressure, unable to uptitrate HF medications.   -Continue Plavix, statin, Vascepa, fenofibrate, beta blocker for CAD.     TIA  -Recent hospital admission at [facility] 11/15-11/17 for bilateral lower extremity edema.   -MRI without acute ischemia, does show small vessel ischemic disease.     Right leg pain  Restless leg syndrome  Diabetes  -Acceptable noninvasive vascular studies in August 2023  -Possible neuropathy.  Consider EMG/ECV.     - Return for Next scheduled follow up 1/20/2025.    Electronically signed by EDGAR Maguire, 11/25/24, 4:22 PM EST.

## 2024-11-26 ENCOUNTER — HOSPITAL ENCOUNTER (OUTPATIENT)
Dept: CARDIOLOGY | Facility: HOSPITAL | Age: 74
Discharge: HOME | End: 2024-11-26
Payer: COMMERCIAL

## 2024-11-26 DIAGNOSIS — Z00.6 ENCOUNTER FOR EXAMINATION FOR NORMAL COMPARISON AND CONTROL IN CLINICAL RESEARCH PROGRAM: ICD-10-CM

## 2024-12-03 ENCOUNTER — TELEPHONE (OUTPATIENT)
Dept: CARDIOLOGY | Facility: CLINIC | Age: 74
End: 2024-12-03
Payer: COMMERCIAL

## 2024-12-03 NOTE — TELEPHONE ENCOUNTER
"  Caller: Kar Mora \"Savage\"    Relationship: Self    Best call back number: [phone]    What is the best time to reach you: ANY    Who are you requesting to speak with (clinical staff, provider,  specific staff member): ANY    What was the call regarding: PT IS STILL WAITING FOR HIS TEST SCHEDULING - SAID TO CALL HIM ON HIS WORK NUMBER     Is it okay if the provider responds through [employer]: NO    "

## 2024-12-05 ENCOUNTER — SPECIALTY PHARMACY (OUTPATIENT)
Dept: CARDIOLOGY | Facility: CLINIC | Age: 74
End: 2024-12-05
Payer: COMMERCIAL

## 2024-12-05 NOTE — PROGRESS NOTES
"Specialty Pharmacy Refill Coordination Note     Kar \"Savage\" is a 74 y.o. male contacted today regarding refills of Vascepa 2 g PO twice daily specialty medication(s).    Delivery scheduled for Tuesday, 12/10.    Specialty medication(s) and dose(s) confirmed: yes    Refill Questions      Flowsheet Row Most Recent Value   Changes to allergies? No   Changes to medications? No   New conditions or infections since last clinic visit No   Unplanned office visit, urgent care, ED, or hospital admission in the last 4 weeks  No   How does patient/caregiver feel medication is working? Very good   Financial problems or insurance changes  No   Since the previous refill, were any specialty medication doses or scheduled injections missed or delayed?  No   Does this patient require a clinical escalation to a pharmacist? No            Delivery Questions      Flowsheet Row Most Recent Value   Delivery method UPS   Delivery address verified with patient/caregiver? Yes   Delivery address Home   Number of medications in delivery 1   Medication(s) being filled and delivered Icosapent Ethyl (VASCEPA)   Doses left of specialty medications 7-10 days   Copay verified? Yes   Copay amount 20.00   Copay form of payment Credit/debit on file   Ship Date 12/5/24   Delivery Date 12/6/24   Signature Required No                   Follow-up: 30 day(s)     Sonya Solorzano, Pharmacy Technician  Specialty Pharmacy Technician        "

## 2024-12-10 ENCOUNTER — TELEPHONE (OUTPATIENT)
Dept: CARDIOLOGY | Facility: CLINIC | Age: 74
End: 2024-12-10
Payer: COMMERCIAL

## 2024-12-10 ENCOUNTER — HOSPITAL ENCOUNTER (OUTPATIENT)
Dept: CARDIOLOGY | Facility: HOSPITAL | Age: 74
Discharge: HOME | End: 2024-12-10
Admitting: HOSPITALIST
Payer: COMMERCIAL

## 2024-12-10 VITALS
SYSTOLIC BLOOD PRESSURE: 105 MMHG | BODY MASS INDEX: 32.31 KG/M2 | HEIGHT: 66 IN | DIASTOLIC BLOOD PRESSURE: 54 MMHG | WEIGHT: 201.06 LBS

## 2024-12-10 DIAGNOSIS — I25.5 ISCHEMIC CARDIOMYOPATHY: ICD-10-CM

## 2024-12-10 LAB
BH CV ECHO MEAS - EF(MOD-BP): 34.8 %
BH CV ECHO MEAS - IVS/LVPW: 0.92 CM
BH CV ECHO MEAS - IVSD: 1.1 CM
BH CV ECHO MEAS - LVIDD: 5.1 CM
BH CV ECHO MEAS - LVIDS: 4.2 CM
BH CV ECHO MEAS - LVPWD: 1.2 CM

## 2024-12-10 PROCEDURE — 93308 TTE F-UP OR LMTD: CPT

## 2024-12-10 PROCEDURE — 93325 DOPPLER ECHO COLOR FLOW MAPG: CPT

## 2024-12-10 PROCEDURE — 25010000002 SULFUR HEXAFLUORIDE MICROSPH 60.7-25 MG RECONSTITUTED SUSPENSION: Performed by: HOSPITALIST

## 2024-12-10 PROCEDURE — 93321 DOPPLER ECHO F-UP/LMTD STD: CPT

## 2024-12-10 RX ADMIN — SULFUR HEXAFLUORIDE 2 ML: KIT at 10:52

## 2024-12-10 NOTE — TELEPHONE ENCOUNTER
----- Message from Chasity Saucedo sent at 12/10/2024  3:11 PM EST -----  Can you let this patient know that his echo showed that his EF remains low at 31-35%.  We recommend that he continue wearing his LifeVest and keep his scheduled follow up appointment with me on 1/20/2025.  We will plan to repeat another limited echo in February after his follow up to reassess his EF again.  Thank you.  ----- Message -----  From: Madi Moctezuma MD  Sent: 12/10/2024  12:58 PM EST  To: EDGAR Maguire

## 2025-01-09 ENCOUNTER — SPECIALTY PHARMACY (OUTPATIENT)
Dept: CARDIOLOGY | Facility: CLINIC | Age: 75
End: 2025-01-09
Payer: COMMERCIAL

## 2025-01-09 NOTE — PROGRESS NOTES
"Specialty Pharmacy Refill Coordination Note     Kar \"Savage\" is a 74 y.o. male contacted today regarding refills of Vascepa 2 g PO twice daily specialty medication(s).    Delivery scheduled for tomorrow. Patient spouse says their roads are clear for delivery.    Specialty medication(s) and dose(s) confirmed: yes    Refill Questions      Flowsheet Row Most Recent Value   Changes to allergies? No   Changes to medications? No   New conditions or infections since last clinic visit No   Unplanned office visit, urgent care, ED, or hospital admission in the last 4 weeks  No   How does patient/caregiver feel medication is working? Very good   Financial problems or insurance changes  No   Since the previous refill, were any specialty medication doses or scheduled injections missed or delayed?  No   Does this patient require a clinical escalation to a pharmacist? No            Delivery Questions      Flowsheet Row Most Recent Value   Delivery method UPS   Delivery address verified with patient/caregiver? Yes   Delivery address Home   Number of medications in delivery 1   Medication(s) being filled and delivered Icosapent Ethyl (VASCEPA)   Doses left of specialty medications 4 days   Copay verified? Yes   Copay amount 20.00   Copay form of payment Credit/debit on file   Ship Date 1/9/25   Delivery Date 1/10/25   Signature Required No                   Follow-up: 30 day(s)     Sonya Solorzano, Pharmacy Technician  Specialty Pharmacy Technician        "

## 2025-01-10 NOTE — PROGRESS NOTES
[employer] reports delays in deliveries. Per leadership, there is a 24 hour delay on deliveries, possibly more.    Have spoke with patient' spouse and relayed information. She understands and opted to keep prescription with [facility]. She understands patient may miss a couple days but prefers to wait until [facility] can ship.They have had issues with coverage at local pharmacy.    Will f/u with spouse on Monday on delivery.     Sonya Solorzano CPhT  Pharmacy Care Coordinator  [facility]  [facility] Cardiology  [phone]  1/10/2025  11:04 EST

## 2025-01-13 ENCOUNTER — SPECIALTY PHARMACY (OUTPATIENT)
Dept: GENERAL RADIOLOGY | Facility: HOSPITAL | Age: 75
End: 2025-01-13
Payer: COMMERCIAL

## 2025-01-13 ENCOUNTER — OFFICE VISIT (OUTPATIENT)
Age: 75
End: 2025-01-13
Payer: COMMERCIAL

## 2025-01-13 VITALS
SYSTOLIC BLOOD PRESSURE: 114 MMHG | OXYGEN SATURATION: 100 % | BODY MASS INDEX: 32.47 KG/M2 | HEIGHT: 66 IN | HEART RATE: 87 BPM | DIASTOLIC BLOOD PRESSURE: 62 MMHG | WEIGHT: 202 LBS

## 2025-01-13 DIAGNOSIS — E11.65 TYPE 2 DIABETES MELLITUS WITH HYPERGLYCEMIA, WITH LONG-TERM CURRENT USE OF INSULIN: Primary | ICD-10-CM

## 2025-01-13 DIAGNOSIS — Z79.4 TYPE 2 DIABETES MELLITUS WITH HYPERGLYCEMIA, WITH LONG-TERM CURRENT USE OF INSULIN: Primary | ICD-10-CM

## 2025-01-13 DIAGNOSIS — E11.59 TYPE 2 DIABETES MELLITUS WITH OTHER CIRCULATORY COMPLICATION, WITH LONG-TERM CURRENT USE OF INSULIN: ICD-10-CM

## 2025-01-13 DIAGNOSIS — Z79.4 TYPE 2 DIABETES MELLITUS WITH OTHER CIRCULATORY COMPLICATION, WITH LONG-TERM CURRENT USE OF INSULIN: ICD-10-CM

## 2025-01-13 LAB
ALBUMIN UR-MCNC: <1.2 MG/DL
CREAT UR-MCNC: 95.4 MG/DL
EXPIRATION DATE: ABNORMAL
EXPIRATION DATE: ABNORMAL
GLUCOSE BLDC GLUCOMTR-MCNC: 208 MG/DL (ref 70–130)
HBA1C MFR BLD: 7.7 % (ref 4.5–5.7)
Lab: ABNORMAL
Lab: ABNORMAL
MICROALBUMIN/CREAT UR: NORMAL MG/G{CREAT}

## 2025-01-13 PROCEDURE — 82947 ASSAY GLUCOSE BLOOD QUANT: CPT | Performed by: INTERNAL MEDICINE

## 2025-01-13 PROCEDURE — 82043 UR ALBUMIN QUANTITATIVE: CPT | Performed by: INTERNAL MEDICINE

## 2025-01-13 PROCEDURE — 83036 HEMOGLOBIN GLYCOSYLATED A1C: CPT | Performed by: INTERNAL MEDICINE

## 2025-01-13 PROCEDURE — 99213 OFFICE O/P EST LOW 20 MIN: CPT | Performed by: INTERNAL MEDICINE

## 2025-01-13 PROCEDURE — 82570 ASSAY OF URINE CREATININE: CPT | Performed by: INTERNAL MEDICINE

## 2025-01-13 RX ORDER — GLIPIZIDE 5 MG/1
5 TABLET, FILM COATED, EXTENDED RELEASE ORAL DAILY
Qty: 30 TABLET | Refills: 5 | Status: SHIPPED | OUTPATIENT
Start: 2025-01-13

## 2025-01-13 RX ORDER — DULOXETIN HYDROCHLORIDE 30 MG/1
30 CAPSULE, DELAYED RELEASE ORAL DAILY
Qty: 30 CAPSULE | Refills: 11 | Status: SHIPPED | OUTPATIENT
Start: 2025-01-13 | End: 2026-01-13

## 2025-01-13 RX ORDER — ROSUVASTATIN CALCIUM 40 MG/1
40 TABLET, COATED ORAL NIGHTLY
Qty: 30 TABLET | Refills: 11 | Status: SHIPPED | OUTPATIENT
Start: 2025-01-13 | End: 2025-01-20 | Stop reason: SDUPTHER

## 2025-01-13 RX ORDER — INSULIN DEGLUDEC 100 U/ML
70 INJECTION, SOLUTION SUBCUTANEOUS NIGHTLY
Qty: 30 ML | Refills: 5 | Status: SHIPPED | OUTPATIENT
Start: 2025-01-13

## 2025-01-13 RX ORDER — TIRZEPATIDE 7.5 MG/.5ML
7.5 INJECTION, SOLUTION SUBCUTANEOUS WEEKLY
Qty: 2 ML | Refills: 5 | Status: SHIPPED | OUTPATIENT
Start: 2025-01-13

## 2025-01-13 NOTE — PROGRESS NOTES
"     Office Note      Date: 2025  Patient Name: Kar Mora  MRN: [id number]  : 1950    Chief Complaint   Patient presents with    Diabetes       History of Present Illness:   Kar Mora is a 74 y.o. male who presents for Diabetes type 2.   Current RX MOUNJARO TRESIBA/ METFORMIN , SFU AND JARDIAND   HE IS ON ARB AND STATIN     Bg checks are done:DAILY   Hypoglycemia :86 WAS THE LOWEST       Last A1c:  Hemoglobin A1C   Date Value Ref Range Status   2025 7.7 (A) 4.5 - 5.7 % Final   10/10/2018 7.50 (H) 4.80 - 5.60 % Final       Changes in health since last visit: WAS IN THE HOSPITAL WITH NEUROLOGICAL CHANGES. . Last eye exam  SEPTEMBER.    Subjective            Review of Systems:   Review of Systems   Endocrine: Negative for polydipsia and polyuria.       The following portions of the patient's history were reviewed and updated as appropriate: allergies, current medications, past family history, past medical history, past social history, past surgical history, and problem list.    Objective     Visit Vitals  /62 (BP Location: Left arm, Patient Position: Sitting, Cuff Size: Adult)   Pulse 87   Ht 167.6 cm (66\")   Wt 91.6 kg (202 lb)   SpO2 100%   BMI 32.60 kg/m²           Physical Exam:  Physical Exam  Vitals reviewed.   Constitutional:       Appearance: Normal appearance. He is normal weight.   Cardiovascular:      Pulses:           Dorsalis pedis pulses are 2+ on the right side and 2+ on the left side.        Posterior tibial pulses are 2+ on the right side and 2+ on the left side.   Musculoskeletal:      Right foot: Normal range of motion. No deformity, bunion, Charcot foot, foot drop or prominent metatarsal heads.      Left foot: Normal range of motion. No deformity, bunion, Charcot foot, foot drop or prominent metatarsal heads.   Feet:      Right foot:      Protective Sensation: 10 sites tested.  10 sites sensed.      Skin integrity: Skin integrity normal.      Toenail " Condition: Right toenails are normal.      Left foot:      Protective Sensation: 10 sites tested.  10 sites sensed.      Skin integrity: Skin integrity normal.      Toenail Condition: Left toenails are normal.      Comments: Diabetic Foot Exam Performed    Neurological:      Mental Status: He is alert.   Psychiatric:         Mood and Affect: Mood normal.         Behavior: Behavior normal.         Thought Content: Thought content normal.         Judgment: Judgment normal.          Assessment / Plan      Assessment & Plan:  Problem List Items Addressed This Visit       Type 2 diabetes mellitus with hyperglycemia, with long-term current use of insulin - Primary    Overview     . Did ok with byetta for years in past. Did not tolerate victoza           Current Assessment & Plan     IMPROVED.  A1C IN DOWN TO 7.7  EYE EXAM UP TO DATE  LIPIDS DONE IN NOVEMBER  FOOT CHECK TODAY  KATIE ORDERED TODAY          Relevant Medications    DULoxetine (CYMBALTA) 30 MG capsule    empagliflozin (Jardiance) 25 MG tablet tablet    glipizide (GLUCOTROL XL) 5 MG ER tablet    Tirzepatide (Mounjaro) 7.5 MG/0.5ML solution auto-injector    Tresiba FlexTouch 100 UNIT/ML solution pen-injector injection    metFORMIN (GLUCOPHAGE) 500 MG tablet    Other Relevant Orders    POC Glucose, Blood (Completed)    POC Glycosylated Hemoglobin (Hb A1C) (Completed)    Microalbumin / Creatinine Urine Ratio - Urine, Clean Catch         Electronically signed by : Hiren Montoya MD  01/13/2025

## 2025-01-13 NOTE — PROGRESS NOTES
" Specialty Pharmacy Patient Management Program  Endocrinology Initial Fill Outreach      Kar \"Savage\" is a 74 y.o. male contacted today regarding initial fill of his medication(s).    Specialty medication(s) and dose(s) confirmed: Tresiba /day    Delivery Questions      Flowsheet Row Most Recent Value   Delivery method UPS   Delivery address verified with patient/caregiver? Yes   Delivery address Home   Number of medications in delivery 4   Medication(s) being filled and delivered DULoxetine HCl (CYMBALTA), Rosuvastatin Calcium (CRESTOR), glipiZIDE (GLUCOTROL XL), Insulin Degludec (Tresiba FlexTouch)   Doses left of specialty medications 2   Copay verified? Yes   Copay amount $58.35   Copay form of payment Credit/debit on file   Ship Date 1/14/2024   Delivery Date 1/15/2024   Signature Required No            Follow-Up: 30 days  Clinton Castillo, DameonD  Clinical Specialty Pharmacist, Endocrinology  1/13/2025  15:33 EST    "

## 2025-01-13 NOTE — TELEPHONE ENCOUNTER
Specialty Pharmacy Patient Management Program  Prescription Refill Request     Patient currently fills medications at  Pharmacy. Needing refill(s) on the following:      Requested Prescriptions     Pending Prescriptions Disp Refills    DULoxetine (CYMBALTA) 30 MG capsule 30 capsule 11     Sig: Take 1 capsule by mouth Daily.    rosuvastatin (CRESTOR) 40 MG tablet 30 tablet 11     Sig: Take 1 tablet by mouth Every Night.     Pended for endocrinology provider, Dr. Montoya, to review and approve if appropriate.   Clinton Castillo, PharmD  Clinical Specialty Pharmacist, Endocrinology  1/13/2025  14:49 EST

## 2025-01-13 NOTE — PROGRESS NOTES
Specialty Pharmacy Patient Management Program  Endocrinology Initial Assessment     Kar Mora was referred by an Endocrinology provider to the Endocrinology Patient Management program offered by [facility] for Type 2 Diabetes on 01/13/25.  An initial outreach was conducted, including assessment of therapy appropriateness and specialty medication education for Jardiance, Mounjaro, and Tresiba. The patient was introduced to services offered by [facility], including: regular assessments, refill coordination, curbside pick-up or mail order delivery options, prior authorization maintenance, and financial assistance programs as applicable. The patient was also provided with contact information for the pharmacy team.     Insurance Coverage & Financial Support  [facility]     Relevant Past Medical History and Comorbidities  Relevant medical history and concomitant health conditions were discussed with the patient. The patient's chart has been reviewed for relevant past medical history and comorbid conditions and updated as necessary.  Past Medical History:   Diagnosis Date    Abnormal ECG     Allergic rhinitis     Anesthesia     pt says he has woken up during surgery    Chest pain     Congenital heart disease     Coronary artery disease     Dyslipidemia     Erectile dysfunction     GERD (gastroesophageal reflux disease)     Gout     Hypercholesterolemia     Hyperlipidemia     Hypertensive disorder     Impotence of organic origin     Insulin dependent type 2 diabetes mellitus 2002    checks fsbg every am, a1c checked every 6 months, 7.2 in may 2018     Kidney stones     Labile hypertension 10/24/2016    Myocardial infarct     Type 2 diabetes mellitus     Wears glasses      Social History     Socioeconomic History    Marital status:     Number of children: 2   Tobacco Use    Smoking status: Former     Current packs/day: 0.00     Average packs/day: 2.0 packs/day  for 10.0 years (20.0 ttl pk-yrs)     Types: Cigarettes     Start date: 1972     Quit date: 1981     Years since quittin.0     Passive exposure: Past    Smokeless tobacco: Never    Tobacco comments:     quit 35 years ago   Vaping Use    Vaping status: Never Used   Substance and Sexual Activity    Alcohol use: No    Drug use: No    Sexual activity: Yes     Partners: Female     Birth control/protection: None       Problem list reviewed by Jamaal Castillo RPH on 2025 at  3:25 PM    Allergies  Known allergies and reactions were discussed with the patient. The patient's chart has been reviewed for  allergy information and updated as necessary.   Allergies   Allergen Reactions    Asa [Aspirin] Other (See Comments)      upper airway congestion       Allergies reviewed by Jamaal Castillo RPH on 2025 at  3:25 PM    Relevant Laboratory Values  Relevant laboratory values were discussed with the patient. The following specialty medication dose adjustment(s) are recommended: No changes. A1C significantly decreased since last office visit.  A1C Last 3 Results          2024    15:19 2025    13:55   HGBA1C Last 3 Results   Hemoglobin A1C 9.5  7.7      Lab Results   Component Value Date    HGBA1C 7.7 (A) 2025     Lab Results   Component Value Date    GLUCOSE 101 (H) 10/29/2018    CALCIUM 8.9 10/29/2018     10/29/2018    K 4.5 10/29/2018    CO2 28.0 10/29/2018     10/29/2018    BUN 35 (H) 10/29/2018    CREATININE 1.49 (H) 10/29/2018    EGFRIFNONA 47 (L) 10/29/2018    BCR 23.5 10/29/2018    ANIONGAP 9.0 10/29/2018     Lab Results   Component Value Date    CHOL 169 2018    TRIG 385 (H) 2018    HDL 27 (L) 2018    LDL 91 2018         Current Medication List  This medication list has been reviewed with the patient and evaluated for any interactions or necessary modifications/recommendations, and updated to include all prescription medications, OTC  medications, and supplements the patient is currently taking.  This list reflects what is contained in the patient's profile, which has also been marked as reviewed to communicate to other providers it is the most up to date version of the patient's current medication therapy.     Current Outpatient Medications:     empagliflozin (Jardiance) 25 MG tablet tablet, Take 1 tablet by mouth Daily., Disp: 30 tablet, Rfl: 5    glipizide (GLUCOTROL XL) 5 MG ER tablet, Take 1 tablet by mouth Daily., Disp: 30 tablet, Rfl: 5    insulin degludec (Tresiba FlexTouch) 100 UNIT/ML solution pen-injector injection, Inject 70 Units under the skin into the appropriate area as directed Every Night. (Max 100 Units Daily), Disp: 30 mL, Rfl: 5    metFORMIN (GLUCOPHAGE) 500 MG tablet, Take 2 tablets by mouth 2 (Two) Times a Day With Meals., Disp: 120 tablet, Rfl: 5    B-D ULTRAFINE III SHORT PEN 31G X 8 MM misc, FOR DAILY USE; USE AS DIRECTED, Disp: 100 each, Rfl: 3    Blood Glucose Monitoring Suppl device, Use as directed to check blood sugar please dispense insurance preferred, Disp: 1 each, Rfl: 0    carvedilol (COREG) 25 MG tablet, Take 1 tablet by mouth 2 (Two) Times a Day., Disp: 180 tablet, Rfl: 3    clopidogrel (PLAVIX) 75 MG tablet, Take 1 tablet by mouth Daily., Disp: 90 tablet, Rfl: 3    DULoxetine (CYMBALTA) 30 MG capsule, Take 1 capsule by mouth Daily., Disp: 30 capsule, Rfl: 11    fenofibrate 160 MG tablet, TAKE 1 TABLET BY MOUTH DAILY, Disp: 90 tablet, Rfl: 3    Glucose Blood (Blood Glucose Test) strip, Use to check blood sugar daily dx e11.65, Disp: 100 each, Rfl: 3    icosapent ethyl (Vascepa) 1 g capsule capsule, Take 2 capsules by mouth 2 (Two) Times a Day With Meals., Disp: 120 capsule, Rfl: 11    Lancets (onetouch ultrasoft) lancets, TEST TWICE DAILY, Disp: 200 each, Rfl: 1    Lancets 33G misc, Use 1 each Daily. Dx e11.65, Disp: 100 each, Rfl: 3    Multiple Vitamins-Minerals (MULTIVITAMIN ADULT PO), Take 1 tablet by  mouth Daily., Disp: , Rfl:     nitroglycerin (NITROSTAT) 0.4 MG SL tablet, Place 1 tablet under the tongue Every 5 (Five) Minutes As Needed for Chest Pain. Take no more than 3 doses in 15 minutes., Disp: 25 tablet, Rfl: 11    ofloxacin (FLOXIN) 0.3 % otic solution, INSTILL 2 DROPS TO LEFT EAR IN THE MORNING AND AT BEDTIME FOR 7 DAYS, Disp: , Rfl:     rosuvastatin (CRESTOR) 40 MG tablet, Take 1 tablet by mouth Every Night., Disp: 30 tablet, Rfl: 11    sacubitril-valsartan (Entresto) 24-26 MG tablet, Take 1 tablet by mouth Every 12 (Twelve) Hours., Disp: 180 tablet, Rfl: 3    Tirzepatide (Mounjaro) 7.5 MG/0.5ML solution auto-injector, Inject 7.5 mg under the skin into the appropriate area as directed 1 (One) Time Per Week., Disp: 2 mL, Rfl: 5    Medicines reviewed by Jamaal Castillo [facility] on 1/13/2025 at  3:25 PM    Drug Interactions  No Clinically Significant DDIs Were Identified at Present Time Upon Marking Medications Reviewed    Recommended Medications Assessment  Aspirin: Not Taking Currently  Statin: Currently Taking   ACEi/ARB: Currently Taking     Initial Education Provided for Specialty Medication  The patient has been provided with the following education and any applicable administration techniques (i.e. self-injection) have been demonstrated for the therapies indicated. All questions and concerns have been addressed prior to the patient receiving the medication, and the patient has verbalized comprehension of the education and any materials provided. Additional patient education shall be provided and documented upon request by the patient, provider, or payer.    TRESIBA® (insulin degludec)   Medication Expectations    Why am I taking this medication?  You are taking this medication to lower blood sugar and A1c because you have type 1 or type 2 diabetes. Diabetes is not curable but with proper medication and treatment, we can keep your blood sugar within your personalized target range.    What should I  expect while on this medication?  You should expect to see your blood sugar and A1c decrease over time.    How does the medication work?  Tresiba is a long-acting insulin that slowly and steadily releases in the body for 24-hours of blood sugar control. Insulin helps the sugar in your blood get into cells and provide them with energy to fuel your body.     How long will I be on this medication for?  If you have Type 1 diabetes, you will be on this medication or another insulin throughout your lifetime because your body cannot make its own insulin. If you have Type 2 diabetes, the amount of time you will be on this medication will be determined by your doctor based on blood sugar and A1c control. You will most likely be on this medication or another diabetes medication throughout your lifetime because your body does not make enough insulin. Do not abruptly stop this medication without talking to your doctor first.     How do I take this medication?  Take as directed on your prescription label. Tresiba is usually given once daily and can be taken with or without food. Tresiba is injected under the skin (subcutaneously) of your stomach, thigh, or upper arm. Use a different injection site in the same body region each day.       What are some possible side effects?  Tresiba may cause low blood sugar (hypoglycemia). Signs and symptoms that may indicate hypoglycemia include dizziness, sweating, anxiety, irritability, confusion, or headache. The most common side effects of Tresiba include reactions or skin thickening at the injection site, headache, weight gain, swelling of your hands and feet, and cough/runny nose/sore throat.    What happens if I miss a dose?  If you miss a dose, take it as soon as your remember. If it is close to your next dose, skip it. Always make sure there are at least 8 hours between doses and never take 2 doses at once.       Medication Safety    What are things I should warn my doctor immediately  about?  Talk to your doctor if you are pregnant, planning to become pregnant, or breastfeeding. Also tell your doctor if you notice any signs/symptoms of an allergic reaction (rash, hives, difficulty breathing, etc.).    What are things that I should be cautious of?  Be cautious of any side effects from this medication. Talk to your doctor if any new ones develop or aren't getting better.    What are some medications that can interact with this one?  Do not take this medication with rosiglitazone. Taking Tresiba with other medications that lower your blood sugar may increase the risk of hypoglycemia. Your doctor may reduce the dose of these medications when you start Tresiba. Always tell your doctor or pharmacist immediately if you start taking any new medications, including over-the-counter medications, vitamins, and herbal supplements.        Medication Storage/Handling    How should I handle this medication?  Keep this medication out of reach of pets/children and keep the pen capped when not in use. Do not share your medicine with others.     How does this medication need to be stored?  Store your new, unused pens in the original carton in the refrigerator. Protect it from light. Do not freeze. You may store your opened Tresiba at room temperature or in the refrigerator for up to 56 days (8 weeks). Always remove the needle from the pen before storing.     How should I dispose of this medication?  Used Tresiba pens should be thrown away after 56 days even if insulin remains.?Place your used pen and needle in an approved sharps container. ?If your doctor decides to stop this medication, take to your local police station for proper disposal. Some pharmacies also have?take-back bins for medication drop-off.??       Resources/Support    How can I remind myself to take this medication?  You can download reminder apps to help you manage your refills. You may also set an alarm on your phone to remind you.     Is  financial support available?   [email or web address] can provide co-pay cards if you have commercial insurance or patient assistance if you have Medicare or no insurance.     Which vaccines are recommended for me?  Talk to your doctor about these vaccines: Flu, Coronavirus (COVID-19), Pneumococcal (pneumonia), Tdap, Hepatitis B, Zoster (shingles)          Mounjaro® (tirzepatide)   How long will I be on this medication for?  The amount of time you will be on this medication will be determined by your doctor based on blood sugar and A1c control. You will most likely be on this medication or another diabetes medication throughout your lifetime. Do not abruptly stop this medication without talking to your doctor first.     How do I take this medication?  Take as directed on your prescription label. Mounjaro is supplied in a single-use pen for each dose and you will use a new pre-filled pen each week.  It is injected under the skin (subcutaneously) of your stomach, thigh or upper arm.  You may inject in the same body area each week, on the same day each week, with or without food.      What are some possible side effects?  In addition to decreased appetite, the most common side effects are nausea and indigestion. Redness, itching, and/or swelling at the injection site may occur. You should also monitor for low blood sugar (hypoglycemia) if you are taking Mounjaro with other medications that cause low blood sugar.     What happens if I miss a dose?  If you miss a dose, take it as soon as you remember as long as there are at least 3 days until the next scheduled dose.  If there are less than 3 days, skip the missed dose and resume  Mounjaro on the regularly scheduled day.  Do not take 2 doses at the same time or extra doses.      Medication Safety    What are things I should warn my doctor immediately about?  Do not use Mounjaro if you or a family member have ever had medullary thyroid cancer (MTC) or Multiple Endocrine Neoplasia  syndrome type 2 (MEN 2).  Tell your doctor if you have or have had problems with your kidneys or pancreas.  Talk to your doctor if you are pregnant, planning to become pregnant, or breastfeeding. Stop using Mounjaro and get medical help right away if you have severe pain in your stomach area that will not go away, or if you notice any signs/symptoms of an allergic reaction (rash, hives, difficulty breathing, etc.).    What are things that I should be cautious of?  Be cautious of any side effects from this medication. Talk to your doctor if any new ones develop or aren't getting better.    What are some medications that can interact with this one?  Taking Mounjaro with other medications that also lower your blood sugar such as insulin and glipizide/glimepiride/glyburide may increase the risk of low blood sugar. Your doctor may reduce the dose of these medications when you start Mounjaro to minimize low blood sugars.  Always tell your doctor or pharmacist immediately if you start taking any new medications, including over-the-counter medications, vitamins, and herbal supplements.        Medication Storage/Handling    How should I handle this medication?  Keep this medication out of reach of pets/children and keep the pen capped when not in use.  Do not share your medicine pens with others.    How does this medication need to be stored?  Store Mounjaro in the refrigerator. Do not freeze and do not use if Mounjaro has been frozen.  You may store your Mounjaro pens at room temperature for up to 21 days.  Protect from excessive heat and sunlight.  Keep in the original carton until time of administration.    How should I dispose of this medication?  Used Mounjaro pens should discarded after each use in an approved sharps container after use.  If you do not have a sharps container, you may use a household container made of heavy-duty plastic with a tight-fitting lid that is leak resistant (e.g., heavy-duty plastic laundry  detergent bottle). If your doctor decides to stop this medication, take to your local police station for proper disposal. Some pharmacies also have take-back bins for medication drop-off.       Resources/Support    How can I remind myself to take this medication?  You can download reminder apps to help you manage your refills. You may also set an alarm on your phone to remind you.     Is financial support available?   [employer] can provide co-pay cards if you have commercial insurance or patient assistance if you have Medicare or no insurance.     Which vaccines are recommended for me?  Talk to your doctor about these vaccines: Flu, Coronavirus (COVID-19), Pneumococcal (pneumonia), Tdap, Hepatitis B, Zoster (shingles)      JARDIANCE® (empagliflozin)  Medication Expectations   Why am I taking this medication? You could be taking this medication for several reasons:  To lower blood sugar because you have type 2 diabetes  To reduce your risk of death from heart attack or stroke if you have heart disease and type 2 diabetes  To reduce your risk of death or hospitalization for heart failure  To reduce your risk of further kidney damage, death, or hospitalization if you have chronic kidney disease   What should I expect while on this medication? You should expect to see your blood sugar and A1c decrease over time if you have diabetes. You may also see a decrease in your blood pressure and it can help some people lose weight.     How does the medication work? Jardiance works by helping to remove some sugar that the body doesn't need through urination.    How long will I be on this medication for? The amount of time you will be on this medication will be determined by your doctor based on blood sugar and A1c control. You will most likely be on this medication or another diabetes medication throughout your lifetime. Do not abruptly stop this medication without talking to your doctor first.    How do I take this medication?  Take as directed on your prescription label. This medication is usually taken in the morning and can be given with or without food.    What are some possible side effects? You may notice increased urination, especially when you first start Jardiance. The most common side effects are urinary tract infections and yeast infections and are more commonly seen in females. Talk with your doctor if you notice white or yellow vaginal discharge, vaginal itching or odor of if you notice redness, itching, pain, or swelling of the penis and/or bad-smelling discharge from the penis.    What happens if I miss a dose? If you miss a dose, take it as soon as you remember. If it is close to your next dose, skip it (do not take 2 doses at once)     Medication Safety   What are things I should warn my doctor immediately about? Tell your doctor if you have kidney disease, liver disease, heart failure, pancreas problems, or history of frequent genital yeast or urinary tract infections. Tell your doctor if you are on a low-salt diet, if you drink alcohol, or if you are having surgery. Talk to your doctor if you are pregnant, planning to become pregnant, or breastfeeding. Also tell your doctor if you notice any signs/symptoms of an allergic reaction (rash, hives, difficulty breathing, etc.).   What are things that I should be cautious of? Be cautious of any side effects from this medication. Talk to your doctor if any new ones develop or aren't getting better.   What are some medications that can interact with this one? Some medications that interact include diuretics (water pills) and other medications that may also lower your blood sugar such as insulins and glipizide/glimepiride/glyburide. Your doctor may reduce the dose of these medications when you start Jardiance to minimize low blood sugars. Always tell your doctor or pharmacist immediately if you start taking any new medications, including over-the-counter medications, vitamins, and  herbal supplements.      Medication Storage/Handling   How should I handle this medication? Keep this medication out of reach of pets/children in tightly sealed container   How does this medication need to be stored? Store at room temperature and keep dry (don't keep in bathroom or other room with moisture)   How should I dispose of this medication? There should not be a need to dispose of this medication unless your provider decides to change the dose or therapy. If that is the case, take to your local police station for proper disposal. Some pharmacies also have take-back bins for medication drop-off.      Resources/Support   How can I remind myself to take this medication? You can download reminder apps to help you manage your refills. You may also set an alarm on your phone to remind you. The pharmacy carries pill boxes that you can place next to an area you pass everyday (such as where you place your car keys or where you charge your phone)   Is financial support available?  [employer] can provide co-pay cards if you have commercial insurance or patient assistance if you have Medicare or no insurance.    Which vaccines are recommended for me? Talk to your doctor about these vaccines: Flu, Coronavirus (COVID-19), Pneumococcal (pneumonia), Tdap, Hepatitis B, Zoster (shingles)       Adherence and Self-Administration  Adherence related to the patient's specialty therapy was discussed with the patient. The Adherence segment of this outreach has been reviewed and updated.     Is there a concern with patient's ability to self administer the medication correctly and without issue?: No  Were any potential barriers to adherence identified during the initial assessment or patient education?: No  Are there any concerns regarding the patient's understanding of the importance of medication adherence?: No  Methods for Supporting Patient Adherence and/or Self-Administration: Today I spoke with the patient in  person during his office visit. We discussed the medications he was taking and I provided counseling on each specialty medication including proper injection technique using mounjaro and tresiba.    Open Medication Therapy Problems  No medication therapy recommendations to display    Goals of Therapy  Goals related to the patient's specialty therapy were discussed with the patient. The Patient Goals segment of this outreach has been reviewed and updated.   Goals Addressed Today        Specialty Pharmacy General Goal      A1C < 7 %     Lab Results    Component Value Date Notes    HGBA1C 7.7 (A) 01/13/2025 New enrollment. A1C decreased significantly since last office visit. Staying on the same medication regimen. Enrolling with our pharmacy program to help provide the patient with easier access to his medications and to increase adherence and better clinical outcomes. NB    HGBA1C 9.5 (A) 07/08/2024                  Reassessment Plan & Follow-Up  1. Medication Therapy Changes: No Changes  2. Related Plans, Therapy Recommendations, or Therapy Problems to Be Addressed: Follow A1C during office visits.  3. Pharmacist to perform regular assessments no more than (6) months from the previous assessment.  4. Care Coordinator to set up future refill outreaches, coordinate prescription delivery, and escalate clinical questions to pharmacist.  5. Welcome information and patient satisfaction survey to be sent by specialty pharmacy team with patient's initial fill.    Attestation  Therapeutic appropriateness: Appropriate   I attest the patient was actively involved in and has agreed to the above plan of care. If the prescribed therapy is at any point deemed not appropriate based on the current or future assessments, a consultation will be initiated with the patient's specialty care provider to determine the best course of action. The revised plan of therapy will be documented along with any required assessments and/or additional  patient education provided.     Clinton Castillo, PharmD  Clinical Specialty Pharmacist, Endocrinology  1/13/2025  15:33 EST    Discussed the aforementioned information with the patient via In-Person.

## 2025-01-13 NOTE — ASSESSMENT & PLAN NOTE
IMPROVED.  A1C IN DOWN TO 7.7  EYE EXAM UP TO DATE  LIPIDS DONE IN NOVEMBER  FOOT CHECK TODAY  KATIE ORDERED TODAY

## 2025-01-13 NOTE — PROGRESS NOTES
Specialty Pharmacy Patient Management Program  Per Protocol Prescription Order/Refill     Patient currently fills medications at [facility] and is enrolled in an Endocrinology Patient Management Program.     Requested Prescriptions     Pending Prescriptions Disp Refills    empagliflozin (Jardiance) 25 MG tablet tablet 30 tablet 5     Sig: Take 1 tablet by mouth Daily.    glipizide (GLUCOTROL XL) 5 MG ER tablet 30 tablet 5     Sig: Take 1 tablet by mouth Daily.    metFORMIN (GLUCOPHAGE) 500 MG tablet 120 tablet 5     Sig: Take 2 tablets by mouth 2 (Two) Times a Day With Meals.    insulin degludec (Tresiba FlexTouch) 100 UNIT/ML solution pen-injector injection 30 mL 5     Sig: Inject 70 Units under the skin into the appropriate area as directed Every Night. (Max 100 Units Daily)    Tirzepatide (Mounjaro) 7.5 MG/0.5ML solution auto-injector 2 mL 5     Sig: Inject 7.5 mg under the skin into the appropriate area as directed 1 (One) Time Per Week.     Prescription orders above were sent to the pharmacy per Collaborative Care Agreement Protocol.     Clinton Castillo, PharmD  Clinical Specialty Pharmacist, Endocrinology  1/13/2025  14:56 EST

## 2025-01-13 NOTE — PROGRESS NOTES
Update: Package of Vascepa is pending delivery via FedEx. Currently still at [facility].     Per Lary Stallings at [facility], package leaving facility today for delivery tomorrow.    Tracking # [id number]     Sonya Solorzano [facility]  Pharmacy Care Coordinator  [facility]  [facility]  [phone]  1/13/2025  15:07 EST

## 2025-01-20 ENCOUNTER — OFFICE VISIT (OUTPATIENT)
Dept: CARDIOLOGY | Facility: CLINIC | Age: 75
End: 2025-01-20
Payer: MEDICARE

## 2025-01-20 VITALS
HEIGHT: 66 IN | SYSTOLIC BLOOD PRESSURE: 108 MMHG | WEIGHT: 207 LBS | HEART RATE: 90 BPM | BODY MASS INDEX: 33.27 KG/M2 | DIASTOLIC BLOOD PRESSURE: 58 MMHG | OXYGEN SATURATION: 97 %

## 2025-01-20 DIAGNOSIS — I25.10 CORONARY ARTERY DISEASE INVOLVING NATIVE CORONARY ARTERY OF NATIVE HEART WITHOUT ANGINA PECTORIS: Primary | ICD-10-CM

## 2025-01-20 DIAGNOSIS — G45.9 TIA (TRANSIENT ISCHEMIC ATTACK): ICD-10-CM

## 2025-01-20 DIAGNOSIS — I10 ESSENTIAL HYPERTENSION: ICD-10-CM

## 2025-01-20 DIAGNOSIS — E78.5 HYPERLIPIDEMIA LDL GOAL <70: ICD-10-CM

## 2025-01-20 DIAGNOSIS — I50.22 CHRONIC HFREF (HEART FAILURE WITH REDUCED EJECTION FRACTION): ICD-10-CM

## 2025-01-20 PROCEDURE — 1159F MED LIST DOCD IN RCRD: CPT | Performed by: HOSPITALIST

## 2025-01-20 PROCEDURE — 99214 OFFICE O/P EST MOD 30 MIN: CPT | Performed by: HOSPITALIST

## 2025-01-20 PROCEDURE — G2211 COMPLEX E/M VISIT ADD ON: HCPCS | Performed by: HOSPITALIST

## 2025-01-20 PROCEDURE — 3078F DIAST BP <80 MM HG: CPT | Performed by: HOSPITALIST

## 2025-01-20 PROCEDURE — 3074F SYST BP LT 130 MM HG: CPT | Performed by: HOSPITALIST

## 2025-01-20 PROCEDURE — 1160F RVW MEDS BY RX/DR IN RCRD: CPT | Performed by: HOSPITALIST

## 2025-01-20 RX ORDER — CLOPIDOGREL BISULFATE 75 MG/1
75 TABLET ORAL DAILY
Qty: 90 TABLET | Refills: 3 | Status: SHIPPED | OUTPATIENT
Start: 2025-01-20

## 2025-01-20 RX ORDER — ROSUVASTATIN CALCIUM 40 MG/1
40 TABLET, COATED ORAL NIGHTLY
Qty: 30 TABLET | Refills: 11 | Status: SHIPPED | OUTPATIENT
Start: 2025-01-20

## 2025-01-20 RX ORDER — CARVEDILOL 25 MG/1
25 TABLET ORAL 2 TIMES DAILY
Qty: 180 TABLET | Refills: 3 | Status: SHIPPED | OUTPATIENT
Start: 2025-01-20

## 2025-01-20 RX ORDER — FENOFIBRATE 160 MG/1
160 TABLET ORAL DAILY
Qty: 90 TABLET | Refills: 3 | Status: SHIPPED | OUTPATIENT
Start: 2025-01-20

## 2025-01-20 RX ORDER — ICOSAPENT ETHYL 1 G/1
2 CAPSULE ORAL 2 TIMES DAILY WITH MEALS
Qty: 120 CAPSULE | Refills: 11 | Status: SHIPPED | OUTPATIENT
Start: 2025-01-20

## 2025-01-20 RX ORDER — SACUBITRIL AND VALSARTAN 24; 26 MG/1; MG/1
1 TABLET, FILM COATED ORAL EVERY 12 HOURS
Qty: 180 TABLET | Refills: 3 | Status: SHIPPED | OUTPATIENT
Start: 2025-01-20

## 2025-01-20 NOTE — PROGRESS NOTES
[facility]   [address], [address]  [place], [place], [address]    [phone]  [email or web address]           OUTPATIENT CLINIC FOLLOW UP NOTE    Patient Care Team:  Patient Care Team:  Bro Kan MD as PCP - Hiren Baeza MD as Consulting Physician (Endocrinology)  Madi Moctezuma MD as Consulting Physician (Cardiology)  Chasity Saucedo APRN as Nurse Practitioner (Cardiology)    Subjective:      Chief Complaint   Patient presents with    Cardiomyopathy         Kar Mora is a 74 y.o. male.  Cardiac focused, problem list:  CAD:  [facility] 11-day hospitalization for CABG ×4 vessels, 10/11/2018: SVG to diagonal and circumflex, SVG to PDA, LIMA to LAD, discharged with LifeVest  Combined hypertensive and ischemic cardiovascular disease:  Low risk GXT 2004,   Acceptable echocardiographic GXT with preserved exercise capacity and mild LVH, March 2008.   Low risk Lexiscan 2012  Resident class I symptoms with persistent abnormal echocardiogram (LVEF 0.40), with mild concentric LVH and mild left atrial enlargement without pulmonary arterial hypertension or pericardial effusion, July 2015. No sig change on TTE 2016 or 2018, 2020, 2024  MUGA, 12/21/2018: LVEF 0.35   Echocardiogram 11/2024:  LVEF 30-35%. Akinesis of the apical myocardium. Increased left atrial pressure. Ventricular septal motion is severely dyssynergic. No PFO. Moderate mitral valve calcification.   TIA  MRI brain, 11/2024:  Cortical volume loss with scattered periventricular and subcortical white matter disease that most likely represents small vessel ischemic disease.   Insulin-dependent type 2 diabetes mellitus without apparent end organ damage; hemoglobin A1c 6.4%, November 2017; 8.0%, July 2018; 8.2%, March 2019, 7% Fall 2020, 8.8% January 2023  Labile hypertension.  Dyslipidemia.  Erectile dysfunction.  Remote nasal polypectomy, 1986.  Allergic rhinitis.Moderate obesity (BMI  33.73).  Questionable asymptomatic right carotid bruit  Carotid duplex April 2023 showed mild hemodynamic stenosis in the right carotid bulb approximately 54% and proximal ICA 57%.  46% diameter reduction in the bulb on the left carotid artery with borderline hemodynamic stenosis.  Chronic kidney disease  Bilateral leg pain, possible peripheral vascular disease  Normal ABIs and negative venous duplex 8/2023    HPI:    Patient presents today for follow up.  Denies active signs or symptoms of heart failure.  Denies chest pain, shortness of breath, or edema.  Blood pressure remains low normal.  Continues to wear LifeVest.  Most recent echocardiogram 12/2024 shows LVEF remains decreased at 31-35%.     Review of Systems:  As noted above in the HPI     PFSH:  Patient Active Problem List   Diagnosis    Type 2 diabetes mellitus with hyperglycemia, with long-term current use of insulin    Hyperlipidemia LDL goal <70    Erectile dysfunction    Allergic rhinitis. Desensitization injections discontinued July 2018    Essential hypertension    Class 1 obesity due to excess calories in adult    Left bundle branch block    Coronary artery disease involving native coronary artery of native heart without angina pectoris    Cough    Ischemic cardiomyopathy    Stenosis of right carotid artery         Current Outpatient Medications:     B-D ULTRAFINE III SHORT PEN 31G X 8 MM misc, FOR DAILY USE; USE AS DIRECTED, Disp: 100 each, Rfl: 3    Blood Glucose Monitoring Suppl device, Use as directed to check blood sugar please dispense insurance preferred, Disp: 1 each, Rfl: 0    carvedilol (COREG) 25 MG tablet, Take 1 tablet by mouth 2 (Two) Times a Day., Disp: 180 tablet, Rfl: 3    clopidogrel (PLAVIX) 75 MG tablet, Take 1 tablet by mouth Daily., Disp: 90 tablet, Rfl: 3    DULoxetine (CYMBALTA) 30 MG capsule, Take 1 capsule by mouth Daily., Disp: 30 capsule, Rfl: 11    empagliflozin (Jardiance) 25 MG tablet tablet, Take 1 tablet by mouth  Daily., Disp: 30 tablet, Rfl: 5    fenofibrate 160 MG tablet, TAKE 1 TABLET BY MOUTH DAILY, Disp: 90 tablet, Rfl: 3    glipizide (GLUCOTROL XL) 5 MG ER tablet, Take 1 tablet by mouth Daily., Disp: 30 tablet, Rfl: 5    Glucose Blood (Blood Glucose Test) strip, Use to check blood sugar daily dx e11.65, Disp: 100 each, Rfl: 3    icosapent ethyl (Vascepa) 1 g capsule capsule, Take 2 capsules by mouth 2 (Two) Times a Day With Meals., Disp: 120 capsule, Rfl: 11    insulin degludec (Tresiba FlexTouch) 100 UNIT/ML solution pen-injector injection, Inject 70 Units under the skin into the appropriate area as directed Every Night. (Max 100 Units Daily), Disp: 30 mL, Rfl: 5    Lancets (onetouch ultrasoft) lancets, TEST TWICE DAILY, Disp: 200 each, Rfl: 1    Lancets 33G misc, Use 1 each Daily. Dx e11.65, Disp: 100 each, Rfl: 3    metFORMIN (GLUCOPHAGE) 500 MG tablet, Take 2 tablets by mouth 2 (Two) Times a Day With Meals., Disp: 120 tablet, Rfl: 5    Multiple Vitamins-Minerals (MULTIVITAMIN ADULT PO), Take 1 tablet by mouth Daily., Disp: , Rfl:     nitroglycerin (NITROSTAT) 0.4 MG SL tablet, Place 1 tablet under the tongue Every 5 (Five) Minutes As Needed for Chest Pain. Take no more than 3 doses in 15 minutes., Disp: 25 tablet, Rfl: 11    rosuvastatin (CRESTOR) 40 MG tablet, Take 1 tablet by mouth Every Night., Disp: 30 tablet, Rfl: 11    sacubitril-valsartan (Entresto) 24-26 MG tablet, Take 1 tablet by mouth Every 12 (Twelve) Hours., Disp: 180 tablet, Rfl: 3    Tirzepatide (Mounjaro) 7.5 MG/0.5ML solution auto-injector, Inject 7.5 mg under the skin into the appropriate area as directed 1 (One) Time Per Week., Disp: 2 mL, Rfl: 5     reports that he quit smoking about 44 years ago. His smoking use included cigarettes. He started smoking about 53 years ago. He has a 20 pack-year smoking history. He has been exposed to tobacco smoke. He has never used smokeless tobacco.      Objective:   Physical exam:  /58 (BP Location:  "Right arm, Patient Position: Sitting)   Pulse 90   Ht 167.6 cm (66\")   Wt 93.9 kg (207 lb)   SpO2 97%   BMI 33.41 kg/m²   CONSTITUTIONAL: No acute distress  RESPIRATORY: Normal effort. Clear to auscultation bilaterally without wheezing or rales  CARDIOVASCULAR: Carotids with normal upstrokes without bruits.  Regular rate and rhythm with normal S1 and S2. Without murmur. Normal radial pulse.     Labs:    Lab Results   Component Value Date    LDL 91 07/20/2018     No components found for: \"LDLDIRECTC\"    OSH 8/2022: LDL 80,     Diagnostic Data:    Procedures    Results for orders placed during the hospital encounter of 12/10/24    Adult Transthoracic Echo Limited W/ Cont if Necessary Per Protocol    Interpretation Summary    This was a limited echocardiogram to reassess left ventricular function only.    Left ventricular systolic function is moderately decreased. Visually estimated left ventricular ejection fraction appears to be 31 - 35%. Calculated left ventricular EF = 34.8%.    Left ventricular wall thickness is consistent with borderline concentric hypertrophy.    Septal wall motion is abnormal, consistent with a bundle branch block.      Assessment and Plan:     Coronary artery disease   Ischemic cardiomyopathy  Essential hypertension  Left bundle branch block  Hyperlipidemia LDL goal <70   -Echo at [facility] 11/2024 with decreased EF 30-35%.   -Repeat echo at [facility] 12/2024 showed LVEF 31-35%.   -Currently without active HF signs or symptoms.   -Recommend continuing LifeVest for now.    -Repeat limited echo with [facility] in February to reassess EF.  If EF improved >35%, would discontinue LifeVest.    -Continue Entresto, Jardiance, carvedilol for HF. Borderline blood pressure, unable to uptitrate HF medications.   -Currently without angina.    -Recommend ischemic evaluation with Lexiscan nuclear stress test.    -Continue Plavix, statin, Vascepa, fenofibrate, beta blocker for CAD.     TIA  -Recent hospital " admission at [facility] 11/15-11/17 for bilateral lower extremity edema.   -MRI without acute ischemia, does show small vessel ischemic disease.   -14 day heart monitor to assess for arrhythmias.     Right leg pain  Restless leg syndrome  Diabetes  -Acceptable noninvasive vascular studies in August 2023  -Possible neuropathy.  Consider EMG/ECV.     - Return in about 6 months (around 7/20/2025) for Next scheduled follow up with Dr. Moctezuma. .    Electronically signed by EDGAR Maguire, 11/25/24, 4:22 PM EST.

## 2025-01-22 ENCOUNTER — SPECIALTY PHARMACY (OUTPATIENT)
Age: 75
End: 2025-01-22
Payer: COMMERCIAL

## 2025-01-22 NOTE — PROGRESS NOTES
" Specialty Pharmacy Patient Management Program  Endocrinology Refill Outreach      Kar \"Savage\" is a 74 y.o. male contacted today regarding refills of his medication(s).    Specialty medication(s) and dose(s) confirmed: Mounjaro.    Refill Questions      Flowsheet Row Most Recent Value   Changes to allergies? No   Changes to medications? No   New conditions or infections since last clinic visit No   Unplanned office visit, urgent care, ED, or hospital admission in the last 4 weeks  No   How does patient/caregiver feel medication is working? Very good   Financial problems or insurance changes  No   Since the previous refill, were any specialty medication doses or scheduled injections missed or delayed?  No   Does this patient require a clinical escalation to a pharmacist? No          Delivery Questions      Flowsheet Row Most Recent Value   Delivery method UPS   Delivery address verified with patient/caregiver? Yes   Delivery address Home   Number of medications in delivery 2   Medication(s) being filled and delivered metFORMIN HCl (GLUCOPHAGE), Tirzepatide (Mounjaro)   Doses left of specialty medications 1   Copay verified? Yes   Copay amount $25   Copay form of payment Credit/debit on file   Ship Date 1/22   Delivery Date Selection 01/23/25   Signature Required No            Follow-Up: 28d    Conrad Valencia CPhT  Pharmacy Care Coordinator, Endocrinology  1/22/2025  12:53 EST                    "

## 2025-01-30 DIAGNOSIS — Z79.4 TYPE 2 DIABETES MELLITUS WITH OTHER CIRCULATORY COMPLICATION, WITH LONG-TERM CURRENT USE OF INSULIN: ICD-10-CM

## 2025-01-30 DIAGNOSIS — E11.59 TYPE 2 DIABETES MELLITUS WITH OTHER CIRCULATORY COMPLICATION, WITH LONG-TERM CURRENT USE OF INSULIN: ICD-10-CM

## 2025-01-30 RX ORDER — DULOXETIN HYDROCHLORIDE 30 MG/1
30 CAPSULE, DELAYED RELEASE ORAL DAILY
Qty: 30 CAPSULE | Refills: 5 | Status: SHIPPED | OUTPATIENT
Start: 2025-01-30 | End: 2026-01-30

## 2025-01-30 NOTE — TELEPHONE ENCOUNTER
Rx Refill Note  Requested Prescriptions     Pending Prescriptions Disp Refills    DULoxetine (CYMBALTA) 30 MG capsule [Pharmacy Med Name: DULOXETINE DR 30MG CAPSULES] 30 capsule      Sig: TAKE 1 CAPSULE BY MOUTH DAILY      Last office visit with prescribing clinician: 1/13/2025     Next office visit with prescribing clinician: 7/14/2025   {Lien Ellington MA  01/30/25, 10:26 EST

## 2025-02-06 ENCOUNTER — SPECIALTY PHARMACY (OUTPATIENT)
Dept: CARDIOLOGY | Facility: CLINIC | Age: 75
End: 2025-02-06
Payer: COMMERCIAL

## 2025-02-06 ENCOUNTER — SPECIALTY PHARMACY (OUTPATIENT)
Dept: GENERAL RADIOLOGY | Facility: HOSPITAL | Age: 75
End: 2025-02-06
Payer: COMMERCIAL

## 2025-02-06 NOTE — PROGRESS NOTES
" Specialty Pharmacy Patient Management Program  [facility] Refill Outreach      Kar \"Savage\" is a 74 y.o. male contacted today regarding refills of his medication(s).    Specialty medication(s) and dose(s) confirmed: Vascepa 2 g PO twice daily.    Other medications being refilled: n/a    Refill Questions      Flowsheet Row Most Recent Value   Changes to allergies? No   Changes to medications? No   New conditions or infections since last clinic visit No   Unplanned office visit, urgent care, ED, or hospital admission in the last 4 weeks  No   How does patient/caregiver feel medication is working? Very good   Financial problems or insurance changes  No   Since the previous refill, were any specialty medication doses or scheduled injections missed or delayed?  No   Does this patient require a clinical escalation to a pharmacist? No            Delivery Questions      Flowsheet Row Most Recent Value   Delivery method UPS   Delivery address verified with patient/caregiver? Yes   Delivery address Home   Number of medications in delivery 1   Medication(s) being filled and delivered Icosapent Ethyl (VASCEPA)   Doses left of specialty medications 7-10 days   Copay verified? Yes   Copay amount 20.00   Copay form of payment Credit/debit on file   Ship Date 2/6/2025   Delivery Date Selection 02/07/25   Signature Required No                   Follow-up: 30 days     Soyna Solorzano CPhT  Pharmacy Care Coordinator  2/6/2025  08:49 EST    "

## 2025-02-12 ENCOUNTER — SPECIALTY PHARMACY (OUTPATIENT)
Dept: GENERAL RADIOLOGY | Facility: HOSPITAL | Age: 75
End: 2025-02-12
Payer: COMMERCIAL

## 2025-02-12 RX ORDER — PEN NEEDLE, DIABETIC 31 GX5/16"
NEEDLE, DISPOSABLE MISCELLANEOUS
Qty: 100 EACH | Refills: 3 | Status: SHIPPED | OUTPATIENT
Start: 2025-02-12

## 2025-02-12 RX ORDER — BLOOD SUGAR DIAGNOSTIC
STRIP MISCELLANEOUS
Qty: 100 EACH | Refills: 1 | Status: SHIPPED | OUTPATIENT
Start: 2025-02-12

## 2025-02-12 NOTE — PROGRESS NOTES
" Specialty Pharmacy Patient Management Program  Endocrinology Refill Outreach      Kar \"Savage\" is a 74 y.o. male contacted today regarding refills of his medication(s).    Specialty medication(s) and dose(s) confirmed: mounjaro 7.5mg weekly    Refill Questions      Flowsheet Row Most Recent Value   Changes to allergies? No   Changes to medications? No   New conditions or infections since last clinic visit No   Unplanned office visit, urgent care, ED, or hospital admission in the last 4 weeks  No   How does patient/caregiver feel medication is working? Excellent   Financial problems or insurance changes  No   Since the previous refill, were any specialty medication doses or scheduled injections missed or delayed?  No   Does this patient require a clinical escalation to a pharmacist? No          Delivery Questions      Flowsheet Row Most Recent Value   Delivery method UPS   Delivery address verified with patient/caregiver? Yes   Delivery address Home   Number of medications in delivery 3   Medication(s) being filled and delivered Tirzepatide (Mounjaro), Glucose Blood (OneTouch Ultra Test), Insulin Pen Needle (B-D ULTRAFINE III SHORT PEN)   Doses left of specialty medications 2   Copay verified? Yes   Copay amount $25   Copay form of payment Credit/debit on file   Ship Date 02/12/2025   Delivery Date Selection 02/13/25   Signature Required No            Follow-Up: 28 days  Clinton Castillo, DameonD  Clinical Specialty Pharmacist, Endocrinology  2/12/2025  10:51 EST    "

## 2025-02-12 NOTE — PROGRESS NOTES
Specialty Pharmacy Patient Management Program  Per Protocol Prescription Order/Refill     Patient currently fills medications at [facility] and is enrolled in an Endocrinology Patient Management Program.     Requested Prescriptions     Pending Prescriptions Disp Refills    glucose blood (OneTouch Ultra Test) test strip 100 each 1     Sig: Use as instructed testing twice daily daily    B-D ULTRAFINE III SHORT PEN 31G X 8 MM misc 100 each 3     Sig: FOR DAILY USE; USE AS DIRECTED     Prescription orders above were sent to the pharmacy per Collaborative Care Agreement Protocol.     Clinton Castillo, PharmD  Clinical Specialty Pharmacist, Endocrinology  2/12/2025  10:43 EST

## 2025-02-20 ENCOUNTER — SPECIALTY PHARMACY (OUTPATIENT)
Age: 75
End: 2025-02-20
Payer: COMMERCIAL

## 2025-02-20 NOTE — PROGRESS NOTES
" Specialty Pharmacy Patient Management Program  Endocrinology Refill Outreach      Kar \"Savage\" is a 74 y.o. male contacted today regarding refills of his medication(s).    Specialty medication(s) and dose(s) confirmed: Tresiba.    Refill Questions      Flowsheet Row Most Recent Value   Changes to allergies? No   Changes to medications? No   New conditions or infections since last clinic visit No   Unplanned office visit, urgent care, ED, or hospital admission in the last 4 weeks  No   How does patient/caregiver feel medication is working? Very good   Financial problems or insurance changes  No   Since the previous refill, were any specialty medication doses or scheduled injections missed or delayed?  No   Does this patient require a clinical escalation to a pharmacist? No          Delivery Questions      Flowsheet Row Most Recent Value   Delivery method FedEx   Delivery address verified with patient/caregiver? Yes   Delivery address Home   Number of medications in delivery 6   Medication(s) being filled and delivered Carvedilol (COREG), Clopidogrel Bisulfate (PLAVIX), Fenofibrate, glipiZIDE (GLUCOTROL XL), metFORMIN HCl (GLUCOPHAGE), Insulin Degludec (Tresiba FlexTouch)   Doses left of specialty medications 1   Copay verified? Yes   Copay amount $86.06   Copay form of payment Credit/debit on file   Delivery Date Selection 02/21/25   Signature Required No            Follow-Up: kevin Valencia CPhT  Pharmacy Care Coordinator, Endocrinology  2/20/2025  12:44 EST                    "

## 2025-02-24 RX ORDER — LANCETS
1 EACH MISCELLANEOUS 2 TIMES DAILY
Qty: 200 EACH | Refills: 1 | Status: SHIPPED | OUTPATIENT
Start: 2025-02-24

## 2025-02-24 NOTE — TELEPHONE ENCOUNTER
Rx Refill Note  Requested Prescriptions     Pending Prescriptions Disp Refills    Lancets (onetouch ultrasoft) lancets 200 each 1     Sig: TEST TWICE DAILY      Last office visit with prescribing clinician: 1/13/2025      Next office visit with prescribing clinician: 7/14/2025       Cary Luciano MA  02/24/25, 08:26 EST

## 2025-03-03 ENCOUNTER — HOSPITAL ENCOUNTER (OUTPATIENT)
Facility: HOSPITAL | Age: 75
Discharge: HOME | End: 2025-03-03
Payer: COMMERCIAL

## 2025-03-03 ENCOUNTER — SPECIALTY PHARMACY (OUTPATIENT)
Age: 75
End: 2025-03-03
Payer: COMMERCIAL

## 2025-03-03 VITALS — HEIGHT: 66 IN | WEIGHT: 207 LBS | BODY MASS INDEX: 33.27 KG/M2

## 2025-03-03 DIAGNOSIS — I50.22 CHRONIC HFREF (HEART FAILURE WITH REDUCED EJECTION FRACTION): ICD-10-CM

## 2025-03-03 DIAGNOSIS — I25.10 CORONARY ARTERY DISEASE INVOLVING NATIVE CORONARY ARTERY OF NATIVE HEART WITHOUT ANGINA PECTORIS: ICD-10-CM

## 2025-03-03 LAB
BH CV ECHO MEAS - AO ROOT DIAM: 3.4 CM
BH CV ECHO MEAS - EDV(CUBED): 216 ML
BH CV ECHO MEAS - EDV(MOD-SP2): 127 ML
BH CV ECHO MEAS - EDV(MOD-SP4): 189 ML
BH CV ECHO MEAS - EF(MOD-SP2): 40.1 %
BH CV ECHO MEAS - EF(MOD-SP4): 45 %
BH CV ECHO MEAS - ESV(CUBED): 117.6 ML
BH CV ECHO MEAS - ESV(MOD-SP2): 76.1 ML
BH CV ECHO MEAS - ESV(MOD-SP4): 104 ML
BH CV ECHO MEAS - FS: 18.3 %
BH CV ECHO MEAS - IVS/LVPW: 0.91 CM
BH CV ECHO MEAS - IVSD: 1 CM
BH CV ECHO MEAS - LA DIMENSION: 4.4 CM
BH CV ECHO MEAS - LV DIASTOLIC VOL/BSA (35-75): 93.4 CM2
BH CV ECHO MEAS - LV MASS(C)D: 263 GRAMS
BH CV ECHO MEAS - LV SYSTOLIC VOL/BSA (12-30): 51.4 CM2
BH CV ECHO MEAS - LVIDD: 6 CM
BH CV ECHO MEAS - LVIDS: 4.9 CM
BH CV ECHO MEAS - LVOT AREA: 3.1 CM2
BH CV ECHO MEAS - LVOT DIAM: 2 CM
BH CV ECHO MEAS - LVPWD: 1.1 CM
BH CV ECHO MEAS - SV(MOD-SP2): 50.9 ML
BH CV ECHO MEAS - SV(MOD-SP4): 85 ML
BH CV ECHO MEAS - SVI(MOD-SP2): 25.1 ML/M2
BH CV ECHO MEAS - SVI(MOD-SP4): 42 ML/M2
BH CV REST NUCLEAR ISOTOPE DOSE: 9.9 MCI
BH CV STRESS BP STAGE 2: NORMAL
BH CV STRESS BP STAGE 4: NORMAL
BH CV STRESS COMMENTS STAGE 1: NORMAL
BH CV STRESS DOSE REGADENOSON STAGE 1: 0.4
BH CV STRESS DURATION MIN STAGE 1: 1
BH CV STRESS DURATION MIN STAGE 2: 1
BH CV STRESS DURATION MIN STAGE 3: 1
BH CV STRESS DURATION MIN STAGE 4: 1
BH CV STRESS DURATION SEC STAGE 1: 10
BH CV STRESS DURATION SEC STAGE 2: 0
BH CV STRESS DURATION SEC STAGE 3: 0
BH CV STRESS DURATION SEC STAGE 4: 0
BH CV STRESS HR STAGE 1: 79
BH CV STRESS HR STAGE 2: 100
BH CV STRESS HR STAGE 3: 100
BH CV STRESS HR STAGE 4: 97
BH CV STRESS NUCLEAR ISOTOPE DOSE: 30.1 MCI
BH CV STRESS O2 STAGE 1: 95
BH CV STRESS O2 STAGE 2: 95
BH CV STRESS O2 STAGE 3: 98
BH CV STRESS O2 STAGE 4: 97
BH CV STRESS PROTOCOL 1: NORMAL
BH CV STRESS RECOVERY BP: NORMAL MMHG
BH CV STRESS RECOVERY HR: 89 BPM
BH CV STRESS RECOVERY O2: 96 %
BH CV STRESS STAGE 1: 1
BH CV STRESS STAGE 2: 2
BH CV STRESS STAGE 3: 3
BH CV STRESS STAGE 4: 4
BH CV XLRA - RV BASE: 4 CM
BH CV XLRA - RV LENGTH: 8.9 CM
BH CV XLRA - RV MID: 3.7 CM
LEFT ATRIUM VOLUME INDEX: 25.5 ML/M2
LV EF BIPLANE MOD: 40 %
MAXIMAL PREDICTED HEART RATE: 147 BPM
PERCENT MAX PREDICTED HR: 69.39 %
SPECT HRT GATED+EF W RNC IV: 45 %
STRESS BASELINE BP: NORMAL MMHG
STRESS BASELINE HR: 79 BPM
STRESS O2 SAT REST: 96 %
STRESS PERCENT HR: 82 %
STRESS POST ESTIMATED WORKLOAD: 1 METS
STRESS POST EXERCISE DUR MIN: 4 MIN
STRESS POST EXERCISE DUR SEC: 0 SEC
STRESS POST O2 SAT PEAK: 97 %
STRESS POST PEAK BP: NORMAL MMHG
STRESS POST PEAK HR: 102 BPM
STRESS TARGET HR: 125 BPM

## 2025-03-03 PROCEDURE — 93017 CV STRESS TEST TRACING ONLY: CPT

## 2025-03-03 PROCEDURE — 93308 TTE F-UP OR LMTD: CPT

## 2025-03-03 PROCEDURE — 25010000002 SULFUR HEXAFLUORIDE MICROSPH 60.7-25 MG RECONSTITUTED SUSPENSION: Performed by: HOSPITALIST

## 2025-03-03 PROCEDURE — 93018 CV STRESS TEST I&R ONLY: CPT | Performed by: INTERNAL MEDICINE

## 2025-03-03 PROCEDURE — 93308 TTE F-UP OR LMTD: CPT | Performed by: INTERNAL MEDICINE

## 2025-03-03 PROCEDURE — 78452 HT MUSCLE IMAGE SPECT MULT: CPT | Performed by: INTERNAL MEDICINE

## 2025-03-03 PROCEDURE — 93016 CV STRESS TEST SUPVJ ONLY: CPT | Performed by: INTERNAL MEDICINE

## 2025-03-03 PROCEDURE — 34310000005 TECHNETIUM SESTAMIBI: Performed by: HOSPITALIST

## 2025-03-03 PROCEDURE — A9500 TC99M SESTAMIBI: HCPCS | Performed by: HOSPITALIST

## 2025-03-03 PROCEDURE — 78452 HT MUSCLE IMAGE SPECT MULT: CPT

## 2025-03-03 PROCEDURE — 25010000002 REGADENOSON 0.4 MG/5ML SOLUTION: Performed by: HOSPITALIST

## 2025-03-03 RX ORDER — REGADENOSON 0.08 MG/ML
0.4 INJECTION, SOLUTION INTRAVENOUS ONCE
Status: COMPLETED | OUTPATIENT
Start: 2025-03-03 | End: 2025-03-03

## 2025-03-03 RX ADMIN — SULFUR HEXAFLUORIDE 2 ML: KIT at 11:20

## 2025-03-03 RX ADMIN — REGADENOSON 0.4 MG: 0.08 INJECTION INTRAVENOUS at 12:28

## 2025-03-03 RX ADMIN — TECHNETIUM TC 99M SESTAMIBI 1 DOSE: 1 INJECTION INTRAVENOUS at 11:00

## 2025-03-03 RX ADMIN — TECHNETIUM TC 99M SESTAMIBI 1 DOSE: 1 INJECTION INTRAVENOUS at 12:30

## 2025-03-03 NOTE — PROGRESS NOTES
Specialty Pharmacy Patient Management Program  Endocrinology Refill Outreach      Kar is a 74 y.o. male contacted today regarding refills of his medication(s).    Specialty medication(s) and dose(s) confirmed: none.    Refill Questions      Flowsheet Row Most Recent Value   Changes to allergies? No   Changes to medications? No   New conditions or infections since last clinic visit No   Unplanned office visit, urgent care, ED, or hospital admission in the last 4 weeks  No   How does patient/caregiver feel medication is working? Very good   Financial problems or insurance changes  No   Since the previous refill, were any specialty medication doses or scheduled injections missed or delayed?  No   Does this patient require a clinical escalation to a pharmacist? No          Delivery Questions      Flowsheet Row Most Recent Value   Delivery method FedEx   Delivery address verified with patient/caregiver? Yes   Delivery address Home   Number of medications in delivery 2   Medication(s) being filled and delivered Sacubitril-Valsartan (Entresto), Lancets (onetouch ultrasoft)   Doses left of specialty medications 1   Copay verified? Yes   Copay amount $25   Copay form of payment Credit/debit on file   Delivery Date Selection 03/04/25   Signature Required No            Follow-Up: kevin Valencia CPhT  Pharmacy Care Coordinator, Endocrinology  3/3/2025  11:47 EST

## 2025-03-04 ENCOUNTER — TELEPHONE (OUTPATIENT)
Dept: CARDIOLOGY | Facility: CLINIC | Age: 75
End: 2025-03-04
Payer: COMMERCIAL

## 2025-03-04 ENCOUNTER — SPECIALTY PHARMACY (OUTPATIENT)
Dept: CARDIOLOGY | Facility: CLINIC | Age: 75
End: 2025-03-04
Payer: COMMERCIAL

## 2025-03-04 NOTE — TELEPHONE ENCOUNTER
----- Message from Chasity Saucedo sent at 3/4/2025  4:01 PM EST -----  Can you let this patient know that Dr. Moctezuma reviewed his stress test and echo?  Mild ischemia, mostly infarct on stress test.  Recommend continuing current plan without heart cath for now as long as he is not having anginal symptoms.  Echo also shows EF better.  Okay to discontinue LifeVest.  Thank you.

## 2025-03-04 NOTE — TELEPHONE ENCOUNTER
Pt notified of results. Pt agreeable to letting us know if he has any issues. Pt has no further questions at this time.

## 2025-03-04 NOTE — PROGRESS NOTES
Specialty Pharmacy Patient Management Program  Cardiology Specialty Pharmacy Refill Outreach      Kar is a 74 y.o. male contacted today regarding refills of his medication(s).    Specialty medication(s) and dose(s) confirmed: Vascepa 2 g PO twice daily.    Other medications being refilled: n/a    Refill Questions      Flowsheet Row Most Recent Value   Changes to allergies? No   Changes to medications? No   New conditions or infections since last clinic visit No   Unplanned office visit, urgent care, ED, or hospital admission in the last 4 weeks  No   How does patient/caregiver feel medication is working? Very good   Financial problems or insurance changes  No   Since the previous refill, were any specialty medication doses or scheduled injections missed or delayed?  No   Does this patient require a clinical escalation to a pharmacist? No            Delivery Questions      Flowsheet Row Most Recent Value   Delivery method UPS   Delivery address Home   Other address preferred n/a   Number of medications in delivery 1   Medication(s) being filled and delivered Icosapent Ethyl (VASCEPA)   Doses left of specialty medications 3-5 days per spouse   Copay verified? Yes   Copay amount 20.00   Copay form of payment Credit/debit on file   Delivery Date Selection 03/06/25   Signature Required No                   Follow-up: 30 days     Sonya Solorzano CPhT  Pharmacy Care Coordinator  3/4/2025  16:17 EST

## 2025-03-05 NOTE — PROGRESS NOTES
Specialty Pharmacy Patient Management Program  Cardiology Reassessment     Kar Mora was referred by a Cardiology provider to the Cardiology Patient Management program offered by [facility] for Hyperlipidemia. A follow-up outreach was conducted, including assessment of continued therapy appropriateness, medication adherence, and side effect incidence and management for Vascepa.    Changes to Insurance Coverage or Financial Support  No changes    Relevant Past Medical History and Comorbidities  Relevant medical history and concomitant health conditions were discussed with the patient. The patient's chart has been reviewed for relevant past medical history and comorbid health conditions and updated as necessary.   Past Medical History:   Diagnosis Date    Abnormal ECG     Allergic rhinitis     Anesthesia     pt says he has woken up during surgery    Chest pain     Congenital heart disease     Coronary artery disease     Dyslipidemia     Erectile dysfunction     GERD (gastroesophageal reflux disease)     Gout     Hypercholesterolemia     Hyperlipidemia     Hypertensive disorder     Impotence of organic origin     Insulin dependent type 2 diabetes mellitus     checks fsbg every am, a1c checked every 6 months, 7.2 in may 2018     Kidney stones     Labile hypertension 10/24/2016    Myocardial infarct     Type 2 diabetes mellitus     Wears glasses      Social History     Socioeconomic History    Marital status:     Number of children: 2   Tobacco Use    Smoking status: Former     Current packs/day: 0.00     Average packs/day: 2.0 packs/day for 10.0 years (20.0 ttl pk-yrs)     Types: Cigarettes     Start date: 1972     Quit date: 1981     Years since quittin.2     Passive exposure: Past    Smokeless tobacco: Never    Tobacco comments:     quit 35 years ago   Vaping Use    Vaping status: Never Used   Substance and Sexual Activity    Alcohol use: No    Drug use:  No    Sexual activity: Yes     Partners: Female     Birth control/protection: None     Problem list reviewed by Maylin Raines, PharmD on 3/5/2025 at  1:09 PM    Hospitalizations and Urgent Care Since Last Assessment  ED Visits, Admissions, or Hospitalizations: None  Urgent Office Visits: None    Allergies  Known allergies and reactions were discussed with the patient. The patient's chart has been reviewed for allergy information and updated as necessary.   Allergies   Allergen Reactions    Asa [Aspirin] Other (See Comments)      upper airway congestion     Allergies reviewed by Maylin Raines, PharmD on 3/5/2025 at  1:09 PM    Relevant Laboratory Values  Relevant laboratory values were discussed with the patient. The following specialty medication dose adjustment(s) are recommended: No changes; last triglycerides at [facility] were 253 mg/dL Nov. 2024    Lab Results   Component Value Date    GLUCOSE 101 (H) 10/29/2018    CALCIUM 8.9 10/29/2018     10/29/2018    K 4.5 10/29/2018    CO2 28.0 10/29/2018     10/29/2018    BUN 35 (H) 10/29/2018    CREATININE 1.49 (H) 10/29/2018    EGFRIFNONA 47 (L) 10/29/2018    BCR 23.5 10/29/2018    ANIONGAP 9.0 10/29/2018     Lab Results   Component Value Date    CHOL 169 07/20/2018    TRIG 385 (H) 07/20/2018    HDL 27 (L) 07/20/2018    LDL 91 07/20/2018     Microalbumin          1/13/2025    14:12   Microalbumin   Microalbumin, Urine <1.2      Current Medication List  This medication list has been reviewed with the patient and evaluated for any interactions or necessary modifications/recommendations, and updated to include all prescription medications, OTC medications, and supplements the patient is currently taking.  This list reflects what is contained in the patient's profile, which has also been marked as reviewed to communicate to other providers it is the most up to date version of the patient's current medication therapy.     Current Outpatient  Medications:     B-D ULTRAFINE III SHORT PEN 31G X 8 MM misc, Use 1 pen needle as Directed by provider Daily, Disp: 100 each, Rfl: 3    Blood Glucose Monitoring Suppl device, Use as directed to check blood sugar please dispense insurance preferred, Disp: 1 each, Rfl: 0    carvedilol (COREG) 25 MG tablet, Take 1 tablet by mouth 2 (Two) Times a Day., Disp: 180 tablet, Rfl: 3    clopidogrel (PLAVIX) 75 MG tablet, Take 1 tablet by mouth Daily., Disp: 90 tablet, Rfl: 3    DULoxetine (CYMBALTA) 30 MG capsule, TAKE 1 CAPSULE BY MOUTH DAILY, Disp: 30 capsule, Rfl: 5    empagliflozin (Jardiance) 25 MG tablet tablet, Take 1 tablet by mouth Daily., Disp: 30 tablet, Rfl: 5    fenofibrate 160 MG tablet, Take 1 tablet by mouth Daily., Disp: 90 tablet, Rfl: 3    glipizide (GLUCOTROL XL) 5 MG ER tablet, Take 1 tablet by mouth Daily., Disp: 30 tablet, Rfl: 5    glucose blood (OneTouch Ultra Test) test strip, Use as instructed to test 2 (Two) Times a Day, Disp: 100 each, Rfl: 1    icosapent ethyl (Vascepa) 1 g capsule capsule, Take 2 capsules by mouth 2 (Two) Times a Day With Meals., Disp: 120 capsule, Rfl: 11    insulin degludec (Tresiba FlexTouch) 100 UNIT/ML solution pen-injector injection, Inject 70 Units under the skin into the appropriate area as directed Every Night. (Max 100 Units Daily), Disp: 30 mL, Rfl: 5    Lancets (onetouch ultrasoft) lancets, Use 1 lancet to test blood sugar 2 (Two) Times a Day., Disp: 200 each, Rfl: 1    Lancets 33G misc, Use 1 each Daily. Dx e11.65, Disp: 100 each, Rfl: 3    metFORMIN (GLUCOPHAGE) 500 MG tablet, Take 2 tablets by mouth 2 (Two) Times a Day With Meals., Disp: 120 tablet, Rfl: 5    Multiple Vitamins-Minerals (MULTIVITAMIN ADULT PO), Take 1 tablet by mouth Daily., Disp: , Rfl:     nitroglycerin (NITROSTAT) 0.4 MG SL tablet, Place 1 tablet under the tongue Every 5 (Five) Minutes As Needed for Chest Pain. Take no more than 3 doses in 15 minutes., Disp: 25 tablet, Rfl: 11    rosuvastatin  (CRESTOR) 40 MG tablet, Take 1 tablet by mouth Every Night., Disp: 30 tablet, Rfl: 11    sacubitril-valsartan (Entresto) 24-26 MG tablet, Take 1 tablet by mouth Every 12 (Twelve) Hours., Disp: 180 tablet, Rfl: 3    Tirzepatide (Mounjaro) 7.5 MG/0.5ML solution auto-injector, Inject 7.5 mg under the skin into the appropriate area as directed 1 (One) Time Per Week., Disp: 2 mL, Rfl: 5  No current facility-administered medications for this visit.    Medicines reviewed by Maylin Raines, PharmD on 3/5/2025 at  1:09 PM    Drug Interactions  None    Adverse Drug Reactions  Medication tolerability: Tolerating with no to minimal ADRs  Medication plan: Continue therapy with normal follow-up  Plan for ADR Management: N/A    Adherence, Self-Administration, and Current Therapy Problems  Adherence related to the patient's specialty therapy was discussed with the patient. The Adherence segment of this outreach has been reviewed and updated.     Adherence Questions  Linked Medication(s) Assessed: Icosapent Ethyl (VASCEPA)  On average, how many doses/injections does the patient miss per month?: 0  What are the identified reasons for non-adherence or missed doses? : no problems identified  What is the estimated medication adherence level?: %  Based on the patient/caregiver response and refill history, does this patient require an MTP to track adherence improvements?: no    Additional Barriers to Patient Self-Administration: N/A  Methods for Supporting Patient Self-Administration: N/A    Open Medication Therapy Problems  No medication therapy recommendations to display    Goals of Therapy  Goals related to the patient's specialty therapy were discussed with the patient. The Patient Goals segment of this outreach has been reviewed and updated.   Goals Addressed Today        Specialty Pharmacy General Goal      TGY < 150 mg/dL    Lab Results   Component Value Date    TRIG 385 (H) 07/20/2018     3/5/25: TGY from [facility] drawn on 11/16/24 were down to 253 mg/dL. Continue Vascepa and recommend a recheck at next assessment if one hasn't been drawn yet.               Quality of Life Assessment   Quality of Life related to the patient's enrollment in the patient management program and services provided was discussed with the patient. The QOL segment of this outreach has been reviewed and updated.  Quality of Life Improvement Scale: 9-A good deal better    Reassessment Plan & Follow-Up  1. Medication Therapy Changes: Continue Vascepa 2g PO twice daily with meals  2. Related Plans, Therapy Recommendations, or Issues to Be Addressed: None  3. Pharmacist to perform regular assessments no more than (6) months from the previous assessment.  4. Care Coordinator to set up future refill outreaches, coordinate prescription delivery, and escalate clinical questions to pharmacist.    Attestation  Therapeutic appropriateness: Appropriate   I attest the patient was actively involved in and has agreed to the above plan of care.  If the prescribed therapy is at any point deemed not appropriate based on the current or future assessments, a consultation will be initiated with the patient's specialty care provider to determine the best course of action. The revised plan of therapy will be documented along with any required assessments and/or additional patient education provided.     Maylin Raines, PharmD, BCPS  Clinical Specialty Pharmacist, Cardiology  3/5/2025  13:11 EST

## 2025-03-25 ENCOUNTER — SPECIALTY PHARMACY (OUTPATIENT)
Age: 75
End: 2025-03-25
Payer: COMMERCIAL

## 2025-03-25 NOTE — PROGRESS NOTES
Specialty Pharmacy Patient Management Program  Endocrinology Refill Outreach      Kar is a 74 y.o. male contacted today regarding refills of his medication(s).    Specialty medication(s) and dose(s) confirmed: Jardiance.    Refill Questions      Flowsheet Row Most Recent Value   Changes to allergies? No   Changes to medications? No   New conditions or infections since last clinic visit No   Unplanned office visit, urgent care, ED, or hospital admission in the last 4 weeks  No   How does patient/caregiver feel medication is working? Very good   Financial problems or insurance changes  No   Since the previous refill, were any specialty medication doses or scheduled injections missed or delayed?  No   Does this patient require a clinical escalation to a pharmacist? No          Delivery Questions      Flowsheet Row Most Recent Value   Delivery method [employer]   Delivery address verified with patient/caregiver? Yes   Delivery address Home   Number of medications in delivery 1   Medication(s) being filled and delivered Empagliflozin (JARDIANCE)   Doses left of specialty medications 1   Copay verified? Yes   Copay amount $25   Copay form of payment Credit/debit on file   Delivery Date Selection 03/26/25   Signature Required No            Follow-Up: 30d    Conrad Valencia CPhT  Pharmacy Care Coordinator, Endocrinology  3/25/2025  10:49 EDT

## 2025-04-01 ENCOUNTER — SPECIALTY PHARMACY (OUTPATIENT)
Dept: CARDIOLOGY | Facility: CLINIC | Age: 75
End: 2025-04-01
Payer: COMMERCIAL

## 2025-04-14 ENCOUNTER — SPECIALTY PHARMACY (OUTPATIENT)
Age: 75
End: 2025-04-14
Payer: COMMERCIAL

## 2025-04-14 NOTE — PROGRESS NOTES
Specialty Pharmacy Patient Management Program  Endocrinology Refill Outreach      Kar is a 74 y.o. male contacted today regarding refills of his medication(s).    Specialty medication(s) and dose(s) confirmed: Mounjaro, Vascepa    Refill Questions      Flowsheet Row Most Recent Value   Changes to allergies? No   Changes to medications? No   New conditions or infections since last clinic visit No   Unplanned office visit, urgent care, ED, or hospital admission in the last 4 weeks  No   How does patient/caregiver feel medication is working? Very good   Financial problems or insurance changes  No   Since the previous refill, were any specialty medication doses or scheduled injections missed or delayed?  No   Does this patient require a clinical escalation to a pharmacist? No          Delivery Questions      Flowsheet Row Most Recent Value   Delivery method UPS   Delivery address verified with patient/caregiver? Yes   Delivery address Home   Number of medications in delivery 2   Medication(s) being filled and delivered Icosapent Ethyl (VASCEPA), Tirzepatide (Mounjaro)   Doses left of specialty medications 1   Copay verified? Yes   Copay amount $45   Copay form of payment Credit/debit on file   Delivery Date Selection 04/15/25   Signature Required No            Follow-Up: 28d    Conrad Valencia CPhT  Pharmacy Care Coordinator, Endocrinology  4/14/2025  08:45 EDT

## 2025-04-22 ENCOUNTER — SPECIALTY PHARMACY (OUTPATIENT)
Age: 75
End: 2025-04-22
Payer: COMMERCIAL

## 2025-05-07 ENCOUNTER — SPECIALTY PHARMACY (OUTPATIENT)
Age: 75
End: 2025-05-07
Payer: COMMERCIAL

## 2025-05-07 NOTE — PROGRESS NOTES
Specialty Pharmacy Patient Management Program  Endocrinology Refill Outreach      Kar is a 74 y.o. male contacted today regarding refills of his medication(s).    Specialty medication(s) and dose(s) confirmed: None.    Refill Questions      Flowsheet Row Most Recent Value   Changes to allergies? No   Changes to medications? No   New conditions or infections since last clinic visit No   Unplanned office visit, urgent care, ED, or hospital admission in the last 4 weeks  No   How does patient/caregiver feel medication is working? Very good   Financial problems or insurance changes  No   Since the previous refill, were any specialty medication doses or scheduled injections missed or delayed?  No   Does this patient require a clinical escalation to a pharmacist? No          Delivery Questions      Flowsheet Row Most Recent Value   Delivery method UPS   Delivery address verified with patient/caregiver? Yes   Delivery address Home   Number of medications in delivery 1   Medication(s) being filled and delivered Rosuvastatin Calcium (CRESTOR)   Doses left of specialty medications 1   Copay verified? Yes   Copay amount $18.64   Copay form of payment Credit/debit on file   Delivery Date Selection 05/08/25   Signature Required No   Do you consent to receive electronic handouts?  Yes            Follow-Up: kevin Valencia CPhT  Pharmacy Care Coordinator, Endocrinology  5/7/2025  08:15 EDT

## 2025-05-13 ENCOUNTER — SPECIALTY PHARMACY (OUTPATIENT)
Age: 75
End: 2025-05-13
Payer: COMMERCIAL

## 2025-05-13 NOTE — PROGRESS NOTES
Specialty Pharmacy Patient Management Program  Endocrinology Refill Outreach      Kar is a 74 y.o. male contacted today regarding refills of his medication(s).    Specialty medication(s) and dose(s) confirmed: Mounjaro.    Refill Questions      Flowsheet Row Most Recent Value   Changes to allergies? No   Changes to medications? No   New conditions or infections since last clinic visit No   Unplanned office visit, urgent care, ED, or hospital admission in the last 4 weeks  No   How does patient/caregiver feel medication is working? Very good   Financial problems or insurance changes  No   Since the previous refill, were any specialty medication doses or scheduled injections missed or delayed?  No   Does this patient require a clinical escalation to a pharmacist? No          Delivery Questions      Flowsheet Row Most Recent Value   Delivery method UPS   Delivery address verified with patient/caregiver? Yes   Delivery address Home   Number of medications in delivery 1   Medication(s) being filled and delivered Tirzepatide (Mounjaro)   Doses left of specialty medications 1   Copay verified? Yes   Copay amount $25   Copay form of payment Credit/debit on file   Delivery Date Selection 05/14/25   Signature Required No   Do you consent to receive electronic handouts?  Yes            Follow-Up: 28d    Conrad Valencia CPhT  Pharmacy Care Coordinator, Endocrinology  5/13/2025  10:12 EDT

## 2025-05-19 ENCOUNTER — SPECIALTY PHARMACY (OUTPATIENT)
Age: 75
End: 2025-05-19
Payer: COMMERCIAL

## 2025-05-19 NOTE — PROGRESS NOTES
Specialty Pharmacy Patient Management Program  Endocrinology Refill Outreach      aKr is a 74 y.o. male contacted today regarding refills of his medication(s).    Specialty medication(s) and dose(s) confirmed: Vascepa.    Refill Questions      Flowsheet Row Most Recent Value   Changes to allergies? No   Changes to medications? No   New conditions or infections since last clinic visit No   Unplanned office visit, urgent care, ED, or hospital admission in the last 4 weeks  No   How does patient/caregiver feel medication is working? Very good   Financial problems or insurance changes  No   Since the previous refill, were any specialty medication doses or scheduled injections missed or delayed?  No   Does this patient require a clinical escalation to a pharmacist? No          Delivery Questions      Flowsheet Row Most Recent Value   Delivery method UPS   Delivery address verified with patient/caregiver? Yes   Delivery address Home   Number of medications in delivery 1   Medication(s) being filled and delivered Icosapent Ethyl (VASCEPA), Insulin Pen Needle (B-D ULTRAFINE III SHORT PEN)   Doses left of specialty medications 1   Copay verified? Yes   Copay amount $20   Copay form of payment Credit/debit on file   Delivery Date Selection 05/20/25   Signature Required No   Do you consent to receive electronic handouts?  Yes            Follow-Up: 30d    Conrad Valencia CPhT  Pharmacy Care Coordinator, Endocrinology  5/19/2025  09:43 EDT

## 2025-05-21 ENCOUNTER — TELEPHONE (OUTPATIENT)
Dept: ENDOCRINOLOGY | Facility: CLINIC | Age: 75
End: 2025-05-21
Payer: COMMERCIAL

## 2025-05-21 NOTE — TELEPHONE ENCOUNTER
PATIENT STATES HE MISSED A CALL HE THINKS FROM [facility]. HE IS RETURNING OUR CALL. PHONE NUMBER [phone]

## 2025-05-22 ENCOUNTER — SPECIALTY PHARMACY (OUTPATIENT)
Age: 75
End: 2025-05-22
Payer: COMMERCIAL

## 2025-05-22 NOTE — PROGRESS NOTES
Specialty Pharmacy Patient Management Program  Endocrinology Refill Outreach      Kar is a 74 y.o. male contacted today regarding refills of his medication(s).    Specialty medication(s) and dose(s) confirmed: Jardiance.    Refill Questions      Flowsheet Row Most Recent Value   Changes to allergies? No   Changes to medications? No   New conditions or infections since last clinic visit No   Unplanned office visit, urgent care, ED, or hospital admission in the last 4 weeks  No   How does patient/caregiver feel medication is working? Very good   Financial problems or insurance changes  No   Since the previous refill, were any specialty medication doses or scheduled injections missed or delayed?  No   Does this patient require a clinical escalation to a pharmacist? No          Delivery Questions      Flowsheet Row Most Recent Value   Delivery method [employer]Ex   Delivery address verified with patient/caregiver? Yes   Delivery address Home   Number of medications in delivery 7   Medication(s) being filled and delivered Carvedilol (COREG), Clopidogrel Bisulfate (PLAVIX), Fenofibrate, glipiZIDE (GLUCOTROL XL), Empagliflozin (JARDIANCE), Insulin Degludec (Tresiba FlexTouch), metFORMIN HCl (GLUCOPHAGE)   Doses left of specialty medications 1   Copay verified? Yes   Copay amount $61.09   Copay form of payment Credit/debit on file   Delivery Date Selection 05/23/25   Signature Required No   Do you consent to receive electronic handouts?  Yes            Follow-Up: 30d    Conrad Valencia CPhT  Pharmacy Care Coordinator, Endocrinology  5/22/2025  10:11 EDT

## 2025-06-02 ENCOUNTER — SPECIALTY PHARMACY (OUTPATIENT)
Age: 75
End: 2025-06-02
Payer: COMMERCIAL

## 2025-06-02 NOTE — PROGRESS NOTES
Specialty Pharmacy Patient Management Program  Endocrinology Refill Outreach      Kar is a 74 y.o. male contacted today regarding refills of his medication(s).    Specialty medication(s) and dose(s) confirmed: None.    Refill Questions      Flowsheet Row Most Recent Value   Changes to allergies? No   Changes to medications? No   New conditions or infections since last clinic visit No   Unplanned office visit, urgent care, ED, or hospital admission in the last 4 weeks  No   How does patient/caregiver feel medication is working? Very good   Financial problems or insurance changes  No   Since the previous refill, were any specialty medication doses or scheduled injections missed or delayed?  No   Does this patient require a clinical escalation to a pharmacist? No          Delivery Questions      Flowsheet Row Most Recent Value   Delivery method UPS   Delivery address verified with patient/caregiver? Yes   Delivery address Home   Number of medications in delivery 1   Medication(s) being filled and delivered Sacubitril-Valsartan (Entresto)   Doses left of specialty medications 1   Copay verified? Yes   Copay amount $50   Copay form of payment Credit/debit on file   Delivery Date Selection 06/03/25   Signature Required No   Do you consent to receive electronic handouts?  Yes            Follow-Up: kevin Valencia CPhT  Pharmacy Care Coordinator, Endocrinology  6/2/2025  14:06 EDT

## 2025-06-10 ENCOUNTER — SPECIALTY PHARMACY (OUTPATIENT)
Age: 75
End: 2025-06-10
Payer: COMMERCIAL

## 2025-06-10 ENCOUNTER — TELEPHONE (OUTPATIENT)
Age: 75
End: 2025-06-10

## 2025-06-10 NOTE — PROGRESS NOTES
Specialty Pharmacy Patient Management Program  Endocrinology Refill Outreach      Kar is a 74 y.o. male contacted today regarding refills of his medication(s).    Specialty medication(s) and dose(s) confirmed: Mounjaro.    Refill Questions      Flowsheet Row Most Recent Value   Changes to allergies? No   Changes to medications? No   New conditions or infections since last clinic visit No   Unplanned office visit, urgent care, ED, or hospital admission in the last 4 weeks  No   How does patient/caregiver feel medication is working? Very good   Financial problems or insurance changes  No   Since the previous refill, were any specialty medication doses or scheduled injections missed or delayed?  No   Does this patient require a clinical escalation to a pharmacist? No          Delivery Questions      Flowsheet Row Most Recent Value   Delivery method UPS   Delivery address verified with patient/caregiver? Yes   Delivery address Home   Number of medications in delivery 1   Medication(s) being filled and delivered DULoxetine HCl (CYMBALTA), Tirzepatide (Mounjaro)   Doses left of specialty medications 1   Copay verified? Yes   Copay amount $37.99   Copay form of payment Credit/debit on file   Delivery Date Selection 06/11/25   Signature Required No   Do you consent to receive electronic handouts?  Yes            Follow-Up: 28d    Conrad Valencia CPhT  Pharmacy Care Coordinator, Endocrinology  6/10/2025  12:53 EDT

## 2025-06-10 NOTE — TELEPHONE ENCOUNTER
Provider: RAYMON    Caller: Kar Mora    Relationship to Patient: Self    Reason for Call: PATIENT STATES RETURNING CALL TO THE OFFICE. NO NAME OR MESSAGE WAS LEFT. HE JUST RETURNED CALL. PLEASE ADVISE

## 2025-06-16 ENCOUNTER — SPECIALTY PHARMACY (OUTPATIENT)
Age: 75
End: 2025-06-16
Payer: COMMERCIAL

## 2025-06-16 NOTE — PROGRESS NOTES
Specialty Pharmacy Patient Management Program  Cardiology Refill Outreach      Kar is a 74 y.o. male contacted today regarding refills of his medication(s).    Specialty medication(s) and dose(s) confirmed: Vascepa.    Refill Questions      Flowsheet Row Most Recent Value   Changes to allergies? No   Changes to medications? No   New conditions or infections since last clinic visit No   Unplanned office visit, urgent care, ED, or hospital admission in the last 4 weeks  No   How does patient/caregiver feel medication is working? Very good   Financial problems or insurance changes  No   Since the previous refill, were any specialty medication doses or scheduled injections missed or delayed?  No   Does this patient require a clinical escalation to a pharmacist? No          Delivery Questions      Flowsheet Row Most Recent Value   Delivery method UPS   Delivery address verified with patient/caregiver? Yes   Delivery address Home   Number of medications in delivery 1   Medication(s) being filled and delivered Icosapent Ethyl (VASCEPA)   Doses left of specialty medications 1   Copay verified? Yes   Copay amount $40   Copay form of payment Credit/debit on file   Delivery Date Selection 06/17/25   Signature Required No   Do you consent to receive electronic handouts?  Yes            Follow-Up: mik Valencia CPhT  Pharmacy Care Coordinator, Endocrinology  6/16/2025  11:46 EDT

## 2025-06-27 ENCOUNTER — SPECIALTY PHARMACY (OUTPATIENT)
Age: 75
End: 2025-06-27
Payer: COMMERCIAL

## 2025-06-27 NOTE — PROGRESS NOTES
Specialty Pharmacy Patient Management Program  Endocrinology Refill Outreach      Kar is a 74 y.o. male contacted today regarding refills of his medication(s).    Specialty medication(s) and dose(s) confirmed: Tresiba.    Refill Questions      Flowsheet Row Most Recent Value   Changes to allergies? No   Changes to medications? No   New conditions or infections since last clinic visit No   Unplanned office visit, urgent care, ED, or hospital admission in the last 4 weeks  No   How does patient/caregiver feel medication is working? Very good   Financial problems or insurance changes  No   Since the previous refill, were any specialty medication doses or scheduled injections missed or delayed?  No   Does this patient require a clinical escalation to a pharmacist? No          Delivery Questions      Flowsheet Row Most Recent Value   Delivery method UPS   Delivery address verified with patient/caregiver? Yes   Delivery address Home   Number of medications in delivery 1   Medication(s) being filled and delivered Insulin Degludec (Tresiba FlexTouch)   Doses left of specialty medications 1   Copay verified? Yes   Copay amount $25   Copay form of payment Credit/debit on file   Delivery Date Selection 07/01/25   Signature Required No   Do you consent to receive electronic handouts?  Yes            Follow-Up: mik Valencia CPhT  Pharmacy Care Coordinator, Endocrinology  6/27/2025  14:22 EDT

## 2025-07-03 ENCOUNTER — SPECIALTY PHARMACY (OUTPATIENT)
Dept: GENERAL RADIOLOGY | Facility: HOSPITAL | Age: 75
End: 2025-07-03
Payer: COMMERCIAL

## 2025-07-03 NOTE — PROGRESS NOTES
Specialty Pharmacy Patient Management Program  Endocrinology Reassessment     Kar Mora was referred by an Endocrinology provider to the Endocrinology Patient Management program offered by [facility] for Type 2 Diabetes. A follow-up outreach was conducted, including assessment of continued therapy appropriateness, medication adherence, and side effect incidence and management for Jardiance, Mounjaro, and Tresiba.    Changes to Insurance Coverage or Financial Support  No changes    Relevant Past Medical History and Comorbidities  Relevant medical history and concomitant health conditions were discussed with the patient. The patient's chart has been reviewed for relevant past medical history and comorbid health conditions and updated as necessary.   Past Medical History:   Diagnosis Date    Abnormal ECG     Allergic rhinitis     Anesthesia     pt says he has woken up during surgery    Chest pain     Congenital heart disease     Coronary artery disease     Dyslipidemia     Erectile dysfunction     GERD (gastroesophageal reflux disease)     Gout     Hypercholesterolemia     Hyperlipidemia     Hypertensive disorder     Impotence of organic origin     Insulin dependent type 2 diabetes mellitus     checks fsbg every am, a1c checked every 6 months, 7.2 in may 2018     Kidney stones     Labile hypertension 10/24/2016    Myocardial infarct     Type 2 diabetes mellitus     Wears glasses      Social History     Socioeconomic History    Marital status:     Number of children: 2   Tobacco Use    Smoking status: Former     Current packs/day: 0.00     Average packs/day: 2.0 packs/day for 10.0 years (20.0 ttl pk-yrs)     Types: Cigarettes     Start date: 1972     Quit date: 1981     Years since quittin.5     Passive exposure: Past    Smokeless tobacco: Never    Tobacco comments:     quit 35 years ago   Vaping Use    Vaping status: Never Used   Substance and Sexual  Activity    Alcohol use: No    Drug use: No    Sexual activity: Yes     Partners: Female     Birth control/protection: None     Problem list reviewed by Jamaal Castillo RPH on 7/3/2025 at  2:15 PM    Hospitalizations and Urgent Care Since Last Assessment  ED Visits, Admissions, or Hospitalizations: none reported  Urgent Office Visits: none reported    Allergies  Known allergies and reactions were discussed with the patient. The patient's chart has been reviewed for allergy information and updated as necessary.   Allergies   Allergen Reactions    Asa [Aspirin] Other (See Comments)      upper airway congestion     Allergies reviewed by Jamaal Castillo RPH on 7/3/2025 at  2:15 PM    Relevant Laboratory Values  Relevant laboratory values were discussed with the patient. The following specialty medication dose adjustment(s) are recommended: no changes   A1C Last 3 Results          1/13/2025    13:55   HGBA1C Last 3 Results   Hemoglobin A1C 7.7      Lab Results   Component Value Date    HGBA1C 7.7 (A) 01/13/2025     Lab Results   Component Value Date    GLUCOSE 101 (H) 10/29/2018    CALCIUM 8.9 10/29/2018     10/29/2018    K 4.5 10/29/2018    CO2 28.0 10/29/2018     10/29/2018    BUN 35 (H) 10/29/2018    CREATININE 1.49 (H) 10/29/2018    EGFRIFNONA 47 (L) 10/29/2018    BCR 23.5 10/29/2018    ANIONGAP 9.0 10/29/2018     Lab Results   Component Value Date    CHOL 169 07/20/2018    TRIG 385 (H) 07/20/2018    HDL 27 (L) 07/20/2018    LDL 91 07/20/2018     Microalbumin          1/13/2025    14:12   Microalbumin   Microalbumin, Urine <1.2      Current Medication List  This medication list has been reviewed with the patient and evaluated for any interactions or necessary modifications/recommendations, and updated to include all prescription medications, OTC medications, and supplements the patient is currently taking.  This list reflects what is contained in the patient's profile, which has also been  marked as reviewed to communicate to other providers it is the most up to date version of the patient's current medication therapy.     Current Outpatient Medications:     B-D ULTRAFINE III SHORT PEN 31G X 8 MM misc, Use 1 pen needle as Directed by provider Daily, Disp: 100 each, Rfl: 3    Blood Glucose Monitoring Suppl device, Use as directed to check blood sugar please dispense insurance preferred, Disp: 1 each, Rfl: 0    carvedilol (COREG) 25 MG tablet, Take 1 tablet by mouth 2 (Two) Times a Day., Disp: 180 tablet, Rfl: 3    clopidogrel (PLAVIX) 75 MG tablet, Take 1 tablet by mouth Daily., Disp: 90 tablet, Rfl: 3    DULoxetine (CYMBALTA) 30 MG capsule, TAKE 1 CAPSULE BY MOUTH DAILY, Disp: 30 capsule, Rfl: 5    DULoxetine (CYMBALTA) 30 MG capsule, Take 1 capsule by mouth Daily., Disp: 30 capsule, Rfl: 4    empagliflozin (Jardiance) 25 MG tablet tablet, Take 1 tablet by mouth Daily., Disp: 30 tablet, Rfl: 5    fenofibrate 160 MG tablet, Take 1 tablet by mouth Daily., Disp: 90 tablet, Rfl: 3    glipizide (GLUCOTROL XL) 5 MG ER tablet, Take 1 tablet by mouth Daily., Disp: 30 tablet, Rfl: 5    glucose blood (OneTouch Ultra Test) test strip, Use as instructed to test 2 (Two) Times a Day, Disp: 100 each, Rfl: 1    icosapent ethyl (Vascepa) 1 g capsule capsule, Take 2 capsules by mouth 2 (Two) Times a Day With Meals., Disp: 120 capsule, Rfl: 11    insulin degludec (Tresiba FlexTouch) 100 UNIT/ML solution pen-injector injection, Inject 70 Units under the skin into the appropriate area as directed Every Night. (Max 100 Units Daily), Disp: 30 mL, Rfl: 5    Lancets (onetouch ultrasoft) lancets, Use 1 lancet to test blood sugar 2 (Two) Times a Day., Disp: 200 each, Rfl: 1    Lancets 33G misc, Use 1 each Daily. Dx e11.65, Disp: 100 each, Rfl: 3    metFORMIN (GLUCOPHAGE) 500 MG tablet, Take 2 tablets by mouth 2 (Two) Times a Day With Meals., Disp: 120 tablet, Rfl: 5    Multiple Vitamins-Minerals (MULTIVITAMIN ADULT PO), Take 1  tablet by mouth Daily., Disp: , Rfl:     nitroglycerin (NITROSTAT) 0.4 MG SL tablet, Place 1 tablet under the tongue Every 5 (Five) Minutes As Needed for Chest Pain. Take no more than 3 doses in 15 minutes., Disp: 25 tablet, Rfl: 11    rosuvastatin (CRESTOR) 40 MG tablet, Take 1 tablet by mouth Every Night., Disp: 30 tablet, Rfl: 11    sacubitril-valsartan (Entresto) 24-26 MG tablet, Take 1 tablet by mouth Every 12 (Twelve) Hours., Disp: 180 tablet, Rfl: 3    Tirzepatide (Mounjaro) 7.5 MG/0.5ML solution auto-injector, Inject 7.5 mg under the skin into the appropriate area as directed 1 (One) Time Per Week., Disp: 2 mL, Rfl: 5    Medicines reviewed by Jamaal Castillo [facility] on 7/3/2025 at  2:15 PM    Drug Interactions  No Clinically Significant DDIs Were Identified at Present Time Upon Marking Medications Reviewed      Recommended Medications Assessment  Aspirin: Not Taking Currently  Statin: Currently Taking   ACEi/ARB: Currently Taking     Adverse Drug Reactions  Medication tolerability: Tolerating with no to minimal ADRs  Medication plan: Continue therapy with normal follow-up  Plan for ADR Management: n/a    Adherence, Self-Administration, and Current Therapy Problems  Adherence related to the patient's specialty therapy was discussed with the patient. The Adherence segment of this outreach has been reviewed and updated.     Adherence Questions  Linked Medication(s) Assessed: Empagliflozin (JARDIANCE), Insulin Degludec (Tresiba FlexTouch), Tirzepatide (Mounjaro)  On average, how many doses/injections does the patient miss per month?: 0  What are the identified reasons for non-adherence or missed doses? : no problems identified  What is the estimated medication adherence level?: %  Based on the patient/caregiver response and refill history, does this patient require an MTP to track adherence improvements?: no    Additional Barriers to Patient Self-Administration: none identified   Methods for Supporting  Patient Self-Administration: n/a    Open Medication Therapy Problems  No medication therapy recommendations to display    Goals of Therapy  Goals related to the patient's specialty therapy were discussed with the patient. The Patient Goals segment of this outreach has been reviewed and updated.   Goals Addressed Today        Specialty Pharmacy General Goal      A1C < 7 %     Lab Results    Component Value Date Notes      07/03/2025 Phone reassessment. No new lab values. Patient states he is tolerating his current medication regimen well and reports no missed doses. Will check A1C during next office visit scheduled 10/20/2025. NB    HGBA1C 7.7 (A) 01/13/2025 New enrollment. A1C decreased significantly since last office visit. Staying on the same medication regimen. Enrolling with our pharmacy program to help provide the patient with easier access to his medications and to increase adherence and better clinical outcomes. NB    HGBA1C 9.5 (A) 07/08/2024                    Quality of Life Assessment   Quality of Life related to the patient's enrollment in the patient management program and services provided was discussed with the patient. The QOL segment of this outreach has been reviewed and updated.  Quality of Life Improvement Scale: 8-Moderately better    Reassessment Plan & Follow-Up  1. Medication Therapy Changes: no changes  2. Related Plans, Therapy Recommendations, or Issues to Be Addressed: follow A1c during office visits next scheduled for 10/20/2025.  3. Pharmacist to perform regular assessments no more than (6) months from the previous assessment.  4. Care Coordinator to set up future refill outreaches, coordinate prescription delivery, and escalate clinical questions to pharmacist.    Attestation  Therapeutic appropriateness: Appropriate   I attest the patient was actively involved in and has agreed to the above plan of care.  If the prescribed therapy is at any point deemed not appropriate based on the  current or future assessments, a consultation will be initiated with the patient's specialty care provider to determine the best course of action. The revised plan of therapy will be documented along with any required assessments and/or additional patient education provided.     Clinton Castillo, PharmD  Clinical Specialty Pharmacist, Endocrinology  7/3/2025  14:18 EDT    Discussed the aforementioned information with the patient via Telephone.

## 2025-07-07 ENCOUNTER — SPECIALTY PHARMACY (OUTPATIENT)
Age: 75
End: 2025-07-07
Payer: COMMERCIAL

## 2025-07-07 NOTE — PROGRESS NOTES
Specialty Pharmacy Patient Management Program  Endocrinology Refill Outreach      Kar is a 74 y.o. male contacted today regarding refills of his medication(s).    Specialty medication(s) and dose(s) confirmed: Jardiance.    Refill Questions      Flowsheet Row Most Recent Value   Changes to allergies? No   Changes to medications? No   New conditions or infections since last clinic visit No   Unplanned office visit, urgent care, ED, or hospital admission in the last 4 weeks  No   How does patient/caregiver feel medication is working? Very good   Financial problems or insurance changes  No   Since the previous refill, were any specialty medication doses or scheduled injections missed or delayed?  No   Does this patient require a clinical escalation to a pharmacist? No          Delivery Questions      Flowsheet Row Most Recent Value   Delivery method UPS   Delivery address verified with patient/caregiver? Yes   Delivery address Home   Number of medications in delivery 3   Medication(s) being filled and delivered glipiZIDE (GLUCOTROL XL), DULoxetine HCl (CYMBALTA), Empagliflozin (JARDIANCE)   Doses left of specialty medications 1   Copay verified? Yes   Copay amount $37.99   Copay form of payment Credit/debit on file   Delivery Date Selection 07/08/25   Signature Required No   Do you consent to receive electronic handouts?  Yes            Follow-Up: 30d    Conrad Valencia CPhT  Pharmacy Care Coordinator, Endocrinology  7/7/2025  10:10 EDT

## 2025-07-21 ENCOUNTER — SPECIALTY PHARMACY (OUTPATIENT)
Dept: CARDIOLOGY | Facility: CLINIC | Age: 75
End: 2025-07-21
Payer: COMMERCIAL

## 2025-07-21 ENCOUNTER — SPECIALTY PHARMACY (OUTPATIENT)
Dept: GENERAL RADIOLOGY | Facility: HOSPITAL | Age: 75
End: 2025-07-21
Payer: COMMERCIAL

## 2025-07-21 ENCOUNTER — SPECIALTY PHARMACY (OUTPATIENT)
Age: 75
End: 2025-07-21
Payer: COMMERCIAL

## 2025-07-21 RX ORDER — TIRZEPATIDE 7.5 MG/.5ML
7.5 INJECTION, SOLUTION SUBCUTANEOUS WEEKLY
Qty: 6 ML | Refills: 3 | Status: SHIPPED | OUTPATIENT
Start: 2025-07-21

## 2025-07-21 NOTE — PROGRESS NOTES
Specialty Pharmacy Patient Management Program  Per Protocol Prescription Order/Refill     Patient currently fills medications at [facility] and is enrolled in an Endocrinology Patient Management Program.     Requested Prescriptions     Pending Prescriptions Disp Refills    Tirzepatide (Mounjaro) 7.5 MG/0.5ML solution auto-injector 6 mL 3     Sig: Inject 7.5 mg under the skin into the appropriate area as directed 1 (One) Time Per Week.     Prescription orders above were sent to the pharmacy per Collaborative Care Agreement Protocol.     Clinton Castillo, PharmD  Clinical Specialty Pharmacist, Endocrinology  7/21/2025  11:59 EDT

## 2025-07-21 NOTE — PROGRESS NOTES
Specialty Pharmacy Patient Management Program  Endocrinology Refill Outreach      Kar is a 74 y.o. male contacted today regarding refills of his medication(s).    Specialty medication(s) and dose(s) confirmed: Mounjaro, Vascepa.    Refill Questions      Flowsheet Row Most Recent Value   Changes to allergies? No   Changes to medications? No   New conditions or infections since last clinic visit No   Unplanned office visit, urgent care, ED, or hospital admission in the last 4 weeks  No   How does patient/caregiver feel medication is working? Very good   Financial problems or insurance changes  No   Since the previous refill, were any specialty medication doses or scheduled injections missed or delayed?  No   Does this patient require a clinical escalation to a pharmacist? No          Delivery Questions      Flowsheet Row Most Recent Value   Delivery method UPS   Delivery address verified with patient/caregiver? Yes   Delivery address Home   Number of medications in delivery 2   Medication(s) being filled and delivered Tirzepatide (Mounjaro), Icosapent Ethyl (VASCEPA)   Doses left of specialty medications 1   Copay verified? Yes   Copay amount $45   Copay form of payment Credit/debit on file   Delivery Date Selection 07/22/25   Signature Required No   Do you consent to receive electronic handouts?  Yes            Follow-Up: 28d    Conrad Valencia CPhT  Pharmacy Care Coordinator, Endocrinology  7/21/2025  11:49 EDT

## 2025-07-30 ENCOUNTER — SPECIALTY PHARMACY (OUTPATIENT)
Age: 75
End: 2025-07-30
Payer: COMMERCIAL

## 2025-07-30 NOTE — PROGRESS NOTES
Specialty Pharmacy Patient Management Program  Endocrinology Refill Outreach      Kar is a 74 y.o. male contacted today regarding refills of his medication(s).    Specialty medication(s) and dose(s) confirmed: Jardiance.    Refill Questions      Flowsheet Row Most Recent Value   Changes to allergies? No   Changes to medications? No   New conditions or infections since last clinic visit No   Unplanned office visit, urgent care, ED, or hospital admission in the last 4 weeks  No   How does patient/caregiver feel medication is working? Very good   Financial problems or insurance changes  No   Since the previous refill, were any specialty medication doses or scheduled injections missed or delayed?  No   Does this patient require a clinical escalation to a pharmacist? No          Delivery Questions      Flowsheet Row Most Recent Value   Delivery method UPS   Delivery address verified with patient/caregiver? Yes   Delivery address Home   Number of medications in delivery 2   Medication(s) being filled and delivered Empagliflozin (JARDIANCE), metFORMIN HCl (GLUCOPHAGE)   Doses left of specialty medications 1   Copay verified? Yes   Copay amount $25   Copay form of payment Credit/debit on file   Delivery Date Selection 07/31/25   Signature Required No   Do you consent to receive electronic handouts?  Yes            Follow-Up: mik Valencia CPhT  Pharmacy Care Coordinator, Endocrinology  7/30/2025  07:34 EDT

## 2025-08-05 ENCOUNTER — SPECIALTY PHARMACY (OUTPATIENT)
Dept: GENERAL RADIOLOGY | Facility: HOSPITAL | Age: 75
End: 2025-08-05
Payer: COMMERCIAL

## 2025-08-05 RX ORDER — GLIPIZIDE 5 MG/1
5 TABLET, FILM COATED, EXTENDED RELEASE ORAL DAILY
Qty: 90 TABLET | Refills: 3 | Status: SHIPPED | OUTPATIENT
Start: 2025-08-05

## 2025-08-11 ENCOUNTER — SPECIALTY PHARMACY (OUTPATIENT)
Age: 75
End: 2025-08-11
Payer: COMMERCIAL

## 2025-08-11 ENCOUNTER — TELEPHONE (OUTPATIENT)
Age: 75
End: 2025-08-11
Payer: COMMERCIAL

## 2025-08-18 ENCOUNTER — SPECIALTY PHARMACY (OUTPATIENT)
Age: 75
End: 2025-08-18
Payer: COMMERCIAL

## 2025-08-25 ENCOUNTER — SPECIALTY PHARMACY (OUTPATIENT)
Dept: GENERAL RADIOLOGY | Facility: HOSPITAL | Age: 75
End: 2025-08-25
Payer: COMMERCIAL

## 2025-08-29 ENCOUNTER — SPECIALTY PHARMACY (OUTPATIENT)
Dept: GENERAL RADIOLOGY | Facility: HOSPITAL | Age: 75
End: 2025-08-29
Payer: COMMERCIAL

## (undated) DEVICE — BNDG ELAS CO-FLEX SLF ADHR 4IN5YD LF STRL

## (undated) DEVICE — SUCTION CANISTER, 2500CC, RIGID: Brand: DEROYAL

## (undated) DEVICE — INTRO SHEATH PRELUDE IDEAL SPRNG COIL 021 6F 23X80CM

## (undated) DEVICE — DRP SLUSH MACH

## (undated) DEVICE — MEDI-VAC YANKAUER SUCTION HANDLE W/BULBOUS TIP: Brand: CARDINAL HEALTH

## (undated) DEVICE — SOL NS 500ML

## (undated) DEVICE — ANTIBACTERIAL UNDYED BRAIDED (POLYGLACTIN 910), SYNTHETIC ABSORBABLE SUTURE: Brand: COATED VICRYL

## (undated) DEVICE — ST BLOOD ADMIN YTP 80IN

## (undated) DEVICE — DR ROGERS OH: Brand: MEDLINE INDUSTRIES, INC.

## (undated) DEVICE — MODEL AT P65, P/N 701554-001KIT CONTENTS: HAND CONTROLLER, 3-WAY HIGH-PRESSURE STOPCOCK WITH ROTATING END AND PREMIUM HIGH-PRESSURE TUBING: Brand: ANGIOTOUCH® KIT

## (undated) DEVICE — SUT SILK 2/0 TIES 18IN A185H

## (undated) DEVICE — MODEL BT2000 P/N 700287-012KIT CONTENTS: MANIFOLD WITH SALINE AND CONTRAST PORTS, SALINE TUBING WITH SPIKE AND HAND SYRINGE, TRANSDUCER: Brand: BT2000 AUTOMATED MANIFOLD KIT

## (undated) DEVICE — FLTR HME STR UNIV W/SMPL PORT

## (undated) DEVICE — INTRAOPERATIVE COVER KIT, 10 PACK: Brand: SITE-RITE

## (undated) DEVICE — SUT PROLN 7/0 8747H

## (undated) DEVICE — VASOVIEW HEMOPRO: Brand: VASOVIEW HEMOPRO

## (undated) DEVICE — ELECTRD BLD EZ CLN STD 2.5IN

## (undated) DEVICE — ENCORE® LATEX MICRO SIZE 7, STERILE LATEX POWDER-FREE SURGICAL GLOVE: Brand: ENCORE

## (undated) DEVICE — ST PRIM GRVTY NDLESS 3 INJ PORT 105IN

## (undated) DEVICE — SUT SILK 2 SUTUPAK TIE 60IN SA8H 2STRAND

## (undated) DEVICE — PAD ARMBRD SURG CONVOL 7.5X20X2IN

## (undated) DEVICE — PK HEART OPN 10

## (undated) DEVICE — LUER-LOK 360°: Brand: CONNECTA, LUER-LOK

## (undated) DEVICE — SUT SILK B CARDIO BB 5/0 30IN K880H BX/36

## (undated) DEVICE — DEV COMP RAD PRELUDESYNC 24CM

## (undated) DEVICE — PRESSURE MONITORING SET: Brand: TRUWAVE, VAMP

## (undated) DEVICE — CATH DIAG EXPO M/ PK 5F FL4/FR4 PIG

## (undated) DEVICE — SUT ETHIB 1 CTX CR8 18IN CX30D

## (undated) DEVICE — ST EXT IV SMARTSITE 2VLV SP M LL 5ML IV1

## (undated) DEVICE — SUT PROLN 7/0 BV1 D/A 24IN 8702H

## (undated) DEVICE — SUT SILK 2/0 CT1 CR8 18IN C022D

## (undated) DEVICE — SUT SILK 4/0 TIES 18IN A183H

## (undated) DEVICE — BNDG ELAS CO-FLEX SLF ADHR 6IN 5YD LF STRL

## (undated) DEVICE — Device

## (undated) DEVICE — SOL NACL 0.9PCT 1000ML

## (undated) DEVICE — AIRWY SZ11

## (undated) DEVICE — GW FC FLOP/TP .035 260CM 3MM

## (undated) DEVICE — DRSNG WND BORDR/ADHS NONADHR/GZ LF 4X14IN STRL

## (undated) DEVICE — Device: Brand: MEDEX

## (undated) DEVICE — CLTH CLENS READYCLEANSE PERI CARE PK/5

## (undated) DEVICE — SUT ETHIB 2/0 SH SH 36IN X523H

## (undated) DEVICE — CVR HNDL LT SURG ACCSSRY BLU STRL

## (undated) DEVICE — CATH DIAG EXPO .045 FL3.5 5F 100CM

## (undated) DEVICE — SUT PROLN 6/0 C1 D/A 30IN 8706H

## (undated) DEVICE — SUT SILK 0/0 CT2 18IN C027D

## (undated) DEVICE — 2963 MEDIPORE SOFT CLOTH TAPE 3 IN X 10 YD 12 RLS/CS: Brand: 3M™ MEDIPORE™

## (undated) DEVICE — PK CATH CARD 10

## (undated) DEVICE — MEDI-VAC NON-CONDUCTIVE SUCTION TUBING: Brand: CARDINAL HEALTH

## (undated) DEVICE — TUBING, SUCTION, 1/4" X 10', STRAIGHT: Brand: MEDLINE

## (undated) DEVICE — PRESSURE MONITORING SET: Brand: TRUWAVE

## (undated) DEVICE — 3M™ RANGER™ BLOOD/FLUID WARMING STANDARD FLOW SET, 24200, 10/CASE: Brand: 3M™ RANGER™